# Patient Record
Sex: FEMALE | Race: WHITE | NOT HISPANIC OR LATINO | Employment: OTHER | URBAN - NONMETROPOLITAN AREA
[De-identification: names, ages, dates, MRNs, and addresses within clinical notes are randomized per-mention and may not be internally consistent; named-entity substitution may affect disease eponyms.]

---

## 2017-01-06 ENCOUNTER — TRANSCRIBE ORDERS (OUTPATIENT)
Dept: ADMINISTRATIVE | Facility: HOSPITAL | Age: 52
End: 2017-01-06

## 2017-01-06 DIAGNOSIS — M48.07 LUMBOSACRAL STENOSIS: ICD-10-CM

## 2017-01-06 DIAGNOSIS — G56.02 CARPAL TUNNEL SYNDROME ON LEFT: ICD-10-CM

## 2017-01-06 DIAGNOSIS — M47.26 OTHER SPONDYLOSIS WITH RADICULOPATHY, LUMBAR REGION: ICD-10-CM

## 2017-01-06 DIAGNOSIS — G56.01 CARPAL TUNNEL SYNDROME ON RIGHT: ICD-10-CM

## 2017-01-06 DIAGNOSIS — M51.37 DEGENERATION OF LUMBAR OR LUMBOSACRAL INTERVERTEBRAL DISC: ICD-10-CM

## 2017-01-06 DIAGNOSIS — M79.10 MYALGIA: ICD-10-CM

## 2017-01-06 DIAGNOSIS — F01.53 VASCULAR DEMENTIA WITH DEPRESSED MOOD (HCC): Primary | ICD-10-CM

## 2017-01-06 DIAGNOSIS — M48.02 SPINAL STENOSIS IN CERVICAL REGION: ICD-10-CM

## 2017-01-06 DIAGNOSIS — M47.27 LUMBOSACRAL RADICULOPATHY DUE TO DEGENERATIVE JOINT DISEASE OF SPINE: ICD-10-CM

## 2017-01-10 ENCOUNTER — HOSPITAL ENCOUNTER (OUTPATIENT)
Dept: INTERVENTIONAL RADIOLOGY/VASCULAR | Facility: HOSPITAL | Age: 52
Discharge: HOME | End: 2017-01-10
Attending: PHYSICAL MEDICINE & REHABILITATION | Admitting: PHYSICAL MEDICINE & REHABILITATION

## 2017-01-10 VITALS
TEMPERATURE: 97.3 F | DIASTOLIC BLOOD PRESSURE: 86 MMHG | HEART RATE: 74 BPM | BODY MASS INDEX: 22.2 KG/M2 | HEIGHT: 64 IN | SYSTOLIC BLOOD PRESSURE: 134 MMHG | WEIGHT: 130 LBS | OXYGEN SATURATION: 99 % | RESPIRATION RATE: 16 BRPM

## 2017-01-10 DIAGNOSIS — G56.01 CARPAL TUNNEL SYNDROME ON RIGHT: ICD-10-CM

## 2017-01-10 DIAGNOSIS — F01.53 VASCULAR DEMENTIA WITH DEPRESSED MOOD (HCC): ICD-10-CM

## 2017-01-10 DIAGNOSIS — R52 PAIN MANAGEMENT: ICD-10-CM

## 2017-01-10 DIAGNOSIS — M48.07 LUMBOSACRAL STENOSIS: ICD-10-CM

## 2017-01-10 DIAGNOSIS — M47.26 OTHER SPONDYLOSIS WITH RADICULOPATHY, LUMBAR REGION: ICD-10-CM

## 2017-01-10 DIAGNOSIS — M47.27 LUMBOSACRAL RADICULOPATHY DUE TO DEGENERATIVE JOINT DISEASE OF SPINE: ICD-10-CM

## 2017-01-10 DIAGNOSIS — G56.02 CARPAL TUNNEL SYNDROME ON LEFT: ICD-10-CM

## 2017-01-10 DIAGNOSIS — M79.10 MYALGIA: ICD-10-CM

## 2017-01-10 DIAGNOSIS — M51.37 DEGENERATION OF LUMBAR OR LUMBOSACRAL INTERVERTEBRAL DISC: ICD-10-CM

## 2017-01-10 DIAGNOSIS — M48.02 SPINAL STENOSIS IN CERVICAL REGION: ICD-10-CM

## 2017-01-10 PROCEDURE — 77003 FLUOROGUIDE FOR SPINE INJECT: CPT

## 2017-01-10 PROCEDURE — 25010000002 METHYLPREDNISOLONE PER 80 MG: Performed by: PHYSICAL MEDICINE & REHABILITATION

## 2017-01-10 RX ORDER — LIDOCAINE HYDROCHLORIDE 10 MG/ML
INJECTION, SOLUTION EPIDURAL; INFILTRATION; INTRACAUDAL; PERINEURAL
Status: COMPLETED | OUTPATIENT
Start: 2017-01-10 | End: 2017-01-10

## 2017-01-10 RX ORDER — BUPIVACAINE HYDROCHLORIDE 5 MG/ML
INJECTION, SOLUTION EPIDURAL; INTRACAUDAL
Status: COMPLETED | OUTPATIENT
Start: 2017-01-10 | End: 2017-01-10

## 2017-01-10 RX ORDER — METHYLPREDNISOLONE ACETATE 80 MG/ML
INJECTION, SUSPENSION INTRA-ARTICULAR; INTRALESIONAL; INTRAMUSCULAR; SOFT TISSUE
Status: COMPLETED | OUTPATIENT
Start: 2017-01-10 | End: 2017-01-10

## 2017-01-10 RX ADMIN — LIDOCAINE HYDROCHLORIDE 15 ML: 10 INJECTION, SOLUTION EPIDURAL; INFILTRATION; INTRACAUDAL; PERINEURAL at 09:34

## 2017-01-10 RX ADMIN — LIDOCAINE HYDROCHLORIDE 4.5 ML: 10 INJECTION, SOLUTION EPIDURAL; INFILTRATION; INTRACAUDAL; PERINEURAL at 09:38

## 2017-01-10 RX ADMIN — BUPIVACAINE HYDROCHLORIDE 4.5 ML: 5 INJECTION, SOLUTION EPIDURAL; INTRACAUDAL; PERINEURAL at 09:37

## 2017-01-10 RX ADMIN — METHYLPREDNISOLONE ACETATE 80 MG: 80 INJECTION, SUSPENSION INTRA-ARTICULAR; INTRALESIONAL; INTRAMUSCULAR; SOFT TISSUE at 09:38

## 2017-01-10 NOTE — NURSING NOTE
Alert and oriented x3, band aide dry and intact, discharge instructions explained to patient and understanding verbalized,

## 2017-03-07 ENCOUNTER — HOSPITAL ENCOUNTER (OUTPATIENT)
Dept: GENERAL RADIOLOGY | Age: 52
Discharge: HOME | End: 2017-03-07
Payer: COMMERCIAL

## 2017-03-07 ENCOUNTER — HOSPITAL ENCOUNTER (OUTPATIENT)
Age: 52
Setting detail: OUTPATIENT SURGERY
Discharge: HOME | End: 2017-03-07
Attending: PHYSICAL MEDICINE & REHABILITATION | Admitting: PHYSICAL MEDICINE & REHABILITATION

## 2017-03-07 VITALS
DIASTOLIC BLOOD PRESSURE: 78 MMHG | HEART RATE: 59 BPM | RESPIRATION RATE: 16 BRPM | BODY MASS INDEX: 22.2 KG/M2 | SYSTOLIC BLOOD PRESSURE: 135 MMHG | TEMPERATURE: 98.2 F | WEIGHT: 130 LBS | OXYGEN SATURATION: 95 % | HEIGHT: 64 IN

## 2017-03-07 DIAGNOSIS — M54.5 LOW BACK PAIN, UNSPECIFIED BACK PAIN LATERALITY, UNSPECIFIED CHRONICITY, WITH SCIATICA PRESENCE UNSPECIFIED: ICD-10-CM

## 2017-03-07 PROCEDURE — G8907 PT DOC NO EVENTS ON DISCHARG: HCPCS

## 2017-03-07 PROCEDURE — 3209999900 FLUORO FOR SURGICAL PROCEDURES

## 2017-03-07 PROCEDURE — G8918 PT W/O PREOP ORDER IV AB PRO: HCPCS

## 2017-03-07 PROCEDURE — 62322 NJX INTERLAMINAR LMBR/SAC: CPT

## 2017-03-07 RX ORDER — METHYLPREDNISOLONE ACETATE 80 MG/ML
INJECTION, SUSPENSION INTRA-ARTICULAR; INTRALESIONAL; INTRAMUSCULAR; SOFT TISSUE PRN
Status: DISCONTINUED | OUTPATIENT
Start: 2017-03-07 | End: 2017-03-07 | Stop reason: HOSPADM

## 2017-03-07 RX ORDER — LIDOCAINE HYDROCHLORIDE 10 MG/ML
INJECTION, SOLUTION INFILTRATION; PERINEURAL PRN
Status: DISCONTINUED | OUTPATIENT
Start: 2017-03-07 | End: 2017-03-07 | Stop reason: HOSPADM

## 2017-03-07 RX ORDER — 0.9 % SODIUM CHLORIDE 0.9 %
VIAL (ML) INJECTION PRN
Status: DISCONTINUED | OUTPATIENT
Start: 2017-03-07 | End: 2017-03-07 | Stop reason: HOSPADM

## 2017-03-21 ENCOUNTER — HOSPITAL ENCOUNTER (OUTPATIENT)
Dept: GENERAL RADIOLOGY | Age: 52
Discharge: HOME | End: 2017-03-21
Payer: COMMERCIAL

## 2017-03-21 ENCOUNTER — HOSPITAL ENCOUNTER (OUTPATIENT)
Age: 52
Setting detail: OUTPATIENT SURGERY
Discharge: HOME | End: 2017-03-21
Attending: PHYSICAL MEDICINE & REHABILITATION | Admitting: PHYSICAL MEDICINE & REHABILITATION

## 2017-03-21 VITALS
DIASTOLIC BLOOD PRESSURE: 77 MMHG | RESPIRATION RATE: 18 BRPM | HEART RATE: 70 BPM | OXYGEN SATURATION: 99 % | HEIGHT: 64 IN | WEIGHT: 130 LBS | SYSTOLIC BLOOD PRESSURE: 125 MMHG | BODY MASS INDEX: 22.2 KG/M2

## 2017-03-21 DIAGNOSIS — R52 PAIN: ICD-10-CM

## 2017-03-21 PROCEDURE — G8918 PT W/O PREOP ORDER IV AB PRO: HCPCS

## 2017-03-21 PROCEDURE — G8907 PT DOC NO EVENTS ON DISCHARG: HCPCS

## 2017-03-21 PROCEDURE — 64494 INJ PARAVERT F JNT L/S 2 LEV: CPT

## 2017-03-21 PROCEDURE — 64493 INJ PARAVERT F JNT L/S 1 LEV: CPT

## 2017-03-21 PROCEDURE — 3209999900 FLUORO FOR SURGICAL PROCEDURES

## 2017-03-21 RX ORDER — LIDOCAINE HYDROCHLORIDE 10 MG/ML
INJECTION, SOLUTION INFILTRATION; PERINEURAL PRN
Status: DISCONTINUED | OUTPATIENT
Start: 2017-03-21 | End: 2017-03-21 | Stop reason: HOSPADM

## 2017-05-09 ENCOUNTER — HOSPITAL ENCOUNTER (OUTPATIENT)
Age: 52
Setting detail: OUTPATIENT SURGERY
Discharge: HOME | End: 2017-05-09
Attending: PHYSICAL MEDICINE & REHABILITATION | Admitting: PHYSICAL MEDICINE & REHABILITATION

## 2017-05-09 ENCOUNTER — HOSPITAL ENCOUNTER (OUTPATIENT)
Dept: GENERAL RADIOLOGY | Age: 52
Discharge: HOME | End: 2017-05-09
Payer: COMMERCIAL

## 2017-05-09 VITALS
HEIGHT: 64 IN | HEART RATE: 67 BPM | DIASTOLIC BLOOD PRESSURE: 78 MMHG | SYSTOLIC BLOOD PRESSURE: 131 MMHG | RESPIRATION RATE: 16 BRPM | WEIGHT: 130 LBS | OXYGEN SATURATION: 99 % | BODY MASS INDEX: 22.2 KG/M2

## 2017-05-09 DIAGNOSIS — R52 PAIN: ICD-10-CM

## 2017-05-09 PROCEDURE — G8918 PT W/O PREOP ORDER IV AB PRO: HCPCS

## 2017-05-09 PROCEDURE — 62322 NJX INTERLAMINAR LMBR/SAC: CPT

## 2017-05-09 PROCEDURE — G8907 PT DOC NO EVENTS ON DISCHARG: HCPCS

## 2017-05-09 PROCEDURE — 3209999900 FLUORO FOR SURGICAL PROCEDURES

## 2017-05-09 PROCEDURE — 62323 NJX INTERLAMINAR LMBR/SAC: CPT

## 2017-05-09 RX ORDER — 0.9 % SODIUM CHLORIDE 0.9 %
VIAL (ML) INJECTION PRN
Status: DISCONTINUED | OUTPATIENT
Start: 2017-05-09 | End: 2017-05-09 | Stop reason: HOSPADM

## 2017-05-09 RX ORDER — LIDOCAINE HYDROCHLORIDE 10 MG/ML
INJECTION, SOLUTION INFILTRATION; PERINEURAL PRN
Status: DISCONTINUED | OUTPATIENT
Start: 2017-05-09 | End: 2017-05-09 | Stop reason: HOSPADM

## 2017-05-09 RX ORDER — METHYLPREDNISOLONE ACETATE 80 MG/ML
INJECTION, SUSPENSION INTRA-ARTICULAR; INTRALESIONAL; INTRAMUSCULAR; SOFT TISSUE PRN
Status: DISCONTINUED | OUTPATIENT
Start: 2017-05-09 | End: 2017-05-09 | Stop reason: HOSPADM

## 2017-05-23 ENCOUNTER — HOSPITAL ENCOUNTER (OUTPATIENT)
Dept: GENERAL RADIOLOGY | Age: 52
Discharge: HOME | End: 2017-05-23
Payer: COMMERCIAL

## 2017-05-23 ENCOUNTER — HOSPITAL ENCOUNTER (OUTPATIENT)
Age: 52
Setting detail: OUTPATIENT SURGERY
Discharge: HOME | End: 2017-05-23
Attending: PHYSICAL MEDICINE & REHABILITATION | Admitting: PHYSICAL MEDICINE & REHABILITATION

## 2017-05-23 VITALS
DIASTOLIC BLOOD PRESSURE: 81 MMHG | SYSTOLIC BLOOD PRESSURE: 122 MMHG | HEART RATE: 69 BPM | RESPIRATION RATE: 18 BRPM | OXYGEN SATURATION: 98 %

## 2017-05-23 DIAGNOSIS — M54.5 LOW BACK PAIN, UNSPECIFIED BACK PAIN LATERALITY, UNSPECIFIED CHRONICITY, WITH SCIATICA PRESENCE UNSPECIFIED: ICD-10-CM

## 2017-05-23 PROCEDURE — 3209999900 FLUORO FOR SURGICAL PROCEDURES

## 2017-05-23 PROCEDURE — 64493 INJ PARAVERT F JNT L/S 1 LEV: CPT

## 2017-05-23 PROCEDURE — G8907 PT DOC NO EVENTS ON DISCHARG: HCPCS

## 2017-05-23 PROCEDURE — G8918 PT W/O PREOP ORDER IV AB PRO: HCPCS

## 2017-05-23 RX ORDER — LIDOCAINE HYDROCHLORIDE 10 MG/ML
INJECTION, SOLUTION INFILTRATION; PERINEURAL PRN
Status: DISCONTINUED | OUTPATIENT
Start: 2017-05-23 | End: 2017-05-23 | Stop reason: HOSPADM

## 2017-08-01 ENCOUNTER — HOSPITAL ENCOUNTER (OUTPATIENT)
Dept: GENERAL RADIOLOGY | Age: 52
Discharge: HOME | End: 2017-08-01
Payer: COMMERCIAL

## 2017-08-01 ENCOUNTER — HOSPITAL ENCOUNTER (OUTPATIENT)
Age: 52
Setting detail: OUTPATIENT SURGERY
Discharge: HOME | End: 2017-08-01
Attending: PHYSICAL MEDICINE & REHABILITATION | Admitting: PHYSICAL MEDICINE & REHABILITATION

## 2017-08-01 VITALS
DIASTOLIC BLOOD PRESSURE: 74 MMHG | RESPIRATION RATE: 18 BRPM | OXYGEN SATURATION: 98 % | HEART RATE: 65 BPM | SYSTOLIC BLOOD PRESSURE: 121 MMHG

## 2017-08-01 DIAGNOSIS — R52 PAIN: ICD-10-CM

## 2017-08-01 PROCEDURE — G8918 PT W/O PREOP ORDER IV AB PRO: HCPCS

## 2017-08-01 PROCEDURE — 3209999900 FLUORO FOR SURGICAL PROCEDURES

## 2017-08-01 PROCEDURE — G8907 PT DOC NO EVENTS ON DISCHARG: HCPCS

## 2017-08-01 PROCEDURE — 62323 NJX INTERLAMINAR LMBR/SAC: CPT

## 2017-08-01 PROCEDURE — 62322 NJX INTERLAMINAR LMBR/SAC: CPT

## 2017-08-01 RX ORDER — LIDOCAINE HYDROCHLORIDE 10 MG/ML
INJECTION, SOLUTION INFILTRATION; PERINEURAL PRN
Status: DISCONTINUED | OUTPATIENT
Start: 2017-08-01 | End: 2017-08-01 | Stop reason: HOSPADM

## 2017-08-01 RX ORDER — 0.9 % SODIUM CHLORIDE 0.9 %
VIAL (ML) INJECTION PRN
Status: DISCONTINUED | OUTPATIENT
Start: 2017-08-01 | End: 2017-08-01 | Stop reason: HOSPADM

## 2017-08-14 ENCOUNTER — HOSPITAL ENCOUNTER (OUTPATIENT)
Dept: GENERAL RADIOLOGY | Facility: HOSPITAL | Age: 52
Discharge: HOME | End: 2017-08-14
Attending: INTERNAL MEDICINE | Admitting: INTERNAL MEDICINE

## 2017-08-14 ENCOUNTER — TRANSCRIBE ORDERS (OUTPATIENT)
Dept: ADMINISTRATIVE | Facility: HOSPITAL | Age: 52
End: 2017-08-14

## 2017-08-14 ENCOUNTER — APPOINTMENT (OUTPATIENT)
Dept: LAB | Facility: HOSPITAL | Age: 52
End: 2017-08-14
Attending: INTERNAL MEDICINE

## 2017-08-14 DIAGNOSIS — C50.912: Primary | ICD-10-CM

## 2017-08-14 DIAGNOSIS — C50.911: Primary | ICD-10-CM

## 2017-08-14 LAB
ALBUMIN SERPL-MCNC: 3.8 G/DL (ref 3.5–5)
ALBUMIN/GLOB SERPL: 1.6 G/DL (ref 1.1–2.5)
ALP SERPL-CCNC: 42 U/L (ref 24–120)
ALT SERPL W P-5'-P-CCNC: 46 U/L (ref 0–54)
ANION GAP SERPL CALCULATED.3IONS-SCNC: 8 MMOL/L (ref 4–13)
AST SERPL-CCNC: 29 U/L (ref 7–45)
BASOPHILS # BLD AUTO: 0.02 10*3/MM3 (ref 0–0.2)
BASOPHILS NFR BLD AUTO: 0.4 % (ref 0–2)
BILIRUB SERPL-MCNC: 0.5 MG/DL (ref 0.1–1)
BUN BLD-MCNC: 6 MG/DL (ref 5–21)
BUN/CREAT SERPL: 6.8 (ref 7–25)
CALCIUM SPEC-SCNC: 9.2 MG/DL (ref 8.4–10.4)
CEA SERPL-MCNC: 4.22 NG/ML (ref 0–5)
CHLORIDE SERPL-SCNC: 109 MMOL/L (ref 98–110)
CO2 SERPL-SCNC: 25 MMOL/L (ref 24–31)
CREAT BLD-MCNC: 0.88 MG/DL (ref 0.5–1.4)
DEPRECATED RDW RBC AUTO: 45.4 FL (ref 40–54)
EOSINOPHIL # BLD AUTO: 0.08 10*3/MM3 (ref 0–0.7)
EOSINOPHIL NFR BLD AUTO: 1.6 % (ref 0–4)
ERYTHROCYTE [DISTWIDTH] IN BLOOD BY AUTOMATED COUNT: 13.1 % (ref 12–15)
FERRITIN SERPL-MCNC: 56.8 NG/ML (ref 11.1–264)
FOLATE SERPL-MCNC: >20 NG/ML
GFR SERPL CREATININE-BSD FRML MDRD: 67 ML/MIN/1.73
GLOBULIN UR ELPH-MCNC: 2.4 GM/DL
GLUCOSE BLD-MCNC: 81 MG/DL (ref 70–100)
HCT VFR BLD AUTO: 36.8 % (ref 37–47)
HGB BLD-MCNC: 12.2 G/DL (ref 12–16)
IMM GRANULOCYTES # BLD: 0.01 10*3/MM3 (ref 0–0.03)
IMM GRANULOCYTES NFR BLD: 0.2 % (ref 0–5)
IRON 24H UR-MRATE: 52 MCG/DL (ref 42–180)
IRON SATN MFR SERPL: 19 % (ref 20–45)
LYMPHOCYTES # BLD AUTO: 2.33 10*3/MM3 (ref 0.72–4.86)
LYMPHOCYTES NFR BLD AUTO: 45.8 % (ref 15–45)
MCH RBC QN AUTO: 31.3 PG (ref 28–32)
MCHC RBC AUTO-ENTMCNC: 33.2 G/DL (ref 33–36)
MCV RBC AUTO: 94.4 FL (ref 82–98)
MONOCYTES # BLD AUTO: 0.27 10*3/MM3 (ref 0.19–1.3)
MONOCYTES NFR BLD AUTO: 5.3 % (ref 4–12)
NEUTROPHILS # BLD AUTO: 2.38 10*3/MM3 (ref 1.87–8.4)
NEUTROPHILS NFR BLD AUTO: 46.7 % (ref 39–78)
PLATELET # BLD AUTO: 218 10*3/MM3 (ref 130–400)
PMV BLD AUTO: 11.4 FL (ref 6–12)
POTASSIUM BLD-SCNC: 3.3 MMOL/L (ref 3.5–5.3)
PROT SERPL-MCNC: 6.2 G/DL (ref 6.3–8.7)
RBC # BLD AUTO: 3.9 10*6/MM3 (ref 4.2–5.4)
SODIUM BLD-SCNC: 142 MMOL/L (ref 135–145)
TIBC SERPL-MCNC: 272 MCG/DL (ref 225–420)
VIT B12 BLD-MCNC: 812 PG/ML (ref 239–931)
WBC NRBC COR # BLD: 5.09 10*3/MM3 (ref 4.8–10.8)

## 2017-08-14 PROCEDURE — 82746 ASSAY OF FOLIC ACID SERUM: CPT | Performed by: INTERNAL MEDICINE

## 2017-08-14 PROCEDURE — 86300 IMMUNOASSAY TUMOR CA 15-3: CPT | Performed by: INTERNAL MEDICINE

## 2017-08-14 PROCEDURE — 82728 ASSAY OF FERRITIN: CPT | Performed by: INTERNAL MEDICINE

## 2017-08-14 PROCEDURE — 85025 COMPLETE CBC W/AUTO DIFF WBC: CPT | Performed by: INTERNAL MEDICINE

## 2017-08-14 PROCEDURE — 80053 COMPREHEN METABOLIC PANEL: CPT | Performed by: INTERNAL MEDICINE

## 2017-08-14 PROCEDURE — 82607 VITAMIN B-12: CPT | Performed by: INTERNAL MEDICINE

## 2017-08-14 PROCEDURE — 71020 HC CHEST PA AND LATERAL: CPT

## 2017-08-14 PROCEDURE — 83540 ASSAY OF IRON: CPT | Performed by: INTERNAL MEDICINE

## 2017-08-14 PROCEDURE — 82378 CARCINOEMBRYONIC ANTIGEN: CPT | Performed by: INTERNAL MEDICINE

## 2017-08-14 PROCEDURE — 36415 COLL VENOUS BLD VENIPUNCTURE: CPT

## 2017-08-14 PROCEDURE — 83550 IRON BINDING TEST: CPT | Performed by: INTERNAL MEDICINE

## 2017-08-15 ENCOUNTER — HOSPITAL ENCOUNTER (OUTPATIENT)
Dept: GENERAL RADIOLOGY | Age: 52
Discharge: HOME | End: 2017-08-15
Payer: COMMERCIAL

## 2017-08-15 ENCOUNTER — HOSPITAL ENCOUNTER (OUTPATIENT)
Age: 52
Setting detail: OUTPATIENT SURGERY
Discharge: HOME | End: 2017-08-15
Attending: PHYSICAL MEDICINE & REHABILITATION | Admitting: PHYSICAL MEDICINE & REHABILITATION

## 2017-08-15 VITALS
HEART RATE: 63 BPM | DIASTOLIC BLOOD PRESSURE: 79 MMHG | RESPIRATION RATE: 18 BRPM | SYSTOLIC BLOOD PRESSURE: 127 MMHG | OXYGEN SATURATION: 98 %

## 2017-08-15 DIAGNOSIS — R52 PAIN: ICD-10-CM

## 2017-08-15 PROCEDURE — G8907 PT DOC NO EVENTS ON DISCHARG: HCPCS

## 2017-08-15 PROCEDURE — 3209999900 FLUORO FOR SURGICAL PROCEDURES

## 2017-08-15 PROCEDURE — G8918 PT W/O PREOP ORDER IV AB PRO: HCPCS

## 2017-08-15 PROCEDURE — 64494 INJ PARAVERT F JNT L/S 2 LEV: CPT

## 2017-08-15 PROCEDURE — 64493 INJ PARAVERT F JNT L/S 1 LEV: CPT

## 2017-08-15 RX ORDER — LIDOCAINE HYDROCHLORIDE 10 MG/ML
INJECTION, SOLUTION INFILTRATION; PERINEURAL PRN
Status: DISCONTINUED | OUTPATIENT
Start: 2017-08-15 | End: 2017-08-15 | Stop reason: HOSPADM

## 2017-08-16 LAB — CANCER AG27-29 SERPL-ACNC: 15.5 U/ML (ref 0–38.6)

## 2017-10-03 ENCOUNTER — HOSPITAL ENCOUNTER (OUTPATIENT)
Dept: GENERAL RADIOLOGY | Age: 52
Discharge: HOME | End: 2017-10-03
Payer: COMMERCIAL

## 2017-10-03 ENCOUNTER — HOSPITAL ENCOUNTER (OUTPATIENT)
Age: 52
Setting detail: OUTPATIENT SURGERY
Discharge: HOME | End: 2017-10-03
Attending: PHYSICAL MEDICINE & REHABILITATION | Admitting: PHYSICAL MEDICINE & REHABILITATION

## 2017-10-03 VITALS
BODY MASS INDEX: 22.2 KG/M2 | WEIGHT: 130 LBS | HEIGHT: 64 IN | OXYGEN SATURATION: 97 % | SYSTOLIC BLOOD PRESSURE: 125 MMHG | RESPIRATION RATE: 18 BRPM | HEART RATE: 58 BPM | DIASTOLIC BLOOD PRESSURE: 80 MMHG

## 2017-10-03 DIAGNOSIS — R52 PAIN: ICD-10-CM

## 2017-10-03 PROCEDURE — 62323 NJX INTERLAMINAR LMBR/SAC: CPT

## 2017-10-03 PROCEDURE — 3209999900 FLUORO FOR SURGICAL PROCEDURES

## 2017-10-03 PROCEDURE — G8918 PT W/O PREOP ORDER IV AB PRO: HCPCS

## 2017-10-03 PROCEDURE — G8907 PT DOC NO EVENTS ON DISCHARG: HCPCS

## 2017-10-03 RX ORDER — 0.9 % SODIUM CHLORIDE 0.9 %
VIAL (ML) INJECTION PRN
Status: DISCONTINUED | OUTPATIENT
Start: 2017-10-03 | End: 2017-10-03 | Stop reason: HOSPADM

## 2017-10-03 RX ORDER — LIDOCAINE HYDROCHLORIDE 10 MG/ML
INJECTION, SOLUTION EPIDURAL; INFILTRATION; INTRACAUDAL; PERINEURAL PRN
Status: DISCONTINUED | OUTPATIENT
Start: 2017-10-03 | End: 2017-10-03 | Stop reason: HOSPADM

## 2017-10-03 RX ORDER — METHYLPREDNISOLONE ACETATE 80 MG/ML
INJECTION, SUSPENSION INTRA-ARTICULAR; INTRALESIONAL; INTRAMUSCULAR; SOFT TISSUE PRN
Status: DISCONTINUED | OUTPATIENT
Start: 2017-10-03 | End: 2017-10-03 | Stop reason: HOSPADM

## 2017-10-17 ENCOUNTER — HOSPITAL ENCOUNTER (OUTPATIENT)
Dept: GENERAL RADIOLOGY | Age: 52
Discharge: HOME | End: 2017-10-17
Payer: COMMERCIAL

## 2017-10-17 ENCOUNTER — HOSPITAL ENCOUNTER (OUTPATIENT)
Age: 52
Setting detail: OUTPATIENT SURGERY
Discharge: HOME | End: 2017-10-17
Attending: PHYSICAL MEDICINE & REHABILITATION | Admitting: PHYSICAL MEDICINE & REHABILITATION

## 2017-10-17 VITALS
DIASTOLIC BLOOD PRESSURE: 81 MMHG | OXYGEN SATURATION: 95 % | SYSTOLIC BLOOD PRESSURE: 129 MMHG | RESPIRATION RATE: 18 BRPM | HEART RATE: 65 BPM

## 2017-10-17 DIAGNOSIS — R52 PAIN: ICD-10-CM

## 2017-10-17 PROCEDURE — G8907 PT DOC NO EVENTS ON DISCHARG: HCPCS

## 2017-10-17 PROCEDURE — 3209999900 FLUORO FOR SURGICAL PROCEDURES

## 2017-10-17 PROCEDURE — 64494 INJ PARAVERT F JNT L/S 2 LEV: CPT

## 2017-10-17 PROCEDURE — 64493 INJ PARAVERT F JNT L/S 1 LEV: CPT

## 2017-10-17 PROCEDURE — G8918 PT W/O PREOP ORDER IV AB PRO: HCPCS

## 2017-10-17 RX ORDER — LIDOCAINE HYDROCHLORIDE 10 MG/ML
INJECTION, SOLUTION INFILTRATION; PERINEURAL PRN
Status: DISCONTINUED | OUTPATIENT
Start: 2017-10-17 | End: 2017-10-17 | Stop reason: HOSPADM

## 2017-12-04 ENCOUNTER — HOSPITAL ENCOUNTER (OUTPATIENT)
Dept: WOMENS IMAGING | Age: 52
Discharge: HOME | End: 2017-12-04
Payer: COMMERCIAL

## 2017-12-04 DIAGNOSIS — Z78.0 POST-MENOPAUSAL: ICD-10-CM

## 2017-12-04 PROCEDURE — 77080 DXA BONE DENSITY AXIAL: CPT

## 2017-12-12 ENCOUNTER — HOSPITAL ENCOUNTER (OUTPATIENT)
Age: 52
Setting detail: OUTPATIENT SURGERY
Discharge: HOME | End: 2017-12-12
Attending: PHYSICAL MEDICINE & REHABILITATION | Admitting: PHYSICAL MEDICINE & REHABILITATION

## 2017-12-12 ENCOUNTER — HOSPITAL ENCOUNTER (OUTPATIENT)
Dept: GENERAL RADIOLOGY | Age: 52
Discharge: HOME | End: 2017-12-12
Payer: COMMERCIAL

## 2017-12-12 VITALS
SYSTOLIC BLOOD PRESSURE: 138 MMHG | HEIGHT: 64 IN | RESPIRATION RATE: 18 BRPM | WEIGHT: 130 LBS | BODY MASS INDEX: 22.2 KG/M2 | HEART RATE: 66 BPM | DIASTOLIC BLOOD PRESSURE: 81 MMHG | OXYGEN SATURATION: 99 %

## 2017-12-12 DIAGNOSIS — R52 PAIN: ICD-10-CM

## 2017-12-12 PROCEDURE — G8907 PT DOC NO EVENTS ON DISCHARG: HCPCS

## 2017-12-12 PROCEDURE — 62323 NJX INTERLAMINAR LMBR/SAC: CPT

## 2017-12-12 PROCEDURE — 3209999900 FLUORO FOR SURGICAL PROCEDURES

## 2017-12-12 PROCEDURE — G8918 PT W/O PREOP ORDER IV AB PRO: HCPCS

## 2017-12-12 RX ORDER — 0.9 % SODIUM CHLORIDE 0.9 %
VIAL (ML) INJECTION PRN
Status: DISCONTINUED | OUTPATIENT
Start: 2017-12-12 | End: 2017-12-12 | Stop reason: HOSPADM

## 2017-12-12 RX ORDER — METHYLPREDNISOLONE ACETATE 80 MG/ML
INJECTION, SUSPENSION INTRA-ARTICULAR; INTRALESIONAL; INTRAMUSCULAR; SOFT TISSUE PRN
Status: DISCONTINUED | OUTPATIENT
Start: 2017-12-12 | End: 2017-12-12 | Stop reason: HOSPADM

## 2017-12-12 RX ORDER — LIDOCAINE HYDROCHLORIDE 10 MG/ML
INJECTION, SOLUTION INFILTRATION; PERINEURAL PRN
Status: DISCONTINUED | OUTPATIENT
Start: 2017-12-12 | End: 2017-12-12 | Stop reason: HOSPADM

## 2017-12-26 ENCOUNTER — HOSPITAL ENCOUNTER (OUTPATIENT)
Dept: GENERAL RADIOLOGY | Age: 52
Discharge: HOME | End: 2017-12-26
Payer: COMMERCIAL

## 2017-12-26 ENCOUNTER — HOSPITAL ENCOUNTER (OUTPATIENT)
Age: 52
Setting detail: OUTPATIENT SURGERY
Discharge: HOME | End: 2017-12-26
Attending: PHYSICAL MEDICINE & REHABILITATION | Admitting: PHYSICAL MEDICINE & REHABILITATION

## 2017-12-26 VITALS
DIASTOLIC BLOOD PRESSURE: 85 MMHG | SYSTOLIC BLOOD PRESSURE: 142 MMHG | WEIGHT: 130 LBS | TEMPERATURE: 97 F | HEIGHT: 64 IN | RESPIRATION RATE: 18 BRPM | BODY MASS INDEX: 22.2 KG/M2 | OXYGEN SATURATION: 96 % | HEART RATE: 69 BPM

## 2017-12-26 DIAGNOSIS — M54.5 LOW BACK PAIN, UNSPECIFIED BACK PAIN LATERALITY, UNSPECIFIED CHRONICITY, WITH SCIATICA PRESENCE UNSPECIFIED: ICD-10-CM

## 2017-12-26 PROCEDURE — G8918 PT W/O PREOP ORDER IV AB PRO: HCPCS

## 2017-12-26 PROCEDURE — G8907 PT DOC NO EVENTS ON DISCHARG: HCPCS

## 2017-12-26 PROCEDURE — 3209999900 FLUORO FOR SURGICAL PROCEDURES

## 2017-12-26 PROCEDURE — 64494 INJ PARAVERT F JNT L/S 2 LEV: CPT

## 2017-12-26 PROCEDURE — 64493 INJ PARAVERT F JNT L/S 1 LEV: CPT

## 2017-12-26 RX ORDER — LIDOCAINE HYDROCHLORIDE 10 MG/ML
INJECTION, SOLUTION INFILTRATION; PERINEURAL PRN
Status: DISCONTINUED | OUTPATIENT
Start: 2017-12-26 | End: 2017-12-26 | Stop reason: HOSPADM

## 2018-02-20 ENCOUNTER — HOSPITAL ENCOUNTER (OUTPATIENT)
Age: 53
Setting detail: OUTPATIENT SURGERY
Discharge: HOME | End: 2018-02-20
Attending: PHYSICAL MEDICINE & REHABILITATION | Admitting: PHYSICAL MEDICINE & REHABILITATION

## 2018-02-20 ENCOUNTER — HOSPITAL ENCOUNTER (OUTPATIENT)
Dept: GENERAL RADIOLOGY | Age: 53
Discharge: HOME | End: 2018-02-20
Payer: COMMERCIAL

## 2018-02-20 VITALS
SYSTOLIC BLOOD PRESSURE: 137 MMHG | HEART RATE: 70 BPM | BODY MASS INDEX: 22.2 KG/M2 | RESPIRATION RATE: 16 BRPM | HEIGHT: 64 IN | OXYGEN SATURATION: 100 % | DIASTOLIC BLOOD PRESSURE: 86 MMHG | WEIGHT: 130 LBS

## 2018-02-20 DIAGNOSIS — G54.6 PHANTOM PAIN (HCC): ICD-10-CM

## 2018-02-20 PROCEDURE — 62323 NJX INTERLAMINAR LMBR/SAC: CPT

## 2018-02-20 PROCEDURE — G8918 PT W/O PREOP ORDER IV AB PRO: HCPCS

## 2018-02-20 PROCEDURE — G8907 PT DOC NO EVENTS ON DISCHARG: HCPCS

## 2018-02-20 PROCEDURE — 3209999900 FLUORO FOR SURGICAL PROCEDURES

## 2018-02-20 RX ORDER — LIDOCAINE HYDROCHLORIDE 10 MG/ML
INJECTION, SOLUTION INFILTRATION; PERINEURAL PRN
Status: DISCONTINUED | OUTPATIENT
Start: 2018-02-20 | End: 2018-02-20 | Stop reason: HOSPADM

## 2018-02-20 RX ORDER — METHYLPREDNISOLONE ACETATE 80 MG/ML
INJECTION, SUSPENSION INTRA-ARTICULAR; INTRALESIONAL; INTRAMUSCULAR; SOFT TISSUE PRN
Status: DISCONTINUED | OUTPATIENT
Start: 2018-02-20 | End: 2018-02-20 | Stop reason: HOSPADM

## 2018-02-20 RX ORDER — 0.9 % SODIUM CHLORIDE 0.9 %
VIAL (ML) INJECTION PRN
Status: DISCONTINUED | OUTPATIENT
Start: 2018-02-20 | End: 2018-02-20 | Stop reason: HOSPADM

## 2018-03-27 ENCOUNTER — HOSPITAL ENCOUNTER (OUTPATIENT)
Dept: GENERAL RADIOLOGY | Age: 53
Discharge: HOME | End: 2018-03-27
Payer: COMMERCIAL

## 2018-03-27 ENCOUNTER — HOSPITAL ENCOUNTER (OUTPATIENT)
Age: 53
Setting detail: OUTPATIENT SURGERY
Discharge: HOME | End: 2018-03-27
Attending: PHYSICAL MEDICINE & REHABILITATION | Admitting: PHYSICAL MEDICINE & REHABILITATION

## 2018-03-27 VITALS
DIASTOLIC BLOOD PRESSURE: 90 MMHG | RESPIRATION RATE: 20 BRPM | BODY MASS INDEX: 22.2 KG/M2 | OXYGEN SATURATION: 100 % | WEIGHT: 130 LBS | SYSTOLIC BLOOD PRESSURE: 136 MMHG | HEIGHT: 64 IN | HEART RATE: 74 BPM

## 2018-03-27 DIAGNOSIS — M54.5 LOW BACK PAIN, UNSPECIFIED BACK PAIN LATERALITY, UNSPECIFIED CHRONICITY, WITH SCIATICA PRESENCE UNSPECIFIED: ICD-10-CM

## 2018-03-27 PROCEDURE — G8918 PT W/O PREOP ORDER IV AB PRO: HCPCS

## 2018-03-27 PROCEDURE — G8907 PT DOC NO EVENTS ON DISCHARG: HCPCS

## 2018-03-27 PROCEDURE — 64494 INJ PARAVERT F JNT L/S 2 LEV: CPT

## 2018-03-27 PROCEDURE — 3209999900 FLUORO FOR SURGICAL PROCEDURES

## 2018-03-27 PROCEDURE — 64493 INJ PARAVERT F JNT L/S 1 LEV: CPT

## 2018-03-27 RX ORDER — LIDOCAINE HYDROCHLORIDE 10 MG/ML
INJECTION, SOLUTION INFILTRATION; PERINEURAL PRN
Status: DISCONTINUED | OUTPATIENT
Start: 2018-03-27 | End: 2018-03-27 | Stop reason: HOSPADM

## 2018-05-01 ENCOUNTER — HOSPITAL ENCOUNTER (OUTPATIENT)
Dept: GENERAL RADIOLOGY | Age: 53
Discharge: HOME | End: 2018-05-01
Payer: COMMERCIAL

## 2018-05-01 ENCOUNTER — HOSPITAL ENCOUNTER (OUTPATIENT)
Age: 53
Setting detail: OUTPATIENT SURGERY
Discharge: HOME | End: 2018-05-01
Attending: PHYSICAL MEDICINE & REHABILITATION | Admitting: PHYSICAL MEDICINE & REHABILITATION

## 2018-05-01 VITALS
TEMPERATURE: 98.3 F | HEART RATE: 59 BPM | OXYGEN SATURATION: 100 % | DIASTOLIC BLOOD PRESSURE: 79 MMHG | SYSTOLIC BLOOD PRESSURE: 130 MMHG | RESPIRATION RATE: 16 BRPM

## 2018-05-01 DIAGNOSIS — M51.36 DDD (DEGENERATIVE DISC DISEASE), LUMBAR: ICD-10-CM

## 2018-05-01 PROCEDURE — G8918 PT W/O PREOP ORDER IV AB PRO: HCPCS

## 2018-05-01 PROCEDURE — 62323 NJX INTERLAMINAR LMBR/SAC: CPT

## 2018-05-01 PROCEDURE — G8907 PT DOC NO EVENTS ON DISCHARG: HCPCS

## 2018-05-01 PROCEDURE — 3209999900 FLUORO FOR SURGICAL PROCEDURES

## 2018-05-01 RX ORDER — METHYLPREDNISOLONE ACETATE 80 MG/ML
INJECTION, SUSPENSION INTRA-ARTICULAR; INTRALESIONAL; INTRAMUSCULAR; SOFT TISSUE PRN
Status: DISCONTINUED | OUTPATIENT
Start: 2018-05-01 | End: 2018-05-01 | Stop reason: HOSPADM

## 2018-05-01 RX ORDER — LIDOCAINE HYDROCHLORIDE 10 MG/ML
INJECTION, SOLUTION EPIDURAL; INFILTRATION; INTRACAUDAL; PERINEURAL PRN
Status: DISCONTINUED | OUTPATIENT
Start: 2018-05-01 | End: 2018-05-01 | Stop reason: HOSPADM

## 2018-05-01 RX ORDER — 0.9 % SODIUM CHLORIDE 0.9 %
VIAL (ML) INJECTION PRN
Status: DISCONTINUED | OUTPATIENT
Start: 2018-05-01 | End: 2018-05-01 | Stop reason: HOSPADM

## 2018-05-01 ASSESSMENT — PAIN SCALES - GENERAL: PAINLEVEL_OUTOF10: 0

## 2018-05-29 ENCOUNTER — HOSPITAL ENCOUNTER (OUTPATIENT)
Age: 53
Setting detail: OUTPATIENT SURGERY
Discharge: HOME | End: 2018-05-29
Attending: PHYSICAL MEDICINE & REHABILITATION | Admitting: PHYSICAL MEDICINE & REHABILITATION

## 2018-05-29 VITALS
HEART RATE: 60 BPM | DIASTOLIC BLOOD PRESSURE: 83 MMHG | OXYGEN SATURATION: 100 % | SYSTOLIC BLOOD PRESSURE: 129 MMHG | RESPIRATION RATE: 16 BRPM

## 2018-05-29 PROCEDURE — G8907 PT DOC NO EVENTS ON DISCHARG: HCPCS

## 2018-05-29 PROCEDURE — 64494 INJ PARAVERT F JNT L/S 2 LEV: CPT

## 2018-05-29 PROCEDURE — G8918 PT W/O PREOP ORDER IV AB PRO: HCPCS

## 2018-05-29 PROCEDURE — 64493 INJ PARAVERT F JNT L/S 1 LEV: CPT

## 2018-05-29 RX ORDER — LIDOCAINE HYDROCHLORIDE 10 MG/ML
INJECTION, SOLUTION INFILTRATION; PERINEURAL PRN
Status: DISCONTINUED | OUTPATIENT
Start: 2018-05-29 | End: 2018-05-29 | Stop reason: HOSPADM

## 2018-07-03 ENCOUNTER — HOSPITAL ENCOUNTER (OUTPATIENT)
Dept: GENERAL RADIOLOGY | Age: 53
Discharge: HOME | End: 2018-07-03
Payer: COMMERCIAL

## 2018-07-03 ENCOUNTER — HOSPITAL ENCOUNTER (OUTPATIENT)
Age: 53
Setting detail: OUTPATIENT SURGERY
Discharge: HOME | End: 2018-07-03
Attending: PHYSICAL MEDICINE & REHABILITATION | Admitting: PHYSICAL MEDICINE & REHABILITATION

## 2018-07-03 VITALS
HEART RATE: 61 BPM | RESPIRATION RATE: 20 BRPM | SYSTOLIC BLOOD PRESSURE: 134 MMHG | OXYGEN SATURATION: 99 % | DIASTOLIC BLOOD PRESSURE: 81 MMHG

## 2018-07-03 DIAGNOSIS — G54.6 PHANTOM PAIN (HCC): ICD-10-CM

## 2018-07-03 PROCEDURE — G8918 PT W/O PREOP ORDER IV AB PRO: HCPCS

## 2018-07-03 PROCEDURE — 3209999900 FLUORO FOR SURGICAL PROCEDURES

## 2018-07-03 PROCEDURE — 64494 INJ PARAVERT F JNT L/S 2 LEV: CPT

## 2018-07-03 PROCEDURE — G8907 PT DOC NO EVENTS ON DISCHARG: HCPCS

## 2018-07-03 PROCEDURE — 64493 INJ PARAVERT F JNT L/S 1 LEV: CPT

## 2018-07-03 RX ORDER — LIDOCAINE HYDROCHLORIDE 10 MG/ML
INJECTION, SOLUTION INFILTRATION; PERINEURAL PRN
Status: DISCONTINUED | OUTPATIENT
Start: 2018-07-03 | End: 2018-07-03 | Stop reason: HOSPADM

## 2018-07-17 ENCOUNTER — HOSPITAL ENCOUNTER (OUTPATIENT)
Dept: GENERAL RADIOLOGY | Age: 53
Discharge: HOME | End: 2018-07-17
Payer: COMMERCIAL

## 2018-07-17 ENCOUNTER — HOSPITAL ENCOUNTER (OUTPATIENT)
Age: 53
Setting detail: OUTPATIENT SURGERY
Discharge: HOME | End: 2018-07-17
Attending: PHYSICAL MEDICINE & REHABILITATION | Admitting: PHYSICAL MEDICINE & REHABILITATION

## 2018-07-17 VITALS
OXYGEN SATURATION: 100 % | RESPIRATION RATE: 16 BRPM | DIASTOLIC BLOOD PRESSURE: 82 MMHG | SYSTOLIC BLOOD PRESSURE: 124 MMHG | HEART RATE: 66 BPM

## 2018-07-17 DIAGNOSIS — G54.6 PHANTOM PAIN (HCC): ICD-10-CM

## 2018-07-17 PROCEDURE — G8918 PT W/O PREOP ORDER IV AB PRO: HCPCS

## 2018-07-17 PROCEDURE — G8907 PT DOC NO EVENTS ON DISCHARG: HCPCS

## 2018-07-17 PROCEDURE — 62323 NJX INTERLAMINAR LMBR/SAC: CPT

## 2018-07-17 PROCEDURE — 3209999900 FLUORO FOR SURGICAL PROCEDURES

## 2018-07-17 RX ORDER — 0.9 % SODIUM CHLORIDE 0.9 %
VIAL (ML) INJECTION PRN
Status: DISCONTINUED | OUTPATIENT
Start: 2018-07-17 | End: 2018-07-17 | Stop reason: HOSPADM

## 2018-07-17 RX ORDER — LIDOCAINE HYDROCHLORIDE 10 MG/ML
INJECTION, SOLUTION EPIDURAL; INFILTRATION; INTRACAUDAL; PERINEURAL PRN
Status: DISCONTINUED | OUTPATIENT
Start: 2018-07-17 | End: 2018-07-17 | Stop reason: HOSPADM

## 2018-07-17 RX ORDER — METHYLPREDNISOLONE ACETATE 80 MG/ML
INJECTION, SUSPENSION INTRA-ARTICULAR; INTRALESIONAL; INTRAMUSCULAR; SOFT TISSUE PRN
Status: DISCONTINUED | OUTPATIENT
Start: 2018-07-17 | End: 2018-07-17 | Stop reason: HOSPADM

## 2018-08-13 ENCOUNTER — HOSPITAL ENCOUNTER (OUTPATIENT)
Dept: GENERAL RADIOLOGY | Facility: HOSPITAL | Age: 53
Discharge: HOME | End: 2018-08-13
Attending: INTERNAL MEDICINE | Admitting: INTERNAL MEDICINE

## 2018-08-13 ENCOUNTER — APPOINTMENT (OUTPATIENT)
Dept: LAB | Facility: HOSPITAL | Age: 53
End: 2018-08-13
Attending: INTERNAL MEDICINE

## 2018-08-13 ENCOUNTER — TRANSCRIBE ORDERS (OUTPATIENT)
Dept: ADMINISTRATIVE | Facility: HOSPITAL | Age: 53
End: 2018-08-13

## 2018-08-13 DIAGNOSIS — C50.911 INFILTRATING DUCTAL CARCINOMA OF BREAST, RIGHT (HCC): Primary | ICD-10-CM

## 2018-08-13 LAB
ALBUMIN SERPL-MCNC: 3.8 G/DL (ref 3.5–5)
ALBUMIN/GLOB SERPL: 1.5 G/DL (ref 1.1–2.5)
ALP SERPL-CCNC: 33 U/L (ref 24–120)
ALT SERPL W P-5'-P-CCNC: 20 U/L (ref 0–54)
ANION GAP SERPL CALCULATED.3IONS-SCNC: 10 MMOL/L (ref 4–13)
AST SERPL-CCNC: 26 U/L (ref 7–45)
BASOPHILS # BLD AUTO: 0.03 10*3/MM3 (ref 0–0.2)
BASOPHILS NFR BLD AUTO: 0.6 % (ref 0–2)
BILIRUB SERPL-MCNC: 0.3 MG/DL (ref 0.1–1)
BUN BLD-MCNC: 11 MG/DL (ref 5–21)
BUN/CREAT SERPL: 10.2 (ref 7–25)
CALCIUM SPEC-SCNC: 9.8 MG/DL (ref 8.4–10.4)
CEA SERPL-MCNC: 2.37 NG/ML (ref 0–5)
CHLORIDE SERPL-SCNC: 106 MMOL/L (ref 98–110)
CO2 SERPL-SCNC: 30 MMOL/L (ref 24–31)
CREAT BLD-MCNC: 1.08 MG/DL (ref 0.5–1.4)
DEPRECATED RDW RBC AUTO: 45.3 FL (ref 40–54)
EOSINOPHIL # BLD AUTO: 0.07 10*3/MM3 (ref 0–0.7)
EOSINOPHIL NFR BLD AUTO: 1.5 % (ref 0–4)
ERYTHROCYTE [DISTWIDTH] IN BLOOD BY AUTOMATED COUNT: 13 % (ref 12–15)
FERRITIN SERPL-MCNC: 61.6 NG/ML (ref 11.1–264)
FOLATE SERPL-MCNC: >20 NG/ML
GFR SERPL CREATININE-BSD FRML MDRD: 53 ML/MIN/1.73
GLOBULIN UR ELPH-MCNC: 2.5 GM/DL
GLUCOSE BLD-MCNC: 105 MG/DL (ref 70–100)
HCT VFR BLD AUTO: 36 % (ref 37–47)
HGB BLD-MCNC: 11.8 G/DL (ref 12–16)
IMM GRANULOCYTES # BLD: 0.01 10*3/MM3 (ref 0–0.03)
IMM GRANULOCYTES NFR BLD: 0.2 % (ref 0–5)
IRON 24H UR-MRATE: 61 MCG/DL (ref 42–180)
IRON SATN MFR SERPL: 23 % (ref 20–45)
LYMPHOCYTES # BLD AUTO: 1.96 10*3/MM3 (ref 0.72–4.86)
LYMPHOCYTES NFR BLD AUTO: 41.7 % (ref 15–45)
MCH RBC QN AUTO: 31.1 PG (ref 28–32)
MCHC RBC AUTO-ENTMCNC: 32.8 G/DL (ref 33–36)
MCV RBC AUTO: 94.7 FL (ref 82–98)
MONOCYTES # BLD AUTO: 0.38 10*3/MM3 (ref 0.19–1.3)
MONOCYTES NFR BLD AUTO: 8.1 % (ref 4–12)
NEUTROPHILS # BLD AUTO: 2.25 10*3/MM3 (ref 1.87–8.4)
NEUTROPHILS NFR BLD AUTO: 47.9 % (ref 39–78)
NRBC BLD MANUAL-RTO: 0 /100 WBC (ref 0–0)
PLATELET # BLD AUTO: 210 10*3/MM3 (ref 130–400)
PMV BLD AUTO: 11 FL (ref 6–12)
POTASSIUM BLD-SCNC: 3.7 MMOL/L (ref 3.5–5.3)
PROT SERPL-MCNC: 6.3 G/DL (ref 6.3–8.7)
RBC # BLD AUTO: 3.8 10*6/MM3 (ref 4.2–5.4)
SODIUM BLD-SCNC: 146 MMOL/L (ref 135–145)
TIBC SERPL-MCNC: 264 MCG/DL (ref 225–420)
VIT B12 BLD-MCNC: 896 PG/ML (ref 239–931)
WBC NRBC COR # BLD: 4.7 10*3/MM3 (ref 4.8–10.8)

## 2018-08-13 PROCEDURE — 71046 X-RAY EXAM CHEST 2 VIEWS: CPT

## 2018-08-13 PROCEDURE — 82746 ASSAY OF FOLIC ACID SERUM: CPT | Performed by: INTERNAL MEDICINE

## 2018-08-13 PROCEDURE — 80053 COMPREHEN METABOLIC PANEL: CPT | Performed by: INTERNAL MEDICINE

## 2018-08-13 PROCEDURE — 82378 CARCINOEMBRYONIC ANTIGEN: CPT | Performed by: INTERNAL MEDICINE

## 2018-08-13 PROCEDURE — 83540 ASSAY OF IRON: CPT | Performed by: INTERNAL MEDICINE

## 2018-08-13 PROCEDURE — 83550 IRON BINDING TEST: CPT | Performed by: INTERNAL MEDICINE

## 2018-08-13 PROCEDURE — 82728 ASSAY OF FERRITIN: CPT | Performed by: INTERNAL MEDICINE

## 2018-08-13 PROCEDURE — 36415 COLL VENOUS BLD VENIPUNCTURE: CPT

## 2018-08-13 PROCEDURE — 82607 VITAMIN B-12: CPT | Performed by: INTERNAL MEDICINE

## 2018-08-13 PROCEDURE — 86300 IMMUNOASSAY TUMOR CA 15-3: CPT | Performed by: INTERNAL MEDICINE

## 2018-08-13 PROCEDURE — 85025 COMPLETE CBC W/AUTO DIFF WBC: CPT | Performed by: INTERNAL MEDICINE

## 2018-08-14 LAB — CANCER AG27-29 SERPL-ACNC: 12.7 U/ML (ref 0–38.6)

## 2018-08-28 ENCOUNTER — HOSPITAL ENCOUNTER (OUTPATIENT)
Dept: GENERAL RADIOLOGY | Age: 53
Discharge: HOME | End: 2018-08-28
Payer: COMMERCIAL

## 2018-08-28 ENCOUNTER — HOSPITAL ENCOUNTER (OUTPATIENT)
Age: 53
Setting detail: OUTPATIENT SURGERY
Discharge: HOME | End: 2018-08-28
Attending: PHYSICAL MEDICINE & REHABILITATION | Admitting: PHYSICAL MEDICINE & REHABILITATION

## 2018-08-28 VITALS
OXYGEN SATURATION: 99 % | SYSTOLIC BLOOD PRESSURE: 125 MMHG | HEART RATE: 65 BPM | DIASTOLIC BLOOD PRESSURE: 79 MMHG | RESPIRATION RATE: 18 BRPM

## 2018-08-28 DIAGNOSIS — M54.5 LOW BACK PAIN, UNSPECIFIED BACK PAIN LATERALITY, UNSPECIFIED CHRONICITY, WITH SCIATICA PRESENCE UNSPECIFIED: ICD-10-CM

## 2018-08-28 PROCEDURE — 3209999900 FLUORO FOR SURGICAL PROCEDURES

## 2018-08-28 PROCEDURE — 62323 NJX INTERLAMINAR LMBR/SAC: CPT

## 2018-08-28 PROCEDURE — G8907 PT DOC NO EVENTS ON DISCHARG: HCPCS

## 2018-08-28 PROCEDURE — G8918 PT W/O PREOP ORDER IV AB PRO: HCPCS

## 2018-08-28 RX ORDER — 0.9 % SODIUM CHLORIDE 0.9 %
VIAL (ML) INJECTION PRN
Status: DISCONTINUED | OUTPATIENT
Start: 2018-08-28 | End: 2018-08-28 | Stop reason: HOSPADM

## 2018-08-28 RX ORDER — LIDOCAINE HYDROCHLORIDE 10 MG/ML
INJECTION, SOLUTION EPIDURAL; INFILTRATION; INTRACAUDAL; PERINEURAL PRN
Status: DISCONTINUED | OUTPATIENT
Start: 2018-08-28 | End: 2018-08-28 | Stop reason: HOSPADM

## 2018-08-28 RX ORDER — METHYLPREDNISOLONE ACETATE 80 MG/ML
INJECTION, SUSPENSION INTRA-ARTICULAR; INTRALESIONAL; INTRAMUSCULAR; SOFT TISSUE PRN
Status: DISCONTINUED | OUTPATIENT
Start: 2018-08-28 | End: 2018-08-28 | Stop reason: HOSPADM

## 2018-09-11 ENCOUNTER — HOSPITAL ENCOUNTER (OUTPATIENT)
Age: 53
Setting detail: OUTPATIENT SURGERY
Discharge: HOME | End: 2018-09-11
Attending: PHYSICAL MEDICINE & REHABILITATION | Admitting: PHYSICAL MEDICINE & REHABILITATION

## 2018-09-11 ENCOUNTER — HOSPITAL ENCOUNTER (OUTPATIENT)
Dept: GENERAL RADIOLOGY | Age: 53
Discharge: HOME | End: 2018-09-11
Payer: COMMERCIAL

## 2018-09-11 VITALS
SYSTOLIC BLOOD PRESSURE: 123 MMHG | HEART RATE: 58 BPM | OXYGEN SATURATION: 100 % | RESPIRATION RATE: 16 BRPM | DIASTOLIC BLOOD PRESSURE: 74 MMHG

## 2018-09-11 DIAGNOSIS — L90.5 SCAR PAINFUL: ICD-10-CM

## 2018-09-11 DIAGNOSIS — R52 SCAR PAINFUL: ICD-10-CM

## 2018-09-11 PROCEDURE — 64494 INJ PARAVERT F JNT L/S 2 LEV: CPT

## 2018-09-11 PROCEDURE — 64493 INJ PARAVERT F JNT L/S 1 LEV: CPT

## 2018-09-11 PROCEDURE — 3209999900 FLUORO FOR SURGICAL PROCEDURES

## 2018-09-11 PROCEDURE — G8907 PT DOC NO EVENTS ON DISCHARG: HCPCS

## 2018-09-11 PROCEDURE — G8918 PT W/O PREOP ORDER IV AB PRO: HCPCS

## 2018-09-11 RX ORDER — LIDOCAINE HYDROCHLORIDE 10 MG/ML
INJECTION, SOLUTION INFILTRATION; PERINEURAL PRN
Status: DISCONTINUED | OUTPATIENT
Start: 2018-09-11 | End: 2018-09-11 | Stop reason: HOSPADM

## 2018-11-06 ENCOUNTER — HOSPITAL ENCOUNTER (OUTPATIENT)
Age: 53
Setting detail: OUTPATIENT SURGERY
Discharge: HOME | End: 2018-11-06
Attending: PHYSICAL MEDICINE & REHABILITATION | Admitting: PHYSICAL MEDICINE & REHABILITATION

## 2018-11-06 VITALS
OXYGEN SATURATION: 100 % | SYSTOLIC BLOOD PRESSURE: 134 MMHG | DIASTOLIC BLOOD PRESSURE: 78 MMHG | HEART RATE: 52 BPM | RESPIRATION RATE: 16 BRPM

## 2018-11-06 PROCEDURE — G8907 PT DOC NO EVENTS ON DISCHARG: HCPCS

## 2018-11-06 PROCEDURE — 62323 NJX INTERLAMINAR LMBR/SAC: CPT

## 2018-11-06 PROCEDURE — G8918 PT W/O PREOP ORDER IV AB PRO: HCPCS

## 2018-11-06 RX ORDER — LIDOCAINE HYDROCHLORIDE 10 MG/ML
INJECTION, SOLUTION EPIDURAL; INFILTRATION; INTRACAUDAL; PERINEURAL PRN
Status: DISCONTINUED | OUTPATIENT
Start: 2018-11-06 | End: 2018-11-06 | Stop reason: HOSPADM

## 2018-11-06 RX ORDER — 0.9 % SODIUM CHLORIDE 0.9 %
VIAL (ML) INJECTION PRN
Status: DISCONTINUED | OUTPATIENT
Start: 2018-11-06 | End: 2018-11-06 | Stop reason: HOSPADM

## 2018-11-06 RX ORDER — METHYLPREDNISOLONE ACETATE 80 MG/ML
INJECTION, SUSPENSION INTRA-ARTICULAR; INTRALESIONAL; INTRAMUSCULAR; SOFT TISSUE PRN
Status: DISCONTINUED | OUTPATIENT
Start: 2018-11-06 | End: 2018-11-06 | Stop reason: HOSPADM

## 2018-11-20 ENCOUNTER — HOSPITAL ENCOUNTER (OUTPATIENT)
Dept: GENERAL RADIOLOGY | Age: 53
Discharge: HOME | End: 2018-11-20
Payer: COMMERCIAL

## 2018-11-20 ENCOUNTER — HOSPITAL ENCOUNTER (OUTPATIENT)
Age: 53
Setting detail: OUTPATIENT SURGERY
Discharge: HOME | End: 2018-11-20
Attending: PHYSICAL MEDICINE & REHABILITATION | Admitting: PHYSICAL MEDICINE & REHABILITATION

## 2018-11-20 VITALS
OXYGEN SATURATION: 99 % | RESPIRATION RATE: 16 BRPM | DIASTOLIC BLOOD PRESSURE: 81 MMHG | SYSTOLIC BLOOD PRESSURE: 136 MMHG | HEART RATE: 56 BPM

## 2018-11-20 DIAGNOSIS — L90.5 SCAR PAINFUL: ICD-10-CM

## 2018-11-20 DIAGNOSIS — R52 SCAR PAINFUL: ICD-10-CM

## 2018-11-20 PROCEDURE — 64494 INJ PARAVERT F JNT L/S 2 LEV: CPT

## 2018-11-20 PROCEDURE — G8918 PT W/O PREOP ORDER IV AB PRO: HCPCS

## 2018-11-20 PROCEDURE — 3209999900 FLUORO FOR SURGICAL PROCEDURES

## 2018-11-20 PROCEDURE — 64493 INJ PARAVERT F JNT L/S 1 LEV: CPT

## 2018-11-20 PROCEDURE — G8907 PT DOC NO EVENTS ON DISCHARG: HCPCS

## 2018-11-20 RX ORDER — LIDOCAINE HYDROCHLORIDE 10 MG/ML
INJECTION, SOLUTION INFILTRATION; PERINEURAL PRN
Status: DISCONTINUED | OUTPATIENT
Start: 2018-11-20 | End: 2018-11-20 | Stop reason: HOSPADM

## 2019-01-29 ENCOUNTER — HOSPITAL ENCOUNTER (OUTPATIENT)
Age: 54
Setting detail: OUTPATIENT SURGERY
Discharge: HOME | End: 2019-01-29
Attending: PHYSICAL MEDICINE & REHABILITATION | Admitting: PHYSICAL MEDICINE & REHABILITATION

## 2019-01-29 ENCOUNTER — HOSPITAL ENCOUNTER (OUTPATIENT)
Dept: GENERAL RADIOLOGY | Age: 54
Discharge: HOME | End: 2019-01-29
Payer: COMMERCIAL

## 2019-01-29 VITALS
OXYGEN SATURATION: 100 % | HEART RATE: 66 BPM | RESPIRATION RATE: 16 BRPM | DIASTOLIC BLOOD PRESSURE: 82 MMHG | SYSTOLIC BLOOD PRESSURE: 150 MMHG

## 2019-01-29 DIAGNOSIS — L90.5 SCAR PAINFUL: ICD-10-CM

## 2019-01-29 DIAGNOSIS — R52 SCAR PAINFUL: ICD-10-CM

## 2019-01-29 PROCEDURE — 64494 INJ PARAVERT F JNT L/S 2 LEV: CPT

## 2019-01-29 PROCEDURE — 3209999900 FLUORO FOR SURGICAL PROCEDURES

## 2019-01-29 PROCEDURE — G8918 PT W/O PREOP ORDER IV AB PRO: HCPCS

## 2019-01-29 PROCEDURE — G8907 PT DOC NO EVENTS ON DISCHARG: HCPCS

## 2019-01-29 PROCEDURE — 64493 INJ PARAVERT F JNT L/S 1 LEV: CPT

## 2019-01-29 RX ORDER — LIDOCAINE HYDROCHLORIDE 10 MG/ML
INJECTION, SOLUTION INFILTRATION; PERINEURAL PRN
Status: DISCONTINUED | OUTPATIENT
Start: 2019-01-29 | End: 2019-01-29 | Stop reason: HOSPADM

## 2019-02-12 ENCOUNTER — HOSPITAL ENCOUNTER (OUTPATIENT)
Age: 54
Setting detail: OUTPATIENT SURGERY
Discharge: HOME | End: 2019-02-12
Attending: PHYSICAL MEDICINE & REHABILITATION | Admitting: PHYSICAL MEDICINE & REHABILITATION

## 2019-02-12 ENCOUNTER — HOSPITAL ENCOUNTER (OUTPATIENT)
Dept: GENERAL RADIOLOGY | Age: 54
Discharge: HOME | End: 2019-02-12
Payer: COMMERCIAL

## 2019-02-12 VITALS
OXYGEN SATURATION: 99 % | SYSTOLIC BLOOD PRESSURE: 138 MMHG | DIASTOLIC BLOOD PRESSURE: 81 MMHG | RESPIRATION RATE: 18 BRPM | HEART RATE: 62 BPM

## 2019-02-12 DIAGNOSIS — M54.5 LOW BACK PAIN, UNSPECIFIED BACK PAIN LATERALITY, UNSPECIFIED CHRONICITY, WITH SCIATICA PRESENCE UNSPECIFIED: ICD-10-CM

## 2019-02-12 PROCEDURE — G8907 PT DOC NO EVENTS ON DISCHARG: HCPCS

## 2019-02-12 PROCEDURE — G8918 PT W/O PREOP ORDER IV AB PRO: HCPCS

## 2019-02-12 PROCEDURE — 62323 NJX INTERLAMINAR LMBR/SAC: CPT

## 2019-02-12 PROCEDURE — 3209999900 FLUORO FOR SURGICAL PROCEDURES

## 2019-02-12 RX ORDER — METHYLPREDNISOLONE ACETATE 80 MG/ML
INJECTION, SUSPENSION INTRA-ARTICULAR; INTRALESIONAL; INTRAMUSCULAR; SOFT TISSUE PRN
Status: DISCONTINUED | OUTPATIENT
Start: 2019-02-12 | End: 2019-02-12 | Stop reason: ALTCHOICE

## 2019-02-12 RX ORDER — 0.9 % SODIUM CHLORIDE 0.9 %
VIAL (ML) INJECTION PRN
Status: DISCONTINUED | OUTPATIENT
Start: 2019-02-12 | End: 2019-02-12 | Stop reason: ALTCHOICE

## 2019-02-12 RX ORDER — LIDOCAINE HYDROCHLORIDE 10 MG/ML
INJECTION, SOLUTION EPIDURAL; INFILTRATION; INTRACAUDAL; PERINEURAL PRN
Status: DISCONTINUED | OUTPATIENT
Start: 2019-02-12 | End: 2019-02-12 | Stop reason: ALTCHOICE

## 2019-04-02 ENCOUNTER — HOSPITAL ENCOUNTER (OUTPATIENT)
Dept: GENERAL RADIOLOGY | Age: 54
Discharge: HOME | End: 2019-04-02
Payer: COMMERCIAL

## 2019-04-02 ENCOUNTER — HOSPITAL ENCOUNTER (OUTPATIENT)
Age: 54
Setting detail: OUTPATIENT SURGERY
Discharge: HOME | End: 2019-04-02
Attending: PHYSICAL MEDICINE & REHABILITATION | Admitting: PHYSICAL MEDICINE & REHABILITATION

## 2019-04-02 VITALS
OXYGEN SATURATION: 99 % | SYSTOLIC BLOOD PRESSURE: 136 MMHG | HEART RATE: 58 BPM | DIASTOLIC BLOOD PRESSURE: 79 MMHG | RESPIRATION RATE: 18 BRPM

## 2019-04-02 DIAGNOSIS — R52 PAIN: ICD-10-CM

## 2019-04-02 PROCEDURE — G8907 PT DOC NO EVENTS ON DISCHARG: HCPCS

## 2019-04-02 PROCEDURE — 62323 NJX INTERLAMINAR LMBR/SAC: CPT

## 2019-04-02 PROCEDURE — G8918 PT W/O PREOP ORDER IV AB PRO: HCPCS

## 2019-04-02 PROCEDURE — 3209999900 FLUORO FOR SURGICAL PROCEDURES

## 2019-04-02 RX ORDER — 0.9 % SODIUM CHLORIDE 0.9 %
VIAL (ML) INJECTION PRN
Status: DISCONTINUED | OUTPATIENT
Start: 2019-04-02 | End: 2019-04-02 | Stop reason: ALTCHOICE

## 2019-04-02 RX ORDER — LIDOCAINE HYDROCHLORIDE 10 MG/ML
INJECTION, SOLUTION INFILTRATION; PERINEURAL PRN
Status: DISCONTINUED | OUTPATIENT
Start: 2019-04-02 | End: 2019-04-02 | Stop reason: ALTCHOICE

## 2019-04-02 RX ORDER — METHYLPREDNISOLONE ACETATE 80 MG/ML
INJECTION, SUSPENSION INTRA-ARTICULAR; INTRALESIONAL; INTRAMUSCULAR; SOFT TISSUE PRN
Status: DISCONTINUED | OUTPATIENT
Start: 2019-04-02 | End: 2019-04-02 | Stop reason: ALTCHOICE

## 2019-04-16 ENCOUNTER — HOSPITAL ENCOUNTER (OUTPATIENT)
Age: 54
Setting detail: OUTPATIENT SURGERY
Discharge: HOME | End: 2019-04-16
Attending: PHYSICAL MEDICINE & REHABILITATION | Admitting: PHYSICAL MEDICINE & REHABILITATION

## 2019-04-16 ENCOUNTER — HOSPITAL ENCOUNTER (OUTPATIENT)
Dept: GENERAL RADIOLOGY | Age: 54
Discharge: HOME | End: 2019-04-16
Payer: COMMERCIAL

## 2019-04-16 VITALS
SYSTOLIC BLOOD PRESSURE: 138 MMHG | RESPIRATION RATE: 16 BRPM | HEART RATE: 73 BPM | OXYGEN SATURATION: 100 % | DIASTOLIC BLOOD PRESSURE: 77 MMHG

## 2019-04-16 DIAGNOSIS — R52 PAIN: ICD-10-CM

## 2019-04-16 PROCEDURE — 64493 INJ PARAVERT F JNT L/S 1 LEV: CPT

## 2019-04-16 PROCEDURE — G8918 PT W/O PREOP ORDER IV AB PRO: HCPCS

## 2019-04-16 PROCEDURE — 3209999900 FLUORO FOR SURGICAL PROCEDURES

## 2019-04-16 PROCEDURE — G8907 PT DOC NO EVENTS ON DISCHARG: HCPCS

## 2019-04-16 PROCEDURE — 64494 INJ PARAVERT F JNT L/S 2 LEV: CPT

## 2019-04-16 RX ORDER — LIDOCAINE HYDROCHLORIDE 10 MG/ML
INJECTION, SOLUTION INFILTRATION; PERINEURAL PRN
Status: DISCONTINUED | OUTPATIENT
Start: 2019-04-16 | End: 2019-04-16 | Stop reason: ALTCHOICE

## 2019-06-11 ENCOUNTER — HOSPITAL ENCOUNTER (OUTPATIENT)
Dept: GENERAL RADIOLOGY | Age: 54
Discharge: HOME | End: 2019-06-11
Payer: COMMERCIAL

## 2019-06-11 ENCOUNTER — HOSPITAL ENCOUNTER (OUTPATIENT)
Age: 54
Setting detail: OUTPATIENT SURGERY
Discharge: HOME | End: 2019-06-11
Attending: PHYSICAL MEDICINE & REHABILITATION | Admitting: PHYSICAL MEDICINE & REHABILITATION

## 2019-06-11 VITALS
DIASTOLIC BLOOD PRESSURE: 80 MMHG | RESPIRATION RATE: 18 BRPM | OXYGEN SATURATION: 100 % | HEART RATE: 55 BPM | SYSTOLIC BLOOD PRESSURE: 136 MMHG

## 2019-06-11 DIAGNOSIS — R52 PAIN: ICD-10-CM

## 2019-06-11 PROCEDURE — G8907 PT DOC NO EVENTS ON DISCHARG: HCPCS

## 2019-06-11 PROCEDURE — G8918 PT W/O PREOP ORDER IV AB PRO: HCPCS

## 2019-06-11 PROCEDURE — 3209999900 FLUORO FOR SURGICAL PROCEDURES

## 2019-06-11 PROCEDURE — 62323 NJX INTERLAMINAR LMBR/SAC: CPT

## 2019-06-11 RX ORDER — METHYLPREDNISOLONE ACETATE 80 MG/ML
INJECTION, SUSPENSION INTRA-ARTICULAR; INTRALESIONAL; INTRAMUSCULAR; SOFT TISSUE PRN
Status: DISCONTINUED | OUTPATIENT
Start: 2019-06-11 | End: 2019-06-11 | Stop reason: ALTCHOICE

## 2019-06-11 RX ORDER — 0.9 % SODIUM CHLORIDE 0.9 %
VIAL (ML) INJECTION PRN
Status: DISCONTINUED | OUTPATIENT
Start: 2019-06-11 | End: 2019-06-11 | Stop reason: ALTCHOICE

## 2019-06-11 RX ORDER — LIDOCAINE HYDROCHLORIDE 10 MG/ML
INJECTION, SOLUTION INFILTRATION; PERINEURAL PRN
Status: DISCONTINUED | OUTPATIENT
Start: 2019-06-11 | End: 2019-06-11 | Stop reason: ALTCHOICE

## 2019-06-25 ENCOUNTER — HOSPITAL ENCOUNTER (OUTPATIENT)
Dept: GENERAL RADIOLOGY | Age: 54
Discharge: HOME | End: 2019-06-25
Payer: COMMERCIAL

## 2019-06-25 ENCOUNTER — HOSPITAL ENCOUNTER (OUTPATIENT)
Age: 54
Setting detail: OUTPATIENT SURGERY
Discharge: HOME | End: 2019-06-25
Attending: PHYSICAL MEDICINE & REHABILITATION | Admitting: PHYSICAL MEDICINE & REHABILITATION

## 2019-06-25 VITALS
RESPIRATION RATE: 16 BRPM | HEART RATE: 57 BPM | OXYGEN SATURATION: 99 % | BODY MASS INDEX: 22.2 KG/M2 | WEIGHT: 130 LBS | SYSTOLIC BLOOD PRESSURE: 117 MMHG | HEIGHT: 64 IN | DIASTOLIC BLOOD PRESSURE: 71 MMHG

## 2019-06-25 DIAGNOSIS — L90.5 SCAR PAINFUL: ICD-10-CM

## 2019-06-25 DIAGNOSIS — R52 SCAR PAINFUL: ICD-10-CM

## 2019-06-25 PROCEDURE — G8918 PT W/O PREOP ORDER IV AB PRO: HCPCS

## 2019-06-25 PROCEDURE — 3209999900 FLUORO FOR SURGICAL PROCEDURES

## 2019-06-25 PROCEDURE — 64494 INJ PARAVERT F JNT L/S 2 LEV: CPT

## 2019-06-25 PROCEDURE — 64493 INJ PARAVERT F JNT L/S 1 LEV: CPT

## 2019-06-25 PROCEDURE — G8907 PT DOC NO EVENTS ON DISCHARG: HCPCS

## 2019-06-25 RX ORDER — LIDOCAINE HYDROCHLORIDE 10 MG/ML
INJECTION, SOLUTION INFILTRATION; PERINEURAL PRN
Status: DISCONTINUED | OUTPATIENT
Start: 2019-06-25 | End: 2019-06-25 | Stop reason: ALTCHOICE

## 2019-06-25 ASSESSMENT — PAIN SCALES - GENERAL: PAINLEVEL_OUTOF10: 0

## 2019-08-13 ENCOUNTER — HOSPITAL ENCOUNTER (OUTPATIENT)
Dept: GENERAL RADIOLOGY | Age: 54
Discharge: HOME | End: 2019-08-13
Payer: COMMERCIAL

## 2019-08-13 ENCOUNTER — HOSPITAL ENCOUNTER (OUTPATIENT)
Dept: GENERAL RADIOLOGY | Facility: HOSPITAL | Age: 54
Discharge: HOME | End: 2019-08-13
Admitting: INTERNAL MEDICINE

## 2019-08-13 ENCOUNTER — HOSPITAL ENCOUNTER (OUTPATIENT)
Age: 54
Setting detail: OUTPATIENT SURGERY
Discharge: HOME | End: 2019-08-13
Attending: PHYSICAL MEDICINE & REHABILITATION | Admitting: PHYSICAL MEDICINE & REHABILITATION

## 2019-08-13 ENCOUNTER — APPOINTMENT (OUTPATIENT)
Dept: LAB | Facility: HOSPITAL | Age: 54
End: 2019-08-13

## 2019-08-13 ENCOUNTER — TRANSCRIBE ORDERS (OUTPATIENT)
Dept: ADMINISTRATIVE | Facility: HOSPITAL | Age: 54
End: 2019-08-13

## 2019-08-13 VITALS
HEIGHT: 64 IN | WEIGHT: 134 LBS | DIASTOLIC BLOOD PRESSURE: 77 MMHG | OXYGEN SATURATION: 100 % | HEART RATE: 58 BPM | SYSTOLIC BLOOD PRESSURE: 131 MMHG | RESPIRATION RATE: 18 BRPM | BODY MASS INDEX: 22.88 KG/M2

## 2019-08-13 DIAGNOSIS — R52 PAIN: ICD-10-CM

## 2019-08-13 DIAGNOSIS — C50.411 MALIGNANT NEOPLASM OF UPPER-OUTER QUADRANT OF RIGHT FEMALE BREAST, UNSPECIFIED ESTROGEN RECEPTOR STATUS (HCC): Primary | ICD-10-CM

## 2019-08-13 LAB
ALBUMIN SERPL-MCNC: 3.8 G/DL (ref 3.5–5)
ALBUMIN/GLOB SERPL: 1.3 G/DL (ref 1.1–2.5)
ALP SERPL-CCNC: 40 U/L (ref 24–120)
ALT SERPL W P-5'-P-CCNC: 18 U/L (ref 0–54)
ANION GAP SERPL CALCULATED.3IONS-SCNC: 7 MMOL/L (ref 4–13)
AST SERPL-CCNC: 32 U/L (ref 7–45)
BASOPHILS # BLD AUTO: 0.03 10*3/MM3 (ref 0–0.2)
BASOPHILS NFR BLD AUTO: 0.6 % (ref 0–1.5)
BILIRUB SERPL-MCNC: 0.5 MG/DL (ref 0.1–1)
BUN BLD-MCNC: 15 MG/DL (ref 5–21)
BUN/CREAT SERPL: 14.4 (ref 7–25)
CALCIUM SPEC-SCNC: 9.7 MG/DL (ref 8.4–10.4)
CEA SERPL-MCNC: 1.42 NG/ML (ref 0–5)
CHLORIDE SERPL-SCNC: 108 MMOL/L (ref 98–110)
CO2 SERPL-SCNC: 26 MMOL/L (ref 24–31)
CREAT BLD-MCNC: 1.04 MG/DL (ref 0.5–1.4)
DEPRECATED RDW RBC AUTO: 45.7 FL (ref 37–54)
EOSINOPHIL # BLD AUTO: 0.06 10*3/MM3 (ref 0–0.4)
EOSINOPHIL NFR BLD AUTO: 1.3 % (ref 0.3–6.2)
ERYTHROCYTE [DISTWIDTH] IN BLOOD BY AUTOMATED COUNT: 13 % (ref 12.3–15.4)
FERRITIN SERPL-MCNC: 38.1 NG/ML (ref 11.1–264)
FOLATE SERPL-MCNC: >20 NG/ML (ref 4.78–24.2)
GFR SERPL CREATININE-BSD FRML MDRD: 55 ML/MIN/1.73
GLOBULIN UR ELPH-MCNC: 3 GM/DL
GLUCOSE BLD-MCNC: 77 MG/DL (ref 70–100)
HCT VFR BLD AUTO: 36.4 % (ref 34–46.6)
HGB BLD-MCNC: 11.8 G/DL (ref 12–15.9)
IMM GRANULOCYTES # BLD AUTO: 0.01 10*3/MM3 (ref 0–0.05)
IMM GRANULOCYTES NFR BLD AUTO: 0.2 % (ref 0–0.5)
IRON 24H UR-MRATE: 83 MCG/DL (ref 42–180)
IRON SATN MFR SERPL: 31 % (ref 20–45)
LYMPHOCYTES # BLD AUTO: 1.65 10*3/MM3 (ref 0.7–3.1)
LYMPHOCYTES NFR BLD AUTO: 34.5 % (ref 19.6–45.3)
MCH RBC QN AUTO: 30.8 PG (ref 26.6–33)
MCHC RBC AUTO-ENTMCNC: 32.4 G/DL (ref 31.5–35.7)
MCV RBC AUTO: 95 FL (ref 79–97)
MONOCYTES # BLD AUTO: 0.37 10*3/MM3 (ref 0.1–0.9)
MONOCYTES NFR BLD AUTO: 7.7 % (ref 5–12)
NEUTROPHILS # BLD AUTO: 2.66 10*3/MM3 (ref 1.7–7)
NEUTROPHILS NFR BLD AUTO: 55.7 % (ref 42.7–76)
NRBC BLD AUTO-RTO: 0 /100 WBC (ref 0–0.2)
PLATELET # BLD AUTO: 191 10*3/MM3 (ref 140–450)
PMV BLD AUTO: 10.7 FL (ref 6–12)
POTASSIUM BLD-SCNC: 3.7 MMOL/L (ref 3.5–5.3)
PROT SERPL-MCNC: 6.8 G/DL (ref 6.3–8.7)
RBC # BLD AUTO: 3.83 10*6/MM3 (ref 3.77–5.28)
SODIUM BLD-SCNC: 141 MMOL/L (ref 135–145)
TIBC SERPL-MCNC: 271 MCG/DL (ref 225–420)
VIT B12 BLD-MCNC: 862 PG/ML (ref 239–931)
WBC NRBC COR # BLD: 4.78 10*3/MM3 (ref 3.4–10.8)

## 2019-08-13 PROCEDURE — 71046 X-RAY EXAM CHEST 2 VIEWS: CPT

## 2019-08-13 PROCEDURE — 82746 ASSAY OF FOLIC ACID SERUM: CPT | Performed by: INTERNAL MEDICINE

## 2019-08-13 PROCEDURE — G8907 PT DOC NO EVENTS ON DISCHARG: HCPCS

## 2019-08-13 PROCEDURE — 80053 COMPREHEN METABOLIC PANEL: CPT | Performed by: INTERNAL MEDICINE

## 2019-08-13 PROCEDURE — 85025 COMPLETE CBC W/AUTO DIFF WBC: CPT | Performed by: INTERNAL MEDICINE

## 2019-08-13 PROCEDURE — 83540 ASSAY OF IRON: CPT | Performed by: INTERNAL MEDICINE

## 2019-08-13 PROCEDURE — 36415 COLL VENOUS BLD VENIPUNCTURE: CPT | Performed by: INTERNAL MEDICINE

## 2019-08-13 PROCEDURE — 62323 NJX INTERLAMINAR LMBR/SAC: CPT

## 2019-08-13 PROCEDURE — 82607 VITAMIN B-12: CPT | Performed by: INTERNAL MEDICINE

## 2019-08-13 PROCEDURE — 86300 IMMUNOASSAY TUMOR CA 15-3: CPT | Performed by: INTERNAL MEDICINE

## 2019-08-13 PROCEDURE — 83550 IRON BINDING TEST: CPT | Performed by: INTERNAL MEDICINE

## 2019-08-13 PROCEDURE — 3209999900 FLUORO FOR SURGICAL PROCEDURES

## 2019-08-13 PROCEDURE — G8918 PT W/O PREOP ORDER IV AB PRO: HCPCS

## 2019-08-13 PROCEDURE — 82728 ASSAY OF FERRITIN: CPT | Performed by: INTERNAL MEDICINE

## 2019-08-13 PROCEDURE — 82378 CARCINOEMBRYONIC ANTIGEN: CPT | Performed by: INTERNAL MEDICINE

## 2019-08-13 RX ORDER — METHYLPREDNISOLONE ACETATE 80 MG/ML
INJECTION, SUSPENSION INTRA-ARTICULAR; INTRALESIONAL; INTRAMUSCULAR; SOFT TISSUE PRN
Status: DISCONTINUED | OUTPATIENT
Start: 2019-08-13 | End: 2019-08-13 | Stop reason: ALTCHOICE

## 2019-08-13 RX ORDER — LIDOCAINE HYDROCHLORIDE 10 MG/ML
INJECTION, SOLUTION INFILTRATION; PERINEURAL PRN
Status: DISCONTINUED | OUTPATIENT
Start: 2019-08-13 | End: 2019-08-13 | Stop reason: ALTCHOICE

## 2019-08-13 RX ORDER — 0.9 % SODIUM CHLORIDE 0.9 %
VIAL (ML) INJECTION PRN
Status: DISCONTINUED | OUTPATIENT
Start: 2019-08-13 | End: 2019-08-13 | Stop reason: ALTCHOICE

## 2019-08-14 LAB — CANCER AG27-29 SERPL-ACNC: 19.6 U/ML (ref 0–38.6)

## 2019-08-16 PROBLEM — Z17.0 MALIGNANT NEOPLASM OF RIGHT BREAST IN FEMALE, ESTROGEN RECEPTOR POSITIVE (HCC): Status: ACTIVE | Noted: 2019-08-16

## 2019-08-16 PROBLEM — C50.911 MALIGNANT NEOPLASM OF RIGHT BREAST IN FEMALE, ESTROGEN RECEPTOR POSITIVE (HCC): Status: ACTIVE | Noted: 2019-08-16

## 2019-08-16 NOTE — PROGRESS NOTES
[facility]  [address]  [facility] [address] [phone]    Patient Name: Carol Rodriguez  Encounter Date: 08/19/2019  YOB: 1965  Patient Number: [id number]      REASON FOR VISIT: Ms. Carol Rodriguez is a 54-year-old female who returns in follow-up of breast cancer. She is seen nearly 140 months since completion of adjuvant chemotherapy and over 91 months since cessation of adjuvant Femara (completed 60 months of therapy). The patient is here alone. History is obtained from the patient who is considered to be a reliable historian.    Mrs. Rodriguez is doing very well today.  She presents today without any complaints.  She is followed by Dr. Leung for primary medical management.  She is now on Prolia and has had 3 injections so far.  She denies any abdominal pain, nausea and vomiting.  She has had no bowel or bladder habit changes.  She denies any headaches or dizziness.  She states she does have joint pains from time to time that she relates to arthritis.    She had a chest x-ray on 8/13/2019 that showed no acute cardiopulmonary disease. Labs today show a normal CEA of 1.42 and CA-27-29 of 19.6.  Her CMP is normal except for a slightly low GFR of 55.  Folate normal greater than 20.  Her vitamin B12 is normal at 862 with an iron profile that shows an iron saturation at 31% and a ferritin of 38.1.  CBC shows her white count to be 4.78, hemoglobin 11.8 and platelet count of 191,000.      Problem List Items Addressed This Visit        Hematopoietic and Hemostatic    Anemia    Relevant Orders    Iron Profile    Ferritin       Other    Malignant neoplasm of right breast in female, estrogen receptor positive (CMS/HCC) - Primary    Relevant Orders    Vitamin B12 & Folate    CA 27.29 (Serial Monitor)    CEA    CBC & Differential    Comprehensive Metabolic Panel    XR Chest PA & Lateral    Iron Profile    Ferritin         Oncology/Hematology History    DIAGNOSTIC ABNORMALITIES:  Breast carcinoma    1.Unilateral mammogram, right breast, 08/23/2006, [facility].  Approximately a 2 cm spiculated area in the upper-outer quadrant of the right breast at the 10 o'clock to 11 o'clock position.  2.Right breast ultrasound, 08/28/2006, [facility].  A solid nodule at 10 o’clock in the right breast measuring up to 2 cm is felt to correspond to the abnormality seen on the mammograms, including spot views today.  There are also small cysts in this region.  Excisional biopsy should be considered.  3.Bone scan, 09/07/2006, [facility]. Small areas of vaguely increased activity in the upper thoracic area and at the lumbosacral junction, probably arthritic.  No definite metastatic change is identified.  4.CT of the brain, 09/07/2006, [facility]. Chronic sinus disease involving the left half of the sphenoid sinus. Otherwise unremarkable enhanced CT of the brain with no convincing evidence of metastatic disease.  5.CT of the chest, 09/07/2006, [facility] small cortical cyst involving the upper pole of the right kidney. Otherwise unremarkable CT of the chest with no evidence of metastatic disease.  6.CT of the pelvis, 09/07/2006, [facility].  Sclerotic lesion involving the right femoral head which I would favor to represent a bony enostosis. Considering the lack of other metastatic disease, I feel a solitary bony lesion to the right femoral head would be an unusual manifestation but warranting followup nonetheless.  Bone scan may be helpful for further evaluation.  7.Unilateral mammogram of breast, 09/11/2006, [facility]. Lobular areas of density in the left breast consistent with several left breast masses. Ultrasound was performed. There are no spiculated densities. No suspicious microcalcifications are identified. Screening exam of the left breast is recommended in 1 year.  8.Right breast biopsy, 09/29/2006,  [facility]. Infiltrating mammary carcinoma.  Part A: Primary tumor diagnosis is infiltrating ductal carcinoma.  Part B: Right axillary lymph node was benign.  Part C: Right breast tissue showed right modified radical mastectomy with deep margin without obvious histopathologic alteration.  No residual tumor present at previous biopsy site.  Fibrocystic mastopathy including fibrosis, adenosis, duct ectasia, papillary apocrine metaplasia, sclerosing adenosis, multiple cysts, and intraductal microcalcifications.  Seventeen (17) benign lymph nodes.  Part D:  Left breast tissue reveals no tumor present.   Fibrocystic mastopathy including fibrosis, adenosis, duct ectasia, multiple cysts, apocrine metaplasia, ruptured tension cysts, periductal chronic inflammation, intraductal hyperplasia of usual type, and intraductal microcalcifications (multiple).  Part E:  Right breast tissue shows portions of benign loose fibrofatty tissue.  Part F:  Left breast tissue show portions of benign loose fibrofatty tissue and fatty breast tissue.  ER 94% (+), MN 7% (+), HER-2 negative.  9.Labs, 10/10/2006: CMP was unrevealing, CEA of 1.3, and CA27-29 of 14.1.  10.2D echo, 10/17/2006, [facility].  Normal left ventricular chamber size and wall motion.  The left ventricular ejection fraction is felt to be in excess of 60%. No pericardial effusion or intracavitary thrombus noted. No valvular abnormalities are noted.  Spectral and color flow Doppler studies reveal trivial tricuspid regurgitation. The right ventricular systolic pressure is normal and was estimated at 19 mmHg.  11.Labs, 08/22/2016: Hemoglobin 12.4, hematocrit 39.4,  (elevated).  12.Labs, 08/30/2016:  (313 to 615), TSH 1.9, B12 of 1055, folate greater than 20, T4 9.6 (4.9 to 12.3), SPIEP: IgG 527 (791 to 1643), IgM 261, IgA 76.6 (each normal). DAVION: No monoclonal immunoglobulins detected.  13.Comprehensive blood report, 08/30/2016.   MILD ANEMIA; INCREASED  T-CELL LGL'S. COMPREHENSIVE DIAGNOSIS.  Review of peripheral blood smear: Mild anemia with borderline macrocytic features. Normal white blood cell and platelet counts and morphology. Mildly expanded population of reactive appearing T-cell large granular lymphocytes detected on flow cytometric studies.  COMPREHENSIVE COMMENT.  The exact etiology of this patient's mild anemia with borderline macrocytic features is not apparent from review of the specimen. Given the lack of significant atypia and normal white blood cell and platelet values, myelodysplasia appears less likely. Non-neoplastic etiologies to consider include liver disease, thyroid disease, immune mediated disorders, vitamin/nutritional deficiencies and drugs/toxins. Correlation recommended. Flow cytometry study after erythrolysis reveals no circulating myeloid or lymphoid blasts. Myeloid and monocytic lineages are represented by mature granulocytes and monocytes. Basophils and eosinophils are not increased. It must be noted that the diagnosis of a myeloid stem cell disorder, if clinically suspected, is best made using bone marrow specimens. Peripheral blood is not always representative of abnormalities involving the bone marrow. The B cells show no evidence of clonality or antigenic aberrancy. The majority of the T cells show normal expression of the pan T-cell antigens. A mildly expanded population of T-cell LGL is detected accounting for approximately 35.9% of the total T cells and 12.6% of the total white blood cells. The overall immunophenotype of the T-LGL and a normal CD4:CD8 ratio favors reactive process over T-cell neoplasia. The presence of these large granular lymphocytes raises the possibility of an immune-mediated etiology for this patient's anemia.    PREVIOUS INTERVENTIONS:  Breast carcinoma.    1.Adriamycin, 11/02/2006 through 12/14/2006.  Four cycles completed.  2.Taxotere, 12/28/2006 through 02/09/2007.  Four cycles completed.  3.Cytoxan,  02/22/2007 through 04/05/2007.  Four cycles completed.  4.Femara 2.5 mg daily, 04/12/2007 through 04/12/2012.    DIAGNOSTIC ABNORMALITIES:  Anemia.    1.CBC, 05/15/2006. Hemoglobin 7.9 and hematocrit 25.3. Additional lab same date revealed retic count 1.4%, serum iron 7 (50 to 170), iron saturation 2%, TIBC 345 (273 to 456), TSH 1.72, and T4 0.9 (each normal).  2.CBC (post transfusion 2 units of PRBCs), 05/24/2006.  Hemoglobin 9.3, an hematocrit 29.6, an MCV 74.9, platelets 309,000 and a WBC 6.5 with a normal differential.  3.Anemia substrate evaluation, 07/12/2006. Fe 116, TIBC 343, Fe sat 34%, ferritin 14, B12 of 655, folate 23.1, retic 0.8%, and stools for occult blood (OB) 0/3 +.    PREVIOUS INTERVENTIONS:  Anemia.    1.Two units RBCs, 05/22/2006.  2.Transabdominal hysterectomy and bilateral oophorectomy, 06/07/2006. Pathology report is not immediately available to this examiner.  3.Chromagen by mouth 07/12/2006 through present (currently on 1 daily).          Malignant neoplasm of right breast in female, estrogen receptor positive (CMS/HCC)    8/16/2019 Initial Diagnosis     Malignant neoplasm of right breast in female, estrogen receptor positive (CMS/HCC)            PAST MEDICAL HISTORY:  ALLERGIES:  No Known Allergies  CURRENT MEDICATIONS:  Outpatient Encounter Medications as of 8/19/2019   Medication Sig Dispense Refill   • amitriptyline (ELAVIL) 50 MG tablet TK 1 T PO Q NIGHT  2   • amphetamine-dextroamphetamine (ADDERALL) 30 MG tablet   2 times daily     • Calcium 500 MG tablet Take 500 mg by mouth.     • desvenlafaxine (PRISTIQ) 50 MG 24 hr tablet TK 1 T PO DAILY FOR 7 DAYS THEN INCREASE TO 2 TS DAILY  2   • diazePAM (VALIUM) 10 MG tablet TK 1/2 TO 1 T PO BID  2   • HYDROcodone-acetaminophen (NORCO)  MG per tablet Take 1 tablet by mouth.     • nabumetone (RELAFEN) 750 MG tablet Take 750 mg by mouth.     • Omega-3 Fatty Acids (FISH OIL) 1000 MG capsule capsule      • pantoprazole (PROTONIX) 40 MG EC  tablet   daily     • pregabalin (LYRICA) 75 MG capsule Take 75 mg by mouth.     • propranolol (INDERAL) 20 MG tablet Take 40 mg by mouth.     • therapeutic multivitamin-minerals (THERAGRAN-M) tablet Take 1 tablet by mouth.     • tiZANidine (ZANAFLEX) 2 MG tablet Take 4 mg by mouth.     • topiramate (TOPAMAX) 100 MG tablet 100 mg.       No facility-administered encounter medications on file as of 2019.      ADULT ILLNESSES:  Patient Active Problem List   Diagnosis Code   • Malignant neoplasm of right breast in female, estrogen receptor positive (CMS/[facility]) C50.911, Z17.0   • Anemia D64.9     SURGERIES:  Past Surgical History:   Procedure Laterality Date   • BREAST BIOPSY Right 2006    Biopsy of breast, right, benign fibrocystic changes   • CARPAL TUNNEL RELEASE      left 2011; right 2011   •  SECTION      x2. Most recently in    • MASTECTOMY MODIFIED RADICAL / SIMPLE / COMPLETE Right 2006   • SIMPLE MASTECTOMY Left 2006     prophylactic, left with left breast reconstruction   • TOTAL ABDOMINAL HYSTERECTOMY WITH SALPINGO OOPHORECTOMY  2006     HEALTH MAINTENANCE ITEMS:  Health Maintenance Due   Topic Date Due   • ANNUAL PHYSICAL  1968   • TDAP/TD VACCINES (1 - Tdap) 1984   • ZOSTER VACCINE (1 of 2) 2015   • HEPATITIS C SCREENING  2019   • PAP SMEAR  2019   • DXA SCAN  2019   • INFLUENZA VACCINE  2019       <no information>  Last Completed Colonoscopy       Status Date      COLONOSCOPY Done 2015  COLONOSCOPY  Colonoscopy           Patient has more history with this topic...          There is no immunization history on file for this patient.  Last Completed Mammogram     Patient has no health maintenance due at this time            FAMILY HISTORY:  Family History   Problem Relation Age of Onset   • Polymyalgia rheumatica Mother    • Diabetes Father    • Hypertension Father    • Other Father         heart issues   • No Known  "Problems Brother    • No Known Problems Maternal Grandmother    • Heart attack Maternal Grandfather         52 years old    • No Known Problems Paternal Grandmother    • Diabetes Paternal Grandfather    • No Known Problems Brother    • No Known Problems Brother    • No Known Problems Brother      SOCIAL HISTORY:  Social History     Socioeconomic History   • Marital status:      Spouse name: Not on file   • Number of children: Not on file   • Years of education: Not on file   • Highest education level: Not on file   Tobacco Use   • Smoking status: Former Smoker     Packs/day: 1.00     Years: 25.00     Pack years: 25.00     Types: Cigarettes     Last attempt to quit: 2017     Years since quittin.7   • Smokeless tobacco: Never Used   Substance and Sexual Activity   • Alcohol use: No     Frequency: Never   • Drug use: No   • Sexual activity: Defer     CHANGES IN PAST MEDICAL/SOCIAL/FAMILY HISTORY:   Since the last visit, Carol Rodriguez states that she has had no changes since her last visit. She has had a bone density scan and it showed that she has osteoporosis. Is now on Prolia.  PRIMARY COMPLAINT:   \"I feel alright.\"  REVIEW OF SYSTEMS:   Constitutional:   The patient reports a fairly good appetite and energy is variable, \"some days better than others.\" She manages all her ADLs. She has no fevers, chills, or drenching night sweats. Still has intermittent hot flashes, \"yes but just in spurts.\" Her sleep habits are fairly good even off Restoril.  Ear/Nose/Mouth/Throat:   She has chronic rhinitis with no sinus congestion, ear discomfort, or discolored drainage. She has no sore throat, nosebleeds, or sore tongue. She has fewer bouts of headaches.  Ocular:   She still has bouts of blurry vision. She is still followed by Dr. Ellis, ophthalmology. She says that prior eye exams were unrevealing. No eye pain or double vision.  Respiratory:   Still has intermittent chest congestion and cough productive of " "alternating clear phlegm but no exertional dyspnea, no significant shortness of breathing at rest, or unexplained chest wall pain. She has not smoked since 12/31/2017. Is aware that she has chronic obstructive pulmonary disease (COPD) on x-ray.   Cardiovascular:   She has no exertional chest pain, chest pressure, or chest heaviness. She has no claudication. She has no palpitations or symptomatic orthostasis.  Gastrointestinal:   She has no dysphagia, nausea, vomiting, postprandial abdominal pain (Aciphex), bloating, cramping, or change in bowel habits. She has not had dark stools or rectal bleeding. She has no diarrhea, and her bowels have been regular (every other day) without stool softeners. EGD last 08/18/2016 per Dr. Sheppard. \"He took a biopsy but didn't say anything else.\" Colonoscopy, 09/04/2015. Normal. Repeat surveillance 10 years.  Genitourinary:   She has no urinary burning, frequency, dribbling, or discoloration. No vaginal bleeding. She denies any vaginal discharge. Musculoskeletal:   She has intermittent generalized arthralgias and myalgias. She is also on nabumetone (2 times a day), which also helps. Says epidural/spinal injections per Dr. Walters last 06/2018 continue to be effective \"for about 4-5 weeks.\" Still goes every other month. Has been on Savella which has helped her fibromyalgia pains.  Extremities:   She has no trouble with fluid retention or significant leg swelling.  Endocrine:   The hot flashes are tolerable. She has no problems with excess thirst, excessive urination, or unexplained fatigue.  Heme/Lymphatic:   She has no unexplained bleeding, bruising, petechial rash, or swollen glands.  Skin:   She has no itching or rashes.  Neuro:   She has no loss of consciousness, seizures, fainting spells, or dizziness. She has no weakness of her face, arms, or legs. She has no difficulty with speech. She has no tremors.  Psych:   She has chronic depression and anxiety which sometimes interferes " "with her sleep patterns. She still feels that the anxiety is triggered due to the back pains. She continues on Elavil, Pristiq and Valium.      VITAL SIGNS: /82   Pulse 60   Temp 98.3 °F (36.8 °C) (Oral)   Resp 16   Ht 162.6 cm (64\")   Wt 60.8 kg (134 lb)   SpO2 91%   Breastfeeding? No   BMI 23.00 kg/m² Body surface area is 1.65 meters squared.  Pain Score    08/19/19 0835   PainSc: 0-No pain           PHYSICAL EXAMINATION:   General:   She is a pleasant, perennially-anxious, slender, well-nourished, well-appearing, well-kept middle-aged female in no distress. ECOG PS = 0.  Head/Neck:   The patient is anicteric and atraumatic. The oropharynx, mouth, and throat are clear. The trachea is midline. The neck is supple without evidence of jugular venous distention or cervical adenopathy. There is no frontal sinus tenderness.  Eyes:   The extraocular movements are full. There is no scleral jaundice or erythema. There is no conjunctival pallor.  Chest:   The respiratory efforts are normal and unhindered. The breath sounds are slightly diminished but no rhonchi, rales, or asymmetry of breath sounds. The port site is well healed (has been removed).  Breasts:   The right breast is remarkable except for the presence of a saline implant. The left breast is unremarkable except for the presence of a saline implant. Both are well seated. There are no overt chest wall nodules or masses to suggest local recurrence. There is no axillary or supraclavicular adenopathy.  Cardiovascular:   The patient has a regular cardiac rate and rhythm without murmurs, rubs, or gallops. The peripheral pulses are equal and full.  Extremities:   There is no evidence of cyanosis, clubbing, or edema.  Rheumatologic:   There is no overt evidence of rheumatoid deformities of the hands. There is no sausaging of the fingers. There is no sign of active synovitis. The gait is normal.  Cutaneous:   There are no overt rashes, disseminated lesions, " purpura, or petechiae.  Lymphatics:   There is no evidence of adenopathy in the cervical, supraclavicular, or axillary areas.  Neurologic:   The patient is alert, oriented, cooperative, and pleasant. She is appropriately conversant. She ambulated into the exam room without assistance and transferred from chair to exam table unaided. There is no overt dysfunction of the motor, sensory, or cerebellar systems.  Psych:   Mood and affect are appropriate for circumstance. Eye contact is appropriate      LABS    Lab Results - Last 18 Months   Lab Units 08/13/19  0713 08/13/18  0750   HEMOGLOBIN g/dL 11.8* 11.8*   HEMATOCRIT % 36.4 36.0*   MCV fL 95.0 94.7   WBC 10*3/mm3 4.78 4.70*   RDW % 13.0 13.0   MPV fL 10.7 11.0   PLATELETS 10*3/mm3 191 210   IMM GRAN % % 0.2 0.2   NEUTROS ABS 10*3/mm3 2.66 2.25   LYMPHS ABS 10*3/mm3 1.65 1.96   MONOS ABS 10*3/mm3 0.37 0.38   EOS ABS 10*3/mm3 0.06 0.07   BASOS ABS 10*3/mm3 0.03 0.03   IMMATURE GRANS (ABS) 10*3/mm3 0.01 0.01   NRBC /100 WBC 0.0 0.0       Lab Results - Last 18 Months   Lab Units 08/13/19  0713 08/13/18  0750   GLUCOSE mg/dL 77 105*   SODIUM mmol/L 141 146*   POTASSIUM mmol/L 3.7 3.7   CO2 mmol/L 26.0 30.0   CHLORIDE mmol/L 108 106   ANION GAP mmol/L 7.0 10.0   CREATININE mg/dL 1.04 1.08   BUN mg/dL 15 11   BUN / CREAT RATIO  14.4 10.2   CALCIUM mg/dL 9.7 9.8   EGFR IF NONAFRICN AM mL/min/1.73 55* 53*   ALK PHOS U/L 40 33   TOTAL PROTEIN g/dL 6.8 6.3   ALT (SGPT) U/L 18 20   AST (SGOT) U/L 32 26   BILIRUBIN mg/dL 0.5 0.3   ALBUMIN g/dL 3.80 3.80   GLOBULIN gm/dL 3.0 2.5       Lab Results - Last 18 Months   Lab Units 08/13/19  0713 08/13/18  0750   CEA ng/mL 1.42 2.37       Lab Results - Last 18 Months   Lab Units 08/13/19  0713 08/13/18  0750   IRON mcg/dL 83 61   TIBC mcg/dL 271 264   IRON SATURATION % 31 23   FERRITIN ng/mL 38.10 61.60   FOLATE ng/mL >20.00 >20.00       ASSESSMENT:   1. Breast cancer, infiltrating ductal carcinoma:   Stage: I (pT1c, pN0, Mx; G2), ER  94% (+), MS 7% (+), HER-2 negative.   Tumor Lima: 1.9 cm right breast mass, 0/18 lymph nodes.   Complications of Tumor: Breast pain.   Tumor Status: No clinical or biochemical evidence of disease recurrence.   Femara tolerance: Mild arthralgias and hot flashes.  2. Normocytic anemia. Stable, Hgb 11.8; MCV 95 on 08/13/2019 (prior range: Hgb 11.7 - 12.4; MCV 94.4 - 102):  a. Colonoscopy, 09/04/2015. Normal. Repeat surveillance 10 years.   b. Normal B12, normal folate, normal T4, normal TSH, normal SPIEP, normal LDH, and peripheral blood flow comprehensive report (above) showing reactive appearing T-cell large granular lymphocytes.  3. History of Francisco's esophagus, gastritis, and hiatal hernia. Asymptomatic on proton pump inhibitor (PPI):  a. EGD last 08/18/2016: Normal, biopsy showed Squamocolumnar Gastroesophageal junction with mild chronic inflammation.  Negative for intestinal metaplasia.   4. Tobacco addiction/excess. Has not smoked since 12/31/2017.   5. Abnormal CEA. Stable (1.42, 08/13/2019:. Chip 2.37 - Peak 13). Was smoking at time of elevation. No other clinical or radiographic (PET scan, 02/24/2010 - no abnormal uptake) correlate. Continued followup warranted.   6. Chronic obstructive pulmonary disease (COPD). Stable CXR 8/13/19  7. 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 Confirmed by renal ultrasound, 11/10/2009.   8. Iron deficiency resolved with normal iron studies 8/13/19.  Patient had negative repeat colonoscopy and esophagogastroduodenoscopy (EGD) that showed gastritis (see above). Repleted iron on oral iron repletion.   9. Arthralgias, mild. Controlled at present.   10. (Mild) Vasomotor symptoms secondary to iatrogenic menopause. Subjectively not worse.   11. Anxiety stable on medications per PCP  12. Lumbar spinal degenerative disk disease (MRI, 10/09/2008, and again 07/2012). Symptomatically with her current pain regimen. Has been seen by Dr. Goldstein and has received intraspinal  injections by Dr. Walters.  13. Osteopenia with moderate fracture risk (see above). Already on calcium + vitamin D. Boniva intolerant. Now on Prolia every 6 months beginning 02/2018. Dr. Leung following.   14. Chronic kidney disease, stage 3.  GFR, 55 mL/min, 08/13/2019 (prior:  53 mL/min on 08/13/2018)    PLAN:   1.  Re: Apprise of labs from 08/13/2019 with stable anemia, improved macrocytosis, normal K+, low GFR (stable), repleted B12/folate, repleted serum iron and Fe sat (> 20%) but ferritin 38 (prior:61), normal LDH, pending chest x-ray.  2. Applaud her success at smoking cessation (has not smoked since 12/31/2017).    3. Previously discussed the abnormal (but stable) CEA. Other than malignancy (colon, breast, lung), I again noted that increased CEA levels can also be associated with inflammation, cirrhosis, peptic ulcer, ulcerative colitis, rectal polyps, emphysema, and benign breast disease. Needs continued followup.   4. Observe.   5. Continue ongoing management per primary care physician.   6. Continue other currently identified medications.   7. Return to the [place] office in 12 months with pre-office chest x-ray, serum iron, Fe saturation, ferritin, TIBC, B12, folate, CMP, CEA, CA27-29, and CBC with differential.    MEDICAL DECISION MAKING: High Complexity   AMOUNT OF DATA: Moderate  TIME SPENT: Face-to-face time on this encounter, as defined by the American Medical Association in the 2019 Current Procedural Terminology codebook; assessment, record review, lab/imaging review, planning and education - minutes.         cc: Erna Leung DO

## 2019-08-19 ENCOUNTER — OFFICE VISIT (OUTPATIENT)
Dept: ONCOLOGY | Facility: CLINIC | Age: 54
End: 2019-08-19

## 2019-08-19 VITALS
HEART RATE: 60 BPM | RESPIRATION RATE: 16 BRPM | DIASTOLIC BLOOD PRESSURE: 82 MMHG | WEIGHT: 134 LBS | SYSTOLIC BLOOD PRESSURE: 124 MMHG | OXYGEN SATURATION: 91 % | BODY MASS INDEX: 22.88 KG/M2 | TEMPERATURE: 98.3 F | HEIGHT: 64 IN

## 2019-08-19 DIAGNOSIS — Z17.0 MALIGNANT NEOPLASM OF RIGHT BREAST IN FEMALE, ESTROGEN RECEPTOR POSITIVE, UNSPECIFIED SITE OF BREAST (HCC): Primary | ICD-10-CM

## 2019-08-19 DIAGNOSIS — D64.9 ANEMIA, UNSPECIFIED TYPE: ICD-10-CM

## 2019-08-19 DIAGNOSIS — C50.911 MALIGNANT NEOPLASM OF RIGHT BREAST IN FEMALE, ESTROGEN RECEPTOR POSITIVE, UNSPECIFIED SITE OF BREAST (HCC): Primary | ICD-10-CM

## 2019-08-19 PROCEDURE — 99214 OFFICE O/P EST MOD 30 MIN: CPT | Performed by: NURSE PRACTITIONER

## 2019-08-19 RX ORDER — M-VIT,TX,IRON,MINS/CALC/FOLIC 27MG-0.4MG
1 TABLET ORAL DAILY
COMMUNITY

## 2019-08-19 RX ORDER — CHLORAL HYDRATE 500 MG
1000 CAPSULE ORAL
COMMUNITY

## 2019-08-19 RX ORDER — DESVENLAFAXINE SUCCINATE 50 MG/1
TABLET, EXTENDED RELEASE ORAL
Refills: 2 | COMMUNITY
Start: 2019-07-26 | End: 2020-08-20 | Stop reason: ALTCHOICE

## 2019-08-19 RX ORDER — TOPIRAMATE 100 MG/1
100 TABLET, FILM COATED ORAL
COMMUNITY
Start: 2015-05-26 | End: 2023-02-09

## 2019-08-19 RX ORDER — HYDROCODONE BITARTRATE AND ACETAMINOPHEN 10; 325 MG/1; MG/1
1 TABLET ORAL EVERY 4 HOURS PRN
COMMUNITY
End: 2023-02-14 | Stop reason: HOSPADM

## 2019-08-19 RX ORDER — PROPRANOLOL HYDROCHLORIDE 40 MG/1
40 TABLET ORAL
COMMUNITY
Start: 2013-05-08 | End: 2023-02-09

## 2019-08-19 RX ORDER — CALCIUM CARBONATE 500(1250)
500 TABLET ORAL DAILY
COMMUNITY

## 2019-08-19 RX ORDER — NABUMETONE 750 MG/1
750 TABLET, FILM COATED ORAL
COMMUNITY
End: 2022-02-18 | Stop reason: ALTCHOICE

## 2019-08-19 RX ORDER — PANTOPRAZOLE SODIUM 40 MG/1
TABLET, DELAYED RELEASE ORAL
COMMUNITY
Start: 2015-05-31 | End: 2022-02-18 | Stop reason: ALTCHOICE

## 2019-08-19 RX ORDER — PREGABALIN 75 MG/1
75 CAPSULE ORAL
COMMUNITY
End: 2022-08-18 | Stop reason: ALTCHOICE

## 2019-08-19 RX ORDER — DEXTROAMPHETAMINE SACCHARATE, AMPHETAMINE ASPARTATE, DEXTROAMPHETAMINE SULFATE AND AMPHETAMINE SULFATE 7.5; 7.5; 7.5; 7.5 MG/1; MG/1; MG/1; MG/1
TABLET ORAL
COMMUNITY
Start: 2015-05-27 | End: 2023-02-09

## 2019-08-19 RX ORDER — DIAZEPAM 10 MG/1
10 TABLET ORAL EVERY 12 HOURS PRN
Refills: 2 | COMMUNITY
Start: 2019-08-02

## 2019-08-19 RX ORDER — AMITRIPTYLINE HYDROCHLORIDE 50 MG/1
TABLET, FILM COATED ORAL
Refills: 2 | COMMUNITY
Start: 2019-08-06 | End: 2022-10-17

## 2019-08-19 RX ORDER — TIZANIDINE 2 MG/1
4 TABLET ORAL
COMMUNITY
Start: 2013-05-16 | End: 2022-08-18 | Stop reason: ALTCHOICE

## 2019-08-27 ENCOUNTER — HOSPITAL ENCOUNTER (OUTPATIENT)
Age: 54
Setting detail: OUTPATIENT SURGERY
Discharge: HOME | End: 2019-08-27
Attending: PHYSICAL MEDICINE & REHABILITATION | Admitting: PHYSICAL MEDICINE & REHABILITATION

## 2019-08-27 ENCOUNTER — HOSPITAL ENCOUNTER (OUTPATIENT)
Dept: GENERAL RADIOLOGY | Age: 54
Discharge: HOME | End: 2019-08-27
Payer: COMMERCIAL

## 2019-08-27 VITALS
HEART RATE: 57 BPM | RESPIRATION RATE: 18 BRPM | DIASTOLIC BLOOD PRESSURE: 71 MMHG | SYSTOLIC BLOOD PRESSURE: 127 MMHG | OXYGEN SATURATION: 90 %

## 2019-08-27 DIAGNOSIS — R52 PAIN: ICD-10-CM

## 2019-08-27 PROCEDURE — G8907 PT DOC NO EVENTS ON DISCHARG: HCPCS

## 2019-08-27 PROCEDURE — 64493 INJ PARAVERT F JNT L/S 1 LEV: CPT

## 2019-08-27 PROCEDURE — G8918 PT W/O PREOP ORDER IV AB PRO: HCPCS

## 2019-08-27 PROCEDURE — 64494 INJ PARAVERT F JNT L/S 2 LEV: CPT

## 2019-08-27 PROCEDURE — 3209999900 FLUORO FOR SURGICAL PROCEDURES

## 2019-08-27 RX ORDER — LIDOCAINE HYDROCHLORIDE 10 MG/ML
INJECTION, SOLUTION INFILTRATION; PERINEURAL PRN
Status: DISCONTINUED | OUTPATIENT
Start: 2019-08-27 | End: 2019-08-27 | Stop reason: ALTCHOICE

## 2019-10-15 ENCOUNTER — HOSPITAL ENCOUNTER (OUTPATIENT)
Age: 54
Setting detail: OUTPATIENT SURGERY
Discharge: HOME | End: 2019-10-15
Attending: PHYSICAL MEDICINE & REHABILITATION | Admitting: PHYSICAL MEDICINE & REHABILITATION

## 2019-10-15 VITALS
DIASTOLIC BLOOD PRESSURE: 72 MMHG | HEART RATE: 52 BPM | SYSTOLIC BLOOD PRESSURE: 122 MMHG | RESPIRATION RATE: 18 BRPM | OXYGEN SATURATION: 100 %

## 2019-10-15 PROCEDURE — G8918 PT W/O PREOP ORDER IV AB PRO: HCPCS

## 2019-10-15 PROCEDURE — G8907 PT DOC NO EVENTS ON DISCHARG: HCPCS

## 2019-10-15 PROCEDURE — 62323 NJX INTERLAMINAR LMBR/SAC: CPT

## 2019-10-15 RX ORDER — 0.9 % SODIUM CHLORIDE 0.9 %
VIAL (ML) INJECTION PRN
Status: DISCONTINUED | OUTPATIENT
Start: 2019-10-15 | End: 2019-10-15 | Stop reason: ALTCHOICE

## 2019-10-15 RX ORDER — METHYLPREDNISOLONE ACETATE 80 MG/ML
INJECTION, SUSPENSION INTRA-ARTICULAR; INTRALESIONAL; INTRAMUSCULAR; SOFT TISSUE PRN
Status: DISCONTINUED | OUTPATIENT
Start: 2019-10-15 | End: 2019-10-15 | Stop reason: ALTCHOICE

## 2019-10-15 RX ORDER — LIDOCAINE HYDROCHLORIDE 10 MG/ML
INJECTION, SOLUTION INFILTRATION; PERINEURAL PRN
Status: DISCONTINUED | OUTPATIENT
Start: 2019-10-15 | End: 2019-10-15 | Stop reason: ALTCHOICE

## 2019-10-29 ENCOUNTER — HOSPITAL ENCOUNTER (OUTPATIENT)
Dept: GENERAL RADIOLOGY | Age: 54
Discharge: HOME | End: 2019-10-29
Payer: COMMERCIAL

## 2019-10-29 ENCOUNTER — HOSPITAL ENCOUNTER (OUTPATIENT)
Age: 54
Setting detail: OUTPATIENT SURGERY
Discharge: HOME | End: 2019-10-29
Attending: PHYSICAL MEDICINE & REHABILITATION | Admitting: PHYSICAL MEDICINE & REHABILITATION

## 2019-10-29 VITALS
DIASTOLIC BLOOD PRESSURE: 74 MMHG | SYSTOLIC BLOOD PRESSURE: 134 MMHG | RESPIRATION RATE: 16 BRPM | HEART RATE: 57 BPM | OXYGEN SATURATION: 98 %

## 2019-10-29 DIAGNOSIS — M54.50 LOW BACK PAIN, UNSPECIFIED BACK PAIN LATERALITY, UNSPECIFIED CHRONICITY, UNSPECIFIED WHETHER SCIATICA PRESENT: ICD-10-CM

## 2019-10-29 PROCEDURE — 64493 INJ PARAVERT F JNT L/S 1 LEV: CPT

## 2019-10-29 PROCEDURE — 64494 INJ PARAVERT F JNT L/S 2 LEV: CPT

## 2019-10-29 PROCEDURE — 3209999900 FLUORO FOR SURGICAL PROCEDURES

## 2019-10-29 RX ORDER — LIDOCAINE HYDROCHLORIDE 10 MG/ML
INJECTION, SOLUTION INFILTRATION; PERINEURAL PRN
Status: DISCONTINUED | OUTPATIENT
Start: 2019-10-29 | End: 2019-10-29 | Stop reason: ALTCHOICE

## 2019-12-16 ENCOUNTER — HOSPITAL ENCOUNTER (OUTPATIENT)
Dept: WOMENS IMAGING | Age: 54
Discharge: HOME | End: 2019-12-16
Payer: COMMERCIAL

## 2019-12-16 DIAGNOSIS — Z78.0 ASYMPTOMATIC AGE-RELATED POSTMENOPAUSAL STATE: ICD-10-CM

## 2019-12-16 PROCEDURE — 77080 DXA BONE DENSITY AXIAL: CPT

## 2019-12-31 ENCOUNTER — HOSPITAL ENCOUNTER (OUTPATIENT)
Age: 54
Setting detail: OUTPATIENT SURGERY
Discharge: HOME | End: 2019-12-31
Attending: PHYSICAL MEDICINE & REHABILITATION | Admitting: PHYSICAL MEDICINE & REHABILITATION

## 2019-12-31 ENCOUNTER — HOSPITAL ENCOUNTER (OUTPATIENT)
Dept: GENERAL RADIOLOGY | Age: 54
Discharge: HOME | End: 2019-12-31
Payer: COMMERCIAL

## 2019-12-31 VITALS — DIASTOLIC BLOOD PRESSURE: 84 MMHG | SYSTOLIC BLOOD PRESSURE: 144 MMHG

## 2019-12-31 PROCEDURE — G8907 PT DOC NO EVENTS ON DISCHARG: HCPCS

## 2019-12-31 PROCEDURE — G8918 PT W/O PREOP ORDER IV AB PRO: HCPCS

## 2019-12-31 PROCEDURE — 3209999900 FLUORO FOR SURGICAL PROCEDURES

## 2019-12-31 PROCEDURE — 62323 NJX INTERLAMINAR LMBR/SAC: CPT

## 2019-12-31 RX ORDER — LIDOCAINE HYDROCHLORIDE 10 MG/ML
INJECTION, SOLUTION INFILTRATION; PERINEURAL PRN
Status: DISCONTINUED | OUTPATIENT
Start: 2019-12-31 | End: 2019-12-31 | Stop reason: ALTCHOICE

## 2019-12-31 RX ORDER — METHYLPREDNISOLONE ACETATE 80 MG/ML
INJECTION, SUSPENSION INTRA-ARTICULAR; INTRALESIONAL; INTRAMUSCULAR; SOFT TISSUE PRN
Status: DISCONTINUED | OUTPATIENT
Start: 2019-12-31 | End: 2019-12-31 | Stop reason: ALTCHOICE

## 2019-12-31 RX ORDER — SODIUM CHLORIDE 9 MG/ML
INJECTION INTRAVENOUS PRN
Status: DISCONTINUED | OUTPATIENT
Start: 2019-12-31 | End: 2019-12-31 | Stop reason: ALTCHOICE

## 2019-12-31 NOTE — INTERVAL H&P NOTE
H&P Update         Patient examined. There has been no change.     Electronically signed by Arsh Evangelista on 12/31/19 at 12:37 PM

## 2020-01-14 ENCOUNTER — HOSPITAL ENCOUNTER (OUTPATIENT)
Dept: GENERAL RADIOLOGY | Age: 55
Discharge: HOME | End: 2020-01-14
Payer: COMMERCIAL

## 2020-01-14 ENCOUNTER — HOSPITAL ENCOUNTER (OUTPATIENT)
Age: 55
Setting detail: OUTPATIENT SURGERY
Discharge: HOME | End: 2020-01-14
Attending: PHYSICAL MEDICINE & REHABILITATION | Admitting: PHYSICAL MEDICINE & REHABILITATION

## 2020-01-14 VITALS
RESPIRATION RATE: 18 BRPM | OXYGEN SATURATION: 99 % | DIASTOLIC BLOOD PRESSURE: 79 MMHG | HEART RATE: 67 BPM | SYSTOLIC BLOOD PRESSURE: 121 MMHG

## 2020-01-14 PROCEDURE — G8918 PT W/O PREOP ORDER IV AB PRO: HCPCS

## 2020-01-14 PROCEDURE — 64494 INJ PARAVERT F JNT L/S 2 LEV: CPT

## 2020-01-14 PROCEDURE — 64493 INJ PARAVERT F JNT L/S 1 LEV: CPT

## 2020-01-14 PROCEDURE — G8907 PT DOC NO EVENTS ON DISCHARG: HCPCS

## 2020-01-14 PROCEDURE — 3209999900 FLUORO FOR SURGICAL PROCEDURES

## 2020-01-14 RX ORDER — SODIUM CHLORIDE 9 MG/ML
INJECTION INTRAVENOUS PRN
Status: DISCONTINUED | OUTPATIENT
Start: 2020-01-14 | End: 2020-01-14 | Stop reason: ALTCHOICE

## 2020-01-14 RX ORDER — LIDOCAINE HYDROCHLORIDE 10 MG/ML
INJECTION, SOLUTION INFILTRATION; PERINEURAL PRN
Status: DISCONTINUED | OUTPATIENT
Start: 2020-01-14 | End: 2020-01-14 | Stop reason: ALTCHOICE

## 2020-03-17 ENCOUNTER — HOSPITAL ENCOUNTER (OUTPATIENT)
Dept: GENERAL RADIOLOGY | Age: 55
Discharge: HOME | End: 2020-03-17
Payer: COMMERCIAL

## 2020-03-17 ENCOUNTER — HOSPITAL ENCOUNTER (OUTPATIENT)
Age: 55
Setting detail: OUTPATIENT SURGERY
Discharge: HOME | End: 2020-03-17
Attending: PHYSICAL MEDICINE & REHABILITATION | Admitting: PHYSICAL MEDICINE & REHABILITATION

## 2020-03-17 VITALS
HEART RATE: 66 BPM | OXYGEN SATURATION: 100 % | DIASTOLIC BLOOD PRESSURE: 75 MMHG | SYSTOLIC BLOOD PRESSURE: 124 MMHG | RESPIRATION RATE: 20 BRPM

## 2020-03-17 PROCEDURE — 3209999900 FLUORO FOR SURGICAL PROCEDURES

## 2020-03-17 PROCEDURE — 62323 NJX INTERLAMINAR LMBR/SAC: CPT

## 2020-03-17 RX ORDER — SODIUM CHLORIDE 9 MG/ML
INJECTION INTRAVENOUS PRN
Status: DISCONTINUED | OUTPATIENT
Start: 2020-03-17 | End: 2020-03-17 | Stop reason: ALTCHOICE

## 2020-03-17 RX ORDER — METHYLPREDNISOLONE ACETATE 80 MG/ML
INJECTION, SUSPENSION INTRA-ARTICULAR; INTRALESIONAL; INTRAMUSCULAR; SOFT TISSUE PRN
Status: DISCONTINUED | OUTPATIENT
Start: 2020-03-17 | End: 2020-03-17 | Stop reason: ALTCHOICE

## 2020-03-17 RX ORDER — LIDOCAINE HYDROCHLORIDE 10 MG/ML
INJECTION, SOLUTION INFILTRATION; PERINEURAL PRN
Status: DISCONTINUED | OUTPATIENT
Start: 2020-03-17 | End: 2020-03-17 | Stop reason: ALTCHOICE

## 2020-03-17 NOTE — INTERVAL H&P NOTE
H&P Update     Patient examined. There has been no change.     Electronically signed by Angelique Horton on 3/17/20 at 9:13 AM CDT

## 2020-05-12 ENCOUNTER — HOSPITAL ENCOUNTER (OUTPATIENT)
Dept: PAIN MANAGEMENT | Age: 55
Discharge: HOME | End: 2020-05-12
Payer: COMMERCIAL

## 2020-05-12 VITALS
RESPIRATION RATE: 16 BRPM | HEART RATE: 68 BPM | SYSTOLIC BLOOD PRESSURE: 138 MMHG | OXYGEN SATURATION: 97 % | DIASTOLIC BLOOD PRESSURE: 77 MMHG | TEMPERATURE: 96.8 F

## 2020-05-12 PROCEDURE — 6360000002 HC RX W HCPCS

## 2020-05-12 PROCEDURE — 3209999900 FLUORO FOR SURGICAL PROCEDURES

## 2020-05-12 PROCEDURE — 64494 INJ PARAVERT F JNT L/S 2 LEV: CPT

## 2020-05-12 PROCEDURE — 2500000003 HC RX 250 WO HCPCS

## 2020-05-12 PROCEDURE — 64493 INJ PARAVERT F JNT L/S 1 LEV: CPT

## 2020-05-12 RX ORDER — BUPIVACAINE HYDROCHLORIDE 5 MG/ML
INJECTION, SOLUTION EPIDURAL; INTRACAUDAL
Status: COMPLETED | OUTPATIENT
Start: 2020-05-12 | End: 2020-05-12

## 2020-05-12 RX ORDER — LIDOCAINE HYDROCHLORIDE 10 MG/ML
INJECTION, SOLUTION EPIDURAL; INFILTRATION; INTRACAUDAL; PERINEURAL
Status: COMPLETED | OUTPATIENT
Start: 2020-05-12 | End: 2020-05-12

## 2020-05-12 RX ORDER — METHYLPREDNISOLONE ACETATE 80 MG/ML
INJECTION, SUSPENSION INTRA-ARTICULAR; INTRALESIONAL; INTRAMUSCULAR; SOFT TISSUE
Status: COMPLETED | OUTPATIENT
Start: 2020-05-12 | End: 2020-05-12

## 2020-05-12 RX ADMIN — BUPIVACAINE HYDROCHLORIDE 5 ML: 5 INJECTION, SOLUTION EPIDURAL; INTRACAUDAL at 09:00

## 2020-05-12 RX ADMIN — LIDOCAINE HYDROCHLORIDE 20 ML: 10 INJECTION, SOLUTION EPIDURAL; INFILTRATION; INTRACAUDAL; PERINEURAL at 09:00

## 2020-05-12 RX ADMIN — METHYLPREDNISOLONE ACETATE 80 MG: 80 INJECTION, SUSPENSION INTRA-ARTICULAR; INTRALESIONAL; INTRAMUSCULAR; SOFT TISSUE at 09:00

## 2020-05-12 NOTE — INTERVAL H&P NOTE
Update History & Physical    The patient's History and Physical  was reviewed with the patient and I examined the patient. There was no change:[id number]}. The surgical site was confirmed by the patient and me. Plan: The risks, benefits, expected outcome, and alternative to the recommended procedure have been discussed with the patient. Patient understands and wants to proceed with the procedure.      Electronically signed by Jeaneth Neff on 5/12/2020 at 9:07 AM

## 2020-06-23 ENCOUNTER — HOSPITAL ENCOUNTER (OUTPATIENT)
Dept: PAIN MANAGEMENT | Age: 55
Discharge: HOME | End: 2020-06-23
Payer: COMMERCIAL

## 2020-06-23 VITALS
DIASTOLIC BLOOD PRESSURE: 78 MMHG | HEART RATE: 65 BPM | SYSTOLIC BLOOD PRESSURE: 129 MMHG | RESPIRATION RATE: 18 BRPM | TEMPERATURE: 97.4 F | OXYGEN SATURATION: 98 %

## 2020-06-23 PROCEDURE — 2580000003 HC RX 258

## 2020-06-23 PROCEDURE — 62323 NJX INTERLAMINAR LMBR/SAC: CPT

## 2020-06-23 PROCEDURE — 6360000002 HC RX W HCPCS

## 2020-06-23 PROCEDURE — 3209999900 FLUORO FOR SURGICAL PROCEDURES

## 2020-06-23 PROCEDURE — 2500000003 HC RX 250 WO HCPCS

## 2020-06-23 RX ORDER — SODIUM CHLORIDE 9 MG/ML
INJECTION INTRAVENOUS
Status: COMPLETED | OUTPATIENT
Start: 2020-06-23 | End: 2020-06-23

## 2020-06-23 RX ORDER — LIDOCAINE HYDROCHLORIDE 10 MG/ML
INJECTION, SOLUTION EPIDURAL; INFILTRATION; INTRACAUDAL; PERINEURAL
Status: COMPLETED | OUTPATIENT
Start: 2020-06-23 | End: 2020-06-23

## 2020-06-23 RX ORDER — METHYLPREDNISOLONE ACETATE 80 MG/ML
INJECTION, SUSPENSION INTRA-ARTICULAR; INTRALESIONAL; INTRAMUSCULAR; SOFT TISSUE
Status: COMPLETED | OUTPATIENT
Start: 2020-06-23 | End: 2020-06-23

## 2020-06-23 RX ADMIN — LIDOCAINE HYDROCHLORIDE 5 ML: 10 INJECTION, SOLUTION EPIDURAL; INFILTRATION; INTRACAUDAL; PERINEURAL at 09:01

## 2020-06-23 RX ADMIN — METHYLPREDNISOLONE ACETATE 80 MG: 80 INJECTION, SUSPENSION INTRA-ARTICULAR; INTRALESIONAL; INTRAMUSCULAR; SOFT TISSUE at 09:02

## 2020-06-23 RX ADMIN — SODIUM CHLORIDE 5 ML: 9 INJECTION INTRAVENOUS at 09:02

## 2020-06-23 ASSESSMENT — PAIN SCALES - GENERAL: PAINLEVEL_OUTOF10: 8

## 2020-06-23 ASSESSMENT — PAIN - FUNCTIONAL ASSESSMENT
PAIN_FUNCTIONAL_ASSESSMENT: 0-10
PAIN_FUNCTIONAL_ASSESSMENT: PREVENTS OR INTERFERES SOME ACTIVE ACTIVITIES AND ADLS

## 2020-06-23 ASSESSMENT — PAIN DESCRIPTION - LOCATION: LOCATION: BACK

## 2020-06-23 ASSESSMENT — PAIN DESCRIPTION - ONSET: ONSET: AWAKENED FROM SLEEP

## 2020-06-23 ASSESSMENT — PAIN DESCRIPTION - PROGRESSION: CLINICAL_PROGRESSION: GRADUALLY WORSENING

## 2020-06-23 ASSESSMENT — PAIN DESCRIPTION - FREQUENCY: FREQUENCY: INTERMITTENT

## 2020-06-23 ASSESSMENT — PAIN DESCRIPTION - DESCRIPTORS: DESCRIPTORS: ACHING

## 2020-06-23 ASSESSMENT — PAIN DESCRIPTION - PAIN TYPE: TYPE: CHRONIC PAIN

## 2020-07-16 ENCOUNTER — TELEPHONE (OUTPATIENT)
Dept: ONCOLOGY | Facility: CLINIC | Age: 55
End: 2020-07-16

## 2020-07-16 NOTE — TELEPHONE ENCOUNTER
PT CALLED TO CONFIRM 8/20/20 1 YR F/U APPT AND ASKED IF SHE COULD COME IN 8/11/2020 FOR LABS BEFORE SHE LEAVES TOWN UNTIL 8/17/2020. SHE SAID SHE COULD COME IN 8/18/2020, IF NEEDED.     PLEASE CALL AND CONFIRM LAB APPT NEEDED PER DR. REINOSO'S LAST OFFICE NOTE.    PHONE NUMBER:  [phone]

## 2020-08-12 ENCOUNTER — LAB (OUTPATIENT)
Dept: LAB | Facility: HOSPITAL | Age: 55
End: 2020-08-12

## 2020-08-12 ENCOUNTER — HOSPITAL ENCOUNTER (OUTPATIENT)
Dept: GENERAL RADIOLOGY | Facility: HOSPITAL | Age: 55
Discharge: HOME | End: 2020-08-12
Admitting: NURSE PRACTITIONER

## 2020-08-12 DIAGNOSIS — C50.911 MALIGNANT NEOPLASM OF RIGHT BREAST IN FEMALE, ESTROGEN RECEPTOR POSITIVE, UNSPECIFIED SITE OF BREAST (HCC): ICD-10-CM

## 2020-08-12 DIAGNOSIS — Z17.0 MALIGNANT NEOPLASM OF RIGHT BREAST IN FEMALE, ESTROGEN RECEPTOR POSITIVE, UNSPECIFIED SITE OF BREAST (HCC): ICD-10-CM

## 2020-08-12 DIAGNOSIS — D64.9 ANEMIA, UNSPECIFIED TYPE: ICD-10-CM

## 2020-08-12 LAB
ALBUMIN SERPL-MCNC: 4.1 G/DL (ref 3.5–5.2)
ALBUMIN/GLOB SERPL: 1.7 G/DL
ALP SERPL-CCNC: 34 U/L (ref 39–117)
ALT SERPL W P-5'-P-CCNC: 10 U/L (ref 1–33)
ANION GAP SERPL CALCULATED.3IONS-SCNC: 9 MMOL/L (ref 5–15)
AST SERPL-CCNC: 14 U/L (ref 1–32)
BASOPHILS # BLD AUTO: 0.03 10*3/MM3 (ref 0–0.2)
BASOPHILS NFR BLD AUTO: 0.5 % (ref 0–1.5)
BILIRUB SERPL-MCNC: 0.2 MG/DL (ref 0–1.2)
BUN SERPL-MCNC: 5 MG/DL (ref 6–20)
BUN/CREAT SERPL: 5.2 (ref 7–25)
CALCIUM SPEC-SCNC: 9.3 MG/DL (ref 8.6–10.5)
CEA SERPL-MCNC: 2.89 NG/ML
CHLORIDE SERPL-SCNC: 106 MMOL/L (ref 98–107)
CO2 SERPL-SCNC: 26 MMOL/L (ref 22–29)
CREAT SERPL-MCNC: 0.97 MG/DL (ref 0.57–1)
DEPRECATED RDW RBC AUTO: 47.8 FL (ref 37–54)
EOSINOPHIL # BLD AUTO: 0.07 10*3/MM3 (ref 0–0.4)
EOSINOPHIL NFR BLD AUTO: 1.1 % (ref 0.3–6.2)
ERYTHROCYTE [DISTWIDTH] IN BLOOD BY AUTOMATED COUNT: 13.7 % (ref 12.3–15.4)
FERRITIN SERPL-MCNC: 55.19 NG/ML (ref 13–150)
FOLATE SERPL-MCNC: >20 NG/ML (ref 4.78–24.2)
GFR SERPL CREATININE-BSD FRML MDRD: 60 ML/MIN/1.73
GLOBULIN UR ELPH-MCNC: 2.4 GM/DL
GLUCOSE SERPL-MCNC: 99 MG/DL (ref 65–99)
HCT VFR BLD AUTO: 39.7 % (ref 34–46.6)
HGB BLD-MCNC: 12.9 G/DL (ref 12–15.9)
IMM GRANULOCYTES # BLD AUTO: 0.01 10*3/MM3 (ref 0–0.05)
IMM GRANULOCYTES NFR BLD AUTO: 0.2 % (ref 0–0.5)
IRON 24H UR-MRATE: 59 MCG/DL (ref 37–145)
IRON SATN MFR SERPL: 19 % (ref 20–50)
LYMPHOCYTES # BLD AUTO: 1.56 10*3/MM3 (ref 0.7–3.1)
LYMPHOCYTES NFR BLD AUTO: 23.9 % (ref 19.6–45.3)
MCH RBC QN AUTO: 30.9 PG (ref 26.6–33)
MCHC RBC AUTO-ENTMCNC: 32.5 G/DL (ref 31.5–35.7)
MCV RBC AUTO: 95 FL (ref 79–97)
MONOCYTES # BLD AUTO: 0.48 10*3/MM3 (ref 0.1–0.9)
MONOCYTES NFR BLD AUTO: 7.4 % (ref 5–12)
NEUTROPHILS NFR BLD AUTO: 4.38 10*3/MM3 (ref 1.7–7)
NEUTROPHILS NFR BLD AUTO: 66.9 % (ref 42.7–76)
NRBC BLD AUTO-RTO: 0 /100 WBC (ref 0–0.2)
PLATELET # BLD AUTO: 199 10*3/MM3 (ref 140–450)
PMV BLD AUTO: 11.3 FL (ref 6–12)
POTASSIUM SERPL-SCNC: 3.5 MMOL/L (ref 3.5–5.2)
PROT SERPL-MCNC: 6.5 G/DL (ref 6–8.5)
RBC # BLD AUTO: 4.18 10*6/MM3 (ref 3.77–5.28)
SODIUM SERPL-SCNC: 141 MMOL/L (ref 136–145)
TIBC SERPL-MCNC: 305 MCG/DL (ref 298–536)
TRANSFERRIN SERPL-MCNC: 205 MG/DL (ref 200–360)
VIT B12 BLD-MCNC: 755 PG/ML (ref 211–946)
WBC # BLD AUTO: 6.53 10*3/MM3 (ref 3.4–10.8)

## 2020-08-12 PROCEDURE — 82378 CARCINOEMBRYONIC ANTIGEN: CPT | Performed by: NURSE PRACTITIONER

## 2020-08-12 PROCEDURE — 85025 COMPLETE CBC W/AUTO DIFF WBC: CPT | Performed by: NURSE PRACTITIONER

## 2020-08-12 PROCEDURE — 83540 ASSAY OF IRON: CPT | Performed by: NURSE PRACTITIONER

## 2020-08-12 PROCEDURE — 71046 X-RAY EXAM CHEST 2 VIEWS: CPT

## 2020-08-12 PROCEDURE — 86300 IMMUNOASSAY TUMOR CA 15-3: CPT | Performed by: NURSE PRACTITIONER

## 2020-08-12 PROCEDURE — 36415 COLL VENOUS BLD VENIPUNCTURE: CPT

## 2020-08-12 PROCEDURE — 82746 ASSAY OF FOLIC ACID SERUM: CPT | Performed by: NURSE PRACTITIONER

## 2020-08-12 PROCEDURE — 84466 ASSAY OF TRANSFERRIN: CPT | Performed by: NURSE PRACTITIONER

## 2020-08-12 PROCEDURE — 80053 COMPREHEN METABOLIC PANEL: CPT | Performed by: NURSE PRACTITIONER

## 2020-08-12 PROCEDURE — 82607 VITAMIN B-12: CPT | Performed by: NURSE PRACTITIONER

## 2020-08-12 PROCEDURE — 82728 ASSAY OF FERRITIN: CPT | Performed by: NURSE PRACTITIONER

## 2020-08-13 LAB — CANCER AG27-29 SERPL-ACNC: 17.6 U/ML (ref 0–38.6)

## 2020-08-16 NOTE — PROGRESS NOTES
MGW ONC [facility] GROUP HEMATOLOGY AND ONCOLOGY  [address]  [place] [address] [phone]    Patient Name: Carol Rodriguez  Encounter Date: 08/20/2020  YOB: 1965  Patient Number: [id number]        REASON FOR VISIT: Ms. Carol Rodriguez is a 55-year-old female who returns in follow-up of breast cancer. She is seen nearly 152 months since completion of adjuvant chemotherapy and over 103 months since cessation of adjuvant Femara (completed 60 months of therapy). The patient is here alone. History is obtained from the patient who is considered to be a reliable historian.     DIAGNOSTIC ABNORMALITIES: Breast carcinoma   1. Unilateral mammogram, right breast, 08/23/2006, [facility]. Approximately a 2 cm spiculated area in the upper-outer quadrant of the right breast at the 10 o'clock to 11 o'clock position.  2. Right breast ultrasound, 08/28/2006, [facility]. A solid nodule at 10 o’clock in the right breast measuring up to 2 cm is felt to correspond to the abnormality seen on the mammograms, including spot views today. There are also small cysts in this region. Excisional biopsy should be considered.  3. Bone scan, 09/07/2006, [facility]. Small areas of vaguely increased activity in the upper thoracic area and at the lumbosacral junction, probably arthritic. No definite metastatic change is identified.  4. CT of the brain, 09/07/2006, [facility]. Chronic sinus disease involving the left half of the sphenoid sinus. Otherwise unremarkable enhanced CT of the brain with no convincing evidence of metastatic disease.  5. CT of the chest, 09/07/2006, [facility] small cortical cyst involving the upper pole of the right kidney. Otherwise unremarkable CT of the chest with no evidence of metastatic disease.  6. CT of the pelvis, 09/07/2006, [facility]. Sclerotic lesion involving the right femoral head which I would favor to  represent a bony enostosis. Considering the lack of other metastatic disease, I feel a solitary bony lesion to the right femoral head would be an unusual manifestation but warranting followup nonetheless. Bone scan may be helpful for further evaluation.  7. Unilateral mammogram of breast, 09/11/2006, [facility]. Lobular areas of density in the left breast consistent with several left breast masses. Ultrasound was performed. There are no spiculated densities. No suspicious microcalcifications are identified. Screening exam of the left breast is recommended in 1 year.  8. Right breast biopsy, 09/29/2006, [facility]. Infiltrating mammary carcinoma. Part A: Primary tumor diagnosis is infiltrating ductal carcinoma. Part B: Right axillary lymph node was benign. Part C: Right breast tissue showed right modified radical mastectomy with deep margin without obvious histopathologic alteration. No residual tumor present at previous biopsy site. Fibrocystic mastopathy including fibrosis, adenosis, duct ectasia, papillary apocrine metaplasia, sclerosing adenosis, multiple cysts, and intraductal microcalcifications. Seventeen (17) benign lymph nodes. Part D: Left breast tissue reveals no tumor present. Fibrocystic mastopathy including fibrosis, adenosis, duct ectasia, multiple cysts, apocrine metaplasia, ruptured tension cysts, periductal chronic inflammation, intraductal hyperplasia of usual type, and intraductal microcalcifications (multiple). Part E: Right breast tissue shows portions of benign loose fibrofatty tissue. Part F: Left breast tissue show portions of benign loose fibrofatty tissue and fatty breast tissue. ER 94% (+), SC 7% (+), HER-2 negative.  9. Labs, 10/10/2006: CMP was unrevealing, CEA of 1.3, and CA27-29 of 14.1.  10. 2D echo, 10/17/2006, [facility]. Normal left ventricular chamber size and wall motion. The left ventricular ejection fraction is felt to be in excess of 60%. No pericardial  effusion or intracavitary thrombus noted. No valvular abnormalities are noted. Spectral and color flow Doppler studies reveal trivial tricuspid regurgitation. The right ventricular systolic pressure is normal and was estimated at 19 mmHg.  11. Labs, 08/22/2016: Hemoglobin 12.4, hematocrit 39.4,  (elevated).  12. Labs, 08/30/2016:  (313 to 615), TSH 1.9, B12 of 1055, folate greater than 20, T4 9.6 (4.9 to 12.3), SPIEP: IgG 527 (791 to 1643), IgM 261, IgA 76.6 (each normal). DAVION: No monoclonal immunoglobulins detected.  13. Comprehensive blood report, 08/30/2016. MILD ANEMIA; INCREASED T-CELL LGL'S. COMPREHENSIVE DIAGNOSIS. Review of peripheral blood smear: Mild anemia with borderline macrocytic features. Normal white blood cell and platelet counts and morphology. Mildly expanded population of reactive appearing T-cell large granular lymphocytes detected on flow cytometric studies. COMPREHENSIVE COMMENT. The exact etiology of this patient's mild anemia with borderline macrocytic features is not apparent from review of the specimen. Given the lack of significant atypia and normal white blood cell and platelet values, myelodysplasia appears less likely. Non-neoplastic etiologies to consider include liver disease, thyroid disease, immune mediated disorders, vitamin/nutritional deficiencies and drugs/toxins. Correlation recommended. Flow cytometry study after erythrolysis reveals no circulating myeloid or lymphoid blasts. Myeloid and monocytic lineages are represented by mature granulocytes and monocytes. Basophils and eosinophils are not increased. It must be noted that the diagnosis of a myeloid stem cell disorder, if clinically suspected, is best made using bone marrow specimens. Peripheral blood is not always representative of abnormalities involving the bone marrow. The B cells show no evidence of clonality or antigenic aberrancy. The majority of the T cells show normal expression of the pan T-cell  antigens. A mildly expanded population of T-cell LGL is detected accounting for approximately 35.9% of the total T cells and 12.6% of the total white blood cells. The overall immunophenotype of the T-LGL and a normal CD4:CD8 ratio favors reactive process over T-cell neoplasia. The presence of these large granular lymphocytes raises the possibility of an immune-mediated etiology for this patient's anemia.    PREVIOUS INTERVENTIONS: Breast carcinoma.   1. Adriamycin, 11/02/2006 through 12/14/2006. Four cycles completed.  2. Taxotere, 12/28/2006 through 02/09/2007. Four cycles completed.  3. Cytoxan, 02/22/2007 through 04/05/2007. Four cycles completed.  4. Femara 2.5 mg daily, 04/12/2007 through 04/12/2012.    DIAGNOSTIC ABNORMALITIES: Anemia.   1. CBC, 05/15/2006. Hemoglobin 7.9 and hematocrit 25.3. Additional lab same date revealed retic count 1.4%, serum iron 7 (50 to 170), iron saturation 2%, TIBC 345 (273 to 456), TSH 1.72, and T4 0.9 (each normal).  2. CBC (post transfusion 2 units of PRBCs), 05/24/2006. Hemoglobin 9.3, an hematocrit 29.6, an MCV 74.9, platelets 309,000 and a WBC 6.5 with a normal differential.  3. Anemia substrate evaluation, 07/12/2006. Fe 116, TIBC 343, Fe sat 34%, ferritin 14, B12 of 655, folate 23.1, retic 0.8%, and stools for occult blood (OB) 0/3 +.    PREVIOUS INTERVENTIONS: Anemia.   1. Two units RBCs, 05/22/2006.  2. Transabdominal hysterectomy and bilateral oophorectomy, 06/07/2006. Pathology report is not immediately available to this examiner.  3. Chromagen by mouth 07/12/2006 through present (currently on 1 daily).        Problem List Items Addressed This Visit        Other    Malignant neoplasm of right breast in female, estrogen receptor positive (CMS/HCC) - Primary        Oncology/Hematology History    DIAGNOSTIC ABNORMALITIES:  Breast carcinoma    1.Unilateral mammogram, right breast, 08/23/2006, [facility].  Approximately a 2 cm spiculated area in the upper-outer  quadrant of the right breast at the 10 o'clock to 11 o'clock position.  2.Right breast ultrasound, 08/28/2006, [facility].  A solid nodule at 10 o’clock in the right breast measuring up to 2 cm is felt to correspond to the abnormality seen on the mammograms, including spot views today.  There are also small cysts in this region.  Excisional biopsy should be considered.  3.Bone scan, 09/07/2006, [facility]. Small areas of vaguely increased activity in the upper thoracic area and at the lumbosacral junction, probably arthritic.  No definite metastatic change is identified.  4.CT of the brain, 09/07/2006, [facility]. Chronic sinus disease involving the left half of the sphenoid sinus. Otherwise unremarkable enhanced CT of the brain with no convincing evidence of metastatic disease.  5.CT of the chest, 09/07/2006, [facility] small cortical cyst involving the upper pole of the right kidney. Otherwise unremarkable CT of the chest with no evidence of metastatic disease.  6.CT of the pelvis, 09/07/2006, [facility].  Sclerotic lesion involving the right femoral head which I would favor to represent a bony enostosis. Considering the lack of other metastatic disease, I feel a solitary bony lesion to the right femoral head would be an unusual manifestation but warranting followup nonetheless.  Bone scan may be helpful for further evaluation.  7.Unilateral mammogram of breast, 09/11/2006, [facility]. Lobular areas of density in the left breast consistent with several left breast masses. Ultrasound was performed. There are no spiculated densities. No suspicious microcalcifications are identified. Screening exam of the left breast is recommended in 1 year.  8.Right breast biopsy, 09/29/2006, [facility]. Infiltrating mammary carcinoma.  Part A: Primary tumor diagnosis is infiltrating ductal carcinoma.  Part B: Right axillary lymph node was benign.  Part C: Right breast tissue showed right  modified radical mastectomy with deep margin without obvious histopathologic alteration.  No residual tumor present at previous biopsy site.  Fibrocystic mastopathy including fibrosis, adenosis, duct ectasia, papillary apocrine metaplasia, sclerosing adenosis, multiple cysts, and intraductal microcalcifications.  Seventeen (17) benign lymph nodes.  Part D:  Left breast tissue reveals no tumor present.   Fibrocystic mastopathy including fibrosis, adenosis, duct ectasia, multiple cysts, apocrine metaplasia, ruptured tension cysts, periductal chronic inflammation, intraductal hyperplasia of usual type, and intraductal microcalcifications (multiple).  Part E:  Right breast tissue shows portions of benign loose fibrofatty tissue.  Part F:  Left breast tissue show portions of benign loose fibrofatty tissue and fatty breast tissue.  ER 94% (+), UT 7% (+), HER-2 negative.  9.Labs, 10/10/2006: CMP was unrevealing, CEA of 1.3, and CA27-29 of 14.1.  10.2D echo, 10/17/2006, [facility].  Normal left ventricular chamber size and wall motion.  The left ventricular ejection fraction is felt to be in excess of 60%. No pericardial effusion or intracavitary thrombus noted. No valvular abnormalities are noted.  Spectral and color flow Doppler studies reveal trivial tricuspid regurgitation. The right ventricular systolic pressure is normal and was estimated at 19 mmHg.  11.Labs, 08/22/2016: Hemoglobin 12.4, hematocrit 39.4,  (elevated).  12.Labs, 08/30/2016:  (313 to 615), TSH 1.9, B12 of 1055, folate greater than 20, T4 9.6 (4.9 to 12.3), SPIEP: IgG 527 (791 to 1643), IgM 261, IgA 76.6 (each normal). DAVION: No monoclonal immunoglobulins detected.  13.Comprehensive blood report, 08/30/2016.   MILD ANEMIA; INCREASED T-CELL LGL'S. COMPREHENSIVE DIAGNOSIS.  Review of peripheral blood smear: Mild anemia with borderline macrocytic features. Normal white blood cell and platelet counts and morphology. Mildly expanded  population of reactive appearing T-cell large granular lymphocytes detected on flow cytometric studies.  COMPREHENSIVE COMMENT.  The exact etiology of this patient's mild anemia with borderline macrocytic features is not apparent from review of the specimen. Given the lack of significant atypia and normal white blood cell and platelet values, myelodysplasia appears less likely. Non-neoplastic etiologies to consider include liver disease, thyroid disease, immune mediated disorders, vitamin/nutritional deficiencies and drugs/toxins. Correlation recommended. Flow cytometry study after erythrolysis reveals no circulating myeloid or lymphoid blasts. Myeloid and monocytic lineages are represented by mature granulocytes and monocytes. Basophils and eosinophils are not increased. It must be noted that the diagnosis of a myeloid stem cell disorder, if clinically suspected, is best made using bone marrow specimens. Peripheral blood is not always representative of abnormalities involving the bone marrow. The B cells show no evidence of clonality or antigenic aberrancy. The majority of the T cells show normal expression of the pan T-cell antigens. A mildly expanded population of T-cell LGL is detected accounting for approximately 35.9% of the total T cells and 12.6% of the total white blood cells. The overall immunophenotype of the T-LGL and a normal CD4:CD8 ratio favors reactive process over T-cell neoplasia. The presence of these large granular lymphocytes raises the possibility of an immune-mediated etiology for this patient's anemia.    PREVIOUS INTERVENTIONS:  Breast carcinoma.    1.Adriamycin, 11/02/2006 through 12/14/2006.  Four cycles completed.  2.Taxotere, 12/28/2006 through 02/09/2007.  Four cycles completed.  3.Cytoxan, 02/22/2007 through 04/05/2007.  Four cycles completed.  4.Femara 2.5 mg daily, 04/12/2007 through 04/12/2012.    DIAGNOSTIC ABNORMALITIES:  Anemia.    1.CBC, 05/15/2006. Hemoglobin 7.9 and hematocrit  25.3. Additional lab same date revealed retic count 1.4%, serum iron 7 (50 to 170), iron saturation 2%, TIBC 345 (273 to 456), TSH 1.72, and T4 0.9 (each normal).  2.CBC (post transfusion 2 units of PRBCs), 05/24/2006.  Hemoglobin 9.3, an hematocrit 29.6, an MCV 74.9, platelets 309,000 and a WBC 6.5 with a normal differential.  3.Anemia substrate evaluation, 07/12/2006. Fe 116, TIBC 343, Fe sat 34%, ferritin 14, B12 of 655, folate 23.1, retic 0.8%, and stools for occult blood (OB) 0/3 +.    PREVIOUS INTERVENTIONS:  Anemia.    1.Two units RBCs, 05/22/2006.  2.Transabdominal hysterectomy and bilateral oophorectomy, 06/07/2006. Pathology report is not immediately available to this examiner.  3.Chromagen by mouth 07/12/2006 through present (currently on 1 daily).          Malignant neoplasm of right breast in female, estrogen receptor positive (CMS/HCC)    8/16/2019 Initial Diagnosis     Malignant neoplasm of right breast in female, estrogen receptor positive (CMS/HCC)         PAST MEDICAL HISTORY:  ALLERGIES:  No Known Allergies  CURRENT MEDICATIONS:  Outpatient Encounter Medications as of 8/20/2020   Medication Sig Dispense Refill   • amitriptyline (ELAVIL) 50 MG tablet TK 1 T PO Q NIGHT  2   • amphetamine-dextroamphetamine (ADDERALL) 30 MG tablet   2 times daily     • Calcium 500 MG tablet Take 500 mg by mouth.     • diazePAM (VALIUM) 10 MG tablet TK 1/2 TO 1 T PO BID  2   • HYDROcodone-acetaminophen (NORCO)  MG per tablet Take 1 tablet by mouth.     • milnacipran (SAVELLA) 50 MG tablet tablet Take 75 mg by mouth 2 (Two) Times a Day.     • nabumetone (RELAFEN) 750 MG tablet Take 750 mg by mouth.     • Omega-3 Fatty Acids (FISH OIL) 1000 MG capsule capsule      • pantoprazole (PROTONIX) 40 MG EC tablet   daily     • pregabalin (LYRICA) 75 MG capsule Take 75 mg by mouth.     • propranolol (INDERAL) 20 MG tablet Take 40 mg by mouth.     • therapeutic multivitamin-minerals (THERAGRAN-M) tablet Take 1 tablet by  mouth.     • tiZANidine (ZANAFLEX) 2 MG tablet Take 4 mg by mouth.     • topiramate (TOPAMAX) 100 MG tablet 100 mg.     • [DISCONTINUED] desvenlafaxine (PRISTIQ) 50 MG 24 hr tablet TK 1 T PO DAILY FOR 7 DAYS THEN INCREASE TO 2 TS DAILY  2     No facility-administered encounter medications on file as of 2020.      ADULT ILLNESSES:   Infiltrating ductal carcinoma of breast ( ER Status: Positive; PA Status: Positive; )   Anxiety   Arthralgia   Francisco's esophagus (disorder)   Carcinoembryonic antigen present (finding)   Fibrocystic breast disease   Hypokalemia   Iron deficiency anemia   Osteopenia   Tobacco user (finding)    SURGERIES:   Total abdominal hysterectomy with bilateral salpingo-oophorectomy, (DANIEL/BSO), 2006    section, x2. Most recently in    Modified radical mastectomy, right, 2006   Simple mastectomy, prophylactic, left with left breast reconstruction, 2006   Biopsy of breast, right, benign fibrocystic changes, 2006   Carpal tunnel release, left 2011; right 2011      ADULT ILLNESSES:  Patient Active Problem List   Diagnosis Code   • Malignant neoplasm of right breast in female, estrogen receptor positive (CMS/[facility]) C50.911, Z17.0   • Anemia D64.9   • Francisco esophagus K22.70   • GERD (gastroesophageal reflux disease) K21.9   • Hiatal hernia K44.9   • Irritable bowel syndrome K58.9   • Lumbar radicular pain M54.16   • Tortuous colon Q43.8     SURGERIES:  Past Surgical History:   Procedure Laterality Date   • BREAST BIOPSY Right 2006    Biopsy of breast, right, benign fibrocystic changes   • CARPAL TUNNEL RELEASE      left 2011; right 2011   •  SECTION      x2. Most recently in    • MASTECTOMY MODIFIED RADICAL / SIMPLE / COMPLETE Right 2006   • SIMPLE MASTECTOMY Left 2006     prophylactic, left with left breast reconstruction   • TOTAL ABDOMINAL HYSTERECTOMY WITH SALPINGO OOPHORECTOMY  2006     [facility]  "ITEMS:  Health Maintenance Due   Topic Date Due   • ANNUAL PHYSICAL  1968   • TDAP/TD VACCINES (1 - Tdap) 1976   • PNEUMOCOCCAL VACCINE (19-64 MEDIUM RISK) (1 of 1 - PPSV23) 1984   • ZOSTER VACCINE (1 of 2) 2015   • HEPATITIS C SCREENING  2019   • PAP SMEAR  2019   • DXA SCAN  2019   • INFLUENZA VACCINE  2020       <no information>  Last Completed Colonoscopy       Status Date      COLONOSCOPY Done 2015 [facility] COLONOSCOPY [facility] Colonoscopy           Patient has more history with this topic...          There is no immunization history on file for this patient.  Last Completed Mammogram     Patient has no health maintenance due at this time            FAMILY HISTORY:  Family History   Problem Relation Age of Onset   • Polymyalgia rheumatica Mother    • Diabetes Father    • Hypertension Father    • Other Father         heart issues   • No Known Problems Brother    • No Known Problems Maternal Grandmother    • Heart attack Maternal Grandfather         52 years old    • No Known Problems Paternal Grandmother    • Diabetes Paternal Grandfather    • No Known Problems Brother    • No Known Problems Brother    • No Known Problems Brother      SOCIAL HISTORY:  Social History     Socioeconomic History   • Marital status:      Spouse name: Not on file   • Number of children: Not on file   • Years of education: Not on file   • Highest education level: Not on file   Tobacco Use   • Smoking status: Former Smoker     Packs/day: 1.00     Years: 25.00     Pack years: 25.00     Types: Cigarettes     Last attempt to quit: 2017     Years since quittin.7   • Smokeless tobacco: Never Used   Substance and Sexual Activity   • Alcohol use: No     Frequency: Never   • Drug use: No   • Sexual activity: Defer       REVIEW OF SYSTEMS:  Constitutional:   The patient reports a fairly good appetite and energy is variable, \"more down than up.\" She manages all her ADLs. She is still " "working full time - remotely at home. She has gained 8 pounds (in addition to 2 pounds at her prior visit) in the interval. Says her fibromyalgia symptoms have been modulated by Savella and epidural injections by Dr. Lugo, most recently last 06/2020 (every 2 months).  She has no fevers, chills, or drenching night sweats. Still has intermittent hot flashes, \"occasionally.\" Her sleep habits are fairly good even off Restoril.  Ear/Nose/Mouth/Throat:   She has chronic rhinitis with no sinus congestion, ear discomfort, or discolored drainage. She has no sore throat, nosebleeds, or sore tongue. She has fewer bouts of headaches.  Ocular:   She still has bouts of blurry vision. She is still followed by ophthalmology. No eye pain or double vision.  Respiratory:   No chest congestion.  No cough.  Occasional exertional dyspnea, no significant shortness of breathing at rest, or unexplained chest wall pain. She has not smoked since 12/31/2017. Is aware that she has chronic obstructive pulmonary disease (COPD) on x-ray.   Cardiovascular:   She has no exertional chest pain, chest pressure, or chest heaviness. She has no claudication. She has no palpitations or symptomatic orthostasis.  Gastrointestinal:   She has no dysphagia, nausea, vomiting, postprandial abdominal pain (Aciphex), bloating, cramping, or change in bowel habits. She has not had dark stools or rectal bleeding. She has no diarrhea, and her bowels have been regular (every other day) without stool softeners. EGD last 08/18/2016 per Dr. Sheppard. \"He took a biopsy but didn't say anything else.\" Colonoscopy, 09/04/2015. Normal. Repeat surveillance 10 years.  Genitourinary:   She has no urinary burning, frequency, dribbling, or discoloration. She has no need to urinate frequently through the night. She has no difficulty controlling her bladder. No vaginal bleeding. She denies any vaginal discharge. Says she had breakthrough bleeding last 12/2016 but was seen by DrKemar " "Gladys (GYN). \"She did a pelvic exam and ran a bunch of tests and everything was okay.\" No repeat episodes since.  Musculoskeletal:   She has no unexplained myalgias or nighttime leg cramping. Has only mild aching, especially in the hands, lower back, hips, and shoulders. Now knows she has spinal degenerative joint disease (DJD), (MRI, 10/09/2008; 07/2012). She has no distal radiculitis. She is still using Lortab about 2-3 (from 3-4) daily. She is also on nabumetone (2 times a day as needed). Says epidural/spinal injections per Dr. Walters last 06/2020 continue to be effective \"for about 4-5 weeks.\" Still goes every other month. Has been on Savella which has helped her fibromyalgia pains.  Extremities:   She has no trouble with fluid retention or significant leg swelling.  Endocrine:   The hot flashes are tolerable. She has no problems with excess thirst, excessive urination, or unexplained fatigue.  Heme/Lymphatic:   She has no unexplained bleeding, bruising, petechial rash, or swollen glands.  Skin:   She has no itching or rashes.  Neuro:   She has no loss of consciousness, seizures, fainting spells, or dizziness. She has no weakness of her face, arms, or legs. She has no difficulty with speech. She has no tremors.  Psych:   She has chronic depression and anxiety which sometimes interferes with her sleep patterns. She still feels that the anxiety is triggered due to the back pains. Says her symptoms are modulated by her pain medications and Savella.         VITAL SIGNS: /72   Pulse 70   Temp 97 °F (36.1 °C)   Resp 16   Ht 162.6 cm (64\")   Wt 61.7 kg (136 lb 1.6 oz)   SpO2 97%   Breastfeeding No   BMI 23.36 kg/m² Body surface area is 1.66 meters squared.  Pain Score    08/20/20 0859   PainSc:   7   PainLoc: Back         PHYSICAL EXAMINATION:   General:   She is a pleasant, perennially-anxious, slender, well-nourished, well-appearing, well-kept middle-aged female in no distress. ECOG PS = " 0.  Head/Neck:   The patient is anicteric and atraumatic. She is wearing a surgical mask today.  The trachea is midline. The neck is supple without evidence of jugular venous distention or cervical adenopathy. There is no frontal sinus tenderness.  Eyes:   The extraocular movements are full. There is no scleral jaundice or erythema. There is no conjunctival pallor.  Chest:   The respiratory efforts are normal and unhindered. The breath sounds are distant with scattered soft wheezing but no rhonchi, rales, or asymmetry of breath sounds. The port site is well healed (has been removed).  Breasts:   The right breast is remarkable for the presence of a saline implant. The left breast is remarkable for the presence of a saline implant. Both are well seated. There are no overt chest wall nodules or masses to suggest local recurrence. There is no axillary or supraclavicular adenopathy.  Cardiovascular:   The patient has a regular cardiac rate and rhythm without murmurs, rubs, or gallops. The peripheral pulses are equal and full.  Extremities:   There is no evidence of cyanosis, clubbing, or edema.  Rheumatologic:   There is no overt evidence of rheumatoid deformities of the hands. There is no sausaging of the fingers. There is no sign of active synovitis. The gait is normal.  Cutaneous:   There are no overt rashes, disseminated lesions, purpura, or petechiae.  Lymphatics:   There is no evidence of adenopathy in the cervical, supraclavicular, or axillary areas.  Neurologic:   The patient is alert, oriented, cooperative, and pleasant. She is appropriately conversant. She ambulated into the exam room without assistance and transferred from chair to exam table unaided. There is no overt dysfunction of the motor, sensory, or cerebellar systems.  Psych:   Mood and affect are appropriate for circumstance. Eye contact is appropriate.        LABS    Lab Results - Last 18 Months   Lab Units 08/12/20  1229 08/13/19  0713   HEMOGLOBIN  g/dL 12.9 11.8*   HEMATOCRIT % 39.7 36.4   MCV fL 95.0 95.0   WBC 10*3/mm3 6.53 4.78   RDW % 13.7 13.0   MPV fL 11.3 10.7   PLATELETS 10*3/mm3 199 191   IMM GRAN % % 0.2 0.2   NEUTROS ABS 10*3/mm3 4.38 2.66   LYMPHS ABS 10*3/mm3 1.56 1.65   MONOS ABS 10*3/mm3 0.48 0.37   EOS ABS 10*3/mm3 0.07 0.06   BASOS ABS 10*3/mm3 0.03 0.03   IMMATURE GRANS (ABS) 10*3/mm3 0.01 0.01   NRBC /100 WBC 0.0 0.0       Lab Results - Last 18 Months   Lab Units 08/12/20  1229 08/13/19  0713   GLUCOSE mg/dL 99 77   SODIUM mmol/L 141 141   POTASSIUM mmol/L 3.5 3.7   CO2 mmol/L 26.0 26.0   CHLORIDE mmol/L 106 108   ANION GAP mmol/L 9.0 7.0   CREATININE mg/dL 0.97 1.04   BUN mg/dL 5* 15   BUN / CREAT RATIO  5.2* 14.4   CALCIUM mg/dL 9.3 9.7   EGFR IF NONAFRICN AM mL/min/1.73 60* 55*   ALK PHOS U/L 34* 40   TOTAL PROTEIN g/dL 6.5 6.8   ALT (SGPT) U/L 10 18   AST (SGOT) U/L 14 32   BILIRUBIN mg/dL 0.2 0.5   ALBUMIN g/dL 4.10 3.80   GLOBULIN gm/dL 2.4 3.0       Lab Results - Last 18 Months   Lab Units 08/12/20  1229 08/13/19  0713   CEA ng/mL 2.89 1.42       Lab Results - Last 18 Months   Lab Units 08/12/20  1229 08/13/19  0713   IRON mcg/dL 59 83   TIBC mcg/dL 305 271   IRON SATURATION % 19* 31   FERRITIN ng/mL 55.19 38.10   FOLATE ng/mL >20.00 >20.00       ASSESSMENT:   1.  History of breast cancer, infiltrating ductal carcinoma:   Stage: I (pT1c, pN0, Mx; G2), ER 94% (+), AZ 7% (+), HER-2 negative.   Tumor Twain: 1.9 cm right breast mass, 0/18 lymph nodes.   Complications of Tumor: Breast pain.   Tumor Status: No clinical or biochemical evidence of disease recurrence.     2. Normocytic anemia. Stable, Hgb 12.9; MCV 95 on 08/12/2020 (prior range: Hgb 11.7 - 12.4; MCV 94.4 - 102):  a. Colonoscopy, 09/04/2015. Normal. Repeat surveillance 10 years.   b. Normal B12, normal folate, normal T4, normal TSH, normal SPIEP, normal LDH, and peripheral blood flow comprehensive report (above) showing reactive appearing T-cell large granular  lymphocytes.  3. History of Francisco's esophagus, gastritis, and hiatal hernia. Asymptomatic on proton pump inhibitor (PPI):  a. Esophagogastroduodenoscopy (EGD), 07/12/2013 (see above) with small hiatal hernia. May still be contributory to #5.   b. EGD last 08/18/2016. Report pending.  4. Tobacco addiction/excess. Has not smoked since 12/31/2017.   5. Abnormal CEA. Stable (2.89, 08/12/2020:. Chip 2.37 - Peak 13). She admits to smoking. No other clinical or radiographic (PET scan, 02/24/2010 - no abnormal uptake) correlate. Continued followup warranted.   6. Chronic obstructive pulmonary disease (COPD).   7. 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 Confirmed by renal ultrasound, 11/10/2009.   8. Iron deficiency with negative repeat colonoscopy and esophagogastroduodenoscopy (EGD) that showed gastritis (see above). Repleted iron on oral iron repletion.   9. Arthralgias, mild. Perhaps Femara associated. Well-modulated on Lyrica and Lortab.   10. (Mild) Vasomotor symptoms secondary to iatrogenic menopause. Subjectively not worse.   11. Anxiety, unchanged.   12. Lumbar spinal degenerative disk disease (MRI, 10/09/2008, and again 07/2012). Symptomatically not as well modulated by her current pain regimen. Has been seen by Dr. Goldstein and receives intraspinal injections by Dr. Walters, due on 08/23/2016.   13. Osteopenia with moderate fracture risk (see above). Already on calcium + vitamin D. Boniva intolerant. Now on Prolia every 6 months beginning 02/2018. Dr. Leung following.   14. Low GFR, 53 mL/min on 08/13/2018. Repeat 60 mL/min, 08/12/2020    PLAN:   1.  Re: Apprise of labs from 08/12/2020 with resolution of anemia, resolution of macrocytosis, normal K+, stable GFR (60), repleted B12/folate/ iron levels, slightly depressed (19%) Fe sat%, slightly depressed ferritin (55), stable chest x-ray, normal CEA, normal CA-27-29.   2.  Apprised of chest x-ray, 08/12/2020.  Impression: No acute findings.  3.  "Previously discussed labs from 08/22/2016 with macrocytosis () and 08/30/2016 with normal B12, normal folate, normal T4, normal TSH, normal SPIEP, normal LDH, and peripheral blood flow comprehensive report (above) showing ractive appearing T-cell large granular lymphocytes.   4. Applaud her success at smoking cessation (has not smoked since 12/31/2017). \"I'm not going back to smoking again.\"   5. Previously discussed the abnormal (but stable) CEA. Other than malignancy (colon, breast, lung), I again noted that increased CEA levels can also be associated with inflammation, cirrhosis, peptic ulcer, ulcerative colitis, rectal polyps, emphysema, and benign breast disease. Needs continued followup.   6. Observe.   7. Continue ongoing management per primary care physician.   8. Continue other currently identified medications.   9. Return to the [place] office in 12 months with pre-office chest x-ray, serum iron, Fe saturation, ferritin, TIBC, B12, folate, CMP, CEA, CA27-29, and CBC with differential.    MEDICAL DECISION MAKING: High Complexity   AMOUNT OF DATA: Moderate    I spent 44 total minutes, face-to-face, caring for Carol today.  Greater than 50% of this time involved counseling and/or coordination of care as documented within this note regarding the patient's illness(es), pros and cons of various treatment options, instructions and/or risk reduction.    cc: Erna Leung, DO       "

## 2020-08-20 ENCOUNTER — OFFICE VISIT (OUTPATIENT)
Dept: ONCOLOGY | Facility: CLINIC | Age: 55
End: 2020-08-20

## 2020-08-20 VITALS
BODY MASS INDEX: 23.23 KG/M2 | TEMPERATURE: 97 F | RESPIRATION RATE: 16 BRPM | HEART RATE: 70 BPM | WEIGHT: 136.1 LBS | DIASTOLIC BLOOD PRESSURE: 72 MMHG | SYSTOLIC BLOOD PRESSURE: 124 MMHG | OXYGEN SATURATION: 97 % | HEIGHT: 64 IN

## 2020-08-20 DIAGNOSIS — C50.911 MALIGNANT NEOPLASM OF RIGHT BREAST IN FEMALE, ESTROGEN RECEPTOR POSITIVE, UNSPECIFIED SITE OF BREAST (HCC): Primary | ICD-10-CM

## 2020-08-20 DIAGNOSIS — Z17.0 MALIGNANT NEOPLASM OF RIGHT BREAST IN FEMALE, ESTROGEN RECEPTOR POSITIVE, UNSPECIFIED SITE OF BREAST (HCC): Primary | ICD-10-CM

## 2020-08-20 PROCEDURE — 99215 OFFICE O/P EST HI 40 MIN: CPT | Performed by: INTERNAL MEDICINE

## 2020-09-01 ENCOUNTER — HOSPITAL ENCOUNTER (OUTPATIENT)
Dept: PAIN MANAGEMENT | Age: 55
Discharge: HOME | End: 2020-09-01
Payer: COMMERCIAL

## 2020-09-01 VITALS
TEMPERATURE: 96.9 F | SYSTOLIC BLOOD PRESSURE: 132 MMHG | DIASTOLIC BLOOD PRESSURE: 80 MMHG | OXYGEN SATURATION: 100 % | HEART RATE: 55 BPM | RESPIRATION RATE: 18 BRPM

## 2020-09-01 PROCEDURE — 2500000003 HC RX 250 WO HCPCS

## 2020-09-01 PROCEDURE — 3209999900 FLUORO FOR SURGICAL PROCEDURES

## 2020-09-01 PROCEDURE — 62323 NJX INTERLAMINAR LMBR/SAC: CPT

## 2020-09-01 PROCEDURE — 2580000003 HC RX 258

## 2020-09-01 PROCEDURE — 6360000002 HC RX W HCPCS

## 2020-09-01 RX ORDER — METHYLPREDNISOLONE ACETATE 80 MG/ML
INJECTION, SUSPENSION INTRA-ARTICULAR; INTRALESIONAL; INTRAMUSCULAR; SOFT TISSUE
Status: COMPLETED | OUTPATIENT
Start: 2020-09-01 | End: 2020-09-01

## 2020-09-01 RX ORDER — SODIUM CHLORIDE 9 MG/ML
INJECTION INTRAVENOUS
Status: COMPLETED | OUTPATIENT
Start: 2020-09-01 | End: 2020-09-01

## 2020-09-01 RX ORDER — LIDOCAINE HYDROCHLORIDE 10 MG/ML
INJECTION, SOLUTION EPIDURAL; INFILTRATION; INTRACAUDAL; PERINEURAL
Status: COMPLETED | OUTPATIENT
Start: 2020-09-01 | End: 2020-09-01

## 2020-09-01 RX ADMIN — LIDOCAINE HYDROCHLORIDE 5 ML: 10 INJECTION, SOLUTION EPIDURAL; INFILTRATION; INTRACAUDAL; PERINEURAL at 09:39

## 2020-09-01 RX ADMIN — SODIUM CHLORIDE 5 ML: 9 INJECTION INTRAVENOUS at 09:39

## 2020-09-01 RX ADMIN — METHYLPREDNISOLONE ACETATE 80 MG: 80 INJECTION, SUSPENSION INTRA-ARTICULAR; INTRALESIONAL; INTRAMUSCULAR; SOFT TISSUE at 09:39

## 2020-09-01 ASSESSMENT — PAIN SCALES - GENERAL: PAINLEVEL_OUTOF10: 8

## 2020-09-01 ASSESSMENT — PAIN DESCRIPTION - PAIN TYPE: TYPE: CHRONIC PAIN

## 2020-09-01 ASSESSMENT — PAIN DESCRIPTION - LOCATION: LOCATION: BACK

## 2020-09-01 NOTE — INTERVAL H&P NOTE
Update History & Physical    The patient's History and Physical  was reviewed with the patient and I examined the patient. There was NO CHANGE:[id number]}. The surgical site was confirmed by the patient and me. Plan: The risks, benefits, expected outcome, and alternative to the recommended procedure have been discussed with the patient. Patient understands and wants to proceed with the procedure.      Electronically signed by Kindra Palma MD on 9/1/2020 at 9:48 AM

## 2020-09-15 ENCOUNTER — HOSPITAL ENCOUNTER (OUTPATIENT)
Dept: PAIN MANAGEMENT | Age: 55
Discharge: HOME | End: 2020-09-15
Payer: COMMERCIAL

## 2020-09-15 VITALS
OXYGEN SATURATION: 100 % | HEART RATE: 65 BPM | TEMPERATURE: 96.7 F | SYSTOLIC BLOOD PRESSURE: 127 MMHG | RESPIRATION RATE: 18 BRPM | DIASTOLIC BLOOD PRESSURE: 79 MMHG

## 2020-09-15 PROCEDURE — 64493 INJ PARAVERT F JNT L/S 1 LEV: CPT

## 2020-09-15 PROCEDURE — 3209999900 FLUORO FOR SURGICAL PROCEDURES

## 2020-09-15 PROCEDURE — 2500000003 HC RX 250 WO HCPCS

## 2020-09-15 PROCEDURE — 64494 INJ PARAVERT F JNT L/S 2 LEV: CPT

## 2020-09-15 PROCEDURE — 6360000002 HC RX W HCPCS

## 2020-09-15 RX ORDER — BUPIVACAINE HYDROCHLORIDE 5 MG/ML
INJECTION, SOLUTION EPIDURAL; INTRACAUDAL
Status: COMPLETED | OUTPATIENT
Start: 2020-09-15 | End: 2020-09-15

## 2020-09-15 RX ORDER — METHYLPREDNISOLONE ACETATE 80 MG/ML
INJECTION, SUSPENSION INTRA-ARTICULAR; INTRALESIONAL; INTRAMUSCULAR; SOFT TISSUE
Status: COMPLETED | OUTPATIENT
Start: 2020-09-15 | End: 2020-09-15

## 2020-09-15 RX ORDER — LIDOCAINE HYDROCHLORIDE 10 MG/ML
INJECTION, SOLUTION EPIDURAL; INFILTRATION; INTRACAUDAL; PERINEURAL
Status: COMPLETED | OUTPATIENT
Start: 2020-09-15 | End: 2020-09-15

## 2020-09-15 RX ADMIN — LIDOCAINE HYDROCHLORIDE 20 ML: 10 INJECTION, SOLUTION EPIDURAL; INFILTRATION; INTRACAUDAL; PERINEURAL at 09:28

## 2020-09-15 RX ADMIN — METHYLPREDNISOLONE ACETATE 80 MG: 80 INJECTION, SUSPENSION INTRA-ARTICULAR; INTRALESIONAL; INTRAMUSCULAR; SOFT TISSUE at 09:29

## 2020-09-15 RX ADMIN — BUPIVACAINE HYDROCHLORIDE 5 ML: 5 INJECTION, SOLUTION EPIDURAL; INTRACAUDAL at 09:28

## 2020-09-15 ASSESSMENT — PAIN DESCRIPTION - FREQUENCY: FREQUENCY: INTERMITTENT

## 2020-09-15 ASSESSMENT — PAIN DESCRIPTION - PAIN TYPE: TYPE: CHRONIC PAIN

## 2020-09-15 ASSESSMENT — PAIN - FUNCTIONAL ASSESSMENT: PAIN_FUNCTIONAL_ASSESSMENT: PREVENTS OR INTERFERES SOME ACTIVE ACTIVITIES AND ADLS

## 2020-09-15 ASSESSMENT — PAIN SCALES - GENERAL: PAINLEVEL_OUTOF10: 9

## 2020-09-15 ASSESSMENT — PAIN DESCRIPTION - PROGRESSION: CLINICAL_PROGRESSION: GRADUALLY WORSENING

## 2020-09-15 ASSESSMENT — PAIN DESCRIPTION - DESCRIPTORS: DESCRIPTORS: THROBBING;ACHING;OTHER (COMMENT)

## 2020-09-15 ASSESSMENT — PAIN DESCRIPTION - ORIENTATION: ORIENTATION: LOWER

## 2020-09-15 ASSESSMENT — PAIN DESCRIPTION - LOCATION: LOCATION: BACK

## 2020-09-15 ASSESSMENT — PAIN DESCRIPTION - ONSET: ONSET: AWAKENED FROM SLEEP

## 2020-09-15 NOTE — INTERVAL H&P NOTE
Update History & Physical    The patient's History and Physical  was reviewed with the patient and I examined the patient. There was NO CHANGE:[id number]}. The surgical site was confirmed by the patient and me. Plan: The risks, benefits, expected outcome, and alternative to the recommended procedure have been discussed with the patient. Patient understands and wants to proceed with the procedure.      Electronically signed by Winifred Peñaloza MD on 9/15/2020 at 9:53 AM

## 2020-12-01 ENCOUNTER — HOSPITAL ENCOUNTER (OUTPATIENT)
Dept: PAIN MANAGEMENT | Age: 55
Discharge: HOME | End: 2020-12-01
Payer: COMMERCIAL

## 2020-12-01 VITALS
DIASTOLIC BLOOD PRESSURE: 78 MMHG | TEMPERATURE: 96.6 F | OXYGEN SATURATION: 99 % | HEART RATE: 64 BPM | RESPIRATION RATE: 18 BRPM | SYSTOLIC BLOOD PRESSURE: 129 MMHG

## 2020-12-01 PROCEDURE — 6360000002 HC RX W HCPCS

## 2020-12-01 PROCEDURE — 62323 NJX INTERLAMINAR LMBR/SAC: CPT

## 2020-12-01 PROCEDURE — 3209999900 FLUORO FOR SURGICAL PROCEDURES

## 2020-12-01 PROCEDURE — 2580000003 HC RX 258

## 2020-12-01 PROCEDURE — 2500000003 HC RX 250 WO HCPCS

## 2020-12-01 RX ORDER — LIDOCAINE HYDROCHLORIDE 10 MG/ML
INJECTION, SOLUTION EPIDURAL; INFILTRATION; INTRACAUDAL; PERINEURAL
Status: COMPLETED | OUTPATIENT
Start: 2020-12-01 | End: 2020-12-01

## 2020-12-01 RX ORDER — SODIUM CHLORIDE 9 MG/ML
INJECTION INTRAVENOUS
Status: COMPLETED | OUTPATIENT
Start: 2020-12-01 | End: 2020-12-01

## 2020-12-01 RX ORDER — METHYLPREDNISOLONE ACETATE 80 MG/ML
INJECTION, SUSPENSION INTRA-ARTICULAR; INTRALESIONAL; INTRAMUSCULAR; SOFT TISSUE
Status: COMPLETED | OUTPATIENT
Start: 2020-12-01 | End: 2020-12-01

## 2020-12-01 RX ADMIN — METHYLPREDNISOLONE ACETATE 80 MG: 80 INJECTION, SUSPENSION INTRA-ARTICULAR; INTRALESIONAL; INTRAMUSCULAR; SOFT TISSUE at 08:34

## 2020-12-01 RX ADMIN — SODIUM CHLORIDE 5 ML: 9 INJECTION INTRAVENOUS at 08:34

## 2020-12-01 RX ADMIN — LIDOCAINE HYDROCHLORIDE 5 ML: 10 INJECTION, SOLUTION EPIDURAL; INFILTRATION; INTRACAUDAL; PERINEURAL at 08:34

## 2020-12-01 ASSESSMENT — PAIN DESCRIPTION - FREQUENCY: FREQUENCY: CONTINUOUS

## 2020-12-01 ASSESSMENT — PAIN SCALES - GENERAL: PAINLEVEL_OUTOF10: 9

## 2020-12-01 ASSESSMENT — PAIN DESCRIPTION - PROGRESSION: CLINICAL_PROGRESSION: NOT CHANGED

## 2020-12-01 ASSESSMENT — PAIN DESCRIPTION - PAIN TYPE: TYPE: CHRONIC PAIN

## 2020-12-01 ASSESSMENT — PAIN - FUNCTIONAL ASSESSMENT: PAIN_FUNCTIONAL_ASSESSMENT: PREVENTS OR INTERFERES SOME ACTIVE ACTIVITIES AND ADLS

## 2020-12-01 ASSESSMENT — PAIN DESCRIPTION - ORIENTATION: ORIENTATION: LEFT

## 2020-12-01 ASSESSMENT — PAIN DESCRIPTION - LOCATION: LOCATION: BACK

## 2020-12-01 ASSESSMENT — PAIN DESCRIPTION - DESCRIPTORS: DESCRIPTORS: ACHING;RADIATING

## 2020-12-15 ENCOUNTER — HOSPITAL ENCOUNTER (OUTPATIENT)
Dept: PAIN MANAGEMENT | Age: 55
Discharge: HOME | End: 2020-12-15
Payer: COMMERCIAL

## 2020-12-15 VITALS
RESPIRATION RATE: 18 BRPM | TEMPERATURE: 97.7 F | OXYGEN SATURATION: 100 % | HEART RATE: 57 BPM | SYSTOLIC BLOOD PRESSURE: 127 MMHG | DIASTOLIC BLOOD PRESSURE: 79 MMHG

## 2020-12-15 PROCEDURE — 64494 INJ PARAVERT F JNT L/S 2 LEV: CPT

## 2020-12-15 PROCEDURE — 3209999900 FLUORO FOR SURGICAL PROCEDURES

## 2020-12-15 PROCEDURE — 6360000002 HC RX W HCPCS

## 2020-12-15 PROCEDURE — 64493 INJ PARAVERT F JNT L/S 1 LEV: CPT

## 2020-12-15 PROCEDURE — 2500000003 HC RX 250 WO HCPCS

## 2020-12-15 RX ORDER — BUPIVACAINE HYDROCHLORIDE 5 MG/ML
INJECTION, SOLUTION EPIDURAL; INTRACAUDAL
Status: COMPLETED | OUTPATIENT
Start: 2020-12-15 | End: 2020-12-15

## 2020-12-15 RX ORDER — METHYLPREDNISOLONE ACETATE 80 MG/ML
INJECTION, SUSPENSION INTRA-ARTICULAR; INTRALESIONAL; INTRAMUSCULAR; SOFT TISSUE
Status: COMPLETED | OUTPATIENT
Start: 2020-12-15 | End: 2020-12-15

## 2020-12-15 RX ORDER — LIDOCAINE HYDROCHLORIDE 10 MG/ML
INJECTION, SOLUTION EPIDURAL; INFILTRATION; INTRACAUDAL; PERINEURAL
Status: COMPLETED | OUTPATIENT
Start: 2020-12-15 | End: 2020-12-15

## 2020-12-15 RX ADMIN — METHYLPREDNISOLONE ACETATE 80 MG: 80 INJECTION, SUSPENSION INTRA-ARTICULAR; INTRALESIONAL; INTRAMUSCULAR; SOFT TISSUE at 09:48

## 2020-12-15 RX ADMIN — BUPIVACAINE HYDROCHLORIDE 5 ML: 5 INJECTION, SOLUTION EPIDURAL; INTRACAUDAL at 09:48

## 2020-12-15 RX ADMIN — LIDOCAINE HYDROCHLORIDE 20 ML: 10 INJECTION, SOLUTION EPIDURAL; INFILTRATION; INTRACAUDAL; PERINEURAL at 09:48

## 2020-12-15 ASSESSMENT — PAIN DESCRIPTION - PAIN TYPE: TYPE: CHRONIC PAIN

## 2020-12-15 ASSESSMENT — PAIN DESCRIPTION - DESCRIPTORS: DESCRIPTORS: ACHING;STABBING

## 2020-12-15 ASSESSMENT — PAIN DESCRIPTION - FREQUENCY: FREQUENCY: CONTINUOUS

## 2020-12-15 ASSESSMENT — PAIN - FUNCTIONAL ASSESSMENT: PAIN_FUNCTIONAL_ASSESSMENT: PREVENTS OR INTERFERES SOME ACTIVE ACTIVITIES AND ADLS

## 2020-12-15 ASSESSMENT — PAIN SCALES - GENERAL: PAINLEVEL_OUTOF10: 9

## 2020-12-15 ASSESSMENT — PAIN DESCRIPTION - LOCATION: LOCATION: BACK;NECK

## 2020-12-15 ASSESSMENT — PAIN DESCRIPTION - ORIENTATION: ORIENTATION: MID;LOWER

## 2020-12-15 ASSESSMENT — PAIN DESCRIPTION - PROGRESSION: CLINICAL_PROGRESSION: NOT CHANGED

## 2020-12-15 NOTE — INTERVAL H&P NOTE
Update History & Physical    The patient's History and Physical  was reviewed with the patient and I examined the patient. There was  NO CHANGE:[id number]}. The surgical site was confirmed by the patient and me. Plan: The risks, benefits, expected outcome, and alternative to the recommended procedure have been discussed with the patient. Patient understands and wants to proceed with the procedure.      Electronically signed by Ned Galvan MD on 12/15/2020 at 9:48 AM

## 2021-03-02 ENCOUNTER — HOSPITAL ENCOUNTER (OUTPATIENT)
Dept: PAIN MANAGEMENT | Age: 56
Discharge: HOME | End: 2021-03-02
Payer: COMMERCIAL

## 2021-03-02 VITALS
OXYGEN SATURATION: 100 % | TEMPERATURE: 97.1 F | DIASTOLIC BLOOD PRESSURE: 86 MMHG | HEART RATE: 56 BPM | RESPIRATION RATE: 16 BRPM | SYSTOLIC BLOOD PRESSURE: 153 MMHG

## 2021-03-02 DIAGNOSIS — R52 PAIN MANAGEMENT: ICD-10-CM

## 2021-03-02 PROCEDURE — 6360000002 HC RX W HCPCS

## 2021-03-02 PROCEDURE — 64494 INJ PARAVERT F JNT L/S 2 LEV: CPT

## 2021-03-02 PROCEDURE — 2500000003 HC RX 250 WO HCPCS

## 2021-03-02 PROCEDURE — 64493 INJ PARAVERT F JNT L/S 1 LEV: CPT

## 2021-03-02 PROCEDURE — 3209999900 FLUORO FOR SURGICAL PROCEDURES

## 2021-03-02 RX ORDER — LIDOCAINE HYDROCHLORIDE 10 MG/ML
INJECTION, SOLUTION EPIDURAL; INFILTRATION; INTRACAUDAL; PERINEURAL
Status: COMPLETED | OUTPATIENT
Start: 2021-03-02 | End: 2021-03-02

## 2021-03-02 RX ORDER — METHYLPREDNISOLONE ACETATE 80 MG/ML
INJECTION, SUSPENSION INTRA-ARTICULAR; INTRALESIONAL; INTRAMUSCULAR; SOFT TISSUE
Status: COMPLETED | OUTPATIENT
Start: 2021-03-02 | End: 2021-03-02

## 2021-03-02 RX ORDER — BUPIVACAINE HYDROCHLORIDE 5 MG/ML
INJECTION, SOLUTION EPIDURAL; INTRACAUDAL
Status: COMPLETED | OUTPATIENT
Start: 2021-03-02 | End: 2021-03-02

## 2021-03-02 RX ADMIN — BUPIVACAINE HYDROCHLORIDE 5 ML: 5 INJECTION, SOLUTION EPIDURAL; INTRACAUDAL at 09:14

## 2021-03-02 RX ADMIN — LIDOCAINE HYDROCHLORIDE 20 ML: 10 INJECTION, SOLUTION EPIDURAL; INFILTRATION; INTRACAUDAL; PERINEURAL at 09:13

## 2021-03-02 RX ADMIN — METHYLPREDNISOLONE ACETATE 80 MG: 80 INJECTION, SUSPENSION INTRA-ARTICULAR; INTRALESIONAL; INTRAMUSCULAR; SOFT TISSUE at 09:14

## 2021-03-02 ASSESSMENT — PAIN SCALES - GENERAL: PAINLEVEL_OUTOF10: 9

## 2021-03-02 ASSESSMENT — PAIN DESCRIPTION - ORIENTATION: ORIENTATION: LOWER

## 2021-03-02 NOTE — INTERVAL H&P NOTE
Update History & Physical    The patient's History and Physical  was reviewed with the patient and I examined the patient. There was  NO CHANGE:[id number]}. The surgical site was confirmed by the patient and me. Plan: The risks, benefits, expected outcome, and alternative to the recommended procedure have been discussed with the patient. Patient understands and wants to proceed with the procedure.      Electronically signed by Nick Delgado MD on 3/2/2021 at 9:12 AM

## 2021-03-16 ENCOUNTER — HOSPITAL ENCOUNTER (OUTPATIENT)
Dept: PAIN MANAGEMENT | Age: 56
Discharge: HOME | End: 2021-03-16
Payer: COMMERCIAL

## 2021-03-16 VITALS
HEART RATE: 59 BPM | TEMPERATURE: 97.3 F | RESPIRATION RATE: 18 BRPM | SYSTOLIC BLOOD PRESSURE: 142 MMHG | DIASTOLIC BLOOD PRESSURE: 81 MMHG | OXYGEN SATURATION: 96 %

## 2021-03-16 DIAGNOSIS — M51.36 LUMBAR DEGENERATIVE DISC DISEASE: ICD-10-CM

## 2021-03-16 PROCEDURE — 62323 NJX INTERLAMINAR LMBR/SAC: CPT

## 2021-03-16 PROCEDURE — 2580000003 HC RX 258

## 2021-03-16 PROCEDURE — 3209999900 FLUORO FOR SURGICAL PROCEDURES

## 2021-03-16 PROCEDURE — 6360000002 HC RX W HCPCS

## 2021-03-16 PROCEDURE — 2500000003 HC RX 250 WO HCPCS

## 2021-03-16 RX ORDER — METHYLPREDNISOLONE ACETATE 80 MG/ML
INJECTION, SUSPENSION INTRA-ARTICULAR; INTRALESIONAL; INTRAMUSCULAR; SOFT TISSUE
Status: COMPLETED | OUTPATIENT
Start: 2021-03-16 | End: 2021-03-16

## 2021-03-16 RX ORDER — LIDOCAINE HYDROCHLORIDE 10 MG/ML
INJECTION, SOLUTION EPIDURAL; INFILTRATION; INTRACAUDAL; PERINEURAL
Status: COMPLETED | OUTPATIENT
Start: 2021-03-16 | End: 2021-03-16

## 2021-03-16 RX ORDER — SODIUM CHLORIDE 9 MG/ML
INJECTION INTRAVENOUS
Status: COMPLETED | OUTPATIENT
Start: 2021-03-16 | End: 2021-03-16

## 2021-03-16 RX ADMIN — SODIUM CHLORIDE 5 ML: 9 INJECTION INTRAVENOUS at 09:21

## 2021-03-16 RX ADMIN — LIDOCAINE HYDROCHLORIDE 5 ML: 10 INJECTION, SOLUTION EPIDURAL; INFILTRATION; INTRACAUDAL; PERINEURAL at 09:21

## 2021-03-16 RX ADMIN — METHYLPREDNISOLONE ACETATE 80 MG: 80 INJECTION, SUSPENSION INTRA-ARTICULAR; INTRALESIONAL; INTRAMUSCULAR; SOFT TISSUE at 09:21

## 2021-03-16 ASSESSMENT — PAIN DESCRIPTION - ORIENTATION: ORIENTATION: LOWER

## 2021-03-16 ASSESSMENT — PAIN DESCRIPTION - PAIN TYPE: TYPE: CHRONIC PAIN

## 2021-03-16 NOTE — INTERVAL H&P NOTE
Update History & Physical    The patient's History and Physical  was reviewed with the patient and I examined the patient. There was  NO CHANGE:[id number]}. The surgical site was confirmed by the patient and me. Plan: The risks, benefits, expected outcome, and alternative to the recommended procedure have been discussed with the patient. Patient understands and wants to proceed with the procedure.      Electronically signed by Danyelle Shelton MD on 3/16/2021 at 9:19 AM

## 2021-05-25 ENCOUNTER — HOSPITAL ENCOUNTER (OUTPATIENT)
Dept: PAIN MANAGEMENT | Age: 56
Discharge: HOME | End: 2021-05-25
Payer: COMMERCIAL

## 2021-05-25 VITALS
TEMPERATURE: 96.2 F | RESPIRATION RATE: 18 BRPM | HEART RATE: 57 BPM | OXYGEN SATURATION: 100 % | DIASTOLIC BLOOD PRESSURE: 82 MMHG | SYSTOLIC BLOOD PRESSURE: 149 MMHG

## 2021-05-25 DIAGNOSIS — R52 PAIN MANAGEMENT: ICD-10-CM

## 2021-05-25 PROCEDURE — 2500000003 HC RX 250 WO HCPCS

## 2021-05-25 PROCEDURE — 2580000003 HC RX 258

## 2021-05-25 PROCEDURE — 6360000002 HC RX W HCPCS

## 2021-05-25 PROCEDURE — 62323 NJX INTERLAMINAR LMBR/SAC: CPT

## 2021-05-25 PROCEDURE — 3209999900 FLUORO FOR SURGICAL PROCEDURES

## 2021-05-25 RX ORDER — SODIUM CHLORIDE 9 MG/ML
INJECTION INTRAVENOUS
Status: COMPLETED | OUTPATIENT
Start: 2021-05-25 | End: 2021-05-25

## 2021-05-25 RX ORDER — LIDOCAINE HYDROCHLORIDE 10 MG/ML
INJECTION, SOLUTION EPIDURAL; INFILTRATION; INTRACAUDAL; PERINEURAL
Status: COMPLETED | OUTPATIENT
Start: 2021-05-25 | End: 2021-05-25

## 2021-05-25 RX ORDER — METHYLPREDNISOLONE ACETATE 80 MG/ML
INJECTION, SUSPENSION INTRA-ARTICULAR; INTRALESIONAL; INTRAMUSCULAR; SOFT TISSUE
Status: COMPLETED | OUTPATIENT
Start: 2021-05-25 | End: 2021-05-25

## 2021-05-25 RX ADMIN — METHYLPREDNISOLONE ACETATE 80 MG: 80 INJECTION, SUSPENSION INTRA-ARTICULAR; INTRALESIONAL; INTRAMUSCULAR; SOFT TISSUE at 12:16

## 2021-05-25 RX ADMIN — SODIUM CHLORIDE 5 ML: 9 INJECTION INTRAVENOUS at 12:16

## 2021-05-25 RX ADMIN — LIDOCAINE HYDROCHLORIDE 5 ML: 10 INJECTION, SOLUTION EPIDURAL; INFILTRATION; INTRACAUDAL; PERINEURAL at 12:16

## 2021-05-25 ASSESSMENT — PAIN - FUNCTIONAL ASSESSMENT: PAIN_FUNCTIONAL_ASSESSMENT: 0-10

## 2021-05-25 NOTE — INTERVAL H&P NOTE
Update History & Physical    The patient's History and Physical  was reviewed with the patient and I examined the patient. There was NO CHANGE:[id number]}. The surgical site was confirmed by the patient and me. Plan: The risks, benefits, expected outcome, and alternative to the recommended procedure have been discussed with the patient. Patient understands and wants to proceed with the procedure.      Electronically signed by Olivia Robert MD on 5/25/2021 at 12:12 PM

## 2021-06-08 ENCOUNTER — HOSPITAL ENCOUNTER (OUTPATIENT)
Dept: PAIN MANAGEMENT | Age: 56
Discharge: HOME | End: 2021-06-08
Payer: COMMERCIAL

## 2021-06-08 VITALS
RESPIRATION RATE: 16 BRPM | TEMPERATURE: 96.1 F | OXYGEN SATURATION: 100 % | HEART RATE: 64 BPM | DIASTOLIC BLOOD PRESSURE: 69 MMHG | SYSTOLIC BLOOD PRESSURE: 127 MMHG

## 2021-06-08 VITALS
HEART RATE: 54 BPM | RESPIRATION RATE: 18 BRPM | SYSTOLIC BLOOD PRESSURE: 145 MMHG | DIASTOLIC BLOOD PRESSURE: 79 MMHG | OXYGEN SATURATION: 97 %

## 2021-06-08 DIAGNOSIS — M47.816 LUMBAR FACET JOINT SYNDROME: ICD-10-CM

## 2021-06-08 PROCEDURE — 64493 INJ PARAVERT F JNT L/S 1 LEV: CPT

## 2021-06-08 PROCEDURE — 3209999900 FLUORO FOR SURGICAL PROCEDURES

## 2021-06-08 PROCEDURE — 6360000002 HC RX W HCPCS

## 2021-06-08 PROCEDURE — 64494 INJ PARAVERT F JNT L/S 2 LEV: CPT

## 2021-06-08 PROCEDURE — 2500000003 HC RX 250 WO HCPCS

## 2021-06-08 RX ORDER — BUPIVACAINE HYDROCHLORIDE 5 MG/ML
INJECTION, SOLUTION EPIDURAL; INTRACAUDAL
Status: COMPLETED | OUTPATIENT
Start: 2021-06-08 | End: 2021-06-08

## 2021-06-08 RX ORDER — LIDOCAINE HYDROCHLORIDE 10 MG/ML
INJECTION, SOLUTION EPIDURAL; INFILTRATION; INTRACAUDAL; PERINEURAL
Status: COMPLETED | OUTPATIENT
Start: 2021-06-08 | End: 2021-06-08

## 2021-06-08 RX ORDER — METHYLPREDNISOLONE ACETATE 80 MG/ML
INJECTION, SUSPENSION INTRA-ARTICULAR; INTRALESIONAL; INTRAMUSCULAR; SOFT TISSUE
Status: COMPLETED | OUTPATIENT
Start: 2021-06-08 | End: 2021-06-08

## 2021-06-08 RX ADMIN — BUPIVACAINE HYDROCHLORIDE 5 ML: 5 INJECTION, SOLUTION EPIDURAL; INTRACAUDAL at 10:35

## 2021-06-08 RX ADMIN — METHYLPREDNISOLONE ACETATE 80 MG: 80 INJECTION, SUSPENSION INTRA-ARTICULAR; INTRALESIONAL; INTRAMUSCULAR; SOFT TISSUE at 10:35

## 2021-06-08 RX ADMIN — LIDOCAINE HYDROCHLORIDE 20 ML: 10 INJECTION, SOLUTION EPIDURAL; INFILTRATION; INTRACAUDAL; PERINEURAL at 10:35

## 2021-06-08 ASSESSMENT — PAIN DESCRIPTION - PAIN TYPE: TYPE: CHRONIC PAIN

## 2021-06-08 ASSESSMENT — PAIN DESCRIPTION - LOCATION: LOCATION: BACK

## 2021-06-08 ASSESSMENT — PAIN SCALES - GENERAL: PAINLEVEL_OUTOF10: 9

## 2021-06-08 ASSESSMENT — PAIN DESCRIPTION - ORIENTATION: ORIENTATION: LOWER

## 2021-06-08 ASSESSMENT — PAIN DESCRIPTION - FREQUENCY: FREQUENCY: CONTINUOUS

## 2021-06-08 ASSESSMENT — PAIN DESCRIPTION - DESCRIPTORS: DESCRIPTORS: CONSTANT

## 2021-06-08 NOTE — INTERVAL H&P NOTE
Update History & Physical    The patient's History and Physical  was reviewed with the patient and I examined the patient. There was  NO CHANGE:[id number]}. The surgical site was confirmed by the patient and me. Plan: The risks, benefits, expected outcome, and alternative to the recommended procedure have been discussed with the patient. Patient understands and wants to proceed with the procedure.      Electronically signed by Edgar Manuel MD on 6/8/2021 at 10:32 AM

## 2021-08-10 ENCOUNTER — HOSPITAL ENCOUNTER (OUTPATIENT)
Dept: PAIN MANAGEMENT | Age: 56
Discharge: HOME | End: 2021-08-10
Payer: COMMERCIAL

## 2021-08-10 VITALS
OXYGEN SATURATION: 100 % | HEART RATE: 50 BPM | DIASTOLIC BLOOD PRESSURE: 72 MMHG | TEMPERATURE: 97.5 F | SYSTOLIC BLOOD PRESSURE: 124 MMHG | RESPIRATION RATE: 18 BRPM

## 2021-08-10 DIAGNOSIS — R52 PAIN MANAGEMENT: ICD-10-CM

## 2021-08-10 PROCEDURE — 64494 INJ PARAVERT F JNT L/S 2 LEV: CPT

## 2021-08-10 PROCEDURE — 2500000003 HC RX 250 WO HCPCS

## 2021-08-10 PROCEDURE — 6360000002 HC RX W HCPCS

## 2021-08-10 PROCEDURE — 64493 INJ PARAVERT F JNT L/S 1 LEV: CPT

## 2021-08-10 PROCEDURE — 3209999900 FLUORO FOR SURGICAL PROCEDURES

## 2021-08-10 RX ORDER — BUPIVACAINE HYDROCHLORIDE 5 MG/ML
5 INJECTION, SOLUTION EPIDURAL; INTRACAUDAL ONCE
Status: DISCONTINUED | OUTPATIENT
Start: 2021-08-10 | End: 2021-08-12 | Stop reason: HOSPADM

## 2021-08-10 RX ORDER — LIDOCAINE HYDROCHLORIDE 10 MG/ML
20 INJECTION, SOLUTION EPIDURAL; INFILTRATION; INTRACAUDAL; PERINEURAL ONCE
Status: DISCONTINUED | OUTPATIENT
Start: 2021-08-10 | End: 2021-08-12 | Stop reason: HOSPADM

## 2021-08-10 RX ORDER — METHYLPREDNISOLONE ACETATE 80 MG/ML
80 INJECTION, SUSPENSION INTRA-ARTICULAR; INTRALESIONAL; INTRAMUSCULAR; SOFT TISSUE ONCE
Status: DISCONTINUED | OUTPATIENT
Start: 2021-08-10 | End: 2021-08-12 | Stop reason: HOSPADM

## 2021-08-10 ASSESSMENT — PAIN DESCRIPTION - ORIENTATION: ORIENTATION: LOWER

## 2021-08-10 ASSESSMENT — PAIN - FUNCTIONAL ASSESSMENT: PAIN_FUNCTIONAL_ASSESSMENT: 0-10

## 2021-08-10 ASSESSMENT — PAIN DESCRIPTION - PAIN TYPE: TYPE: CHRONIC PAIN

## 2021-08-10 ASSESSMENT — PAIN SCALES - GENERAL: PAINLEVEL_OUTOF10: 10

## 2021-08-10 ASSESSMENT — PAIN DESCRIPTION - LOCATION: LOCATION: BACK

## 2021-08-10 NOTE — INTERVAL H&P NOTE
Update History & Physical    The patient's History and Physical  was reviewed with the patient and I examined the patient. There was NO CHANGE:[id number]}. The surgical site was confirmed by the patient and me. Plan: The risks, benefits, expected outcome, and alternative to the recommended procedure have been discussed with the patient. Patient understands and wants to proceed with the procedure.      Electronically signed by Kenneth Oquendo MD on 8/10/2021 at 8:21 AM

## 2021-08-11 ENCOUNTER — LAB (OUTPATIENT)
Dept: LAB | Facility: HOSPITAL | Age: 56
End: 2021-08-11

## 2021-08-11 ENCOUNTER — TRANSCRIBE ORDERS (OUTPATIENT)
Dept: ADMINISTRATIVE | Facility: HOSPITAL | Age: 56
End: 2021-08-11

## 2021-08-11 ENCOUNTER — HOSPITAL ENCOUNTER (OUTPATIENT)
Dept: GENERAL RADIOLOGY | Facility: HOSPITAL | Age: 56
Discharge: HOME | End: 2021-08-11

## 2021-08-11 ENCOUNTER — HOSPITAL ENCOUNTER (OUTPATIENT)
Dept: CARDIOLOGY | Facility: HOSPITAL | Age: 56
Discharge: HOME | End: 2021-08-11

## 2021-08-11 DIAGNOSIS — Z17.0 MALIGNANT NEOPLASM OF RIGHT BREAST IN FEMALE, ESTROGEN RECEPTOR POSITIVE, UNSPECIFIED SITE OF BREAST (HCC): ICD-10-CM

## 2021-08-11 DIAGNOSIS — Z51.81 ENCOUNTER FOR THERAPEUTIC DRUG MONITORING: ICD-10-CM

## 2021-08-11 DIAGNOSIS — Z51.81 ENCOUNTER FOR THERAPEUTIC DRUG MONITORING: Primary | ICD-10-CM

## 2021-08-11 DIAGNOSIS — C50.911 MALIGNANT NEOPLASM OF RIGHT BREAST IN FEMALE, ESTROGEN RECEPTOR POSITIVE, UNSPECIFIED SITE OF BREAST (HCC): ICD-10-CM

## 2021-08-11 LAB
ALBUMIN SERPL-MCNC: 4.3 G/DL (ref 3.5–5.2)
ALBUMIN/GLOB SERPL: 1.8 G/DL
ALP SERPL-CCNC: 48 U/L (ref 39–117)
ALT SERPL W P-5'-P-CCNC: 10 U/L (ref 1–33)
ANION GAP SERPL CALCULATED.3IONS-SCNC: 9 MMOL/L (ref 5–15)
AST SERPL-CCNC: 14 U/L (ref 1–32)
BASOPHILS # BLD AUTO: 0.03 10*3/MM3 (ref 0–0.2)
BASOPHILS NFR BLD AUTO: 0.5 % (ref 0–1.5)
BILIRUB SERPL-MCNC: 0.2 MG/DL (ref 0–1.2)
BUN SERPL-MCNC: 7 MG/DL (ref 6–20)
BUN/CREAT SERPL: 7 (ref 7–25)
CALCIUM SPEC-SCNC: 9.7 MG/DL (ref 8.6–10.5)
CEA SERPL-MCNC: 2.77 NG/ML
CHLORIDE SERPL-SCNC: 107 MMOL/L (ref 98–107)
CO2 SERPL-SCNC: 27 MMOL/L (ref 22–29)
CREAT SERPL-MCNC: 1 MG/DL (ref 0.57–1)
DEPRECATED RDW RBC AUTO: 47 FL (ref 37–54)
EOSINOPHIL # BLD AUTO: 0.03 10*3/MM3 (ref 0–0.4)
EOSINOPHIL NFR BLD AUTO: 0.5 % (ref 0.3–6.2)
ERYTHROCYTE [DISTWIDTH] IN BLOOD BY AUTOMATED COUNT: 13.2 % (ref 12.3–15.4)
FERRITIN SERPL-MCNC: 54.01 NG/ML (ref 13–150)
FOLATE SERPL-MCNC: >20 NG/ML (ref 4.78–24.2)
GFR SERPL CREATININE-BSD FRML MDRD: 57 ML/MIN/1.73
GLOBULIN UR ELPH-MCNC: 2.4 GM/DL
GLUCOSE SERPL-MCNC: 95 MG/DL (ref 65–99)
HCT VFR BLD AUTO: 38.6 % (ref 34–46.6)
HGB BLD-MCNC: 11.8 G/DL (ref 12–15.9)
IMM GRANULOCYTES # BLD AUTO: 0.02 10*3/MM3 (ref 0–0.05)
IMM GRANULOCYTES NFR BLD AUTO: 0.3 % (ref 0–0.5)
IRON 24H UR-MRATE: 65 MCG/DL (ref 37–145)
IRON SATN MFR SERPL: 21 % (ref 20–50)
LYMPHOCYTES # BLD AUTO: 1.54 10*3/MM3 (ref 0.7–3.1)
LYMPHOCYTES NFR BLD AUTO: 23.2 % (ref 19.6–45.3)
MCH RBC QN AUTO: 29.2 PG (ref 26.6–33)
MCHC RBC AUTO-ENTMCNC: 30.6 G/DL (ref 31.5–35.7)
MCV RBC AUTO: 95.5 FL (ref 79–97)
MONOCYTES # BLD AUTO: 0.37 10*3/MM3 (ref 0.1–0.9)
MONOCYTES NFR BLD AUTO: 5.6 % (ref 5–12)
NEUTROPHILS NFR BLD AUTO: 4.65 10*3/MM3 (ref 1.7–7)
NEUTROPHILS NFR BLD AUTO: 69.9 % (ref 42.7–76)
NRBC BLD AUTO-RTO: 0 /100 WBC (ref 0–0.2)
PLATELET # BLD AUTO: 232 10*3/MM3 (ref 140–450)
PMV BLD AUTO: 11.3 FL (ref 6–12)
POTASSIUM SERPL-SCNC: 3.4 MMOL/L (ref 3.5–5.2)
PROT SERPL-MCNC: 6.7 G/DL (ref 6–8.5)
RBC # BLD AUTO: 4.04 10*6/MM3 (ref 3.77–5.28)
SODIUM SERPL-SCNC: 143 MMOL/L (ref 136–145)
TIBC SERPL-MCNC: 314 MCG/DL (ref 298–536)
TRANSFERRIN SERPL-MCNC: 211 MG/DL (ref 200–360)
VIT B12 BLD-MCNC: 591 PG/ML (ref 211–946)
WBC # BLD AUTO: 6.64 10*3/MM3 (ref 3.4–10.8)

## 2021-08-11 PROCEDURE — 82746 ASSAY OF FOLIC ACID SERUM: CPT

## 2021-08-11 PROCEDURE — 82378 CARCINOEMBRYONIC ANTIGEN: CPT

## 2021-08-11 PROCEDURE — 82728 ASSAY OF FERRITIN: CPT

## 2021-08-11 PROCEDURE — 82607 VITAMIN B-12: CPT

## 2021-08-11 PROCEDURE — 93005 ELECTROCARDIOGRAM TRACING: CPT | Performed by: NURSE PRACTITIONER

## 2021-08-11 PROCEDURE — 71046 X-RAY EXAM CHEST 2 VIEWS: CPT

## 2021-08-11 PROCEDURE — 83540 ASSAY OF IRON: CPT

## 2021-08-11 PROCEDURE — 93010 ELECTROCARDIOGRAM REPORT: CPT | Performed by: INTERNAL MEDICINE

## 2021-08-11 PROCEDURE — 36415 COLL VENOUS BLD VENIPUNCTURE: CPT

## 2021-08-11 PROCEDURE — 80053 COMPREHEN METABOLIC PANEL: CPT

## 2021-08-11 PROCEDURE — 85025 COMPLETE CBC W/AUTO DIFF WBC: CPT

## 2021-08-11 PROCEDURE — 86300 IMMUNOASSAY TUMOR CA 15-3: CPT

## 2021-08-11 PROCEDURE — 84466 ASSAY OF TRANSFERRIN: CPT

## 2021-08-12 ENCOUNTER — TELEPHONE (OUTPATIENT)
Dept: ONCOLOGY | Facility: CLINIC | Age: 56
End: 2021-08-12

## 2021-08-12 LAB
CANCER AG27-29 SERPL-ACNC: 21.2 U/ML (ref 0–38.6)
QT INTERVAL: 416 MS
QTC INTERVAL: 418 MS

## 2021-08-12 RX ORDER — POTASSIUM CHLORIDE 1500 MG/1
20 TABLET, FILM COATED, EXTENDED RELEASE ORAL 3 TIMES DAILY
Qty: 9 TABLET | Refills: 0 | Status: SHIPPED | OUTPATIENT
Start: 2021-08-12 | End: 2021-08-12

## 2021-08-12 RX ORDER — POTASSIUM CHLORIDE 20 MEQ/1
20 TABLET, EXTENDED RELEASE ORAL 3 TIMES DAILY
Qty: 9 TABLET | Refills: 0 | Status: SHIPPED | OUTPATIENT
Start: 2021-08-12 | End: 2021-08-15

## 2021-08-12 NOTE — TELEPHONE ENCOUNTER
----- Message from Dave Gonzalez MD sent at 8/11/2021  5:35 PM CDT -----  Potassium 3.4-please: K-Dur 20 mEq p.o. 3 times daily x3 days.  Thank you

## 2021-08-12 NOTE — TELEPHONE ENCOUNTER
Patient called back and she is agreeable to taking potassium. Script to be sent to [facility] in [place].

## 2021-08-15 NOTE — PROGRESS NOTES
[facility] HEMATOLOGY AND ONCOLOGY  [address]  [place] [address] [phone]    Patient Name: Carol Rodriguez  Encounter Date: 08/19/2021  YOB: 1965  Patient Number: [id number]    REASON FOR VISIT: Ms. Carol Rodriguez is a 56-year-old female who returns in follow-up of breast cancer. She is seen nearly 164 months since completion of adjuvant chemotherapy and over 115 months since cessation of adjuvant Femara (completed 60 months of therapy). The patient is here alone.      I have reviewed the HPI and verified with the patient the accuracy of it. No changes to interval history since the information was documented. Dave Gonzalez MD 08/19/21    DIAGNOSTIC ABNORMALITIES: Breast carcinoma   1. Unilateral mammogram, right breast, 08/23/2006, [facility]. Approximately a 2 cm spiculated area in the upper-outer quadrant of the right breast at the 10 o'clock to 11 o'clock position.  2. Right breast ultrasound, 08/28/2006, [facility]. A solid nodule at 10 o’clock in the right breast measuring up to 2 cm is felt to correspond to the abnormality seen on the mammograms, including spot views today. There are also small cysts in this region. Excisional biopsy should be considered.  3. Bone scan, 09/07/2006, [facility]. Small areas of vaguely increased activity in the upper thoracic area and at the lumbosacral junction, probably arthritic. No definite metastatic change is identified.  4. CT of the brain, 09/07/2006, [facility]. Chronic sinus disease involving the left half of the sphenoid sinus. Otherwise unremarkable enhanced CT of the brain with no convincing evidence of metastatic disease.  5. CT of the chest, 09/07/2006, [facility] small cortical cyst involving the upper pole of the right kidney. Otherwise unremarkable CT of the chest with no evidence of metastatic disease.  6. CT of the pelvis, 09/07/2006,  [facility]. Sclerotic lesion involving the right femoral head which I would favor to represent a bony enostosis. Considering the lack of other metastatic disease, I feel a solitary bony lesion to the right femoral head would be an unusual manifestation but warranting followup nonetheless. Bone scan may be helpful for further evaluation.  7. Unilateral mammogram of breast, 09/11/2006, [facility]. Lobular areas of density in the left breast consistent with several left breast masses. Ultrasound was performed. There are no spiculated densities. No suspicious microcalcifications are identified. Screening exam of the left breast is recommended in 1 year.  8. Right breast biopsy, 09/29/2006, [facility]. Infiltrating mammary carcinoma. Part A: Primary tumor diagnosis is infiltrating ductal carcinoma. Part B: Right axillary lymph node was benign. Part C: Right breast tissue showed right modified radical mastectomy with deep margin without obvious histopathologic alteration. No residual tumor present at previous biopsy site. Fibrocystic mastopathy including fibrosis, adenosis, duct ectasia, papillary apocrine metaplasia, sclerosing adenosis, multiple cysts, and intraductal microcalcifications. Seventeen (17) benign lymph nodes. Part D: Left breast tissue reveals no tumor present. Fibrocystic mastopathy including fibrosis, adenosis, duct ectasia, multiple cysts, apocrine metaplasia, ruptured tension cysts, periductal chronic inflammation, intraductal hyperplasia of usual type, and intraductal microcalcifications (multiple). Part E: Right breast tissue shows portions of benign loose fibrofatty tissue. Part F: Left breast tissue show portions of benign loose fibrofatty tissue and fatty breast tissue. ER 94% (+), NM 7% (+), HER-2 negative.  9. Labs, 10/10/2006: CMP was unrevealing, CEA of 1.3, and CA27-29 of 14.1.  10. 2D echo, 10/17/2006, [facility]. Normal left ventricular chamber size and wall motion.  The left ventricular ejection fraction is felt to be in excess of 60%. No pericardial effusion or intracavitary thrombus noted. No valvular abnormalities are noted. Spectral and color flow Doppler studies reveal trivial tricuspid regurgitation. The right ventricular systolic pressure is normal and was estimated at 19 mmHg.  11. Labs, 08/22/2016: Hemoglobin 12.4, hematocrit 39.4,  (elevated).  12. Labs, 08/30/2016:  (313 to 615), TSH 1.9, B12 of 1055, folate greater than 20, T4 9.6 (4.9 to 12.3), SPIEP: IgG 527 (791 to 1643), IgM 261, IgA 76.6 (each normal). DAVION: No monoclonal immunoglobulins detected.  13. Comprehensive blood report, 08/30/2016. MILD ANEMIA; INCREASED T-CELL LGL'S. COMPREHENSIVE DIAGNOSIS. Review of peripheral blood smear: Mild anemia with borderline macrocytic features. Normal white blood cell and platelet counts and morphology. Mildly expanded population of reactive appearing T-cell large granular lymphocytes detected on flow cytometric studies. COMPREHENSIVE COMMENT. The exact etiology of this patient's mild anemia with borderline macrocytic features is not apparent from review of the specimen. Given the lack of significant atypia and normal white blood cell and platelet values, myelodysplasia appears less likely. Non-neoplastic etiologies to consider include liver disease, thyroid disease, immune mediated disorders, vitamin/nutritional deficiencies and drugs/toxins. Correlation recommended. Flow cytometry study after erythrolysis reveals no circulating myeloid or lymphoid blasts. Myeloid and monocytic lineages are represented by mature granulocytes and monocytes. Basophils and eosinophils are not increased. It must be noted that the diagnosis of a myeloid stem cell disorder, if clinically suspected, is best made using bone marrow specimens. Peripheral blood is not always representative of abnormalities involving the bone marrow. The B cells show no evidence of clonality or  antigenic aberrancy. The majority of the T cells show normal expression of the pan T-cell antigens. A mildly expanded population of T-cell LGL is detected accounting for approximately 35.9% of the total T cells and 12.6% of the total white blood cells. The overall immunophenotype of the T-LGL and a normal CD4:CD8 ratio favors reactive process over T-cell neoplasia. The presence of these large granular lymphocytes raises the possibility of an immune-mediated etiology for this patient's anemia.    PREVIOUS INTERVENTIONS: Breast carcinoma.   1. Adriamycin, 11/02/2006 through 12/14/2006. Four cycles completed.  2. Taxotere, 12/28/2006 through 02/09/2007. Four cycles completed.  3. Cytoxan, 02/22/2007 through 04/05/2007. Four cycles completed.  4. Femara 2.5 mg daily, 04/12/2007 through 04/12/2012.    DIAGNOSTIC ABNORMALITIES: Anemia.   1. CBC, 05/15/2006. Hemoglobin 7.9 and hematocrit 25.3. Additional lab same date revealed retic count 1.4%, serum iron 7 (50 to 170), iron saturation 2%, TIBC 345 (273 to 456), TSH 1.72, and T4 0.9 (each normal).  2. CBC (post transfusion 2 units of PRBCs), 05/24/2006. Hemoglobin 9.3, an hematocrit 29.6, an MCV 74.9, platelets 309,000 and a WBC 6.5 with a normal differential.  3. Anemia substrate evaluation, 07/12/2006. Fe 116, TIBC 343, Fe sat 34%, ferritin 14, B12 of 655, folate 23.1, retic 0.8%, and stools for occult blood (OB) 0/3 +.    PREVIOUS INTERVENTIONS: Anemia.   1. Two units RBCs, 05/22/2006.  2. Transabdominal hysterectomy and bilateral oophorectomy, 06/07/2006. Pathology report is not immediately available to this examiner.  3. Chromagen by mouth 07/12/2006 through present (currently on 1 daily).        Problem List Items Addressed This Visit        Other    Malignant neoplasm of right breast in female, estrogen receptor positive (CMS/HCC) - Primary        Oncology/Hematology History Overview Note   DIAGNOSTIC ABNORMALITIES:  Breast carcinoma    1.Unilateral mammogram, right  breast, 08/23/2006, [facility].  Approximately a 2 cm spiculated area in the upper-outer quadrant of the right breast at the 10 o'clock to 11 o'clock position.  2.Right breast ultrasound, 08/28/2006, [facility].  A solid nodule at 10 o’clock in the right breast measuring up to 2 cm is felt to correspond to the abnormality seen on the mammograms, including spot views today.  There are also small cysts in this region.  Excisional biopsy should be considered.  3.Bone scan, 09/07/2006, [facility]. Small areas of vaguely increased activity in the upper thoracic area and at the lumbosacral junction, probably arthritic.  No definite metastatic change is identified.  4.CT of the brain, 09/07/2006, [facility]. Chronic sinus disease involving the left half of the sphenoid sinus. Otherwise unremarkable enhanced CT of the brain with no convincing evidence of metastatic disease.  5.CT of the chest, 09/07/2006, [facility] small cortical cyst involving the upper pole of the right kidney. Otherwise unremarkable CT of the chest with no evidence of metastatic disease.  6.CT of the pelvis, 09/07/2006, [facility].  Sclerotic lesion involving the right femoral head which I would favor to represent a bony enostosis. Considering the lack of other metastatic disease, I feel a solitary bony lesion to the right femoral head would be an unusual manifestation but warranting followup nonetheless.  Bone scan may be helpful for further evaluation.  7.Unilateral mammogram of breast, 09/11/2006, [facility]. Lobular areas of density in the left breast consistent with several left breast masses. Ultrasound was performed. There are no spiculated densities. No suspicious microcalcifications are identified. Screening exam of the left breast is recommended in 1 year.  8.Right breast biopsy, 09/29/2006, [facility]. Infiltrating mammary carcinoma.  Part A: Primary tumor diagnosis is infiltrating ductal  carcinoma.  Part B: Right axillary lymph node was benign.  Part C: Right breast tissue showed right modified radical mastectomy with deep margin without obvious histopathologic alteration.  No residual tumor present at previous biopsy site.  Fibrocystic mastopathy including fibrosis, adenosis, duct ectasia, papillary apocrine metaplasia, sclerosing adenosis, multiple cysts, and intraductal microcalcifications.  Seventeen (17) benign lymph nodes.  Part D:  Left breast tissue reveals no tumor present.   Fibrocystic mastopathy including fibrosis, adenosis, duct ectasia, multiple cysts, apocrine metaplasia, ruptured tension cysts, periductal chronic inflammation, intraductal hyperplasia of usual type, and intraductal microcalcifications (multiple).  Part E:  Right breast tissue shows portions of benign loose fibrofatty tissue.  Part F:  Left breast tissue show portions of benign loose fibrofatty tissue and fatty breast tissue.  ER 94% (+), KS 7% (+), HER-2 negative.  9.Labs, 10/10/2006: CMP was unrevealing, CEA of 1.3, and CA27-29 of 14.1.  10.2D echo, 10/17/2006, [facility].  Normal left ventricular chamber size and wall motion.  The left ventricular ejection fraction is felt to be in excess of 60%. No pericardial effusion or intracavitary thrombus noted. No valvular abnormalities are noted.  Spectral and color flow Doppler studies reveal trivial tricuspid regurgitation. The right ventricular systolic pressure is normal and was estimated at 19 mmHg.  11.Labs, 08/22/2016: Hemoglobin 12.4, hematocrit 39.4,  (elevated).  12.Labs, 08/30/2016:  (313 to 615), TSH 1.9, B12 of 1055, folate greater than 20, T4 9.6 (4.9 to 12.3), SPIEP: IgG 527 (791 to 1643), IgM 261, IgA 76.6 (each normal). DAVION: No monoclonal immunoglobulins detected.  13.Comprehensive blood report, 08/30/2016.   MILD ANEMIA; INCREASED T-CELL LGL'S. COMPREHENSIVE DIAGNOSIS.  Review of peripheral blood smear: Mild anemia with borderline  macrocytic features. Normal white blood cell and platelet counts and morphology. Mildly expanded population of reactive appearing T-cell large granular lymphocytes detected on flow cytometric studies.  COMPREHENSIVE COMMENT.  The exact etiology of this patient's mild anemia with borderline macrocytic features is not apparent from review of the specimen. Given the lack of significant atypia and normal white blood cell and platelet values, myelodysplasia appears less likely. Non-neoplastic etiologies to consider include liver disease, thyroid disease, immune mediated disorders, vitamin/nutritional deficiencies and drugs/toxins. Correlation recommended. Flow cytometry study after erythrolysis reveals no circulating myeloid or lymphoid blasts. Myeloid and monocytic lineages are represented by mature granulocytes and monocytes. Basophils and eosinophils are not increased. It must be noted that the diagnosis of a myeloid stem cell disorder, if clinically suspected, is best made using bone marrow specimens. Peripheral blood is not always representative of abnormalities involving the bone marrow. The B cells show no evidence of clonality or antigenic aberrancy. The majority of the T cells show normal expression of the pan T-cell antigens. A mildly expanded population of T-cell LGL is detected accounting for approximately 35.9% of the total T cells and 12.6% of the total white blood cells. The overall immunophenotype of the T-LGL and a normal CD4:CD8 ratio favors reactive process over T-cell neoplasia. The presence of these large granular lymphocytes raises the possibility of an immune-mediated etiology for this patient's anemia.    PREVIOUS INTERVENTIONS:  Breast carcinoma.    1.Adriamycin, 11/02/2006 through 12/14/2006.  Four cycles completed.  2.Taxotere, 12/28/2006 through 02/09/2007.  Four cycles completed.  3.Cytoxan, 02/22/2007 through 04/05/2007.  Four cycles completed.  4.Femara 2.5 mg daily, 04/12/2007 through  04/12/2012.    DIAGNOSTIC ABNORMALITIES:  Anemia.    1.CBC, 05/15/2006. Hemoglobin 7.9 and hematocrit 25.3. Additional lab same date revealed retic count 1.4%, serum iron 7 (50 to 170), iron saturation 2%, TIBC 345 (273 to 456), TSH 1.72, and T4 0.9 (each normal).  2.CBC (post transfusion 2 units of PRBCs), 05/24/2006.  Hemoglobin 9.3, an hematocrit 29.6, an MCV 74.9, platelets 309,000 and a WBC 6.5 with a normal differential.  3.Anemia substrate evaluation, 07/12/2006. Fe 116, TIBC 343, Fe sat 34%, ferritin 14, B12 of 655, folate 23.1, retic 0.8%, and stools for occult blood (OB) 0/3 +.    PREVIOUS INTERVENTIONS:  Anemia.    1.Two units RBCs, 05/22/2006.  2.Transabdominal hysterectomy and bilateral oophorectomy, 06/07/2006. Pathology report is not immediately available to this examiner.  3.Chromagen by mouth 07/12/2006 through present (currently on 1 daily).       Malignant neoplasm of right breast in female, estrogen receptor positive (CMS/HCC)   8/16/2019 Initial Diagnosis    Malignant neoplasm of right breast in female, estrogen receptor positive (CMS/HCC)         PAST MEDICAL HISTORY:  ALLERGIES:  No Known Allergies  CURRENT MEDICATIONS:  Outpatient Encounter Medications as of 8/19/2021   Medication Sig Dispense Refill   • amitriptyline (ELAVIL) 50 MG tablet TK 1 T PO Q NIGHT  2   • amphetamine-dextroamphetamine (ADDERALL) 30 MG tablet   2 times daily     • Calcium 500 MG tablet Take 500 mg by mouth.     • diazePAM (VALIUM) 10 MG tablet TK 1/2 TO 1 T PO BID  2   • HYDROcodone-acetaminophen (NORCO)  MG per tablet Take 1 tablet by mouth.     • milnacipran (SAVELLA) 50 MG tablet tablet Take 75 mg by mouth 2 (Two) Times a Day.     • nabumetone (RELAFEN) 750 MG tablet Take 750 mg by mouth.     • Omega-3 Fatty Acids (FISH OIL) 1000 MG capsule capsule      • pantoprazole (PROTONIX) 40 MG EC tablet   daily     • pregabalin (LYRICA) 75 MG capsule Take 75 mg by mouth.     • propranolol (INDERAL) 20 MG tablet Take  40 mg by mouth.     • therapeutic multivitamin-minerals (THERAGRAN-M) tablet Take 1 tablet by mouth.     • tiZANidine (ZANAFLEX) 2 MG tablet Take 4 mg by mouth.     • topiramate (TOPAMAX) 100 MG tablet 100 mg.     • [] potassium chloride (K-DUR,KLOR-CON) 20 MEQ CR tablet Take 1 tablet by mouth 3 (Three) Times a Day for 3 days. 9 tablet 0     No facility-administered encounter medications on file as of 2021.     ADULT ILLNESSES:   Infiltrating ductal carcinoma of breast ( ER Status: Positive; IN Status: Positive; )   Anxiety   Arthralgia   Francisco's esophagus (disorder)   Carcinoembryonic antigen present (finding)   Fibrocystic breast disease   Hypokalemia   Iron deficiency anemia   Osteopenia   Tobacco user (finding)    SURGERIES:   Total abdominal hysterectomy with bilateral salpingo-oophorectomy, (DANIEL/BSO), 2006    section, x2. Most recently in    Modified radical mastectomy, right, 2006   Simple mastectomy, prophylactic, left with left breast reconstruction, 2006   Biopsy of breast, right, benign fibrocystic changes, 2006   Carpal tunnel release, left 2011; right 2011      ADULT ILLNESSES:  Patient Active Problem List   Diagnosis Code   • Malignant neoplasm of right breast in female, estrogen receptor positive (CMS/[facility]) C50.911, Z17.0   • Anemia D64.9   • Francisco esophagus K22.70   • GERD (gastroesophageal reflux disease) K21.9   • Hiatal hernia K44.9   • Irritable bowel syndrome K58.9   • Lumbar radicular pain M54.16   • Tortuous colon Q43.8     SURGERIES:  Past Surgical History:   Procedure Laterality Date   • BREAST BIOPSY Right 2006    Biopsy of breast, right, benign fibrocystic changes   • CARPAL TUNNEL RELEASE      left 2011; right 2011   •  SECTION      x2. Most recently in    • MASTECTOMY MODIFIED RADICAL / SIMPLE / COMPLETE Right 2006   • SIMPLE MASTECTOMY Left 2006     prophylactic, left with left breast  "reconstruction   • TOTAL ABDOMINAL HYSTERECTOMY WITH SALPINGO OOPHORECTOMY  2006     HEALTH MAINTENANCE ITEMS:  Health Maintenance Due   Topic Date Due   • DXA SCAN  Never done   • ANNUAL PHYSICAL  Never done   • Pneumococcal Vaccine 0-64 (1 of 2 - PPSV23) Never done   • COVID-19 Vaccine (1) Never done   • TDAP/TD VACCINES (1 - Tdap) Never done   • ZOSTER VACCINE (1 of 2) Never done   • LUNG CANCER SCREENING  Never done   • HEPATITIS C SCREENING  Never done   • PAP SMEAR  Never done       <no information>  Last Completed Colonoscopy     This patient has no relevant Health Maintenance data.          There is no immunization history on file for this patient.  Last Completed Mammogram     This patient has no relevant Health Maintenance data.            FAMILY HISTORY:  Family History   Problem Relation Age of Onset   • Polymyalgia rheumatica Mother    • Diabetes Father    • Hypertension Father    • Other Father         heart issues   • No Known Problems Brother    • No Known Problems Maternal Grandmother    • Heart attack Maternal Grandfather         52 years old    • No Known Problems Paternal Grandmother    • Diabetes Paternal Grandfather    • No Known Problems Brother    • No Known Problems Brother    • No Known Problems Brother      SOCIAL HISTORY:  Social History     Socioeconomic History   • Marital status:      Spouse name: Not on file   • Number of children: Not on file   • Years of education: Not on file   • Highest education level: Not on file   Tobacco Use   • Smoking status: Former Smoker     Packs/day: 1.00     Years: 25.00     Pack years: 25.00     Types: Cigarettes     Quit date: 2017     Years since quitting: 3.7   • Smokeless tobacco: Never Used   Substance and Sexual Activity   • Alcohol use: No   • Drug use: No   • Sexual activity: Defer       REVIEW OF SYSTEMS:  Constitutional:   The patient reports a fairly good appetite and energy is variable, \"some days better than others.\" " "She manages all her ADLs. She has retired since 04/30/2021. She has gained 7 pounds (in addition to 10 pounds at her prior visit) in the interval. Says her fibromyalgia symptoms have been modulated by Savella and epidural injections by Dr. Lugo, most recently last 08/2021 (every 3 months).  She has no fevers, chills, or drenching night sweats. Still has intermittent hot flashes, \"occasionally.\" Her sleep habits are fairly good even off Restoril.  Ear/Nose/Mouth/Throat:   She has chronic rhinitis with no sinus congestion, ear discomfort, or discolored drainage. She has no sore throat, nosebleeds, or sore tongue. She has fewer bouts of headaches.  Ocular:   She still has bouts of blurry vision. She is still followed by ophthalmology. No eye pain or double vision.  Respiratory:   No chest congestion.  No cough.  Occasional exertional dyspnea, no significant shortness of breathing at rest, or unexplained chest wall pain. She has not smoked since 12/31/2017. Is aware that she has chronic obstructive pulmonary disease (COPD) on x-ray.   Cardiovascular:   She has no exertional chest pain, chest pressure, or chest heaviness. She has no claudication. She has no palpitations or symptomatic orthostasis.  Gastrointestinal:   She has no dysphagia, nausea, vomiting, postprandial abdominal pain (Aciphex), bloating, cramping, or change in bowel habits. She has not had dark stools or rectal bleeding. She has no diarrhea, and her bowels have been regular (still every other day) without stool softeners. EGD last 08/18/2016 per Dr. Sheppard. \"He took a biopsy but didn't say anything else.\" Colonoscopy, 09/04/2015. Normal. Says she has been recalled for repeat EGD  Genitourinary:   She has no urinary burning, frequency, dribbling, or discoloration. She has no need to urinate frequently through the night. She has no difficulty controlling her bladder. No vaginal bleeding. She denies any vaginal discharge. Says she had breakthrough " "bleeding last 12/2016 but was seen by Dr. Graham (GYN). \"She did a pelvic exam and ran a bunch of tests and everything was okay.\" No repeat episodes since.  Musculoskeletal:   She has no unexplained myalgias or nighttime leg cramping. Has only mild aching, especially in the hands, lower back, hips, and shoulders. Now knows she has spinal degenerative joint disease (DJD), (MRI, 10/09/2008; 07/2012). She has no distal radiculitis. She is still using Lortab ~ 3-4 daily. She is also on nabumetone (2 times a day - 2x/week). Says epidural/spinal injections per Dr. Walters last 08/2021 continue to be effective \"for about 4-5 weeks.\" Still goes every other month. Has been on Savella which has helped her fibromyalgia pains.  Extremities:   She has no trouble with fluid retention or significant leg swelling.  Endocrine:   The hot flashes are tolerable. She has no problems with excess thirst, excessive urination, or unexplained fatigue.  Heme/Lymphatic:   She has no unexplained bleeding, bruising, petechial rash, or swollen glands.  Skin:   She has no itching or rashes.  Neuro:   She has no loss of consciousness, seizures, fainting spells, or dizziness. She has no weakness of her face, arms, or legs. She has no difficulty with speech. She has no tremors.  Psych:   She has chronic depression and anxiety which sometimes interferes with her sleep patterns. She still feels that the anxiety is triggered due to the back pains. Says her symptoms are modulated by her pain medications and Savella.       VITAL SIGNS: /80   Pulse 60   Temp 97.2 °F (36.2 °C)   Resp 16   Ht 162.6 cm (64\")   Wt 65.1 kg (143 lb 9.6 oz)   SpO2 99%   Breastfeeding No   BMI 24.65 kg/m² Body surface area is 1.7 meters squared.  Pain Score    08/19/21 1013   PainSc: 0-No pain   PainLoc: Back  Comment: Has low back pain that is normal.         PHYSICAL EXAMINATION:   General:   She is a pleasant, perennially-anxious, slender, well-nourished, " well-appearing, well-kept middle-aged female in no distress. ECOG PS = 0.  Head/Neck:   The patient is anicteric and atraumatic. She is wearing a surgical mask today.  The trachea is midline. The neck is supple without evidence of jugular venous distention or cervical adenopathy. There is no frontal sinus tenderness.  Eyes:   The extraocular movements are full. There is no scleral jaundice or erythema. There is no conjunctival pallor.  Chest:   The respiratory efforts are normal and unhindered. The breath sounds are distant with scattered soft wheezing but no rhonchi, rales, or asymmetry of breath sounds. The port site is well healed (has been removed).  Breasts:   The right breast is remarkable for the presence of a saline implant. The left breast is remarkable for the presence of a saline implant. Both are well seated. There are no overt chest wall nodules or masses to suggest local recurrence. There is no axillary or supraclavicular adenopathy.  Cardiovascular:   The patient has a regular cardiac rate and rhythm without murmurs, rubs, or gallops. The peripheral pulses are equal and full.  Extremities:   There is no evidence of cyanosis, clubbing, or edema.  Rheumatologic:   There is no overt evidence of rheumatoid deformities of the hands. There is no sausaging of the fingers. There is no sign of active synovitis. The gait is normal.  Cutaneous:   There are no overt rashes, disseminated lesions, purpura, or petechiae.  Lymphatics:   There is no evidence of adenopathy in the cervical, supraclavicular, or axillary areas.  Neurologic:   The patient is alert, oriented, cooperative, and pleasant. She is appropriately conversant. She ambulated into the exam room without assistance and transferred from chair to exam table unaided. There is no overt dysfunction of the motor, sensory, or cerebellar systems.  Psych:   Mood and affect are appropriate for circumstance. Eye contact is appropriate.        LABS    Lab Results -  Last 18 Months   Lab Units 08/11/21  1345 08/12/20  1229   HEMOGLOBIN g/dL 11.8* 12.9   HEMATOCRIT % 38.6 39.7   MCV fL 95.5 95.0   WBC 10*3/mm3 6.64 6.53   RDW % 13.2 13.7   MPV fL 11.3 11.3   PLATELETS 10*3/mm3 232 199   IMM GRAN % % 0.3 0.2   NEUTROS ABS 10*3/mm3 4.65 4.38   LYMPHS ABS 10*3/mm3 1.54 1.56   MONOS ABS 10*3/mm3 0.37 0.48   EOS ABS 10*3/mm3 0.03 0.07   BASOS ABS 10*3/mm3 0.03 0.03   IMMATURE GRANS (ABS) 10*3/mm3 0.02 0.01   NRBC /100 WBC 0.0 0.0       Lab Results - Last 18 Months   Lab Units 08/11/21  1345 08/12/20  1229   GLUCOSE mg/dL 95 99   SODIUM mmol/L 143 141   POTASSIUM mmol/L 3.4* 3.5   CO2 mmol/L 27.0 26.0   CHLORIDE mmol/L 107 106   ANION GAP mmol/L 9.0 9.0   CREATININE mg/dL 1.00 0.97   BUN mg/dL 7 5*   BUN / CREAT RATIO  7.0 5.2*   CALCIUM mg/dL 9.7 9.3   EGFR IF NONAFRICN AM mL/min/1.73 57* 60*   ALK PHOS U/L 48 34*   TOTAL PROTEIN g/dL 6.7 6.5   ALT (SGPT) U/L 10 10   AST (SGOT) U/L 14 14   BILIRUBIN mg/dL 0.2 0.2   ALBUMIN g/dL 4.30 4.10   GLOBULIN gm/dL 2.4 2.4       Lab Results - Last 18 Months   Lab Units 08/11/21  1345 08/12/20  1229   CEA ng/mL 2.77 2.89       Lab Results - Last 18 Months   Lab Units 08/11/21  1345 08/12/20  1229   IRON mcg/dL 65 59   TIBC mcg/dL 314 305   IRON SATURATION % 21 19*   FERRITIN ng/mL 54.01 55.19   FOLATE ng/mL >20.00 >20.00     I have reviewed the assessment and plan and verified the accuracy of it. No changes to assessment and plan since the information was documented. Dave Gonzalez MD 08/19/21    ASSESSMENT:   1.  History of breast cancer, infiltrating ductal carcinoma:   Stage: I (pT1c, pN0, Mx; G2), ER 94% (+), [place] 7% (+), HER-2 negative.   Tumor [place]: 1.9 cm right breast mass, 0/18 lymph nodes.   Complications of Tumor: Breast pain.   Tumor Status: No clinical or biochemical evidence of disease recurrence.     2. Normocytic anemia.   --Stable, Hgb 11.8; MCV 95.5 on 08/11/2021 (prior range: Hgb 11.7 - 12.4; MCV 94.4 - 102):  erik  Colonoscopy, 09/04/2015. Normal. Repeat surveillance 10 years.   b. Normal B12, normal folate, normal T4, normal TSH, normal SPIEP, normal LDH, and peripheral blood flow comprehensive report (above) showing reactive appearing T-cell large granular lymphocytes.  3. History of Francisco's esophagus, gastritis, and hiatal hernia. Asymptomatic on proton pump inhibitor (PPI):  a. Esophagogastroduodenoscopy (EGD), 07/12/2013 (see above) with small hiatal hernia. May still be contributory to #5.   b. EGD last 08/18/2016. Report pending.  4. Tobacco addiction/excess. Has not smoked since 12/31/2017.   5. Abnormal CEA. Stable (2.77, 08/11/2021:. Chip 2.37 - Peak 13). She admits to smoking. No other clinical or radiographic (PET scan, 02/24/2010 - no abnormal uptake) correlate. Continued followup warranted.   6. Chronic obstructive pulmonary disease (COPD).   7. 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 Confirmed by renal ultrasound, 11/10/2009.   8. Iron deficiency with negative repeat colonoscopy and esophagogastroduodenoscopy (EGD) that showed gastritis (see above). Repleted iron on oral iron repletion.   9. Arthralgias, mild. Perhaps Femara associated. Well-modulated on Lyrica and Lortab.   10. (Mild) Vasomotor symptoms secondary to iatrogenic menopause. Subjectively not worse.   11. Anxiety, unchanged.   12. Lumbar spinal degenerative disk disease (MRI, 10/09/2008, and again 07/2012). Symptomatically not as well modulated by her current pain regimen. Has been seen by Dr. Goldstein and receives intraspinal injections by Dr. Walters, due on 08/23/2016.   13. Osteopenia with moderate fracture risk (see above). Already on calcium + vitamin D. Boniva intolerant.   --On Prolia every 6 months beginning 02/2018. Dr. Leung following.   14.  Chronic kidney disease, stage 3.    --GFR, 57 mL/min on 08/11/2021 (prior:  GFR 53- 67 mL/min)    PLAN:   1.  Re: Apprise of labs from 08/11/2021 with mild (stable) anemia,  "resolution of macrocytosis, slightly depressed K+ (kdur called in), stable GFR, repleted B12/folate/ iron levels, slightly depressed (19%) Fe sat%, slightly depressed ferritin (54; from 55), stable chest x-ray, normal CEA, normal CA-27-29.   2.  Apprised of chest x-ray, 08/11/2021.  Impression: No acute findings.  3. Previously discussed labs from 08/22/2016 with macrocytosis () and 08/30/2016 with normal B12, normal folate, normal T4, normal TSH, normal SPIEP, normal LDH, and peripheral blood flow comprehensive report (above) showing ractive appearing T-cell large granular lymphocytes.   4. Applaud her success at smoking cessation (has not smoked since 12/31/2017). \"I'm not going back to smoking again.\"   5. Previously discussed the abnormal (but stable) CEA. Other than malignancy (colon, breast, lung), I again noted that increased CEA levels can also be associated with inflammation, cirrhosis, peptic ulcer, ulcerative colitis, rectal polyps, emphysema, and benign breast disease. Needs continued followup.   6. Rx:  Ferrous sulfate 325 po qd # 30 x 6 RF  7. Continue ongoing management per primary care physician.   8. Continue other currently identified medications.  Is due for EGD  9. Return to the [place] office in 6 months with pre-office chest x-ray, serum iron, Fe saturation, ferritin, TIBC, B12, folate, CMP, CEA, CA27-29, and CBC with differential.    MEDICAL DECISION MAKING: High Complexity   AMOUNT OF DATA: Moderate    I spent    total minutes, face-to-face, caring for Carol today.  Greater than 50% of this time involved counseling and/or coordination of care as documented within this note regarding the patient's illness(es), pros and cons of various treatment options, instructions and/or risk reduction.    cc: Erna Leung, DO       "

## 2021-08-19 ENCOUNTER — OFFICE VISIT (OUTPATIENT)
Dept: ONCOLOGY | Facility: CLINIC | Age: 56
End: 2021-08-19

## 2021-08-19 VITALS
HEIGHT: 64 IN | BODY MASS INDEX: 24.52 KG/M2 | HEART RATE: 60 BPM | DIASTOLIC BLOOD PRESSURE: 80 MMHG | RESPIRATION RATE: 16 BRPM | WEIGHT: 143.6 LBS | OXYGEN SATURATION: 99 % | SYSTOLIC BLOOD PRESSURE: 142 MMHG | TEMPERATURE: 97.2 F

## 2021-08-19 DIAGNOSIS — C50.911 MALIGNANT NEOPLASM OF RIGHT BREAST IN FEMALE, ESTROGEN RECEPTOR POSITIVE, UNSPECIFIED SITE OF BREAST (HCC): Primary | ICD-10-CM

## 2021-08-19 DIAGNOSIS — Z17.0 MALIGNANT NEOPLASM OF RIGHT BREAST IN FEMALE, ESTROGEN RECEPTOR POSITIVE, UNSPECIFIED SITE OF BREAST (HCC): Primary | ICD-10-CM

## 2021-08-19 PROCEDURE — 99214 OFFICE O/P EST MOD 30 MIN: CPT | Performed by: INTERNAL MEDICINE

## 2021-08-19 RX ORDER — FERROUS SULFATE 325(65) MG
325 TABLET ORAL DAILY
Qty: 30 TABLET | Refills: 6 | Status: SHIPPED | OUTPATIENT
Start: 2021-08-19 | End: 2022-03-10 | Stop reason: SDUPTHER

## 2021-08-25 ENCOUNTER — OFFICE VISIT (OUTPATIENT)
Dept: GASTROENTEROLOGY | Age: 56
End: 2021-08-25
Payer: COMMERCIAL

## 2021-08-25 VITALS
BODY MASS INDEX: 25.27 KG/M2 | OXYGEN SATURATION: 98 % | DIASTOLIC BLOOD PRESSURE: 62 MMHG | WEIGHT: 148 LBS | HEIGHT: 64 IN | HEART RATE: 62 BPM | SYSTOLIC BLOOD PRESSURE: 106 MMHG

## 2021-08-25 DIAGNOSIS — R12 CHRONIC HEARTBURN: ICD-10-CM

## 2021-08-25 DIAGNOSIS — K22.70 BARRETT'S ESOPHAGUS DETERMINED BY BIOPSY: Primary | ICD-10-CM

## 2021-08-25 PROCEDURE — 99214 OFFICE O/P EST MOD 30 MIN: CPT | Performed by: NURSE PRACTITIONER

## 2021-08-25 RX ORDER — DIAZEPAM 10 MG/1
10 TABLET ORAL DAILY
COMMUNITY

## 2021-08-25 ASSESSMENT — ENCOUNTER SYMPTOMS
ABDOMINAL DISTENTION: 0
ABDOMINAL PAIN: 0
BACK PAIN: 1
VOMITING: 0
CONSTIPATION: 0
BLOOD IN STOOL: 0
VOICE CHANGE: 0
COUGH: 0
SHORTNESS OF BREATH: 0
TROUBLE SWALLOWING: 0
SORE THROAT: 0
DIARRHEA: 0
NAUSEA: 0
ANAL BLEEDING: 0
RECTAL PAIN: 0

## 2021-08-25 NOTE — PROGRESS NOTES
Subjective:      Rosa Tafoya is a61 y.o. female  Chief Complaint   Patient presents with    Endoscopy       HPI  PCP: Agapito Hennessy DO   CC: Dr Bea Curran  Pt made an appt to schedule an EGD. Has hx of Barretts esophagus. Is due for surveillance. Reports she has acid reflux. Has been on antacids for 10-15 years. Currenty on generic pantoprazole 40mg daily. This works well most of the time. However some days, at random times, will notice breakthrough heartburn. The brand name protonix worked much better than the generic she is on now. She never had any breakthrough heartburn on it. Wants to get back on the brand name protonix. Recent CMP in epic, I reviewed this. No further GI complaints. GI scope reports in history per MA per OV policy, I reviewed this. Family HX:    Pt denies family hx of colon polyps, colon CA, inflammatory bowel dx, gastric CA and esophageal CA.     Past Medical History:   Diagnosis Date    Anxiety     Arthralgia     Arthritis     Back pain     Ferrari's esophagus 2007 diagnosed    Bereavement     father passed away 13    Disc degeneration, lumbar     Fibrocystic breast     Gastritis     GERD (gastroesophageal reflux disease)     Hiatal hernia     History of anemia     Infiltrating ductal carcinoma of breast ([facility] [address]     right-Dr Persaud    Osteopenia     Tortuous colon           Past Surgical History:   Procedure Laterality Date    BREAST BIOPSY  2006    right    CARPAL TUNNEL RELEASE      left , right 11     SECTION      x 2, most recent was     COLONOSCOPY  2007    Dr Sheryl Cuadra  2010    Dr Anabela Fu: unremarkable, tortuous, 5 year recall    COLONOSCOPY  2015    Dr. Doreen Sanchez: normal  10yr recall    EPIDURAL STEROID INJECTION N/A 2019    LUMBAR EPIDURAL STEROID INJECTION L4-5 performed by Naga Jasso at [facility]  2006    transabdominal w/kenny oophorectomy    LUMBAR NERVE BLOCK N/A 02/02/2016    LESI L4-5 #1 performed by Naga Jasso at [address] N/A 04/02/2019    LUMBAR INTER LAMINAR STACI L5-S1 performed by Naga Jasso at [address] N/A 06/11/2019    LUMBAR INTER LAMINAR STACI L4-5 performed by Naga Jasso at [address] [facility] N/A 08/13/2019    LUMBAR INTER LAMINAR STACI L4-5 performed by Naga Jasso at [address] [place] [address]  09/29/2006    right modified radical    MASTECTOMY  09/29/2006    prophylactic left simple with reconstruction    NERVE BLOCK LUMB FACET LEVEL 1 BILATERAL Bilateral 05/23/2017    LUMBAR FACET BLOCK BILATERAL L4-5 L5-S1 performed by Naga Jasso at [facility] FACET LEVEL 1 BILATERAL Bilateral 01/29/2019    LUMBAR FACET L4-5/ L5-S1  performed by Naga Jasso at [facility] FACET LEVEL 1 BILATERAL Bilateral 04/16/2019    LUMBAR FACET L4-5 L5-S1 performed by Naga Jasso at [facility] FACET LEVEL 1 BILATERAL Bilateral 06/25/2019    LUMBAR FACET BILATERAL L4-5 L5-S1 performed by Naga Jasso at [facility] Bilateral 08/27/2019    LUMBAR FACET BILATERAL L4-5 AND L5-S1 performed by Naga Jasso at [facility] N/A 10/15/2019    LUMBAR INTER LAMINAR STACI L4-5 performed by Naga Jasso at [facility] Bilateral 10/29/2019    LUMBAR FACET BILATERAL L4-5 L5-S1 performed by Naga Jasso at [address] [place] [address] N/A 12/31/2019    EPIDURAL STEROID INJECTION L4-5 performed by Naga Jasso at [address] [place] [address] Bilateral 01/14/2020    LUMBAR FACET BILATERAL L4-5, L5-S1 performed by Naga Jasso at [address] [place] [address] N/A 03/17/2020    LUMBAR INTER LAMINAR STACI L4-5 performed by Naga Jasso at [facility]    [place] INJ DX/THER AGNT PARAVERT FACET JOINT, CERV/THORAC, 2ND LEVEL Bilateral 11/20/2018    LUMBAR FACET L4-5, L5-S1 performed by Naga Jasso at [address] [place] [address] DX/THER AGNT PARAVERT FACET JOINT, LUMBAR/SAC, 1ST LEVEL Bilateral 10/17/2017    LUMBAR FACET L4-5 L5-S1 performed by Naga Jasso at [address] [place] [address] DX/THER AGNT PARAVERT FACET JOINT, LUMBAR/SAC, 1ST LEVEL Bilateral 12/26/2017    LUMBAR FACET L4-5/ L5-S1 performed by Naga Jasso at [address] [place] [address] DX/THER AGNT PARAVERT FACET JOINT, LUMBAR/SAC, 1ST LEVEL Bilateral 03/27/2018    LUMBAR FACET L4-5/ L5-S1 performed by Naga Jasso at [address] [place] [address] DX/THER AGNT PARAVERT FACET JOINT, LUMBAR/SAC, 1ST LEVEL Bilateral 05/29/2018    LUMBAR FACET L4-5/L5-S1 performed by Naga Jasso at [address] [place] [address] DX/THER AGNT PARAVERT FACET JOINT, LUMBAR/SAC, 1ST LEVEL Bilateral 07/03/2018    BILATERAL LUMBAR FACET L4-5 / L5-S1 performed by Naga Jasso at [address] [place] [address] DX/THER AGNT PARAVERT FACET JOINT, LUMBAR/SAC, 1ST LEVEL Bilateral 09/11/2018    LUMBAR FACET BLOCK 1ST AND 2ND LEVEL L4-5, L5-S1 performed by Naga Jasso at [facility] L/S,1 LEVEL N/A 03/21/2017    LUMBAR FACET BLKAC L4-5 L5-S1 performed by Naga Jasso at [facility] L/S,1 LEVEL N/A 08/15/2017    LUMBAR FACET L4-5 L5-S1 performed by Naga Jasso at [address] [place] [address] DX/THER SBST EPIDURAL/SUBARACH LUMBAR/SACRAL N/A 03/07/2017    L4-5 LUMBAR EPIDURAL STEROID INJECTION performed by Naga Jasso at [address] [place] [address] DX/THER SBST EPIDURAL/SUBARACH LUMBAR/SACRAL N/A 05/09/2017    EPIDURAL STEROID INJECTION LUMBAR L4-5 #2 performed by Naga Jasso at [address] [place] [address] DX/THER SBST EPIDURAL/SUBARACH LUMBAR/SACRAL N/A 08/01/2017    L4-5 EPIDURAL STEROID INJECTION performed by Theresa Jasso at [address] [place] [address] DX/THER SBST EPIDURAL/SUBARACH LUMBAR/SACRAL  10/03/2017    LUMBAR INTER LAMINAR STACI performed by Theresa Jasso at [address] [place] [address] [place] DX/THER SBST EPIDURAL/SUBARACH LUMBAR/SACRAL N/A 12/12/2017    L4-5 EPIDURAL STEROID INJECTION performed by Naga Jasso at [address] DX/THER SBST INTRLMNR LMBR/SAC W/IMG GDN N/A 02/20/2018    L4-5 EPIDURAL STEROID INJECTION performed by Naga Jasso at [address] DX/THER SBST INTRLMNR LMBR/SAC W/IMG GDN N/A 05/01/2018    L4-5 EPIDURAL STEROID INJECTION performed by Naga Jasso at [address] DX/THER SBST INTRLMNR LMBR/SAC W/IMG GDN N/A 07/17/2018    LUMBAR EPIDURAL STEROID INJECTION L4-5 performed by Naga Jasso at [address] DX/THER SBST INTRLMNR LMBR/SAC W/IMG GDN N/A 08/28/2018    LUMBAR EPIDURAL STEROID INJECTION L4-5 performed by Naga Jasso at [address] DX/THER SBST INTRLMNR LMBR/SAC W/IMG GDN N/A 11/06/2018    LUMBAR EPIDURAL STEROID INJECTION L4-5 performed by Naga Jasso at [facility] ENDOSCOPY  05/01/2007    Dr Cele Luther: GERD, pos Ferrari's-1st dx, neg dysplasia; gastritis, neg h-pylori    UPPER GASTROINTESTINAL ENDOSCOPY  05/02/2008    Dr Esther Rosario Ferrari's   [facility] ENDOSCOPY  07/02/2010    Dr Esther Rosario Ferrari's   [address] [place] [address] Drive  07/12/2013    [facility]: surveillance of Ferrari's, biopsies neg for Ferrari's/dysplasia.  Small hiatal hernia noted    UPPER GASTROINTESTINAL ENDOSCOPY  08/18/2016    Dr Hayward (-), 5 yr recall       Social History     Socioeconomic History    Marital status:      Spouse name: None    Number of children: None    Years of education: None    Highest education level: None   Occupational History    None   Tobacco Use    Smoking status: Former Smoker     Years: 25.00     Quit date: 12/2017     Years since quitting: 3.7    Smokeless tobacco: Never Used    Tobacco comment: Juul    Vaping Use    Vaping Use: Every day    Start date: 3/15/2019    Substances: Nicotine    Devices: Disposable   Substance and Sexual Activity    Alcohol use: No     Alcohol/week: 0.0 standard drinks    Drug use: No    Sexual activity: None   Other Topics Concern    None   Social History Narrative    None     Social Determinants of Health     Financial Resource Strain:     Difficulty of Paying Living Expenses:    Food Insecurity:     Worried About Running Out of Food in the Last Year:     Ran Out of Food in the Last Year:    Transportation Needs:     Lack of Transportation (Medical):  Lack of Transportation (Non-Medical):    Physical Activity:     Days of Exercise per Week:     Minutes of Exercise per Session:    Stress:     Feeling of Stress :    Social Connections:     Frequency of Communication with Friends and Family:     Frequency of Social Gatherings with Friends and Family:     Attends Hinduism Services:     Active Member of Clubs or Organizations:     Attends Club or Organization Meetings:     Marital Status:    Intimate Partner Violence:     Fear of Current or Ex-Partner:     Emotionally Abused:     Physically Abused:     Sexually Abused:        No Known Allergies    Current Outpatient Medications   Medication Sig Dispense Refill    diazePAM (VALIUM) 10 MG tablet Take 10 mg by mouth daily.  HYDROcodone-acetaminophen (NORCO)  MG per tablet Take 1 tablet by mouth every 6 hours as needed for Pain      milnacipran HCl (SAVELLA) 50 MG TABS Take 50 mg by mouth 2 times daily      pantoprazole (PROTONIX) 40 MG tablet   daily       amphetamine-dextroamphetamine (ADDERALL) 30 MG tablet   2 times daily       topiramate (TOPAMAX) 100 MG tablet   100 mg daily       propranolol (INDERAL) 20 MG tablet   Take 40 mg by mouth daily       tiZANidine (ZANAFLEX) 2 MG tablet   Take 4 mg by mouth every 6 hours as needed       FISH OIL by Does not apply route daily       calcium carbonate (OSCAL) 500 MG TABS tablet Take 500 mg by mouth daily.       pregabalin (LYRICA) 75 MG capsule Take 75 mg by mouth 3 times daily       therapeutic multivitamin-minerals (THERAGRAN-M) tablet Take 1 tablet by mouth daily.  nabumetone (RELAFEN) 750 MG tablet Take 750 mg by mouth Two tablet three times weekly       No current facility-administered medications for this visit. Review of Systems   Constitutional: Negative for appetite change, fatigue, fever and unexpected weight change. HENT: Negative for sore throat, trouble swallowing and voice change. Respiratory: Negative for cough and shortness of breath. Cardiovascular: Negative for chest pain, palpitations and leg swelling. Gastrointestinal: Negative for abdominal distention, abdominal pain, anal bleeding, blood in stool, constipation, diarrhea, nausea, rectal pain and vomiting. Genitourinary: Negative for hematuria. Musculoskeletal: Positive for arthralgias, back pain and neck pain. Neurological: Negative for dizziness, weakness, light-headedness and headaches. Psychiatric/Behavioral: Positive for dysphoric mood. Negative for sleep disturbance. The patient is nervous/anxious. All other systems reviewed and are negative. Objective:     Physical Exam  Vitals and nursing note reviewed. Constitutional:       Appearance: She is well-developed. Comments: /62   Pulse 62   Ht 5' 4\" (1.626 m)   Wt 148 lb (67.1 kg)   SpO2 98%   BMI 25.40 kg/m²    Eyes:      General: No scleral icterus. Conjunctiva/sclera: Conjunctivae normal.      Pupils: Pupils are equal, round, and reactive to light. Neck:      Thyroid: No thyromegaly. Cardiovascular:      Rate and Rhythm: Normal rate and regular rhythm. Heart sounds: Normal heart sounds. No murmur heard. No friction rub. No gallop. Pulmonary:      Effort: Pulmonary effort is normal. No respiratory distress. Breath sounds: Normal breath sounds. Abdominal:      General: Bowel sounds are normal. There is no distension. Palpations: Abdomen is soft.

## 2021-08-25 NOTE — PATIENT INSTRUCTIONS
You are going to have an Endoscopy and here are some basic instructions:    Nothing to eat or drink after midnight EXCEPT:  PLEASE TAKE MEDICATION(S) FOR HIGH BLOOD PRESSURE, SEIZURES, HEART, AND THYROID WITH A SIP OF WATER AT LEAST 2 HOURS PRIOR TO ARRIVAL TIME.   YOU MAY ALSO TAKE ANY INHALERS YOU ARE PRESCRIBED. You will not be able to drive for 24 hours after the procedure due to sedation. Bring a  with you the day of the procedure. No aspirin, ibuprofen, naproxen, fish oil or vitamin E for 5 days before procedure. Continue current medications. If you are on blood thinners, clearance from the prescribing physician will be obtained before your procedure is scheduled. [email or web address] may be associated with discontinuation of your blood thinner and include, but not limited to, stroke, TIA, or cardiac event. If biopsies are taken during the procedure they will be sent to a pathologist for analysis. You will be notified by mail of the pathology results in 2-3 weeks. Your physician may also schedule a follow up appointment with the nurse practitioner to discuss pathology, symptoms or to check if you have had any problems related to your procedure. If you prefer not to return to the office after your procedure please discuss this with your physician on the day of your procedure.

## 2021-08-27 ENCOUNTER — TELEPHONE (OUTPATIENT)
Dept: GASTROENTEROLOGY | Age: 56
End: 2021-08-27

## 2021-08-27 DIAGNOSIS — R12 CHRONIC HEARTBURN: ICD-10-CM

## 2021-08-27 DIAGNOSIS — K22.70 BARRETT'S ESOPHAGUS DETERMINED BY BIOPSY: Primary | ICD-10-CM

## 2021-08-27 RX ORDER — DEXLANSOPRAZOLE 60 MG/1
60 CAPSULE, DELAYED RELEASE ORAL DAILY
Qty: 90 CAPSULE | Refills: 3 | Status: SHIPPED | OUTPATIENT
Start: 2021-08-27

## 2021-08-27 NOTE — TELEPHONE ENCOUNTER
Shana Carranza,    Pt called her insurance, and Protonix brand name is not covered. They will cover Dexilant. Pt is wanting to try this new medication. Please send a 90 day supply. I have changed her pharmacy in her chart to her mail in pharmacy. Thanks.

## 2021-08-31 ENCOUNTER — HOSPITAL ENCOUNTER (OUTPATIENT)
Dept: PAIN MANAGEMENT | Age: 56
Discharge: HOME | End: 2021-08-31
Payer: COMMERCIAL

## 2021-08-31 VITALS
OXYGEN SATURATION: 99 % | HEART RATE: 48 BPM | DIASTOLIC BLOOD PRESSURE: 71 MMHG | RESPIRATION RATE: 18 BRPM | SYSTOLIC BLOOD PRESSURE: 117 MMHG | TEMPERATURE: 96.3 F

## 2021-08-31 DIAGNOSIS — R52 PAIN MANAGEMENT: ICD-10-CM

## 2021-08-31 PROCEDURE — 2580000003 HC RX 258

## 2021-08-31 PROCEDURE — 6360000002 HC RX W HCPCS

## 2021-08-31 PROCEDURE — 3209999900 FLUORO FOR SURGICAL PROCEDURES

## 2021-08-31 PROCEDURE — 62323 NJX INTERLAMINAR LMBR/SAC: CPT

## 2021-08-31 PROCEDURE — 2500000003 HC RX 250 WO HCPCS

## 2021-08-31 RX ORDER — METHYLPREDNISOLONE ACETATE 80 MG/ML
80 INJECTION, SUSPENSION INTRA-ARTICULAR; INTRALESIONAL; INTRAMUSCULAR; SOFT TISSUE ONCE
Status: DISCONTINUED | OUTPATIENT
Start: 2021-08-31 | End: 2021-09-02 | Stop reason: HOSPADM

## 2021-08-31 RX ORDER — FERROUS SULFATE 325(65) MG
325 TABLET ORAL DAILY
COMMUNITY
Start: 2021-08-19

## 2021-08-31 RX ORDER — SODIUM CHLORIDE 9 MG/ML
5 INJECTION INTRAVENOUS ONCE
Status: DISCONTINUED | OUTPATIENT
Start: 2021-08-31 | End: 2021-09-02 | Stop reason: HOSPADM

## 2021-08-31 RX ORDER — LIDOCAINE HYDROCHLORIDE 10 MG/ML
5 INJECTION, SOLUTION EPIDURAL; INFILTRATION; INTRACAUDAL; PERINEURAL ONCE
Status: DISCONTINUED | OUTPATIENT
Start: 2021-08-31 | End: 2021-09-02 | Stop reason: HOSPADM

## 2021-08-31 ASSESSMENT — PAIN SCALES - GENERAL: PAINLEVEL_OUTOF10: 8

## 2021-08-31 ASSESSMENT — PAIN DESCRIPTION - PAIN TYPE: TYPE: CHRONIC PAIN

## 2021-08-31 ASSESSMENT — PAIN DESCRIPTION - ORIENTATION: ORIENTATION: LOWER

## 2021-08-31 ASSESSMENT — PAIN DESCRIPTION - LOCATION: LOCATION: BACK

## 2021-08-31 NOTE — INTERVAL H&P NOTE
Update History & Physical    The patient's History and Physical   was reviewed with the patient and I examined the patient. There was  NO CHANGE:[id number]}. The surgical site was confirmed by the patient and me. Plan: The risks, benefits, expected outcome, and alternative to the recommended procedure have been discussed with the patient. Patient understands and wants to proceed with the procedure.      Electronically signed by Sundar De La Cruz MD on 8/31/2021 at 8:37 AM

## 2021-09-13 ENCOUNTER — TELEPHONE (OUTPATIENT)
Dept: GASTROENTEROLOGY | Age: 56
End: 2021-09-13

## 2021-09-13 NOTE — TELEPHONE ENCOUNTER
Patient saw Maydahalle Boss for an OV on 8/25/2, she received her EOB from her insurance saying that Silvestre Trammell was no ta preferred (in-network) provider. I looked at her insurance and we have always been in network with and advised that she call her insurance company - she called them and they said that as of 1/2021 she was not renewed. I told her that I would send then info to you, as I have us in network and it may be a clerical error. Per her EOB She is expecting to receive a bill for $109. She is also scheduled for an EGD with Dr. Harry Casas on 9/27/21, she said that he is in network. [address] - I told her you were off today, but would call her asap.

## 2021-09-13 NOTE — TELEPHONE ENCOUNTER
Estefany Chkaraborty requests that the office return their call. She is scheduled for a EGD on 9/27 and she wants to be sure her insurance [facility] will cover it. The best time to reach her is Anytime. Thank you.

## 2021-09-14 NOTE — TELEPHONE ENCOUNTER
I called patient this morning and had to leave a msg; I informed her on the message that we are in fact in network with her insurance and if she receives a bill to let me know and we can take it from there; I told her on the vm that if she has any more questions to let me know- AA

## 2021-09-23 ENCOUNTER — ANESTHESIA EVENT (OUTPATIENT)
Dept: OPERATING ROOM | Age: 56
End: 2021-09-23

## 2021-09-24 ENCOUNTER — OFFICE VISIT (OUTPATIENT)
Age: 56
End: 2021-09-24

## 2021-09-24 DIAGNOSIS — Z11.59 SCREENING FOR VIRAL DISEASE: Primary | ICD-10-CM

## 2021-09-24 LAB — SARS-COV-2, PCR: NOT DETECTED

## 2021-09-24 PROCEDURE — 99999 PR OFFICE/OUTPT VISIT,PROCEDURE ONLY: CPT | Performed by: NURSE PRACTITIONER

## 2021-09-27 ENCOUNTER — HOSPITAL ENCOUNTER (OUTPATIENT)
Age: 56
Setting detail: OUTPATIENT SURGERY
Discharge: HOME | End: 2021-09-27
Attending: INTERNAL MEDICINE | Admitting: INTERNAL MEDICINE
Payer: COMMERCIAL

## 2021-09-27 ENCOUNTER — HOSPITAL ENCOUNTER (OUTPATIENT)
Age: 56
Setting detail: SPECIMEN
Discharge: HOME | End: 2021-09-27
Payer: COMMERCIAL

## 2021-09-27 ENCOUNTER — APPOINTMENT (OUTPATIENT)
Dept: OPERATING ROOM | Age: 56
End: 2021-09-27

## 2021-09-27 ENCOUNTER — ANESTHESIA (OUTPATIENT)
Dept: OPERATING ROOM | Age: 56
End: 2021-09-27

## 2021-09-27 VITALS
TEMPERATURE: 97.1 F | SYSTOLIC BLOOD PRESSURE: 106 MMHG | OXYGEN SATURATION: 95 % | WEIGHT: 130 LBS | RESPIRATION RATE: 18 BRPM | DIASTOLIC BLOOD PRESSURE: 60 MMHG | BODY MASS INDEX: 22.2 KG/M2 | HEART RATE: 56 BPM | HEIGHT: 64 IN

## 2021-09-27 VITALS — SYSTOLIC BLOOD PRESSURE: 96 MMHG | OXYGEN SATURATION: 98 % | DIASTOLIC BLOOD PRESSURE: 54 MMHG

## 2021-09-27 PROCEDURE — 43239 EGD BIOPSY SINGLE/MULTIPLE: CPT | Performed by: INTERNAL MEDICINE

## 2021-09-27 PROCEDURE — 88305 TISSUE EXAM BY PATHOLOGIST: CPT

## 2021-09-27 PROCEDURE — 43239 EGD BIOPSY SINGLE/MULTIPLE: CPT

## 2021-09-27 RX ORDER — SODIUM CHLORIDE 9 MG/ML
INJECTION, SOLUTION INTRAVENOUS CONTINUOUS
Status: DISCONTINUED | OUTPATIENT
Start: 2021-09-27 | End: 2021-09-27 | Stop reason: HOSPADM

## 2021-09-27 RX ORDER — PROPOFOL 10 MG/ML
INJECTION, EMULSION INTRAVENOUS PRN
Status: DISCONTINUED | OUTPATIENT
Start: 2021-09-27 | End: 2021-09-27 | Stop reason: SDUPTHER

## 2021-09-27 RX ORDER — LIDOCAINE HYDROCHLORIDE 10 MG/ML
INJECTION, SOLUTION INFILTRATION; PERINEURAL PRN
Status: DISCONTINUED | OUTPATIENT
Start: 2021-09-27 | End: 2021-09-27 | Stop reason: SDUPTHER

## 2021-09-27 RX ADMIN — LIDOCAINE HYDROCHLORIDE 50 MG: 10 INJECTION, SOLUTION INFILTRATION; PERINEURAL at 09:59

## 2021-09-27 RX ADMIN — PROPOFOL 150 MG: 10 INJECTION, EMULSION INTRAVENOUS at 09:59

## 2021-09-27 RX ADMIN — SODIUM CHLORIDE: 9 INJECTION, SOLUTION INTRAVENOUS at 09:44

## 2021-09-27 NOTE — ANESTHESIA PRE PROCEDURE
Department of Anesthesiology  Preprocedure Note       Name:  Nikolas Watson   Age:  64 y.o.  :  1965                                          MRN:  [id number]         Date:  2021      Surgeon: Caitlin Justin):  Chloe Helm MD    Procedure: Procedure(s):  EGD BIOPSY    Medications prior to admission:   Prior to Admission medications    Medication Sig Start Date End Date Taking? Authorizing Provider   ferrous sulfate (IRON 325) 325 (65 Fe) MG tablet Take 325 mg by mouth daily 21   Historical Provider, MD   dexlansoprazole (DEXILANT) 60 MG CPDR delayed release capsule Take 1 capsule by mouth daily 21   DAYNA Girard   diazePAM (VALIUM) 10 MG tablet Take 10 mg by mouth daily. Historical Provider, MD   HYDROcodone-acetaminophen (NORCO)  MG per tablet Take 1 tablet by mouth every 6 hours as needed for Pain    Historical Provider, MD   milnacipran HCl (SAVELLA) 50 MG TABS Take 50 mg by mouth 2 times daily    Historical Provider, MD   pantoprazole (PROTONIX) 40 MG tablet   daily  5/31/15   Historical Provider, MD   amphetamine-dextroamphetamine (ADDERALL) 30 MG tablet   2 times daily  5/27/15   Historical Provider, MD   topiramate (TOPAMAX) 100 MG tablet   100 mg daily  5/26/15   Historical Provider, MD   propranolol (INDERAL) 20 MG tablet   Take 40 mg by mouth daily  13   Historical Provider, MD   tiZANidine (ZANAFLEX) 2 MG tablet   Take 4 mg by mouth every 6 hours as needed  13   Historical Provider, MD   [address] [place] [address]  by Does not apply route daily     Historical Provider, MD   calcium carbonate (OSCAL) 500 MG TABS tablet Take 500 mg by mouth daily. Historical Provider, MD   pregabalin (LYRICA) 75 MG capsule   Take 75 mg by mouth 3 times daily     Historical Provider, MD   therapeutic multivitamin-minerals (THERAGRAN-M) tablet Take 1 tablet by mouth daily.     Historical Provider, MD   nabumetone (RELAFEN) 750 MG tablet Take 750 mg by mouth Two tablet three times weekly    Historical Provider, MD       Current medications:    No current facility-administered medications for this encounter.        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    GERD (gastroesophageal reflux disease) K21.9    Hiatal hernia K44.9    Tortuous colon Q43.8    Irritable bowel syndrome K58.9    Lumbar radicular pain M54.16    Ferrari's esophagus determined by biopsy K22.70    Chronic heartburn R12       Past Medical History:        Diagnosis Date    Anxiety     Arthralgia     Arthritis     Back pain     Ferrari's esophagus 2007 diagnosed    Bereavement     father passed away 13    Disc degeneration, lumbar     Fibrocystic breast     Gastritis     GERD (gastroesophageal reflux disease)     Hiatal hernia     History of anemia     Infiltrating ductal carcinoma of breast ([facility] [address]     right-Dr Persaud    Osteopenia     Tortuous colon        Past Surgical History:        Procedure Laterality Date    BREAST BIOPSY  2006    right    CARPAL TUNNEL RELEASE      left , right 11     SECTION      x 2, most recent was     COLONOSCOPY  2007    Dr Urrutia Reveal COLONOSCOPY  2010    Dr Maximino Morales: unremarkable, tortuous, 5 year recall    COLONOSCOPY  2015    Dr. Alcira Krishnamurthy: normal  10yr recall    EPIDURAL STEROID INJECTION N/A 2019    LUMBAR EPIDURAL STEROID INJECTION L4-5 performed by Naga Jasso at e [facility]  2006    transabdominal w/kenny oophorectomy    LUMBAR NERVE BLOCK N/A 2016    LESI L4-5 #1 performed by Naga Jasso at [address] N/A 2019    LUMBAR INTER LAMINAR STACI L5-S1 performed by Naga Jasso at [address] N/A 2019    LUMBAR INTER LAMINAR STACI L4-5 performed by Naga Jasso at [address] N/A 2019    LUMBAR INTER LAMINAR STACI L4-5 performed by Filipe Padilla at [address] [place] [address]  09/29/2006    right modified radical    MASTECTOMY  09/29/2006    prophylactic left simple with reconstruction    NERVE BLOCK LUMB FACET LEVEL 1 BILATERAL Bilateral 05/23/2017    LUMBAR FACET BLOCK BILATERAL L4-5 L5-S1 performed by Naga Jasso at [facility] FACET LEVEL 1 BILATERAL Bilateral 01/29/2019    LUMBAR FACET L4-5/ L5-S1  performed by Naga Jasso at [facility] FACET LEVEL 1 BILATERAL Bilateral 04/16/2019    LUMBAR FACET L4-5 L5-S1 performed by Naga Jasso at [facility] FACET LEVEL 1 BILATERAL Bilateral 06/25/2019    LUMBAR FACET BILATERAL L4-5 L5-S1 performed by Naga Jasso at [facility] Bilateral 08/27/2019    LUMBAR FACET BILATERAL L4-5 AND L5-S1 performed by Naga Jasso at [facility] N/A 10/15/2019    LUMBAR INTER LAMINAR STACI L4-5 performed by Naga Jasso at [facility] Bilateral 10/29/2019    LUMBAR FACET BILATERAL L4-5 L5-S1 performed by Naga Jasso at [address] [place] [address] N/A 12/31/2019    EPIDURAL STEROID INJECTION L4-5 performed by Naga Jasso at [address] [place] [address] Bilateral 01/14/2020    LUMBAR FACET BILATERAL L4-5, L5-S1 performed by Naga Jasso at [address] [place] [address] N/A 03/17/2020    LUMBAR INTER LAMINAR STACI L4-5 performed by Naga Jasso at [address] [place] [address] DX/THER AGNT PARAVERT FACET JOINT, CERV/THORAC, 2ND LEVEL Bilateral 11/20/2018    LUMBAR FACET L4-5, L5-S1 performed by Naga Jasso at [address] [place] [address] DX/THER AGNT PARAVERT FACET JOINT, LUMBAR/SAC, 1ST LEVEL Bilateral 10/17/2017    LUMBAR FACET L4-5 L5-S1 performed by Naga Jasso at [address] [place] [address] DX/THER AGNT PARAVERT FACET JOINT, LUMBAR/SAC, 1ST LEVEL Bilateral 12/26/2017    LUMBAR FACET L4-5/ L5-S1 performed by Naga Jasso at [facility]  AL INJ DX/THER AGNT PARAVERT FACET JOINT, LUMBAR/SAC, 1ST LEVEL Bilateral 03/27/2018    LUMBAR FACET L4-5/ L5-S1 performed by Naga Jasso at [address] [place] [address] DX/THER AGNT PARAVERT FACET JOINT, LUMBAR/SAC, 1ST LEVEL Bilateral 05/29/2018    LUMBAR FACET L4-5/L5-S1 performed by Naga Jasso at [address] [place] [address] DX/THER AGNT PARAVERT FACET JOINT, LUMBAR/SAC, 1ST LEVEL Bilateral 07/03/2018    BILATERAL LUMBAR FACET L4-5 / L5-S1 performed by Naga Jasso at [address] [place] [address] DX/THER AGNT PARAVERT FACET JOINT, LUMBAR/SAC, 1ST LEVEL Bilateral 09/11/2018    LUMBAR FACET BLOCK 1ST AND 2ND LEVEL L4-5, L5-S1 performed by Naga Jasso at e  [address] L/S,1 LEVEL N/A 03/21/2017    LUMBAR FACET BLKAC L4-5 L5-S1 [facility] L/S,1 LEVEL N/A 08/15/2017    LUMBAR FACET L4-5 L5-S1 performed by Naga Jasso at [address] [place] [address] DX/THER SBST EPIDURAL/SUBARACH LUMBAR/SACRAL N/A 03/07/2017    L4-5 LUMBAR EPIDURAL STEROID INJECTION performed by Naga Jasso at [address] [place] [address] DX/THER SBST EPIDURAL/SUBARACH LUMBAR/SACRAL N/A 05/09/2017    EPIDURAL STEROID INJECTION LUMBAR L4-5 #2 performed by Naga Jasso at [address] [place] [address] DX/THER SBST EPIDURAL/SUBARACH LUMBAR/SACRAL N/A 08/01/2017    L4-5 EPIDURAL STEROID INJECTION performed by Naga Jasso at [address] [place] [address] DX/THER SBST EPIDURAL/SUBARACH LUMBAR/SACRAL  10/03/2017    LUMBAR INTER LAMINAR STACI performed by Naga Jasso at [address] [place] [address] DX/THER SBST EPIDURAL/SUBARACH LUMBAR/SACRAL N/A 12/12/2017    L4-5 EPIDURAL STEROID INJECTION performed by Naga Jasso at [address] [place] [address] DX/THER SBST INTRLMNR LMBR/SAC W/IMG GDN N/A 02/20/2018    L4-5 EPIDURAL STEROID INJECTION performed by Naga Jasso at [address] [place] [address] DX/THER SBST INTRLMNR LMBR/SAC W/IMG GDN N/A 05/01/2018    L4-5 EPIDURAL STEROID INJECTION performed by SYSCO Romero at [facility] OR    MA NJX DX/THER SBST INTRLMNR LMBR/SAC W/IMG GDN N/A 07/17/2018    LUMBAR EPIDURAL STEROID INJECTION L4-5 performed by Naga Jasso at [address] DX/THER SBST INTRLMNR LMBR/SAC W/IMG GDN N/A 08/28/2018    LUMBAR EPIDURAL STEROID INJECTION L4-5 performed by aNga Jasso at [address] DX/THER SBST INTRLMNR LMBR/SAC W/IMG GDN N/A 11/06/2018    LUMBAR EPIDURAL STEROID INJECTION L4-5 performed by Naga Jasso at [facility] ENDOSCOPY  05/01/2007    Dr Collazo Going: GERD, pos Ferrari's-1st dx, neg dysplasia; gastritis, neg h-pylori    UPPER GASTROINTESTINAL ENDOSCOPY  05/02/2008    Dr Aneudy Ferrari's   [facility] ENDOSCOPY  07/02/2010    Dr Aneudy Wellers   [facility] ENDOSCOPY  07/12/2013    [place]: surveillance of Ferrari's, biopsies neg for Ferrari's/dysplasia. Small hiatal hernia noted    UPPER GASTROINTESTINAL ENDOSCOPY  08/18/2016    Dr Hayward (-), 5 yr recall       Social History:    Social History     Tobacco Use    Smoking status: Former Smoker     Years: 25.00     Quit date: 12/2017     Years since quitting: 3.8    Smokeless tobacco: Never Used    Tobacco comment: Isabela    Substance Use Topics    Alcohol use: No     Alcohol/week: 0.0 standard drinks                                Counseling given: Not Answered  Comment: Isabela       Vital Signs (Current): There were no vitals filed for this visit.                                            BP Readings from Last 3 Encounters:   08/31/21 117/71   08/25/21 106/62   08/10/21 124/72       NPO Status:                                                                                 BMI:   Wt Readings from Last 3 Encounters:   08/25/21 148 lb (67.1 kg)   08/13/19 134 lb (60.8 kg)   06/25/19 130 lb (59 kg)     There is no height or weight on file to calculate BMI.    CBC: No results found for: WBC, RBC, HGB, HCT, MCV, RDW, PLT    CMP: No results found for: NA, K, CL, CO2, BUN, CREATININE, GFRAA, AGRATIO, LABGLOM, GLUCOSE, PROT, CALCIUM, BILITOT, ALKPHOS, AST, ALT    POC Tests: No results for input(s): POCGLU, POCNA, POCK, POCCL, POCBUN, POCHEMO, POCHCT in the last 72 hours. Coags: No results found for: PROTIME, INR, APTT    HCG (If Applicable): No results found for: PREGTESTUR, PREGSERUM, HCG, HCGQUANT     ABGs: No results found for: PHART, PO2ART, OZS4MUZ, ESG6MVC, BEART, Z9LSFTGN     Type & Screen (If Applicable):  No results found for: LABABO, LABRH    Drug/Infectious Status (If Applicable):  No results found for: HIV, HEPCAB    COVID-19 Screening (If Applicable):   Lab Results   Component Value Date    COVID19 Not Detected 09/24/2021           Anesthesia Evaluation  Patient summary reviewed and Nursing notes reviewed  Airway: Mallampati: II  TM distance: >3 FB   Neck ROM: full  Mouth opening: > = 3 FB Dental: normal exam         Pulmonary:Negative Pulmonary ROS and normal exam                               Cardiovascular:Negative CV ROS             Beta Blocker:  Dose within 24 Hrs         Neuro/Psych:   (+) neuromuscular disease:,             GI/Hepatic/Renal:   (+) hiatal hernia, GERD:,           Endo/Other: Negative Endo/Other ROS                    Abdominal:             Vascular: negative vascular ROS. Other Findings:             Anesthesia Plan      general and TIVA     ASA 2       Induction: intravenous. Anesthetic plan and risks discussed with patient. Plan discussed with CRNA.                   DAYNA Plascencia - CRNA   9/27/2021

## 2021-09-27 NOTE — H&P
Patient Name: Osiel Chatterjee  : 1965  MRN: [id number]  DATE: 21    Allergies: No Known Allergies     ENDOSCOPY  History and Physical    Procedure:    [] Diagnostic Colonoscopy       [] Screening Colonoscopy  [x] EGD      [] ERCP      [] EUS       [] Other    [x] Previous office notes/History and Physical reviewed from the patients chart. Please see EMR for further details of HPI. I have examined the patient's status immediately prior to the procedure and:      Indications/HPI:    []Abdominal Pain   []Cancer- GI/Lung     []Fhx of colon CA/polyps  []History of Polyps  [x]Barretts            []Melena  []Abnormal Imaging              []Dysphagia              []Persistent Pneumonia   []Anemia                            []Food Impaction        []History of Polyps  [] GI Bleed             []Pulmonary nodule/Mass   []Change in bowel habits [x]Heartburn/Reflux  []Rectal Bleed (BRBPR)  []Chest Pain - Non Cardiac []Heme (+) Stool []Ulcers  []Constipation  []Hemoptysis  []Varices  []Diarrhea  []Hypoxemia    []Nausea/Vomiting   []Screening   []Crohns/Colitis  []Other:     Anesthesia:   [x] MAC [] Moderate Sedation   [] General   [] None     ROS: 12 pt Review of Symptoms was negative unless mentioned above    Medications:   Prior to Admission medications    Medication Sig Start Date End Date Taking? Authorizing Provider   ferrous sulfate (IRON 325) 325 (65 Fe) MG tablet Take 325 mg by mouth daily 21   Historical Provider, MD   dexlansoprazole (DEXILANT) 60 MG CPDR delayed release capsule Take 1 capsule by mouth daily 21   DAYNA Sepulveda   diazePAM (VALIUM) 10 MG tablet Take 10 mg by mouth daily.     Historical Provider, MD   HYDROcodone-acetaminophen (NORCO)  MG per tablet Take 1 tablet by mouth every 6 hours as needed for Pain    Historical Provider, MD   milnacipran HCl (SAVELLA) 50 MG TABS Take 50 mg by mouth 2 times daily    Historical Provider, MD   pantoprazole (PROTONIX) 40 MG tablet daily  5/31/15   Historical Provider, MD   amphetamine-dextroamphetamine (ADDERALL) 30 MG tablet   2 times daily  5/27/15   Historical Provider, MD   topiramate (TOPAMAX) 100 MG tablet   100 mg daily  5/26/15   Historical Provider, MD   propranolol (INDERAL) 20 MG tablet   Take 40 mg by mouth daily  13   Historical Provider, MD   tiZANidine (ZANAFLEX) 2 MG tablet   Take 4 mg by mouth every 6 hours as needed  13   Historical Provider, MD   [address] by Does not apply route daily     Historical Provider, MD   calcium carbonate (OSCAL) 500 MG TABS tablet Take 500 mg by mouth daily. Historical Provider, MD   pregabalin (LYRICA) 75 MG capsule   Take 75 mg by mouth 3 times daily     Historical Provider, MD   therapeutic multivitamin-minerals (THERAGRAN-M) tablet Take 1 tablet by mouth daily.     Historical Provider, MD   nabumetone (RELAFEN) 750 MG tablet Take 750 mg by mouth Two tablet three times weekly    Historical Provider, MD       Past Medical History:  Past Medical History:   Diagnosis Date    Anxiety     Arthralgia     Arthritis     Back pain     Ferrari's esophagus 2007 diagnosed    Bereavement     father passed away 13    Disc degeneration, lumbar     Fibrocystic breast     Gastritis     GERD (gastroesophageal reflux disease)     Hiatal hernia     History of anemia     Infiltrating ductal carcinoma of breast ([facility] [address]     right-Dr Persaud    Osteopenia     Tortuous colon        Past Surgical History:  Past Surgical History:   Procedure Laterality Date    BREAST BIOPSY  2006    right    CARPAL TUNNEL RELEASE      left , right 11     SECTION      x 2, most recent was     COLONOSCOPY  2007    Dr Jesus Lu  2010    Dr Delarosa Cutter: unremarkable, tortuous, 5 year recall    COLONOSCOPY  2015    Dr. Clay Leaks: normal  10yr recall    EPIDURAL STEROID INJECTION N/A 2019    LUMBAR EPIDURAL STEROID INJECTION L4-5 performed by Moi Mast at [facility]  06/07/2006    transabdominal w/kenny oophorectomy    LUMBAR NERVE BLOCK N/A 02/02/2016    LESI L4-5 #1 performed by Naga Jasso at [address] N/A 04/02/2019    LUMBAR INTER LAMINAR STACI L5-S1 performed by Naga Jasso at [address] N/A 06/11/2019    LUMBAR INTER LAMINAR STACI L4-5 performed by Naga Jasso at [address] N/A 08/13/2019    LUMBAR INTER LAMINAR TSACI L4-5 performed by Naga Jasso at [address]  09/29/2006    right modified radical    MASTECTOMY  09/29/2006    prophylactic left simple with reconstruction    NERVE BLOCK LUMB FACET LEVEL 1 BILATERAL Bilateral 05/23/2017    LUMBAR FACET BLOCK BILATERAL L4-5 L5-S1 performed by Naga Jasso at [facility] FACET LEVEL 1 BILATERAL Bilateral 01/29/2019    LUMBAR FACET L4-5/ L5-S1  performed by Naga Jasso at [facility] FACET LEVEL 1 BILATERAL Bilateral 04/16/2019    LUMBAR FACET L4-5 L5-S1 performed by Naga Jasso at [facility] FACET LEVEL 1 BILATERAL Bilateral 06/25/2019    LUMBAR FACET BILATERAL L4-5 L5-S1 performed by Naga Jasso at [address] Bilateral 08/27/2019    LUMBAR FACET BILATERAL L4-5 AND L5-S1 performed by Naga Jasso at [address] N/A 10/15/2019    LUMBAR INTER LAMINAR STACI L4-5 performed by Naga Jasso at [address] Bilateral 10/29/2019    LUMBAR FACET BILATERAL L4-5 L5-S1 performed by Naga Jasso at [facility] N/A 12/31/2019    EPIDURAL STEROID INJECTION L4-5 performed by Naga Jasso at [facility] Bilateral 01/14/2020    LUMBAR FACET BILATERAL L4-5, L5-S1 performed by Naga Jasso at [facility] N/A 03/17/2020    LUMBAR INTER LAMINAR STACI L4-5 performed by Naga Jasso at [address] [place] [address] DX/THER AGNT PARAVERT FACET JOINT, CERV/THORAC, 2ND LEVEL Bilateral 11/20/2018    LUMBAR FACET L4-5, L5-S1 performed by Naga Jasso at [address] [place] [address] DX/THER AGNT PARAVERT FACET JOINT, LUMBAR/SAC, 1ST LEVEL Bilateral 10/17/2017    LUMBAR FACET L4-5 L5-S1 performed by Naga Jasso at [address] [place] [address] DX/THER AGNT PARAVERT FACET JOINT, LUMBAR/SAC, 1ST LEVEL Bilateral 12/26/2017    LUMBAR FACET L4-5/ L5-S1 performed by Naga Jasso at [address] [place] [address] DX/THER AGNT PARAVERT FACET JOINT, LUMBAR/SAC, 1ST LEVEL Bilateral 03/27/2018    LUMBAR FACET L4-5/ L5-S1 performed by Naga Jasso at [address] [place] [address] DX/THER AGNT PARAVERT FACET JOINT, LUMBAR/SAC, 1ST LEVEL Bilateral 05/29/2018    LUMBAR FACET L4-5/L5-S1 performed by Naga Jasso at [address] [place] [address] DX/THER AGNT PARAVERT FACET JOINT, LUMBAR/SAC, 1ST LEVEL Bilateral 07/03/2018    BILATERAL LUMBAR FACET L4-5 / L5-S1 performed by Naga Jasso at [address] [place] [address] DX/THER AGNT PARAVERT FACET JOINT, LUMBAR/SAC, 1ST LEVEL Bilateral 09/11/2018    LUMBAR FACET BLOCK 1ST AND 2ND LEVEL L4-5, L5-S1 performed by Naga Jasso at [facility] [address] L/S,1 LEVEL N/A 03/21/2017    LUMBAR FACET BLKAC L4-5 L5-S1 [facility] L/S,1 LEVEL N/A 08/15/2017    LUMBAR FACET L4-5 L5-S1 performed by Naga Jasso at [address] [place] [address] DX/THER SBST EPIDURAL/SUBARACH LUMBAR/SACRAL N/A 03/07/2017    L4-5 LUMBAR EPIDURAL STEROID INJECTION performed by Naga Jasso at [address] [place] [address] DX/THER SBST EPIDURAL/SUBARACH LUMBAR/SACRAL N/A 05/09/2017    EPIDURAL STEROID INJECTION LUMBAR L4-5 #2 performed by Naga Jasso at [address] [place] [address] DX/THER SBST EPIDURAL/SUBARACH LUMBAR/SACRAL N/A 08/01/2017    L4-5 EPIDURAL STEROID INJECTION performed by Jeaneth Swanson at [address] [place] [address] DX/THER SBST EPIDURAL/SUBARACH LUMBAR/SACRAL  10/03/2017    LUMBAR INTER LAMINAR STACI performed by Naga Jasso at [address] DX/THER SBST EPIDURAL/SUBARACH LUMBAR/SACRAL N/A 12/12/2017    L4-5 EPIDURAL STEROID INJECTION performed by Naga Jasso at [address] DX/THER SBST INTRLMNR LMBR/SAC W/IMG GDN N/A 02/20/2018    L4-5 EPIDURAL STEROID INJECTION performed by Naga Jasso at [address] DX/THER SBST INTRLMNR LMBR/SAC W/IMG GDN N/A 05/01/2018    L4-5 EPIDURAL STEROID INJECTION performed by Naga Jasso at [address] DX/THER SBST INTRLMNR LMBR/SAC W/IMG GDN N/A 07/17/2018    LUMBAR EPIDURAL STEROID INJECTION L4-5 performed by Naga Jasso at [address] DX/THER SBST INTRLMNR LMBR/SAC W/IMG GDN N/A 08/28/2018    LUMBAR EPIDURAL STEROID INJECTION L4-5 performed by Naga Jasso at [address] DX/THER SBST INTRLMNR LMBR/SAC W/IMG GDN N/A 11/06/2018    LUMBAR EPIDURAL STEROID INJECTION L4-5 [facility]  05/01/2007    Dr Lane Spine: GERD, pos Ferrari's-1st dx, neg dysplasia; gastritis, neg h-pylori    UPPER GASTROINTESTINAL ENDOSCOPY  05/02/2008    Dr Andrez Velásquez Ferrari's   [facility] ENDOSCOPY  07/02/2010    Dr Andrez Velásquez Ferrari's   [address] [place] [address] Drive  07/12/2013    [facility]'s, biopsies neg for Ferrari's/dysplasia.  Small hiatal hernia noted    UPPER GASTROINTESTINAL ENDOSCOPY  08/18/2016    Dr Hayward (-), 5 yr recall       Social History:  Social History     Tobacco Use    Smoking status: Former Smoker     Years: 25.00     Quit date: 12/2017     Years since quitting: 3.8    Smokeless tobacco: Never Used    Tobacco comment: Juul    Vaping Use    Vaping Use: Every day    Start date: 3/15/2019    Substances: Nicotine    Devices: Disposable   Substance Use Topics    Alcohol use: No     Alcohol/week: 0.0 standard drinks    Drug use: No       Vital Signs: There were no vitals filed for this visit. Physical Exam:  Cardiac:  [x]WNL  []Comments:  Pulmonary:  [x]WNL   []Comments:  Neuro/Mental Status:  [x]WNL  []Comments:  Abdominal:  [x]WNL    []Comments:  Other:   []WNL  []Comments:    Informed Consent:  The risks and benefits of the procedure have been discussed with either the patient or if they cannot consent, their representative. Assessment:  Patient examined and appropriate for planned sedation and procedure. Plan:  Proceed with planned sedation and procedure as above. Chelly Phillips am scribing for and in the presence of Dr. Durga Paige MD.  Electronically signed by Micheline Spaulding RN on 9/27/2021 at 9:24 AM    I personally performed the services described in this documentation as scribed by Sophia Emanuel, and it appears accurate and complete.      Durga Paige MD  9/27/2021

## 2021-09-27 NOTE — OP NOTE
Endoscopic Procedure Note    Patient: Roland Gao: 1965  Med Rec#: [id number] Acc#: [id number]     Primary Care Provider Gabe Adorno DO  Referring Provider: DAYNA Carrera Si    Endoscopist: Gareth Roth MD    Date of Procedure:  9/27/2021    Procedure:   1. EGD with biopsy    Indications:   1. Reflux   2. Hx of Ferrari's esophagus    Anesthesia:  Sedation was administered by anesthesia who monitored the patient during the procedure. Estimated Blood Loss: minimal    Procedure:   After reviewing the patient's chart and obtaining informed consent, the patient was placed in the left lateral decubitus position. A forward-viewing Olympus endoscope was lubricated and inserted through the mouth into the oropharynx. Under direct visualization, the upper esophagus was intubated. The scope was advanced to the level of the third portion of duodenum. Scope was slowly withdrawn with careful inspection of the mucosal surfaces. The scope was retroflexed for inspection of the gastric fundus and incisura. Findings and maneuvers are listed in impression below. The patient tolerated the procedure well. The scope was removed. There were no immediate complications. Findings:   Esophagus: abnormal: there was questionable mucosal changes of esophagitis vs Ferrari's- biopsied. There is no hiatal hernia present. Stomach: Abnormal: there was a large amount of solid food in the stomach. The pylorus was patent. Duodenum: normal      IMPRESSION:  1. Esophagitis vs Ferrari's- biopsied     RECOMMENDATIONS:    1. Await path results, the patient will be contacted in 7-10 days with biopsy results. 2.  Continue PPI daily  3. Repeat EGD in 5 years for surveillance. The results were discussed with the patient and family. A copy of the images obtained were given to the patient.      Kvng Caballero am scribing for and in the presence of Dr. Gareth Roth MD.  Electronically signed by [facility] Giles Barnes RN on 9/27/2021 at 9:25 AM    I personally performed the services described in this documentation as scribed by Michelle Davidson, and it appears accurate and complete.      Liam Pratt MD  9/27/2021

## 2021-10-17 ENCOUNTER — TRANSCRIBE ORDERS (OUTPATIENT)
Dept: ADMINISTRATIVE | Facility: HOSPITAL | Age: 56
End: 2021-10-17

## 2021-10-17 DIAGNOSIS — Z51.81 ENCOUNTER FOR THERAPEUTIC DRUG MONITORING: Primary | ICD-10-CM

## 2021-10-19 ENCOUNTER — HOSPITAL ENCOUNTER (OUTPATIENT)
Dept: PAIN MANAGEMENT | Age: 56
Discharge: HOME | End: 2021-10-19
Payer: COMMERCIAL

## 2021-10-19 VITALS
SYSTOLIC BLOOD PRESSURE: 141 MMHG | OXYGEN SATURATION: 99 % | TEMPERATURE: 97.1 F | DIASTOLIC BLOOD PRESSURE: 74 MMHG | RESPIRATION RATE: 18 BRPM | HEART RATE: 51 BPM

## 2021-10-19 DIAGNOSIS — R52 PAIN MANAGEMENT: ICD-10-CM

## 2021-10-19 PROCEDURE — G0260 INJ FOR SACROILIAC JT ANESTH: HCPCS

## 2021-10-19 PROCEDURE — 2580000003 HC RX 258

## 2021-10-19 PROCEDURE — 2500000003 HC RX 250 WO HCPCS

## 2021-10-19 PROCEDURE — 3209999900 FLUORO FOR SURGICAL PROCEDURES

## 2021-10-19 PROCEDURE — 6360000002 HC RX W HCPCS

## 2021-10-19 RX ORDER — LIDOCAINE HYDROCHLORIDE 10 MG/ML
7 INJECTION, SOLUTION EPIDURAL; INFILTRATION; INTRACAUDAL; PERINEURAL ONCE
Status: DISCONTINUED | OUTPATIENT
Start: 2021-10-19 | End: 2021-10-21 | Stop reason: HOSPADM

## 2021-10-19 RX ORDER — METHYLPREDNISOLONE ACETATE 80 MG/ML
80 INJECTION, SUSPENSION INTRA-ARTICULAR; INTRALESIONAL; INTRAMUSCULAR; SOFT TISSUE ONCE
Status: DISCONTINUED | OUTPATIENT
Start: 2021-10-19 | End: 2021-10-21 | Stop reason: HOSPADM

## 2021-10-19 RX ORDER — BUPIVACAINE HYDROCHLORIDE 5 MG/ML
2 INJECTION, SOLUTION EPIDURAL; INTRACAUDAL ONCE
Status: DISCONTINUED | OUTPATIENT
Start: 2021-10-19 | End: 2021-10-21 | Stop reason: HOSPADM

## 2021-10-19 RX ORDER — SODIUM CHLORIDE 9 MG/ML
3 INJECTION INTRAVENOUS ONCE
Status: DISCONTINUED | OUTPATIENT
Start: 2021-10-19 | End: 2021-10-21 | Stop reason: HOSPADM

## 2021-10-19 ASSESSMENT — PAIN - FUNCTIONAL ASSESSMENT: PAIN_FUNCTIONAL_ASSESSMENT: 0-10

## 2021-10-19 NOTE — INTERVAL H&P NOTE
Update History & Physical    The patient's History and Physical  was reviewed with the patient and I examined the patient. There was  NO CHANGE:[id number]}. The surgical site was confirmed by the patient and me. Plan: The risks, benefits, expected outcome, and alternative to the recommended procedure have been discussed with the patient. Patient understands and wants to proceed with the procedure.      Electronically signed by Virginia Quinn MD on 10/19/2021 at 11:41 AM

## 2021-10-22 ENCOUNTER — HOSPITAL ENCOUNTER (OUTPATIENT)
Dept: MRI IMAGING | Age: 56
Discharge: HOME | End: 2021-10-22
Payer: COMMERCIAL

## 2021-10-22 DIAGNOSIS — M54.16 LUMBAR RADICULOPATHY: ICD-10-CM

## 2021-10-22 PROCEDURE — 72148 MRI LUMBAR SPINE W/O DYE: CPT

## 2021-11-02 ENCOUNTER — HOSPITAL ENCOUNTER (OUTPATIENT)
Dept: PAIN MANAGEMENT | Age: 56
Discharge: HOME | End: 2021-11-02
Payer: COMMERCIAL

## 2021-11-02 VITALS
DIASTOLIC BLOOD PRESSURE: 80 MMHG | OXYGEN SATURATION: 100 % | SYSTOLIC BLOOD PRESSURE: 130 MMHG | RESPIRATION RATE: 18 BRPM | TEMPERATURE: 96.9 F | HEART RATE: 61 BPM

## 2021-11-02 DIAGNOSIS — R52 PAIN MANAGEMENT: ICD-10-CM

## 2021-11-02 PROCEDURE — 64494 INJ PARAVERT F JNT L/S 2 LEV: CPT

## 2021-11-02 PROCEDURE — 6360000002 HC RX W HCPCS

## 2021-11-02 PROCEDURE — 2500000003 HC RX 250 WO HCPCS

## 2021-11-02 PROCEDURE — 3209999900 FLUORO FOR SURGICAL PROCEDURES

## 2021-11-02 PROCEDURE — 64493 INJ PARAVERT F JNT L/S 1 LEV: CPT

## 2021-11-02 RX ORDER — BUPIVACAINE HYDROCHLORIDE 5 MG/ML
5 INJECTION, SOLUTION EPIDURAL; INTRACAUDAL ONCE
Status: DISCONTINUED | OUTPATIENT
Start: 2021-11-02 | End: 2021-11-04 | Stop reason: HOSPADM

## 2021-11-02 RX ORDER — LIDOCAINE HYDROCHLORIDE 10 MG/ML
20 INJECTION, SOLUTION EPIDURAL; INFILTRATION; INTRACAUDAL; PERINEURAL ONCE
Status: DISCONTINUED | OUTPATIENT
Start: 2021-11-02 | End: 2021-11-04 | Stop reason: HOSPADM

## 2021-11-02 RX ORDER — METHYLPREDNISOLONE ACETATE 80 MG/ML
80 INJECTION, SUSPENSION INTRA-ARTICULAR; INTRALESIONAL; INTRAMUSCULAR; SOFT TISSUE ONCE
Status: DISCONTINUED | OUTPATIENT
Start: 2021-11-02 | End: 2021-11-04 | Stop reason: HOSPADM

## 2021-11-02 ASSESSMENT — PAIN DESCRIPTION - FREQUENCY: FREQUENCY: INTERMITTENT

## 2021-11-02 ASSESSMENT — PAIN DESCRIPTION - PAIN TYPE: TYPE: CHRONIC PAIN

## 2021-11-02 ASSESSMENT — PAIN DESCRIPTION - DESCRIPTORS: DESCRIPTORS: RADIATING;ACHING;SHARP

## 2021-11-02 ASSESSMENT — PAIN SCALES - GENERAL: PAINLEVEL_OUTOF10: 9

## 2021-11-02 ASSESSMENT — PAIN DESCRIPTION - LOCATION: LOCATION: BACK

## 2021-11-02 ASSESSMENT — PAIN DESCRIPTION - ORIENTATION: ORIENTATION: LOWER

## 2021-11-02 ASSESSMENT — PAIN - FUNCTIONAL ASSESSMENT
PAIN_FUNCTIONAL_ASSESSMENT: PREVENTS OR INTERFERES SOME ACTIVE ACTIVITIES AND ADLS
PAIN_FUNCTIONAL_ASSESSMENT: 0-10

## 2021-11-02 NOTE — INTERVAL H&P NOTE
Update History & Physical    The patient's History and Physical  was reviewed with the patient and I examined the patient. There was  NO CHANGE:[id number]}. The surgical site was confirmed by the patient and me. Plan: The risks, benefits, expected outcome, and alternative to the recommended procedure have been discussed with the patient. Patient understands and wants to proceed with the procedure.      Electronically signed by Sabrina Pederson MD on 11/2/2021 at 8:52 AM

## 2021-11-02 NOTE — PROGRESS NOTES
Procedure:  Level of Consciousness: [x]Alert [x]Oriented []Disoriented []Lethargic  Anxiety Level: [x]Calm []Anxious []Depressed []Other  Skin: [x]Warm [x]Dry []Cool []Moist []Intact []Other  Cardiovascular: [x]Palpitations: [x]Never []Occasionally []Frequently  Chest Pain: [x]No []Yes  Respiratory:  [x]Unlabored []Labored []Cough ([] Productive []Unproductive)  HCG Required: [x]No []Yes   Results: []Negative []Positive  Knowledge Level:        [x]Patient/Other verbalized understanding of pre-procedure instructions. [x]Assessment of post-op care needs (transportation, responsible caregiver)        [x]Able to discuss health care problems and how to deal with it.   Factors that Affect Teaching:        Language Barrier: [x]No []Yes - why:        Hearing Loss:        [x]No []Yes            Corrective Device:  []Yes []No        Vision Loss:           []No [x]Yes            Corrective Device:  [x]Yes []No        Memory Loss:       [x]No []Yes            []Short Term []Long Term  Motivational Level:  [x]Asks Questions                  []Extremely Anxious       [x]Seems Interested               []Seems Uninterested                  [x]Denies need for Education  Risk for Injury:  [x]Patient oriented to person, place and time  []History of frequent falls/loss of balance  Nutritional:  []Change in appetite   []Weight Gain   []Weight Loss  Functional:  []Requires assistance with ADL's 17-Mar-2019 20:50

## 2021-11-16 ENCOUNTER — HOSPITAL ENCOUNTER (OUTPATIENT)
Dept: PAIN MANAGEMENT | Age: 56
Discharge: HOME | End: 2021-11-16
Payer: COMMERCIAL

## 2021-11-16 VITALS
TEMPERATURE: 98 F | SYSTOLIC BLOOD PRESSURE: 127 MMHG | OXYGEN SATURATION: 100 % | DIASTOLIC BLOOD PRESSURE: 76 MMHG | RESPIRATION RATE: 18 BRPM | HEART RATE: 64 BPM

## 2021-11-16 DIAGNOSIS — R52 PAIN MANAGEMENT: ICD-10-CM

## 2021-11-16 PROCEDURE — 3209999900 FLUORO FOR SURGICAL PROCEDURES

## 2021-11-16 PROCEDURE — 2580000003 HC RX 258

## 2021-11-16 PROCEDURE — 2500000003 HC RX 250 WO HCPCS

## 2021-11-16 PROCEDURE — 6360000002 HC RX W HCPCS

## 2021-11-16 PROCEDURE — 62323 NJX INTERLAMINAR LMBR/SAC: CPT

## 2021-11-16 RX ORDER — METHYLPREDNISOLONE ACETATE 80 MG/ML
80 INJECTION, SUSPENSION INTRA-ARTICULAR; INTRALESIONAL; INTRAMUSCULAR; SOFT TISSUE ONCE
Status: DISCONTINUED | OUTPATIENT
Start: 2021-11-16 | End: 2021-11-18 | Stop reason: HOSPADM

## 2021-11-16 RX ORDER — SODIUM CHLORIDE 9 MG/ML
5 INJECTION INTRAVENOUS ONCE
Status: DISCONTINUED | OUTPATIENT
Start: 2021-11-16 | End: 2021-11-18 | Stop reason: HOSPADM

## 2021-11-16 RX ORDER — LIDOCAINE HYDROCHLORIDE 10 MG/ML
5 INJECTION, SOLUTION EPIDURAL; INFILTRATION; INTRACAUDAL; PERINEURAL ONCE
Status: DISCONTINUED | OUTPATIENT
Start: 2021-11-16 | End: 2021-11-18 | Stop reason: HOSPADM

## 2021-11-16 ASSESSMENT — PAIN DESCRIPTION - PAIN TYPE: TYPE: CHRONIC PAIN

## 2021-11-16 ASSESSMENT — PAIN DESCRIPTION - LOCATION: LOCATION: BACK

## 2021-11-16 ASSESSMENT — PAIN SCALES - GENERAL: PAINLEVEL_OUTOF10: 8

## 2021-11-16 ASSESSMENT — PAIN - FUNCTIONAL ASSESSMENT: PAIN_FUNCTIONAL_ASSESSMENT: 0-10

## 2021-11-16 NOTE — INTERVAL H&P NOTE
Update History & Physical    The patient's History and Physical   was reviewed with the patient and I examined the patient. There was  NO CHANGE:[id number]}. The surgical site was confirmed by the patient and me. Plan: The risks, benefits, expected outcome, and alternative to the recommended procedure have been discussed with the patient. Patient understands and wants to proceed with the procedure.      Electronically signed by Geno Amador MD on 11/16/2021 at 8:25 AM

## 2021-11-23 ENCOUNTER — HOSPITAL ENCOUNTER (OUTPATIENT)
Dept: ULTRASOUND IMAGING | Age: 56
Discharge: HOME | End: 2021-11-23
Payer: COMMERCIAL

## 2021-11-23 DIAGNOSIS — R93.429 ABNORMAL RADIOLOGIC FINDINGS ON DIAGNOSTIC IMAGING OF UNSPECIFIED KIDNEY: ICD-10-CM

## 2021-11-23 PROCEDURE — 76770 US EXAM ABDO BACK WALL COMP: CPT

## 2021-12-09 ENCOUNTER — TELEPHONE (OUTPATIENT)
Dept: VASCULAR SURGERY | Facility: CLINIC | Age: 56
End: 2021-12-09

## 2021-12-14 ENCOUNTER — TELEPHONE (OUTPATIENT)
Dept: VASCULAR SURGERY | Facility: CLINIC | Age: 56
End: 2021-12-14

## 2021-12-14 ENCOUNTER — TRANSCRIBE ORDERS (OUTPATIENT)
Dept: LAB | Facility: HOSPITAL | Age: 56
End: 2021-12-14

## 2021-12-14 DIAGNOSIS — Z11.59 SCREENING FOR VIRAL DISEASE: Primary | ICD-10-CM

## 2021-12-17 ENCOUNTER — HOSPITAL ENCOUNTER (OUTPATIENT)
Dept: WOMENS IMAGING | Age: 56
Discharge: HOME | End: 2021-12-17
Payer: COMMERCIAL

## 2021-12-17 DIAGNOSIS — M81.0 AGE-RELATED OSTEOPOROSIS WITHOUT CURRENT PATHOLOGICAL FRACTURE: ICD-10-CM

## 2021-12-17 DIAGNOSIS — M85.9 DISORDER OF BONE DENSITY AND STRUCTURE, UNSPECIFIED: ICD-10-CM

## 2021-12-17 DIAGNOSIS — E55.9 VITAMIN D DEFICIENCY: ICD-10-CM

## 2021-12-17 DIAGNOSIS — Z13.820 ENCOUNTER FOR SCREENING FOR OSTEOPOROSIS: ICD-10-CM

## 2021-12-17 PROCEDURE — 77080 DXA BONE DENSITY AXIAL: CPT

## 2022-01-25 ENCOUNTER — HOSPITAL ENCOUNTER (OUTPATIENT)
Dept: PAIN MANAGEMENT | Age: 57
Discharge: HOME | End: 2022-01-25
Payer: COMMERCIAL

## 2022-01-25 VITALS
OXYGEN SATURATION: 99 % | BODY MASS INDEX: 23.39 KG/M2 | WEIGHT: 137 LBS | TEMPERATURE: 97.4 F | HEIGHT: 64 IN | HEART RATE: 64 BPM | DIASTOLIC BLOOD PRESSURE: 83 MMHG | SYSTOLIC BLOOD PRESSURE: 142 MMHG | RESPIRATION RATE: 18 BRPM

## 2022-01-25 DIAGNOSIS — R52 PAIN MANAGEMENT: ICD-10-CM

## 2022-01-25 PROCEDURE — 2500000003 HC RX 250 WO HCPCS

## 2022-01-25 PROCEDURE — 2580000003 HC RX 258

## 2022-01-25 PROCEDURE — 3209999900 FLUORO FOR SURGICAL PROCEDURES

## 2022-01-25 PROCEDURE — G0260 INJ FOR SACROILIAC JT ANESTH: HCPCS

## 2022-01-25 PROCEDURE — 6360000002 HC RX W HCPCS

## 2022-01-25 PROCEDURE — A4216 STERILE WATER/SALINE, 10 ML: HCPCS

## 2022-01-25 RX ORDER — SODIUM CHLORIDE 9 MG/ML
3 INJECTION INTRAVENOUS ONCE
Status: DISCONTINUED | OUTPATIENT
Start: 2022-01-25 | End: 2022-01-27 | Stop reason: HOSPADM

## 2022-01-25 RX ORDER — BUPIVACAINE HYDROCHLORIDE 5 MG/ML
2 INJECTION, SOLUTION EPIDURAL; INTRACAUDAL ONCE
Status: DISCONTINUED | OUTPATIENT
Start: 2022-01-25 | End: 2022-01-27 | Stop reason: HOSPADM

## 2022-01-25 RX ORDER — METHYLPREDNISOLONE ACETATE 80 MG/ML
80 INJECTION, SUSPENSION INTRA-ARTICULAR; INTRALESIONAL; INTRAMUSCULAR; SOFT TISSUE ONCE
Status: DISCONTINUED | OUTPATIENT
Start: 2022-01-25 | End: 2022-01-27 | Stop reason: HOSPADM

## 2022-01-25 RX ORDER — LIDOCAINE HYDROCHLORIDE 10 MG/ML
7 INJECTION, SOLUTION EPIDURAL; INFILTRATION; INTRACAUDAL; PERINEURAL ONCE
Status: DISCONTINUED | OUTPATIENT
Start: 2022-01-25 | End: 2022-01-27 | Stop reason: HOSPADM

## 2022-01-25 ASSESSMENT — PAIN - FUNCTIONAL ASSESSMENT
PAIN_FUNCTIONAL_ASSESSMENT: 0-10
PAIN_FUNCTIONAL_ASSESSMENT: 0-10

## 2022-01-25 NOTE — INTERVAL H&P NOTE
Update History & Physical    The patient's History and Physical   was reviewed with the patient and I examined the patient. There was nO CHANGE:[id number]}. The surgical site was confirmed by the patient and me. Plan: The risks, benefits, expected outcome, and alternative to the recommended procedure have been discussed with the patient. Patient understands and wants to proceed with the procedure.      Electronically signed by Angelica Contreras MD on 1/25/2022 at 9:57 AM

## 2022-02-08 ENCOUNTER — HOSPITAL ENCOUNTER (OUTPATIENT)
Dept: PAIN MANAGEMENT | Age: 57
Discharge: HOME | End: 2022-02-08
Payer: COMMERCIAL

## 2022-02-08 VITALS
RESPIRATION RATE: 18 BRPM | DIASTOLIC BLOOD PRESSURE: 77 MMHG | HEART RATE: 57 BPM | OXYGEN SATURATION: 99 % | TEMPERATURE: 97.5 F | SYSTOLIC BLOOD PRESSURE: 134 MMHG

## 2022-02-08 DIAGNOSIS — R52 PAIN MANAGEMENT: ICD-10-CM

## 2022-02-08 PROCEDURE — 2580000003 HC RX 258

## 2022-02-08 PROCEDURE — 6360000002 HC RX W HCPCS

## 2022-02-08 PROCEDURE — 62323 NJX INTERLAMINAR LMBR/SAC: CPT

## 2022-02-08 PROCEDURE — 2500000003 HC RX 250 WO HCPCS

## 2022-02-08 PROCEDURE — 3209999900 FLUORO FOR SURGICAL PROCEDURES

## 2022-02-08 PROCEDURE — A4216 STERILE WATER/SALINE, 10 ML: HCPCS

## 2022-02-08 RX ORDER — METHYLPREDNISOLONE ACETATE 40 MG/ML
80 INJECTION, SUSPENSION INTRA-ARTICULAR; INTRALESIONAL; INTRAMUSCULAR; SOFT TISSUE ONCE
Status: DISCONTINUED | OUTPATIENT
Start: 2022-02-08 | End: 2022-02-10 | Stop reason: HOSPADM

## 2022-02-08 RX ORDER — LIDOCAINE HYDROCHLORIDE 10 MG/ML
5 INJECTION, SOLUTION EPIDURAL; INFILTRATION; INTRACAUDAL; PERINEURAL ONCE
Status: DISCONTINUED | OUTPATIENT
Start: 2022-02-08 | End: 2022-02-10 | Stop reason: HOSPADM

## 2022-02-08 RX ORDER — SODIUM CHLORIDE 9 MG/ML
5 INJECTION INTRAVENOUS ONCE
Status: DISCONTINUED | OUTPATIENT
Start: 2022-02-08 | End: 2022-02-10 | Stop reason: HOSPADM

## 2022-02-08 ASSESSMENT — PAIN - FUNCTIONAL ASSESSMENT: PAIN_FUNCTIONAL_ASSESSMENT: 0-10

## 2022-02-08 NOTE — INTERVAL H&P NOTE
Update History & Physical    The patient's History and Physical  was reviewed with the patient and I examined the patient. There was  NO CHANGE:[id number]}. The surgical site was confirmed by the patient and me. Plan: The risks, benefits, expected outcome, and alternative to the recommended procedure have been discussed with the patient. Patient understands and wants to proceed with the procedure.      Electronically signed by Edgar Manuel MD on 2/8/2022 at 10:37 AM

## 2022-02-11 ENCOUNTER — HOSPITAL ENCOUNTER (OUTPATIENT)
Dept: GENERAL RADIOLOGY | Facility: HOSPITAL | Age: 57
Discharge: HOME | End: 2022-02-11

## 2022-02-11 ENCOUNTER — LAB (OUTPATIENT)
Dept: LAB | Facility: HOSPITAL | Age: 57
End: 2022-02-11

## 2022-02-11 DIAGNOSIS — C50.911 MALIGNANT NEOPLASM OF RIGHT BREAST IN FEMALE, ESTROGEN RECEPTOR POSITIVE, UNSPECIFIED SITE OF BREAST: ICD-10-CM

## 2022-02-11 DIAGNOSIS — Z17.0 MALIGNANT NEOPLASM OF RIGHT BREAST IN FEMALE, ESTROGEN RECEPTOR POSITIVE, UNSPECIFIED SITE OF BREAST: ICD-10-CM

## 2022-02-11 LAB
ALBUMIN SERPL-MCNC: 4.1 G/DL (ref 3.5–5.2)
ALBUMIN/GLOB SERPL: 2 G/DL
ALP SERPL-CCNC: 48 U/L (ref 39–117)
ALT SERPL W P-5'-P-CCNC: 12 U/L (ref 1–33)
ANION GAP SERPL CALCULATED.3IONS-SCNC: 8 MMOL/L (ref 5–15)
AST SERPL-CCNC: 14 U/L (ref 1–32)
BASOPHILS # BLD AUTO: 0.02 10*3/MM3 (ref 0–0.2)
BASOPHILS NFR BLD AUTO: 0.3 % (ref 0–1.5)
BILIRUB SERPL-MCNC: 0.2 MG/DL (ref 0–1.2)
BUN SERPL-MCNC: 9 MG/DL (ref 6–20)
BUN/CREAT SERPL: 7.8 (ref 7–25)
CALCIUM SPEC-SCNC: 9.5 MG/DL (ref 8.6–10.5)
CHLORIDE SERPL-SCNC: 106 MMOL/L (ref 98–107)
CO2 SERPL-SCNC: 27 MMOL/L (ref 22–29)
CREAT SERPL-MCNC: 1.15 MG/DL (ref 0.57–1)
DEPRECATED RDW RBC AUTO: 46.3 FL (ref 37–54)
EOSINOPHIL # BLD AUTO: 0.04 10*3/MM3 (ref 0–0.4)
EOSINOPHIL NFR BLD AUTO: 0.7 % (ref 0.3–6.2)
ERYTHROCYTE [DISTWIDTH] IN BLOOD BY AUTOMATED COUNT: 13.3 % (ref 12.3–15.4)
FERRITIN SERPL-MCNC: 63.95 NG/ML (ref 13–150)
GFR SERPL CREATININE-BSD FRML MDRD: 49 ML/MIN/1.73
GLOBULIN UR ELPH-MCNC: 2.1 GM/DL
GLUCOSE SERPL-MCNC: 96 MG/DL (ref 65–99)
HCT VFR BLD AUTO: 39.3 % (ref 34–46.6)
HGB BLD-MCNC: 12 G/DL (ref 12–15.9)
IMM GRANULOCYTES # BLD AUTO: 0.01 10*3/MM3 (ref 0–0.05)
IMM GRANULOCYTES NFR BLD AUTO: 0.2 % (ref 0–0.5)
IRON 24H UR-MRATE: 125 MCG/DL (ref 37–145)
IRON SATN MFR SERPL: 35 % (ref 20–50)
LYMPHOCYTES # BLD AUTO: 1.65 10*3/MM3 (ref 0.7–3.1)
LYMPHOCYTES NFR BLD AUTO: 26.8 % (ref 19.6–45.3)
MCH RBC QN AUTO: 28.9 PG (ref 26.6–33)
MCHC RBC AUTO-ENTMCNC: 30.5 G/DL (ref 31.5–35.7)
MCV RBC AUTO: 94.7 FL (ref 79–97)
MONOCYTES # BLD AUTO: 0.41 10*3/MM3 (ref 0.1–0.9)
MONOCYTES NFR BLD AUTO: 6.7 % (ref 5–12)
NEUTROPHILS NFR BLD AUTO: 4.02 10*3/MM3 (ref 1.7–7)
NEUTROPHILS NFR BLD AUTO: 65.3 % (ref 42.7–76)
NRBC BLD AUTO-RTO: 0 /100 WBC (ref 0–0.2)
PLATELET # BLD AUTO: 213 10*3/MM3 (ref 140–450)
PMV BLD AUTO: 10.5 FL (ref 6–12)
POTASSIUM SERPL-SCNC: 4.1 MMOL/L (ref 3.5–5.2)
PROT SERPL-MCNC: 6.2 G/DL (ref 6–8.5)
RBC # BLD AUTO: 4.15 10*6/MM3 (ref 3.77–5.28)
SODIUM SERPL-SCNC: 141 MMOL/L (ref 136–145)
TIBC SERPL-MCNC: 353 MCG/DL (ref 298–536)
TRANSFERRIN SERPL-MCNC: 237 MG/DL (ref 200–360)
WBC NRBC COR # BLD: 6.15 10*3/MM3 (ref 3.4–10.8)

## 2022-02-11 PROCEDURE — 83540 ASSAY OF IRON: CPT

## 2022-02-11 PROCEDURE — 80053 COMPREHEN METABOLIC PANEL: CPT

## 2022-02-11 PROCEDURE — 84466 ASSAY OF TRANSFERRIN: CPT

## 2022-02-11 PROCEDURE — 82728 ASSAY OF FERRITIN: CPT

## 2022-02-11 PROCEDURE — 82746 ASSAY OF FOLIC ACID SERUM: CPT

## 2022-02-11 PROCEDURE — 71046 X-RAY EXAM CHEST 2 VIEWS: CPT

## 2022-02-11 PROCEDURE — 82378 CARCINOEMBRYONIC ANTIGEN: CPT

## 2022-02-11 PROCEDURE — 85025 COMPLETE CBC W/AUTO DIFF WBC: CPT

## 2022-02-11 PROCEDURE — 36415 COLL VENOUS BLD VENIPUNCTURE: CPT

## 2022-02-11 PROCEDURE — 82607 VITAMIN B-12: CPT

## 2022-02-11 PROCEDURE — 86300 IMMUNOASSAY TUMOR CA 15-3: CPT

## 2022-02-12 LAB
CANCER AG27-29 SERPL-ACNC: 19.2 U/ML (ref 0–38.6)
CEA SERPL-MCNC: 2.39 NG/ML
FOLATE SERPL-MCNC: >20 NG/ML (ref 4.78–24.2)
VIT B12 BLD-MCNC: 515 PG/ML (ref 211–946)

## 2022-02-13 NOTE — PROGRESS NOTES
[facility] ON[facility] HEMATOLOGY AND ONCOLOGY  [address]  [facility] [address] [phone]    Patient Name: Carol Rodriguez  Encounter Date: 02/18/2022  YOB: 1965  Patient Number: [id number]      REASON FOR VISIT: Ms. Carol Rodriguez is a 56-year-old female who returns in follow-up of breast cancer. She is seen nearly 170 months since completion of adjuvant chemotherapy and over 121 months since cessation of adjuvant Femara (completed 60 months of therapy). The patient is here alone.      I have reviewed the HPI and verified with the patient the accuracy of it. No changes to interval history since the information was documented. Dave Gonzalez MD 02/18/22       DIAGNOSTIC ABNORMALITIES: Breast carcinoma   1. Unilateral mammogram, right breast, 08/23/2006, [facility]. Approximately a 2 cm spiculated area in the upper-outer quadrant of the right breast at the 10 o'clock to 11 o'clock position.  2. Right breast ultrasound, 08/28/2006, [facility]. A solid nodule at 10 o’clock in the right breast measuring up to 2 cm is felt to correspond to the abnormality seen on the mammograms, including spot views today. There are also small cysts in this region. Excisional biopsy should be considered.  3. Bone scan, 09/07/2006, [facility]. Small areas of vaguely increased activity in the upper thoracic area and at the lumbosacral junction, probably arthritic. No definite metastatic change is identified.  4. CT of the brain, 09/07/2006, [facility]. Chronic sinus disease involving the left half of the sphenoid sinus. Otherwise unremarkable enhanced CT of the brain with no convincing evidence of metastatic disease.  5. CT of the chest, 09/07/2006, [facility] small cortical cyst involving the upper pole of the right kidney. Otherwise unremarkable CT of the chest with no evidence of metastatic disease.  6. CT of the pelvis,  09/07/2006, [facility]. Sclerotic lesion involving the right femoral head which I would favor to represent a bony enostosis. Considering the lack of other metastatic disease, I feel a solitary bony lesion to the right femoral head would be an unusual manifestation but warranting followup nonetheless. Bone scan may be helpful for further evaluation.  7. Unilateral mammogram of breast, 09/11/2006, [facility]. Lobular areas of density in the left breast consistent with several left breast masses. Ultrasound was performed. There are no spiculated densities. No suspicious microcalcifications are identified. Screening exam of the left breast is recommended in 1 year.  8. Right breast biopsy, 09/29/2006, [facility]. Infiltrating mammary carcinoma. Part A: Primary tumor diagnosis is infiltrating ductal carcinoma. Part B: Right axillary lymph node was benign. Part C: Right breast tissue showed right modified radical mastectomy with deep margin without obvious histopathologic alteration. No residual tumor present at previous biopsy site. Fibrocystic mastopathy including fibrosis, adenosis, duct ectasia, papillary apocrine metaplasia, sclerosing adenosis, multiple cysts, and intraductal microcalcifications. Seventeen (17) benign lymph nodes. Part D: Left breast tissue reveals no tumor present. Fibrocystic mastopathy including fibrosis, adenosis, duct ectasia, multiple cysts, apocrine metaplasia, ruptured tension cysts, periductal chronic inflammation, intraductal hyperplasia of usual type, and intraductal microcalcifications (multiple). Part E: Right breast tissue shows portions of benign loose fibrofatty tissue. Part F: Left breast tissue show portions of benign loose fibrofatty tissue and fatty breast tissue. ER 94% (+), IA 7% (+), HER-2 negative.  9. Labs, 10/10/2006: CMP was unrevealing, CEA of 1.3, and CA27-29 of 14.1.  10. 2D echo, 10/17/2006, [facility]. Normal left ventricular chamber size and  wall motion. The left ventricular ejection fraction is felt to be in excess of 60%. No pericardial effusion or intracavitary thrombus noted. No valvular abnormalities are noted. Spectral and color flow Doppler studies reveal trivial tricuspid regurgitation. The right ventricular systolic pressure is normal and was estimated at 19 mmHg.  11. Labs, 08/22/2016: Hemoglobin 12.4, hematocrit 39.4,  (elevated).  12. Labs, 08/30/2016:  (313 to 615), TSH 1.9, B12 of 1055, folate greater than 20, T4 9.6 (4.9 to 12.3), SPIEP: IgG 527 (791 to 1643), IgM 261, IgA 76.6 (each normal). DAVION: No monoclonal immunoglobulins detected.  13. Comprehensive blood report, 08/30/2016. MILD ANEMIA; INCREASED T-CELL LGL'S. COMPREHENSIVE DIAGNOSIS. Review of peripheral blood smear: Mild anemia with borderline macrocytic features. Normal white blood cell and platelet counts and morphology. Mildly expanded population of reactive appearing T-cell large granular lymphocytes detected on flow cytometric studies. COMPREHENSIVE COMMENT. The exact etiology of this patient's mild anemia with borderline macrocytic features is not apparent from review of the specimen. Given the lack of significant atypia and normal white blood cell and platelet values, myelodysplasia appears less likely. Non-neoplastic etiologies to consider include liver disease, thyroid disease, immune mediated disorders, vitamin/nutritional deficiencies and drugs/toxins. Correlation recommended. Flow cytometry study after erythrolysis reveals no circulating myeloid or lymphoid blasts. Myeloid and monocytic lineages are represented by mature granulocytes and monocytes. Basophils and eosinophils are not increased. It must be noted that the diagnosis of a myeloid stem cell disorder, if clinically suspected, is best made using bone marrow specimens. Peripheral blood is not always representative of abnormalities involving the bone marrow. The B cells show no evidence of  clonality or antigenic aberrancy. The majority of the T cells show normal expression of the pan T-cell antigens. A mildly expanded population of T-cell LGL is detected accounting for approximately 35.9% of the total T cells and 12.6% of the total white blood cells. The overall immunophenotype of the T-LGL and a normal CD4:CD8 ratio favors reactive process over T-cell neoplasia. The presence of these large granular lymphocytes raises the possibility of an immune-mediated etiology for this patient's anemia.    PREVIOUS INTERVENTIONS: Breast carcinoma.   1. Adriamycin, 11/02/2006 through 12/14/2006. Four cycles completed.  2. Taxotere, 12/28/2006 through 02/09/2007. Four cycles completed.  3. Cytoxan, 02/22/2007 through 04/05/2007. Four cycles completed.  4. Femara 2.5 mg daily, 04/12/2007 through 04/12/2012.    DIAGNOSTIC ABNORMALITIES: Anemia.   1. CBC, 05/15/2006. Hemoglobin 7.9 and hematocrit 25.3. Additional lab same date revealed retic count 1.4%, serum iron 7 (50 to 170), iron saturation 2%, TIBC 345 (273 to 456), TSH 1.72, and T4 0.9 (each normal).  2. CBC (post transfusion 2 units of PRBCs), 05/24/2006. Hemoglobin 9.3, an hematocrit 29.6, an MCV 74.9, platelets 309,000 and a WBC 6.5 with a normal differential.  3. Anemia substrate evaluation, 07/12/2006. Fe 116, TIBC 343, Fe sat 34%, ferritin 14, B12 of 655, folate 23.1, retic 0.8%, and stools for occult blood (OB) 0/3 +.    PREVIOUS INTERVENTIONS: Anemia.   1. Two units RBCs, 05/22/2006.  2. Transabdominal hysterectomy and bilateral oophorectomy, 06/07/2006. Pathology report is not immediately available to this examiner.  3. Chromagen by mouth 07/12/2006 through present (currently on 1 daily).        Problem List Items Addressed This Visit        Other    Malignant neoplasm of right breast in female, estrogen receptor positive (HCC) - Primary        Oncology/Hematology History Overview Note   DIAGNOSTIC ABNORMALITIES:  Breast carcinoma    1.Unilateral  mammogram, right breast, 08/23/2006, [facility].  Approximately a 2 cm spiculated area in the upper-outer quadrant of the right breast at the 10 o'clock to 11 o'clock position.  2.Right breast ultrasound, 08/28/2006, [facility].  A solid nodule at 10 o’clock in the right breast measuring up to 2 cm is felt to correspond to the abnormality seen on the mammograms, including spot views today.  There are also small cysts in this region.  Excisional biopsy should be considered.  3.Bone scan, 09/07/2006, [facility]. Small areas of vaguely increased activity in the upper thoracic area and at the lumbosacral junction, probably arthritic.  No definite metastatic change is identified.  4.CT of the brain, 09/07/2006, [facility]. Chronic sinus disease involving the left half of the sphenoid sinus. Otherwise unremarkable enhanced CT of the brain with no convincing evidence of metastatic disease.  5.CT of the chest, 09/07/2006, [facility] small cortical cyst involving the upper pole of the right kidney. Otherwise unremarkable CT of the chest with no evidence of metastatic disease.  6.CT of the pelvis, 09/07/2006, [facility].  Sclerotic lesion involving the right femoral head which I would favor to represent a bony enostosis. Considering the lack of other metastatic disease, I feel a solitary bony lesion to the right femoral head would be an unusual manifestation but warranting followup nonetheless.  Bone scan may be helpful for further evaluation.  7.Unilateral mammogram of breast, 09/11/2006, [facility]. Lobular areas of density in the left breast consistent with several left breast masses. Ultrasound was performed. There are no spiculated densities. No suspicious microcalcifications are identified. Screening exam of the left breast is recommended in 1 year.  8.Right breast biopsy, 09/29/2006, [facility]. Infiltrating mammary carcinoma.  Part A: Primary tumor diagnosis is  infiltrating ductal carcinoma.  Part B: Right axillary lymph node was benign.  Part C: Right breast tissue showed right modified radical mastectomy with deep margin without obvious histopathologic alteration.  No residual tumor present at previous biopsy site.  Fibrocystic mastopathy including fibrosis, adenosis, duct ectasia, papillary apocrine metaplasia, sclerosing adenosis, multiple cysts, and intraductal microcalcifications.  Seventeen (17) benign lymph nodes.  Part D:  Left breast tissue reveals no tumor present.   Fibrocystic mastopathy including fibrosis, adenosis, duct ectasia, multiple cysts, apocrine metaplasia, ruptured tension cysts, periductal chronic inflammation, intraductal hyperplasia of usual type, and intraductal microcalcifications (multiple).  Part E:  Right breast tissue shows portions of benign loose fibrofatty tissue.  Part F:  Left breast tissue show portions of benign loose fibrofatty tissue and fatty breast tissue.  ER 94% (+), SC 7% (+), HER-2 negative.  9.Labs, 10/10/2006: CMP was unrevealing, CEA of 1.3, and CA27-29 of 14.1.  10.2D echo, 10/17/2006, [facility].  Normal left ventricular chamber size and wall motion.  The left ventricular ejection fraction is felt to be in excess of 60%. No pericardial effusion or intracavitary thrombus noted. No valvular abnormalities are noted.  Spectral and color flow Doppler studies reveal trivial tricuspid regurgitation. The right ventricular systolic pressure is normal and was estimated at 19 mmHg.  11.Labs, 08/22/2016: Hemoglobin 12.4, hematocrit 39.4,  (elevated).  12.Labs, 08/30/2016:  (313 to 615), TSH 1.9, B12 of 1055, folate greater than 20, T4 9.6 (4.9 to 12.3), SPIEP: IgG 527 (791 to 1643), IgM 261, IgA 76.6 (each normal). DAVION: No monoclonal immunoglobulins detected.  13.Comprehensive blood report, 08/30/2016.   MILD ANEMIA; INCREASED T-CELL LGL'S. COMPREHENSIVE DIAGNOSIS.  Review of peripheral blood smear: Mild anemia  with borderline macrocytic features. Normal white blood cell and platelet counts and morphology. Mildly expanded population of reactive appearing T-cell large granular lymphocytes detected on flow cytometric studies.  COMPREHENSIVE COMMENT.  The exact etiology of this patient's mild anemia with borderline macrocytic features is not apparent from review of the specimen. Given the lack of significant atypia and normal white blood cell and platelet values, myelodysplasia appears less likely. Non-neoplastic etiologies to consider include liver disease, thyroid disease, immune mediated disorders, vitamin/nutritional deficiencies and drugs/toxins. Correlation recommended. Flow cytometry study after erythrolysis reveals no circulating myeloid or lymphoid blasts. Myeloid and monocytic lineages are represented by mature granulocytes and monocytes. Basophils and eosinophils are not increased. It must be noted that the diagnosis of a myeloid stem cell disorder, if clinically suspected, is best made using bone marrow specimens. Peripheral blood is not always representative of abnormalities involving the bone marrow. The B cells show no evidence of clonality or antigenic aberrancy. The majority of the T cells show normal expression of the pan T-cell antigens. A mildly expanded population of T-cell LGL is detected accounting for approximately 35.9% of the total T cells and 12.6% of the total white blood cells. The overall immunophenotype of the T-LGL and a normal CD4:CD8 ratio favors reactive process over T-cell neoplasia. The presence of these large granular lymphocytes raises the possibility of an immune-mediated etiology for this patient's anemia.    PREVIOUS INTERVENTIONS:  Breast carcinoma.    1.Adriamycin, 11/02/2006 through 12/14/2006.  Four cycles completed.  2.Taxotere, 12/28/2006 through 02/09/2007.  Four cycles completed.  3.Cytoxan, 02/22/2007 through 04/05/2007.  Four cycles completed.  4.Femara 2.5 mg daily,  04/12/2007 through 04/12/2012.    DIAGNOSTIC ABNORMALITIES:  Anemia.    1.CBC, 05/15/2006. Hemoglobin 7.9 and hematocrit 25.3. Additional lab same date revealed retic count 1.4%, serum iron 7 (50 to 170), iron saturation 2%, TIBC 345 (273 to 456), TSH 1.72, and T4 0.9 (each normal).  2.CBC (post transfusion 2 units of PRBCs), 05/24/2006.  Hemoglobin 9.3, an hematocrit 29.6, an MCV 74.9, platelets 309,000 and a WBC 6.5 with a normal differential.  3.Anemia substrate evaluation, 07/12/2006. Fe 116, TIBC 343, Fe sat 34%, ferritin 14, B12 of 655, folate 23.1, retic 0.8%, and stools for occult blood (OB) 0/3 +.    PREVIOUS INTERVENTIONS:  Anemia.    1.Two units RBCs, 05/22/2006.  2.Transabdominal hysterectomy and bilateral oophorectomy, 06/07/2006. Pathology report is not immediately available to this examiner.  3.Chromagen by mouth 07/12/2006 through present (currently on 1 daily).       Malignant neoplasm of right breast in female, estrogen receptor positive (HCC)   8/16/2019 Initial Diagnosis    Malignant neoplasm of right breast in female, estrogen receptor positive (CMS/HCC)         PAST MEDICAL HISTORY:  ALLERGIES:  No Known Allergies  CURRENT MEDICATIONS:  Outpatient Encounter Medications as of 2/18/2022   Medication Sig Dispense Refill   • amitriptyline (ELAVIL) 50 MG tablet TK 1 T PO Q NIGHT  2   • amphetamine-dextroamphetamine (ADDERALL) 30 MG tablet   2 times daily     • Calcium 500 MG tablet Take 500 mg by mouth.     • dexlansoprazole (DEXILANT) 60 MG capsule      • diazePAM (VALIUM) 10 MG tablet TK 1/2 TO 1 T PO BID  2   • ferrous sulfate 325 (65 FE) MG tablet Take 1 tablet by mouth Daily. 30 tablet 6   • HYDROcodone-acetaminophen (NORCO)  MG per tablet Take 1 tablet by mouth.     • milnacipran (SAVELLA) 50 MG tablet tablet Take 75 mg by mouth 2 (Two) Times a Day.     • Omega-3 Fatty Acids (FISH OIL) 1000 MG capsule capsule      • pregabalin (LYRICA) 75 MG capsule Take 75 mg by mouth.     • propranolol  (INDERAL) 20 MG tablet Take 40 mg by mouth.     • therapeutic multivitamin-minerals (THERAGRAN-M) tablet Take 1 tablet by mouth.     • tiZANidine (ZANAFLEX) 2 MG tablet Take 4 mg by mouth.     • topiramate (TOPAMAX) 100 MG tablet 100 mg.     • [DISCONTINUED] nabumetone (RELAFEN) 750 MG tablet Take 750 mg by mouth.     • [DISCONTINUED] pantoprazole (PROTONIX) 40 MG EC tablet   daily       No facility-administered encounter medications on file as of 2022.     ADULT ILLNESSES:   Infiltrating ductal carcinoma of breast ( ER Status: Positive; MD Status: Positive; )   Anxiety   Arthralgia   Francisco's esophagus (disorder)   Carcinoembryonic antigen present (finding)   Fibrocystic breast disease   Hypokalemia   Iron deficiency anemia   Osteopenia   Tobacco user (finding)    SURGERIES:   Total abdominal hysterectomy with bilateral salpingo-oophorectomy, (DANIEL/BSO), 2006    section, x2. Most recently in    Modified radical mastectomy, right, 2006   Simple mastectomy, prophylactic, left with left breast reconstruction, 2006   Biopsy of breast, right, benign fibrocystic changes, 2006   Carpal tunnel release, left 2011; right 2011      ADULT ILLNESSES:  Patient Active Problem List   Diagnosis Code   • Malignant neoplasm of right breast in female, estrogen receptor positive (HCC) C50.911, Z17.0   • Anemia D64.9   • Francisco esophagus K22.70   • GERD (gastroesophageal reflux disease) K21.9   • Hiatal hernia K44.9   • Irritable bowel syndrome K58.9   • Lumbar radicular pain M54.16   • Tortuous colon Q43.8     SURGERIES:  Past Surgical History:   Procedure Laterality Date   • BREAST BIOPSY Right 2006    Biopsy of breast, right, benign fibrocystic changes   • CARPAL TUNNEL RELEASE      left 2011; right 2011   •  SECTION      x2. Most recently in    • MASTECTOMY MODIFIED RADICAL / SIMPLE / COMPLETE Right 2006   • SIMPLE MASTECTOMY Left 2006      "prophylactic, left with left breast reconstruction   • TOTAL ABDOMINAL HYSTERECTOMY WITH SALPINGO OOPHORECTOMY  2006     HEALTH MAINTENANCE ITEMS:  Health Maintenance Due   Topic Date Due   • MAMMOGRAM  Never done   • ANNUAL PHYSICAL  Never done   • TDAP/TD VACCINES (1 - Tdap) Never done   • ZOSTER VACCINE (1 of 2) Never done   • LUNG CANCER SCREENING  Never done   • HEPATITIS C SCREENING  Never done   • PAP SMEAR  Never done   • COVID-19 Vaccine (3 - Booster for Moderna series) 2021       <no information>  Last Completed Colonoscopy          COLORECTAL CANCER SCREENING (COLONOSCOPY - Every 10 Years) Next due on 2015  SCANNED - COLONOSCOPY    2015  [facility] Colonoscopy component of [facility] COLONOSCOPY    2014  Colonoscopy                There is no immunization history on file for this patient.  Last Completed Mammogram     This patient has no relevant Health Maintenance data.            FAMILY HISTORY:  Family History   Problem Relation Age of Onset   • Polymyalgia rheumatica Mother    • Diabetes Father    • Hypertension Father    • Other Father         heart issues   • No Known Problems Brother    • No Known Problems Maternal Grandmother    • Heart attack Maternal Grandfather         52 years old    • No Known Problems Paternal Grandmother    • Diabetes Paternal Grandfather    • No Known Problems Brother    • No Known Problems Brother    • No Known Problems Brother      SOCIAL HISTORY:  Social History     Socioeconomic History   • Marital status:    Tobacco Use   • Smoking status: Former Smoker     Packs/day: 1.00     Years: 25.00     Pack years: 25.00     Types: Cigarettes     Quit date: 2017     Years since quittin.2   • Smokeless tobacco: Never Used   Substance and Sexual Activity   • Alcohol use: No   • Drug use: No   • Sexual activity: Defer       REVIEW OF SYSTEMS:  Constitutional:   The patient reports a fairly good appetite and energy is variable, \"comes " "and goes.\" She manages all her ADLs. She has retired since 04/30/2021. She has gained 4 pounds (in addition to 17 pounds at her 2 prior visits) in the interval.  States her fibromyalgia has slowed her down.  She has no fevers, chills, or drenching night sweats. Still has intermittent hot flashes, \"just occasionally.\" Her sleep habits are fairly good even off Restoril.  Ear/Nose/Mouth/Throat:   She has chronic rhinitis with no sinus congestion, ear discomfort, or discolored drainage. She has no sore throat, nosebleeds, or sore tongue. She has fewer bouts of headaches.  Ocular:   She still has bouts of blurry vision. She is still followed by ophthalmology. No eye pain or double vision.  Respiratory:   No chest congestion.  No cough.  Only occasional exertional dyspnea, no significant shortness of breathing at rest, or unexplained chest wall pain. She has not smoked since 12/31/2017. Is aware that she has chronic obstructive pulmonary disease (COPD) on x-ray.   Cardiovascular:   She has no exertional chest pain, chest pressure, or chest heaviness. She has no claudication. She has no palpitations or symptomatic orthostasis.  Gastrointestinal:   She has no dysphagia, nausea, vomiting, postprandial abdominal pain (PPI), bloating, cramping, or change in bowel habits. She has not had dark stools or rectal bleeding. She has no diarrhea, and her bowels have been regular (still every other day) without stool softeners. EGD last 08/18/2016 per Dr. Sheppard. \"He took a biopsy but didn't say anything else.\" Colonoscopy, 09/04/2015. Normal. Says she has been recalled for repeat EGD  Genitourinary:   She has no urinary burning, frequency, dribbling, or discoloration. She has no need to urinate frequently through the night. She has no difficulty controlling her bladder. No vaginal bleeding. She denies any vaginal discharge. Says she had breakthrough bleeding last 12/2016 but was seen by Dr. Graham (GYN). \"She did a pelvic exam and " "ran a bunch of tests and everything was okay.\" No repeat episodes since.  Musculoskeletal:   She has no unexplained myalgias or nighttime leg cramping. Has only mild aching, especially in the hands, lower back, hips, and shoulders. Now knows she has spinal degenerative joint disease (DJD), (MRI, 10/09/2008; 07/2012). She has no distal radiculitis. She is still using Lortab ~ 3-4 daily. She is also on nabumetone (2 times a day - 2x/week). Says epidural/spinal injections per Dr. Walters last 08/2021 have not been as healthy.  Is contemplating back surgery as a result.  Has been on Savella which has helped her fibromyalgia pains.  Extremities:   She has no trouble with fluid retention or significant leg swelling.  Endocrine:   The hot flashes are tolerable. She has no problems with excess thirst, excessive urination, or unexplained fatigue.  Heme/Lymphatic:   She has no unexplained bleeding, bruising, petechial rash, or swollen glands.  Skin:   She has no itching or rashes.  Neuro:   She has no loss of consciousness, seizures, fainting spells, or dizziness. She has no weakness of her face, arms, or legs. She has no difficulty with speech. She has no tremors.  Psych:   She has chronic depression and anxiety which sometimes interferes with her sleep patterns. She still feels that the anxiety is triggered due to the back pains. Says her symptoms are modulated by her pain medications and Savella.         VITAL SIGNS: /80   Pulse 57   Temp 97.4 °F (36.3 °C)   Resp 16   Ht 162.6 cm (64\")   Wt 66.8 kg (147 lb 4.8 oz)   SpO2 100%   Breastfeeding No   BMI 25.28 kg/m² Body surface area is 1.72 meters squared.  Pain Score    02/18/22 1052   PainSc:   8   PainLoc: Back         PHYSICAL EXAMINATION:   General:   She is a pleasant, perennially-anxious, more heavyset, well-nourished, well-appearing, well-kept middle-aged female in no distress. ECOG PS = 0.  Head/Neck:   The patient is anicteric and atraumatic. She is " wearing a surgical mask today.  The trachea is midline. The neck is supple without evidence of jugular venous distention or cervical adenopathy. There is no frontal sinus tenderness.  Eyes:   The extraocular movements are full. There is no scleral jaundice or erythema. There is no conjunctival pallor.  Chest:   The respiratory efforts are normal and unhindered. The breath sounds are distant with scattered soft wheezing but no rhonchi, rales, or asymmetry of breath sounds. The port site is well healed (has been removed).  Breasts:   The right breast is remarkable for the presence of a saline implant. The left breast is remarkable for the presence of a saline implant. Both are well seated. There are no overt chest wall nodules or masses to suggest local recurrence. There is no axillary or supraclavicular adenopathy.  Cardiovascular:   The patient has a regular cardiac rate and rhythm without murmurs, rubs, or gallops. The peripheral pulses are equal and full.  Extremities:   There is no evidence of cyanosis, clubbing, or edema.  Rheumatologic:   There is no overt evidence of rheumatoid deformities of the hands. There is no sausaging of the fingers. There is no sign of active synovitis. The gait is normal.  Cutaneous:   There are no overt rashes, disseminated lesions, purpura, or petechiae.  Lymphatics:   There is no evidence of adenopathy in the cervical, supraclavicular, or axillary areas.  Neurologic:   The patient is alert, oriented, cooperative, and pleasant. She is appropriately conversant. She ambulated into the exam room without assistance and transferred from chair to exam table unaided. There is no overt dysfunction of the motor, sensory, or cerebellar systems.  Psych:   Mood and affect are appropriate for circumstance. Eye contact is appropriate.        LABS    Lab Results - Last 18 Months   Lab Units 02/11/22  1409 08/11/21  1345   HEMOGLOBIN g/dL 12.0 11.8*   HEMATOCRIT % 39.3 38.6   MCV fL 94.7 95.5   WBC  10*3/mm3 6.15 6.64   RDW % 13.3 13.2   MPV fL 10.5 11.3   PLATELETS 10*3/mm3 213 232   IMM GRAN % % 0.2 0.3   NEUTROS ABS 10*3/mm3 4.02 4.65   LYMPHS ABS 10*3/mm3 1.65 1.54   MONOS ABS 10*3/mm3 0.41 0.37   EOS ABS 10*3/mm3 0.04 0.03   BASOS ABS 10*3/mm3 0.02 0.03   IMMATURE GRANS (ABS) 10*3/mm3 0.01 0.02   NRBC /100 WBC 0.0 0.0       Lab Results - Last 18 Months   Lab Units 02/11/22  1409 08/11/21  1345   GLUCOSE mg/dL 96 95   SODIUM mmol/L 141 143   POTASSIUM mmol/L 4.1 3.4*   CO2 mmol/L 27.0 27.0   CHLORIDE mmol/L 106 107   ANION GAP mmol/L 8.0 9.0   CREATININE mg/dL 1.15* 1.00   BUN mg/dL 9 7   BUN / CREAT RATIO  7.8 7.0   CALCIUM mg/dL 9.5 9.7   EGFR IF NONAFRICN AM mL/min/1.73 49* 57*   ALK PHOS U/L 48 48   TOTAL PROTEIN g/dL 6.2 6.7   ALT (SGPT) U/L 12 10   AST (SGOT) U/L 14 14   BILIRUBIN mg/dL 0.2 0.2   ALBUMIN g/dL 4.10 4.30   GLOBULIN gm/dL 2.1 2.4       Lab Results - Last 18 Months   Lab Units 02/11/22  1409 08/11/21  1345   CEA ng/mL 2.39 2.77       Lab Results - Last 18 Months   Lab Units 02/11/22  1409 08/11/21  1345   IRON mcg/dL 125 65   TIBC mcg/dL 353 314   IRON SATURATION % 35 21   FERRITIN ng/mL 63.95 54.01   FOLATE ng/mL >20.00 >20.00     I have reviewed the assessment and plan and verified the accuracy of it. No changes to assessment and plan since the information was documented. Dave oGnzalez MD 02/18/22     ASSESSMENT:   1.  History of breast cancer, infiltrating ductal carcinoma:   Stage: I (pT1c, pN0, Mx; G2), ER 94% (+), HI 7% (+), HER-2 negative.   Tumor [place]: 1.9 cm right breast mass, 0/18 lymph nodes.   Complications of Tumor: Breast pain.   Tumor Status: No clinical or biochemical evidence of disease recurrence.     2. Normocytic anemia.   --Improved.  Hgb 12; MCV 94.7, 02/11/2022 (prior range: Hgb 11.7 - 12.4; MCV 94.4 - 102):  a. Colonoscopy, 09/04/2015. Normal. Repeat surveillance 10 years.   b. Normal B12, normal folate, normal T4, normal TSH, normal SPIEP, normal LDH, and  peripheral blood flow comprehensive report (above) showing reactive appearing T-cell large granular lymphocytes.  3. History of Francisco's esophagus, gastritis, and hiatal hernia. Asymptomatic on proton pump inhibitor (PPI):  a. Esophagogastroduodenoscopy (EGD), 07/12/2013 (see above) with small hiatal hernia. May still be contributory to #5.   b. EGD last 08/18/2016. Report pending.  4. Tobacco addiction/excess. Has not smoked since 12/31/2017.   5. Abnormal CEA. Stable (2.37, 02/11/2022:. Chip 2.37 - Peak 13). She admits to smoking. No other clinical or radiographic (PET scan, 02/24/2010 - no abnormal uptake) correlate. Continued followup warranted.   6. Chronic obstructive pulmonary disease (COPD).   7. 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 Confirmed by renal ultrasound, 11/10/2009.   8. Iron deficiency with negative repeat colonoscopy and esophagogastroduodenoscopy (EGD) that showed gastritis (see above). Repleted iron on oral iron repletion.   9. Arthralgias, mild. Perhaps Femara associated. Well-modulated on Lyrica and Lortab.   10. (Mild) Vasomotor symptoms secondary to iatrogenic menopause. Subjectively not worse.   11. Anxiety, unchanged.   12. Lumbar spinal degenerative disk disease (MRI, 10/09/2008, and again 07/2012). Symptomatically not as well modulated by her current pain regimen. Has been seen by Dr. Goldstein and receives intraspinal injections by Dr. Walters, due on 08/23/2016.   13. Osteopenia with moderate fracture risk (see above). Already on calcium + vitamin D. Boniva intolerant.   --On Prolia every 6 months beginning 02/2018. Dr. Leung following.   14.  Chronic kidney disease, stage 3.    --GFR, 49 mL/min on 02/11/2022 (prior:  GFR 53- 67 mL/min)    PLAN:   1.  Re: Apprise of labs from 02/11/2022 with resolution of anemia, resolution of macrocytosis, slightly worse GFR, repleted B12/folate/ iron levels, Fe sat% 35%, slightly depressed ferritin (64; from 54; 55), stable  chest x-ray, normal CEA, normal CA-27-29.   2.  Apprised of chest x-ray, 02/11/2022.  Impression: No acute findings.  3. Previously discussed labs from 08/22/2016 with macrocytosis () and 08/30/2016 with normal B12, normal folate, normal T4, normal TSH, normal SPIEP, normal LDH, and peripheral blood flow comprehensive report (above) showing ractive appearing T-cell large granular lymphocytes.   4. Again applaud her success at smoking cessation (has not smoked since 12/31/2017).    5. Previously discussed the abnormal (but stable) CEA. Other than malignancy (colon, breast, lung), I again noted that increased CEA levels can also be associated with inflammation, cirrhosis, peptic ulcer, ulcerative colitis, rectal polyps, emphysema, and benign breast disease. Needs continued followup.   6. Rx:  Ferrous sulfate 325 po qd # 30 x 6 RF  7. Continue ongoing management per primary care physician.   8. Continue other currently identified medications.    9. Return to the [place] office in 6 months with pre-office chest x-ray, serum iron, Fe saturation, ferritin, TIBC, B12, folate, CMP, CEA, CA27-29, and CBC with differential.    I spent ~ 30 minutes caring for Carol on this date of service. This time includes time spent by me in the following activities: preparing for the visit, reviewing tests, performing a medically appropriate examination and/or evaluation, counseling and educating the patient/family/caregiver, ordering medications, tests, or procedures and documenting information in the medical record        cc: Erna Leung DO

## 2022-02-18 ENCOUNTER — OFFICE VISIT (OUTPATIENT)
Dept: ONCOLOGY | Facility: CLINIC | Age: 57
End: 2022-02-18

## 2022-02-18 VITALS
OXYGEN SATURATION: 100 % | RESPIRATION RATE: 16 BRPM | HEIGHT: 64 IN | BODY MASS INDEX: 25.15 KG/M2 | TEMPERATURE: 97.4 F | HEART RATE: 57 BPM | SYSTOLIC BLOOD PRESSURE: 124 MMHG | WEIGHT: 147.3 LBS | DIASTOLIC BLOOD PRESSURE: 80 MMHG

## 2022-02-18 DIAGNOSIS — Z17.0 MALIGNANT NEOPLASM OF RIGHT BREAST IN FEMALE, ESTROGEN RECEPTOR POSITIVE, UNSPECIFIED SITE OF BREAST: Primary | ICD-10-CM

## 2022-02-18 DIAGNOSIS — C50.911 MALIGNANT NEOPLASM OF RIGHT BREAST IN FEMALE, ESTROGEN RECEPTOR POSITIVE, UNSPECIFIED SITE OF BREAST: Primary | ICD-10-CM

## 2022-02-18 PROCEDURE — 99214 OFFICE O/P EST MOD 30 MIN: CPT | Performed by: INTERNAL MEDICINE

## 2022-02-18 RX ORDER — DEXLANSOPRAZOLE 60 MG/1
CAPSULE, DELAYED RELEASE ORAL
COMMUNITY
Start: 2022-02-13 | End: 2022-10-17

## 2022-03-10 DIAGNOSIS — C50.911 MALIGNANT NEOPLASM OF RIGHT BREAST IN FEMALE, ESTROGEN RECEPTOR POSITIVE, UNSPECIFIED SITE OF BREAST: Primary | ICD-10-CM

## 2022-03-10 DIAGNOSIS — Z17.0 MALIGNANT NEOPLASM OF RIGHT BREAST IN FEMALE, ESTROGEN RECEPTOR POSITIVE, UNSPECIFIED SITE OF BREAST: Primary | ICD-10-CM

## 2022-03-10 RX ORDER — FERROUS SULFATE 325(65) MG
325 TABLET ORAL DAILY
Qty: 30 TABLET | Refills: 6 | Status: SHIPPED | OUTPATIENT
Start: 2022-03-10 | End: 2022-08-30 | Stop reason: SDUPTHER

## 2022-03-15 ENCOUNTER — HOSPITAL ENCOUNTER (OUTPATIENT)
Dept: PAIN MANAGEMENT | Age: 57
Discharge: HOME | End: 2022-03-15
Payer: COMMERCIAL

## 2022-03-15 VITALS
RESPIRATION RATE: 18 BRPM | TEMPERATURE: 98.2 F | OXYGEN SATURATION: 100 % | HEART RATE: 66 BPM | DIASTOLIC BLOOD PRESSURE: 83 MMHG | SYSTOLIC BLOOD PRESSURE: 137 MMHG

## 2022-03-15 DIAGNOSIS — R52 PAIN MANAGEMENT: ICD-10-CM

## 2022-03-15 PROCEDURE — 6360000002 HC RX W HCPCS

## 2022-03-15 PROCEDURE — 2500000003 HC RX 250 WO HCPCS

## 2022-03-15 PROCEDURE — 64493 INJ PARAVERT F JNT L/S 1 LEV: CPT

## 2022-03-15 PROCEDURE — 3209999900 FLUORO FOR SURGICAL PROCEDURES

## 2022-03-15 PROCEDURE — 64494 INJ PARAVERT F JNT L/S 2 LEV: CPT

## 2022-03-15 RX ORDER — LIDOCAINE HYDROCHLORIDE 10 MG/ML
19.5 INJECTION, SOLUTION EPIDURAL; INFILTRATION; INTRACAUDAL; PERINEURAL ONCE
Status: DISCONTINUED | OUTPATIENT
Start: 2022-03-15 | End: 2022-03-17 | Stop reason: HOSPADM

## 2022-03-15 RX ORDER — METHYLPREDNISOLONE ACETATE 40 MG/ML
80 INJECTION, SUSPENSION INTRA-ARTICULAR; INTRALESIONAL; INTRAMUSCULAR; SOFT TISSUE ONCE
Status: DISCONTINUED | OUTPATIENT
Start: 2022-03-15 | End: 2022-03-17 | Stop reason: HOSPADM

## 2022-03-15 RX ORDER — BUPIVACAINE HYDROCHLORIDE 5 MG/ML
4.5 INJECTION, SOLUTION EPIDURAL; INTRACAUDAL ONCE
Status: DISCONTINUED | OUTPATIENT
Start: 2022-03-15 | End: 2022-03-17 | Stop reason: HOSPADM

## 2022-03-15 ASSESSMENT — PAIN SCALES - GENERAL: PAINLEVEL_OUTOF10: 9

## 2022-03-15 ASSESSMENT — PAIN DESCRIPTION - LOCATION: LOCATION: BACK

## 2022-03-15 ASSESSMENT — PAIN - FUNCTIONAL ASSESSMENT: PAIN_FUNCTIONAL_ASSESSMENT: 0-10

## 2022-03-15 ASSESSMENT — PAIN DESCRIPTION - ORIENTATION: ORIENTATION: LOWER

## 2022-03-15 ASSESSMENT — PAIN DESCRIPTION - PAIN TYPE: TYPE: CHRONIC PAIN

## 2022-03-15 NOTE — PROGRESS NOTES

## 2022-03-15 NOTE — INTERVAL H&P NOTE
Update History & Physical    The patient's History and Physical  was reviewed with the patient and I examined the patient. There was  NO CHANGE:[id number]}. The surgical site was confirmed by the patient and me. Plan: The risks, benefits, expected outcome, and alternative to the recommended procedure have been discussed with the patient. Patient understands and wants to proceed with the procedure.      Electronically signed by Carie Harrington MD on 3/15/2022 at 9:42 AM

## 2022-05-10 ENCOUNTER — HOSPITAL ENCOUNTER (OUTPATIENT)
Dept: PAIN MANAGEMENT | Age: 57
Discharge: HOME | End: 2022-05-10
Payer: COMMERCIAL

## 2022-05-10 VITALS
RESPIRATION RATE: 18 BRPM | TEMPERATURE: 96.8 F | OXYGEN SATURATION: 98 % | SYSTOLIC BLOOD PRESSURE: 138 MMHG | HEART RATE: 66 BPM | DIASTOLIC BLOOD PRESSURE: 77 MMHG

## 2022-05-10 DIAGNOSIS — M51.36 LUMBAR DEGENERATIVE DISC DISEASE: ICD-10-CM

## 2022-05-10 PROCEDURE — A4216 STERILE WATER/SALINE, 10 ML: HCPCS

## 2022-05-10 PROCEDURE — G0260 INJ FOR SACROILIAC JT ANESTH: HCPCS

## 2022-05-10 PROCEDURE — 2500000003 HC RX 250 WO HCPCS

## 2022-05-10 PROCEDURE — 6360000002 HC RX W HCPCS

## 2022-05-10 PROCEDURE — 3209999900 FLUORO FOR SURGICAL PROCEDURES

## 2022-05-10 PROCEDURE — 2580000003 HC RX 258

## 2022-05-10 RX ORDER — LIDOCAINE HYDROCHLORIDE 10 MG/ML
7 INJECTION, SOLUTION EPIDURAL; INFILTRATION; INTRACAUDAL; PERINEURAL ONCE
Status: DISCONTINUED | OUTPATIENT
Start: 2022-05-10 | End: 2022-05-12 | Stop reason: HOSPADM

## 2022-05-10 RX ORDER — BUPIVACAINE HYDROCHLORIDE 5 MG/ML
2 INJECTION, SOLUTION EPIDURAL; INTRACAUDAL ONCE
Status: DISCONTINUED | OUTPATIENT
Start: 2022-05-10 | End: 2022-05-12 | Stop reason: HOSPADM

## 2022-05-10 RX ORDER — METHYLPREDNISOLONE ACETATE 40 MG/ML
80 INJECTION, SUSPENSION INTRA-ARTICULAR; INTRALESIONAL; INTRAMUSCULAR; SOFT TISSUE ONCE
Status: DISCONTINUED | OUTPATIENT
Start: 2022-05-10 | End: 2022-05-12 | Stop reason: HOSPADM

## 2022-05-10 RX ORDER — SODIUM CHLORIDE 9 MG/ML
3 INJECTION INTRAVENOUS 2 TIMES DAILY
Status: DISCONTINUED | OUTPATIENT
Start: 2022-05-10 | End: 2022-05-12 | Stop reason: HOSPADM

## 2022-05-10 ASSESSMENT — PAIN - FUNCTIONAL ASSESSMENT: PAIN_FUNCTIONAL_ASSESSMENT: 0-10

## 2022-05-10 NOTE — INTERVAL H&P NOTE
Update History & Physical    The patient's History and Physical  was reviewed with the patient and I examined the patient. There was NO CHANGE:[id number]}. The surgical site was confirmed by the patient and me. Plan: The risks, benefits, expected outcome, and alternative to the recommended procedure have been discussed with the patient. Patient understands and wants to proceed with the procedure.      Electronically signed by Deshaun Jordan MD on 5/10/2022 at 10:01 AM

## 2022-07-04 ENCOUNTER — APPOINTMENT (OUTPATIENT)
Dept: GENERAL RADIOLOGY | Facility: HOSPITAL | Age: 57
End: 2022-07-04

## 2022-07-04 ENCOUNTER — APPOINTMENT (OUTPATIENT)
Dept: CT IMAGING | Facility: HOSPITAL | Age: 57
End: 2022-07-04

## 2022-07-04 ENCOUNTER — HOSPITAL ENCOUNTER (EMERGENCY)
Facility: HOSPITAL | Age: 57
Discharge: HOME | End: 2022-07-04
Attending: STUDENT IN AN ORGANIZED HEALTH CARE EDUCATION/TRAINING PROGRAM | Admitting: STUDENT IN AN ORGANIZED HEALTH CARE EDUCATION/TRAINING PROGRAM

## 2022-07-04 VITALS
TEMPERATURE: 98 F | SYSTOLIC BLOOD PRESSURE: 150 MMHG | WEIGHT: 144 LBS | OXYGEN SATURATION: 99 % | HEIGHT: 64 IN | RESPIRATION RATE: 20 BRPM | HEART RATE: 84 BPM | DIASTOLIC BLOOD PRESSURE: 76 MMHG | BODY MASS INDEX: 24.59 KG/M2

## 2022-07-04 DIAGNOSIS — I25.84 CALCIFICATION OF CORONARY ARTERY: ICD-10-CM

## 2022-07-04 DIAGNOSIS — I25.10 CALCIFICATION OF CORONARY ARTERY: ICD-10-CM

## 2022-07-04 DIAGNOSIS — S22.32XA CLOSED FRACTURE OF ONE RIB OF LEFT SIDE, INITIAL ENCOUNTER: Primary | ICD-10-CM

## 2022-07-04 LAB
ALBUMIN SERPL-MCNC: 4 G/DL (ref 3.5–5.2)
ALBUMIN/GLOB SERPL: 1.5 G/DL
ALP SERPL-CCNC: 47 U/L (ref 39–117)
ALT SERPL W P-5'-P-CCNC: 17 U/L (ref 1–33)
ANION GAP SERPL CALCULATED.3IONS-SCNC: 10 MMOL/L (ref 5–15)
AST SERPL-CCNC: 18 U/L (ref 1–32)
BASOPHILS # BLD MANUAL: 0.11 10*3/MM3 (ref 0–0.2)
BASOPHILS NFR BLD MANUAL: 2 % (ref 0–1.5)
BILIRUB SERPL-MCNC: <0.2 MG/DL (ref 0–1.2)
BUN SERPL-MCNC: 17 MG/DL (ref 6–20)
BUN/CREAT SERPL: 15.6 (ref 7–25)
CALCIUM SPEC-SCNC: 9.5 MG/DL (ref 8.6–10.5)
CHLORIDE SERPL-SCNC: 105 MMOL/L (ref 98–107)
CO2 SERPL-SCNC: 25 MMOL/L (ref 22–29)
CREAT SERPL-MCNC: 1.09 MG/DL (ref 0.57–1)
DEPRECATED RDW RBC AUTO: 46.5 FL (ref 37–54)
EGFRCR SERPLBLD CKD-EPI 2021: 59.4 ML/MIN/1.73
EOSINOPHIL # BLD MANUAL: 0.05 10*3/MM3 (ref 0–0.4)
EOSINOPHIL NFR BLD MANUAL: 1 % (ref 0.3–6.2)
ERYTHROCYTE [DISTWIDTH] IN BLOOD BY AUTOMATED COUNT: 13.6 % (ref 12.3–15.4)
GLOBULIN UR ELPH-MCNC: 2.6 GM/DL
GLUCOSE SERPL-MCNC: 97 MG/DL (ref 65–99)
HCT VFR BLD AUTO: 38.6 % (ref 34–46.6)
HGB BLD-MCNC: 12.5 G/DL (ref 12–15.9)
LYMPHOCYTES # BLD MANUAL: 1.74 10*3/MM3 (ref 0.7–3.1)
LYMPHOCYTES NFR BLD MANUAL: 9 % (ref 5–12)
MCH RBC QN AUTO: 30 PG (ref 26.6–33)
MCHC RBC AUTO-ENTMCNC: 32.4 G/DL (ref 31.5–35.7)
MCV RBC AUTO: 92.6 FL (ref 79–97)
MONOCYTES # BLD: 0.49 10*3/MM3 (ref 0.1–0.9)
NEUTROPHILS # BLD AUTO: 3.04 10*3/MM3 (ref 1.7–7)
NEUTROPHILS NFR BLD MANUAL: 56 % (ref 42.7–76)
PLAT MORPH BLD: NORMAL
PLATELET # BLD AUTO: 237 10*3/MM3 (ref 140–450)
PMV BLD AUTO: 10.1 FL (ref 6–12)
POTASSIUM SERPL-SCNC: 3.4 MMOL/L (ref 3.5–5.2)
PROT SERPL-MCNC: 6.6 G/DL (ref 6–8.5)
RBC # BLD AUTO: 4.17 10*6/MM3 (ref 3.77–5.28)
RBC MORPH BLD: NORMAL
SODIUM SERPL-SCNC: 140 MMOL/L (ref 136–145)
TROPONIN T SERPL-MCNC: <0.01 NG/ML (ref 0–0.03)
VARIANT LYMPHS NFR BLD MANUAL: 28 % (ref 19.6–45.3)
VARIANT LYMPHS NFR BLD MANUAL: 4 % (ref 0–5)
WBC MORPH BLD: NORMAL
WBC NRBC COR # BLD: 5.43 10*3/MM3 (ref 3.4–10.8)

## 2022-07-04 PROCEDURE — 96372 THER/PROPH/DIAG INJ SC/IM: CPT

## 2022-07-04 PROCEDURE — 25010000002 KETOROLAC TROMETHAMINE PER 15 MG: Performed by: STUDENT IN AN ORGANIZED HEALTH CARE EDUCATION/TRAINING PROGRAM

## 2022-07-04 PROCEDURE — 36415 COLL VENOUS BLD VENIPUNCTURE: CPT

## 2022-07-04 PROCEDURE — 93005 ELECTROCARDIOGRAM TRACING: CPT | Performed by: STUDENT IN AN ORGANIZED HEALTH CARE EDUCATION/TRAINING PROGRAM

## 2022-07-04 PROCEDURE — 85007 BL SMEAR W/DIFF WBC COUNT: CPT | Performed by: STUDENT IN AN ORGANIZED HEALTH CARE EDUCATION/TRAINING PROGRAM

## 2022-07-04 PROCEDURE — 99283 EMERGENCY DEPT VISIT LOW MDM: CPT

## 2022-07-04 PROCEDURE — 71250 CT THORAX DX C-: CPT

## 2022-07-04 PROCEDURE — 71045 X-RAY EXAM CHEST 1 VIEW: CPT

## 2022-07-04 PROCEDURE — 93010 ELECTROCARDIOGRAM REPORT: CPT | Performed by: INTERNAL MEDICINE

## 2022-07-04 PROCEDURE — 85025 COMPLETE CBC W/AUTO DIFF WBC: CPT | Performed by: STUDENT IN AN ORGANIZED HEALTH CARE EDUCATION/TRAINING PROGRAM

## 2022-07-04 PROCEDURE — 80053 COMPREHEN METABOLIC PANEL: CPT | Performed by: STUDENT IN AN ORGANIZED HEALTH CARE EDUCATION/TRAINING PROGRAM

## 2022-07-04 PROCEDURE — 84484 ASSAY OF TROPONIN QUANT: CPT | Performed by: STUDENT IN AN ORGANIZED HEALTH CARE EDUCATION/TRAINING PROGRAM

## 2022-07-04 RX ORDER — KETOROLAC TROMETHAMINE 30 MG/ML
30 INJECTION, SOLUTION INTRAMUSCULAR; INTRAVENOUS ONCE
Status: COMPLETED | OUTPATIENT
Start: 2022-07-04 | End: 2022-07-04

## 2022-07-04 RX ORDER — PROPRANOLOL HYDROCHLORIDE 40 MG/1
40 TABLET ORAL 3 TIMES DAILY
COMMUNITY
End: 2022-10-17 | Stop reason: SDUPTHER

## 2022-07-04 RX ADMIN — KETOROLAC TROMETHAMINE 30 MG: 30 INJECTION, SOLUTION INTRAMUSCULAR; INTRAVENOUS at 20:41

## 2022-07-05 NOTE — DISCHARGE INSTRUCTIONS
It was very nice to meet you, Carol. Thank you for allowing us to take care of you today at [facility].    You were evaluated in the ER for a rib fracture and coronary calcifications. Please follow up with your PCP and use the incentive spirometer.  Your work-up today did not show any emergent findings or emergent indications for admission to the hospital. While it is unclear what exactly is the cause of your symptoms, please understand that an ER evaluation is considered to be just the start of your evaluation. We will do what we can in one visit, but we are often unable to fully figure out what is causing your symptoms from one evaluation. Thus our primary goal is to determine whether you need to be evaluated in the hospital or if it is safe for you to go home and see other doctors such as a primary care physician or a specialist on an outpatient basis. A copy of your results should be included in your paperwork.     It is VERY IMPORTANT that you follow up (call them to set up an appointment) with your primary care doctor* within the next few days or as soon as possible so that you can be re-evaluated for improvement in your symptoms or for any other questions. If you were prescribed any medications, please take them as directed or call us back with any questions.     Please return to the emergency room within 12-48 hours if you experience fever, chills, chest pain or shortness of breath, pain with inspiration/expiration, pain that travels to your arms, neck or back, nausea, vomiting, severe headache, tearing pain in your chest, dizziness, feel as though you are about to pass out, have any worsening symptoms, or any other concerns.
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
## 2022-07-06 LAB
QT INTERVAL: 430 MS
QTC INTERVAL: 440 MS

## 2022-07-09 DIAGNOSIS — K22.70 BARRETT'S ESOPHAGUS DETERMINED BY BIOPSY: ICD-10-CM

## 2022-07-09 DIAGNOSIS — R12 CHRONIC HEARTBURN: ICD-10-CM

## 2022-07-11 RX ORDER — DEXLANSOPRAZOLE 60 MG/1
CAPSULE, DELAYED RELEASE ORAL
Qty: 90 CAPSULE | Refills: 3 | OUTPATIENT
Start: 2022-07-11

## 2022-08-12 ENCOUNTER — HOSPITAL ENCOUNTER (OUTPATIENT)
Dept: GENERAL RADIOLOGY | Facility: HOSPITAL | Age: 57
Discharge: HOME | End: 2022-08-12

## 2022-08-12 ENCOUNTER — TELEPHONE (OUTPATIENT)
Dept: ONCOLOGY | Facility: CLINIC | Age: 57
End: 2022-08-12

## 2022-08-12 ENCOUNTER — LAB (OUTPATIENT)
Dept: LAB | Facility: HOSPITAL | Age: 57
End: 2022-08-12

## 2022-08-12 DIAGNOSIS — Z17.0 MALIGNANT NEOPLASM OF RIGHT BREAST IN FEMALE, ESTROGEN RECEPTOR POSITIVE, UNSPECIFIED SITE OF BREAST: ICD-10-CM

## 2022-08-12 DIAGNOSIS — C50.911 MALIGNANT NEOPLASM OF RIGHT BREAST IN FEMALE, ESTROGEN RECEPTOR POSITIVE, UNSPECIFIED SITE OF BREAST: ICD-10-CM

## 2022-08-12 LAB
ALBUMIN SERPL-MCNC: 4.1 G/DL (ref 3.5–5.2)
ALBUMIN/GLOB SERPL: 1.7 G/DL
ALP SERPL-CCNC: 51 U/L (ref 39–117)
ALT SERPL W P-5'-P-CCNC: 14 U/L (ref 1–33)
ANION GAP SERPL CALCULATED.3IONS-SCNC: 9 MMOL/L (ref 5–15)
AST SERPL-CCNC: 16 U/L (ref 1–32)
BASOPHILS # BLD AUTO: 0.04 10*3/MM3 (ref 0–0.2)
BASOPHILS NFR BLD AUTO: 0.9 % (ref 0–1.5)
BILIRUB SERPL-MCNC: 0.2 MG/DL (ref 0–1.2)
BUN SERPL-MCNC: 9 MG/DL (ref 6–20)
BUN/CREAT SERPL: 8 (ref 7–25)
CALCIUM SPEC-SCNC: 9.9 MG/DL (ref 8.6–10.5)
CEA SERPL-MCNC: 2.22 NG/ML
CHLORIDE SERPL-SCNC: 105 MMOL/L (ref 98–107)
CO2 SERPL-SCNC: 27 MMOL/L (ref 22–29)
CREAT SERPL-MCNC: 1.12 MG/DL (ref 0.57–1)
DEPRECATED RDW RBC AUTO: 46.4 FL (ref 37–54)
EGFRCR SERPLBLD CKD-EPI 2021: 57.5 ML/MIN/1.73
EOSINOPHIL # BLD AUTO: 0.12 10*3/MM3 (ref 0–0.4)
EOSINOPHIL NFR BLD AUTO: 2.6 % (ref 0.3–6.2)
ERYTHROCYTE [DISTWIDTH] IN BLOOD BY AUTOMATED COUNT: 13.2 % (ref 12.3–15.4)
FERRITIN SERPL-MCNC: 63.45 NG/ML (ref 13–150)
FOLATE SERPL-MCNC: >20 NG/ML (ref 4.78–24.2)
GLOBULIN UR ELPH-MCNC: 2.4 GM/DL
GLUCOSE SERPL-MCNC: 94 MG/DL (ref 65–99)
HCT VFR BLD AUTO: 38.6 % (ref 34–46.6)
HGB BLD-MCNC: 11.8 G/DL (ref 12–15.9)
IMM GRANULOCYTES # BLD AUTO: 0.03 10*3/MM3 (ref 0–0.05)
IMM GRANULOCYTES NFR BLD AUTO: 0.7 % (ref 0–0.5)
IRON 24H UR-MRATE: 88 MCG/DL (ref 37–145)
IRON SATN MFR SERPL: 28 % (ref 20–50)
LYMPHOCYTES # BLD AUTO: 1.67 10*3/MM3 (ref 0.7–3.1)
LYMPHOCYTES NFR BLD AUTO: 36.5 % (ref 19.6–45.3)
MCH RBC QN AUTO: 28.9 PG (ref 26.6–33)
MCHC RBC AUTO-ENTMCNC: 30.6 G/DL (ref 31.5–35.7)
MCV RBC AUTO: 94.4 FL (ref 79–97)
MONOCYTES # BLD AUTO: 0.36 10*3/MM3 (ref 0.1–0.9)
MONOCYTES NFR BLD AUTO: 7.9 % (ref 5–12)
NEUTROPHILS NFR BLD AUTO: 2.36 10*3/MM3 (ref 1.7–7)
NEUTROPHILS NFR BLD AUTO: 51.4 % (ref 42.7–76)
NRBC BLD AUTO-RTO: 0 /100 WBC (ref 0–0.2)
PLATELET # BLD AUTO: 232 10*3/MM3 (ref 140–450)
PMV BLD AUTO: 11.6 FL (ref 6–12)
POTASSIUM SERPL-SCNC: 3.3 MMOL/L (ref 3.5–5.2)
PROT SERPL-MCNC: 6.5 G/DL (ref 6–8.5)
RBC # BLD AUTO: 4.09 10*6/MM3 (ref 3.77–5.28)
SODIUM SERPL-SCNC: 141 MMOL/L (ref 136–145)
TIBC SERPL-MCNC: 311 MCG/DL (ref 298–536)
TRANSFERRIN SERPL-MCNC: 209 MG/DL (ref 200–360)
VIT B12 BLD-MCNC: 827 PG/ML (ref 211–946)
WBC NRBC COR # BLD: 4.58 10*3/MM3 (ref 3.4–10.8)

## 2022-08-12 PROCEDURE — 80053 COMPREHEN METABOLIC PANEL: CPT

## 2022-08-12 PROCEDURE — 84466 ASSAY OF TRANSFERRIN: CPT

## 2022-08-12 PROCEDURE — 82607 VITAMIN B-12: CPT

## 2022-08-12 PROCEDURE — 83540 ASSAY OF IRON: CPT

## 2022-08-12 PROCEDURE — 71046 X-RAY EXAM CHEST 2 VIEWS: CPT

## 2022-08-12 PROCEDURE — 86300 IMMUNOASSAY TUMOR CA 15-3: CPT

## 2022-08-12 PROCEDURE — 36415 COLL VENOUS BLD VENIPUNCTURE: CPT

## 2022-08-12 PROCEDURE — 82378 CARCINOEMBRYONIC ANTIGEN: CPT

## 2022-08-12 PROCEDURE — 82746 ASSAY OF FOLIC ACID SERUM: CPT

## 2022-08-12 PROCEDURE — 82728 ASSAY OF FERRITIN: CPT

## 2022-08-12 PROCEDURE — 85025 COMPLETE CBC W/AUTO DIFF WBC: CPT

## 2022-08-12 RX ORDER — POTASSIUM CHLORIDE 20 MEQ/1
20 TABLET, EXTENDED RELEASE ORAL 3 TIMES DAILY
Qty: 9 TABLET | Refills: 0 | Status: SHIPPED | OUTPATIENT
Start: 2022-08-12 | End: 2022-10-17

## 2022-08-12 NOTE — TELEPHONE ENCOUNTER
Patient notified her K+ was 3.3 a prescription for oral potassium is to be sent to pharmacy for  to help bring her potassium level up.   Patient to  prescription

## 2022-08-12 NOTE — TELEPHONE ENCOUNTER
----- Message from Dave Gonzalez MD sent at 8/12/2022  1:12 PM CDT -----  Kdur 20 meq po tid x 3 d

## 2022-08-13 LAB — CANCER AG27-29 SERPL-ACNC: 13.2 U/ML (ref 0–38.6)

## 2022-08-13 NOTE — PROGRESS NOTES
[facility] ONC [facility] HEMATOLOGY AND ONCOLOGY  [address]  [facility] [address] [phone]    Patient Name: Carol Rodriguez  Encounter Date: 08/18/2022  YOB: 1965  Patient Number: [id number]      REASON FOR VISIT: Ms. Carol Rodrgiuez is a 57-year-old female who returns in follow-up of breast cancer. She is seen nearly 176 months since completion of adjuvant chemotherapy and over 127 months since cessation of adjuvant Femara (completed 60 months of therapy). The patient is here alone.      I have reviewed the HPI and verified with the patient the accuracy of it. No changes to interval history since the information was documented. Dave Gonzalez MD 08/18/22     DIAGNOSTIC ABNORMALITIES: Breast carcinoma   1. Unilateral mammogram, right breast, 08/23/2006, [facility]. Approximately a 2 cm spiculated area in the upper-outer quadrant of the right breast at the 10 o'clock to 11 o'clock position.  2. Right breast ultrasound, 08/28/2006, [facility]. A solid nodule at 10 o’clock in the right breast measuring up to 2 cm is felt to correspond to the abnormality seen on the mammograms, including spot views today. There are also small cysts in this region. Excisional biopsy should be considered.  3. Bone scan, 09/07/2006, [facility]. Small areas of vaguely increased activity in the upper thoracic area and at the lumbosacral junction, probably arthritic. No definite metastatic change is identified.  4. CT of the brain, 09/07/2006, [facility]. Chronic sinus disease involving the left half of the sphenoid sinus. Otherwise unremarkable enhanced CT of the brain with no convincing evidence of metastatic disease.  5. CT of the chest, 09/07/2006, [facility] small cortical cyst involving the upper pole of the right kidney. Otherwise unremarkable CT of the chest with no evidence of metastatic disease.  6. CT of the pelvis,  09/07/2006, [facility]. Sclerotic lesion involving the right femoral head which I would favor to represent a bony enostosis. Considering the lack of other metastatic disease, I feel a solitary bony lesion to the right femoral head would be an unusual manifestation but warranting followup nonetheless. Bone scan may be helpful for further evaluation.  7. Unilateral mammogram of breast, 09/11/2006, [facility]. Lobular areas of density in the left breast consistent with several left breast masses. Ultrasound was performed. There are no spiculated densities. No suspicious microcalcifications are identified. Screening exam of the left breast is recommended in 1 year.  8. Right breast biopsy, 09/29/2006, [facility]. Infiltrating mammary carcinoma. Part A: Primary tumor diagnosis is infiltrating ductal carcinoma. Part B: Right axillary lymph node was benign. Part C: Right breast tissue showed right modified radical mastectomy with deep margin without obvious histopathologic alteration. No residual tumor present at previous biopsy site. Fibrocystic mastopathy including fibrosis, adenosis, duct ectasia, papillary apocrine metaplasia, sclerosing adenosis, multiple cysts, and intraductal microcalcifications. Seventeen (17) benign lymph nodes. Part D: Left breast tissue reveals no tumor present. Fibrocystic mastopathy including fibrosis, adenosis, duct ectasia, multiple cysts, apocrine metaplasia, ruptured tension cysts, periductal chronic inflammation, intraductal hyperplasia of usual type, and intraductal microcalcifications (multiple). Part E: Right breast tissue shows portions of benign loose fibrofatty tissue. Part F: Left breast tissue show portions of benign loose fibrofatty tissue and fatty breast tissue. ER 94% (+), IA 7% (+), HER-2 negative.  9. Labs, 10/10/2006: CMP was unrevealing, CEA of 1.3, and CA27-29 of 14.1.  10. 2D echo, 10/17/2006, [facility]. Normal left ventricular chamber size and  wall motion. The left ventricular ejection fraction is felt to be in excess of 60%. No pericardial effusion or intracavitary thrombus noted. No valvular abnormalities are noted. Spectral and color flow Doppler studies reveal trivial tricuspid regurgitation. The right ventricular systolic pressure is normal and was estimated at 19 mmHg.  11. Labs, 08/22/2016: Hemoglobin 12.4, hematocrit 39.4,  (elevated).  12. Labs, 08/30/2016:  (313 to 615), TSH 1.9, B12 of 1055, folate greater than 20, T4 9.6 (4.9 to 12.3), SPIEP: IgG 527 (791 to 1643), IgM 261, IgA 76.6 (each normal). DAVION: No monoclonal immunoglobulins detected.  13. Comprehensive blood report, 08/30/2016. MILD ANEMIA; INCREASED T-CELL LGL'S. COMPREHENSIVE DIAGNOSIS. Review of peripheral blood smear: Mild anemia with borderline macrocytic features. Normal white blood cell and platelet counts and morphology. Mildly expanded population of reactive appearing T-cell large granular lymphocytes detected on flow cytometric studies. COMPREHENSIVE COMMENT. The exact etiology of this patient's mild anemia with borderline macrocytic features is not apparent from review of the specimen. Given the lack of significant atypia and normal white blood cell and platelet values, myelodysplasia appears less likely. Non-neoplastic etiologies to consider include liver disease, thyroid disease, immune mediated disorders, vitamin/nutritional deficiencies and drugs/toxins. Correlation recommended. Flow cytometry study after erythrolysis reveals no circulating myeloid or lymphoid blasts. Myeloid and monocytic lineages are represented by mature granulocytes and monocytes. Basophils and eosinophils are not increased. It must be noted that the diagnosis of a myeloid stem cell disorder, if clinically suspected, is best made using bone marrow specimens. Peripheral blood is not always representative of abnormalities involving the bone marrow. The B cells show no evidence of  clonality or antigenic aberrancy. The majority of the T cells show normal expression of the pan T-cell antigens. A mildly expanded population of T-cell LGL is detected accounting for approximately 35.9% of the total T cells and 12.6% of the total white blood cells. The overall immunophenotype of the T-LGL and a normal CD4:CD8 ratio favors reactive process over T-cell neoplasia. The presence of these large granular lymphocytes raises the possibility of an immune-mediated etiology for this patient's anemia.    PREVIOUS INTERVENTIONS: Breast carcinoma.   1. Adriamycin, 11/02/2006 through 12/14/2006. Four cycles completed.  2. Taxotere, 12/28/2006 through 02/09/2007. Four cycles completed.  3. Cytoxan, 02/22/2007 through 04/05/2007. Four cycles completed.  4. Femara 2.5 mg daily, 04/12/2007 through 04/12/2012.    DIAGNOSTIC ABNORMALITIES: Anemia.   1. CBC, 05/15/2006. Hemoglobin 7.9 and hematocrit 25.3. Additional lab same date revealed retic count 1.4%, serum iron 7 (50 to 170), iron saturation 2%, TIBC 345 (273 to 456), TSH 1.72, and T4 0.9 (each normal).  2. CBC (post transfusion 2 units of PRBCs), 05/24/2006. Hemoglobin 9.3, an hematocrit 29.6, an MCV 74.9, platelets 309,000 and a WBC 6.5 with a normal differential.  3. Anemia substrate evaluation, 07/12/2006. Fe 116, TIBC 343, Fe sat 34%, ferritin 14, B12 of 655, folate 23.1, retic 0.8%, and stools for occult blood (OB) 0/3 +.    PREVIOUS INTERVENTIONS: Anemia.   1. Two units RBCs, 05/22/2006.  2. Transabdominal hysterectomy and bilateral oophorectomy, 06/07/2006. Pathology report is not immediately available to this examiner.  3. Chromagen by mouth 07/12/2006 through present (currently on 1 daily).        Problem List Items Addressed This Visit        Other    Malignant neoplasm of right breast in female, estrogen receptor positive (HCC) - Primary    Relevant Orders    XR Chest PA & Lateral    Iron Profile    Ferritin    Vitamin B12 & Folate    Comprehensive  Metabolic Panel    CEA    Cancer Antigen 27.29    CBC & Differential        Oncology/Hematology History Overview Note   DIAGNOSTIC ABNORMALITIES:  Breast carcinoma    1.Unilateral mammogram, right breast, 08/23/2006, [facility].  Approximately a 2 cm spiculated area in the upper-outer quadrant of the right breast at the 10 o'clock to 11 o'clock position.  2.Right breast ultrasound, 08/28/2006, [facility].  A solid nodule at 10 o’clock in the right breast measuring up to 2 cm is felt to correspond to the abnormality seen on the mammograms, including spot views today.  There are also small cysts in this region.  Excisional biopsy should be considered.  3.Bone scan, 09/07/2006, [facility]. Small areas of vaguely increased activity in the upper thoracic area and at the lumbosacral junction, probably arthritic.  No definite metastatic change is identified.  4.CT of the brain, 09/07/2006, [facility]. Chronic sinus disease involving the left half of the sphenoid sinus. Otherwise unremarkable enhanced CT of the brain with no convincing evidence of metastatic disease.  5.CT of the chest, 09/07/2006, [facility] small cortical cyst involving the upper pole of the right kidney. Otherwise unremarkable CT of the chest with no evidence of metastatic disease.  6.CT of the pelvis, 09/07/2006, [facility].  Sclerotic lesion involving the right femoral head which I would favor to represent a bony enostosis. Considering the lack of other metastatic disease, I feel a solitary bony lesion to the right femoral head would be an unusual manifestation but warranting followup nonetheless.  Bone scan may be helpful for further evaluation.  7.Unilateral mammogram of breast, 09/11/2006, [facility]. Lobular areas of density in the left breast consistent with several left breast masses. Ultrasound was performed. There are no spiculated densities. No suspicious microcalcifications are identified. Screening  exam of the left breast is recommended in 1 year.  8.Right breast biopsy, 09/29/2006, [facility]. Infiltrating mammary carcinoma.  Part A: Primary tumor diagnosis is infiltrating ductal carcinoma.  Part B: Right axillary lymph node was benign.  Part C: Right breast tissue showed right modified radical mastectomy with deep margin without obvious histopathologic alteration.  No residual tumor present at previous biopsy site.  Fibrocystic mastopathy including fibrosis, adenosis, duct ectasia, papillary apocrine metaplasia, sclerosing adenosis, multiple cysts, and intraductal microcalcifications.  Seventeen (17) benign lymph nodes.  Part D:  Left breast tissue reveals no tumor present.   Fibrocystic mastopathy including fibrosis, adenosis, duct ectasia, multiple cysts, apocrine metaplasia, ruptured tension cysts, periductal chronic inflammation, intraductal hyperplasia of usual type, and intraductal microcalcifications (multiple).  Part E:  Right breast tissue shows portions of benign loose fibrofatty tissue.  Part F:  Left breast tissue show portions of benign loose fibrofatty tissue and fatty breast tissue.  ER 94% (+), OK 7% (+), HER-2 negative.  9.Labs, 10/10/2006: CMP was unrevealing, CEA of 1.3, and CA27-29 of 14.1.  10.2D echo, 10/17/2006, [facility].  Normal left ventricular chamber size and wall motion.  The left ventricular ejection fraction is felt to be in excess of 60%. No pericardial effusion or intracavitary thrombus noted. No valvular abnormalities are noted.  Spectral and color flow Doppler studies reveal trivial tricuspid regurgitation. The right ventricular systolic pressure is normal and was estimated at 19 mmHg.  11.Labs, 08/22/2016: Hemoglobin 12.4, hematocrit 39.4,  (elevated).  12.Labs, 08/30/2016:  (313 to 615), TSH 1.9, B12 of 1055, folate greater than 20, T4 9.6 (4.9 to 12.3), SPIEP: IgG 527 (791 to 1643), IgM 261, IgA 76.6 (each normal). DAVION: No monoclonal  immunoglobulins detected.  13.Comprehensive blood report, 08/30/2016.   MILD ANEMIA; INCREASED T-CELL LGL'S. COMPREHENSIVE DIAGNOSIS.  Review of peripheral blood smear: Mild anemia with borderline macrocytic features. Normal white blood cell and platelet counts and morphology. Mildly expanded population of reactive appearing T-cell large granular lymphocytes detected on flow cytometric studies.  COMPREHENSIVE COMMENT.  The exact etiology of this patient's mild anemia with borderline macrocytic features is not apparent from review of the specimen. Given the lack of significant atypia and normal white blood cell and platelet values, myelodysplasia appears less likely. Non-neoplastic etiologies to consider include liver disease, thyroid disease, immune mediated disorders, vitamin/nutritional deficiencies and drugs/toxins. Correlation recommended. Flow cytometry study after erythrolysis reveals no circulating myeloid or lymphoid blasts. Myeloid and monocytic lineages are represented by mature granulocytes and monocytes. Basophils and eosinophils are not increased. It must be noted that the diagnosis of a myeloid stem cell disorder, if clinically suspected, is best made using bone marrow specimens. Peripheral blood is not always representative of abnormalities involving the bone marrow. The B cells show no evidence of clonality or antigenic aberrancy. The majority of the T cells show normal expression of the pan T-cell antigens. A mildly expanded population of T-cell LGL is detected accounting for approximately 35.9% of the total T cells and 12.6% of the total white blood cells. The overall immunophenotype of the T-LGL and a normal CD4:CD8 ratio favors reactive process over T-cell neoplasia. The presence of these large granular lymphocytes raises the possibility of an immune-mediated etiology for this patient's anemia.    PREVIOUS INTERVENTIONS:  Breast carcinoma.    1.Adriamycin, 11/02/2006 through 12/14/2006.  Four  cycles completed.  2.Taxotere, 12/28/2006 through 02/09/2007.  Four cycles completed.  3.Cytoxan, 02/22/2007 through 04/05/2007.  Four cycles completed.  4.Femara 2.5 mg daily, 04/12/2007 through 04/12/2012.    DIAGNOSTIC ABNORMALITIES:  Anemia.    1.CBC, 05/15/2006. Hemoglobin 7.9 and hematocrit 25.3. Additional lab same date revealed retic count 1.4%, serum iron 7 (50 to 170), iron saturation 2%, TIBC 345 (273 to 456), TSH 1.72, and T4 0.9 (each normal).  2.CBC (post transfusion 2 units of PRBCs), 05/24/2006.  Hemoglobin 9.3, an hematocrit 29.6, an MCV 74.9, platelets 309,000 and a WBC 6.5 with a normal differential.  3.Anemia substrate evaluation, 07/12/2006. Fe 116, TIBC 343, Fe sat 34%, ferritin 14, B12 of 655, folate 23.1, retic 0.8%, and stools for occult blood (OB) 0/3 +.    PREVIOUS INTERVENTIONS:  Anemia.    1.Two units RBCs, 05/22/2006.  2.Transabdominal hysterectomy and bilateral oophorectomy, 06/07/2006. Pathology report is not immediately available to this examiner.  3.Chromagen by mouth 07/12/2006 through present (currently on 1 daily).       Malignant neoplasm of right breast in female, estrogen receptor positive (HCC)   8/16/2019 Initial Diagnosis    Malignant neoplasm of right breast in female, estrogen receptor positive (CMS/HCC)         PAST MEDICAL HISTORY:  ALLERGIES:  No Known Allergies  CURRENT MEDICATIONS:  Outpatient Encounter Medications as of 8/18/2022   Medication Sig Dispense Refill   • amitriptyline (ELAVIL) 50 MG tablet TK 1 T PO Q NIGHT  2   • amphetamine-dextroamphetamine (ADDERALL) 30 MG tablet   2 times daily     • Calcium 500 MG tablet Take 500 mg by mouth.     • dexlansoprazole (DEXILANT) 60 MG capsule      • diazePAM (VALIUM) 10 MG tablet TK 1/2 TO 1 T PO BID  2   • ferrous sulfate 325 (65 FE) MG tablet Take 1 tablet by mouth Daily. 30 tablet 6   • HYDROcodone-acetaminophen (NORCO)  MG per tablet Take 1 tablet by mouth.     • Omega-3 Fatty Acids (FISH OIL) 1000 MG capsule  capsule      • pantoprazole (PROTONIX) 40 MG EC tablet Take 40 mg by mouth Daily.     • potassium chloride (K-DUR,KLOR-CON) 20 MEQ CR tablet Take 1 tablet by mouth 3 (Three) Times a Day. Until finished 9 tablet 0   • pregabalin (LYRICA) 150 MG capsule Take 150 mg by mouth 3 (Three) Times a Day.     • Prolia 60 MG/ML solution prefilled syringe syringe      • propranolol (INDERAL) 20 MG tablet Take 40 mg by mouth.     • propranolol (INDERAL) 40 MG tablet Take 40 mg by mouth 3 (Three) Times a Day.     • therapeutic multivitamin-minerals (THERAGRAN-M) tablet Take 1 tablet by mouth.     • tiZANidine (ZANAFLEX) 4 MG tablet Take 4 mg by mouth 3 (Three) Times a Day As Needed.     • topiramate (TOPAMAX) 100 MG tablet 100 mg.     • [DISCONTINUED] milnacipran (SAVELLA) 50 MG tablet tablet Take 75 mg by mouth 2 (Two) Times a Day.     • [DISCONTINUED] pregabalin (LYRICA) 75 MG capsule Take 75 mg by mouth.     • [DISCONTINUED] tiZANidine (ZANAFLEX) 2 MG tablet Take 4 mg by mouth.       No facility-administered encounter medications on file as of 2022.     ADULT ILLNESSES:   Infiltrating ductal carcinoma of breast ( ER Status: Positive; W[place] Status: Positive; )   Anxiety   Arthralgia   Francisco's esophagus (disorder)   Carcinoembryonic antigen present (finding)   Fibrocystic breast disease   Hypokalemia   Iron deficiency anemia   Osteopenia   Tobacco user (finding)    SURGERIES:   Total abdominal hysterectomy with bilateral salpingo-oophorectomy, (DANIEL/BSO), 2006    section, x2. Most recently in    Modified radical mastectomy, right, 2006   Simple mastectomy, prophylactic, left with left breast reconstruction, 2006   Biopsy of breast, right, benign fibrocystic changes, 2006   Carpal tunnel release, left 2011; right 2011      ADULT ILLNESSES:  Patient Active Problem List   Diagnosis Code   • Malignant neoplasm of right breast in female, estrogen receptor positive (HCC) C50.911, Z17.0    • Anemia D64.9   • Francisco esophagus K22.70   • GERD (gastroesophageal reflux disease) K21.9   • Hiatal hernia K44.9   • Irritable bowel syndrome K58.9   • Lumbar radicular pain M54.16   • Tortuous colon Q43.8     SURGERIES:  Past Surgical History:   Procedure Laterality Date   • BREAST BIOPSY Right 2006    Biopsy of breast, right, benign fibrocystic changes   • CARPAL TUNNEL RELEASE      left 2011; right 2011   •  SECTION      x2. Most recently in    • MASTECTOMY MODIFIED RADICAL / SIMPLE / COMPLETE Right 2006   • SIMPLE MASTECTOMY Left 2006     prophylactic, left with left breast reconstruction   • TOTAL ABDOMINAL HYSTERECTOMY WITH SALPINGO OOPHORECTOMY  2006     HEALTH MAINTENANCE ITEMS:  Health Maintenance Due   Topic Date Due   • MAMMOGRAM  Never done   • ANNUAL PHYSICAL  Never done   • TDAP/TD VACCINES (1 - Tdap) Never done   • ZOSTER VACCINE (1 of 2) Never done   • HEPATITIS C SCREENING  Never done   • PAP SMEAR  Never done   • COVID-19 Vaccine (3 - Booster for Moderna series) 2021       <no information>  Last Completed Colonoscopy          COLORECTAL CANCER SCREENING (COLONOSCOPY - Every 10 Years) Next due on 2015  [facility] Colonoscopy component of [facility] COLONOSCOPY    2015  SCANNED - COLONOSCOPY    2014  Colonoscopy                There is no immunization history on file for this patient.  Last Completed Mammogram     This patient has no relevant Health Maintenance data.            FAMILY HISTORY:  Family History   Problem Relation Age of Onset   • Polymyalgia rheumatica Mother    • Diabetes Father    • Hypertension Father    • Other Father         heart issues   • No Known Problems Brother    • No Known Problems Maternal Grandmother    • Heart attack Maternal Grandfather         52 years old    • No Known Problems Paternal Grandmother    • Diabetes Paternal Grandfather    • No Known Problems Brother    • No Known Problems  "Brother    • No Known Problems Brother      SOCIAL HISTORY:  Social History     Socioeconomic History   • Marital status:    Tobacco Use   • Smoking status: Former Smoker     Packs/day: 1.00     Years: 25.00     Pack years: 25.00     Types: Cigarettes     Quit date: 2017     Years since quittin.7   • Smokeless tobacco: Never Used   Substance and Sexual Activity   • Alcohol use: No   • Drug use: No   • Sexual activity: Defer       REVIEW OF SYSTEMS:  Constitutional:   The patient reports a fairly good appetite and energy is variable, \"still comes and goes.\" She manages all her ADLs. She retired last 2021. She has no weight changes (had gained 21 pounds at her 3 prior visits) in the interval.  States her fibromyalgia slows her down.  Has been going to [facility] rehab since 2022.  She has no fevers, chills, or drenching night sweats. Still has intermittent hot flashes, \"still occasionally.\" Her sleep habits are fairly good even off Restoril.  Ear/Nose/Mouth/Throat:   She has chronic rhinitis with no sinus congestion, ear discomfort, or discolored drainage. She has no sore throat, nosebleeds, or sore tongue. She has fewer bouts of headaches.  Ocular:   She still has bouts of blurry vision. She is still followed by ophthalmology. No eye pain or double vision.  Respiratory:   No chest congestion.  No cough.  Only occasional exertional dyspnea, no significant shortness of breathing at rest, or unexplained chest wall pain. She has not smoked since 2017. Is aware that she has chronic obstructive pulmonary disease (COPD) on x-ray.   Cardiovascular:   She has no exertional chest pain, chest pressure, or chest heaviness. She has no claudication. She has no palpitations or symptomatic orthostasis.  Gastrointestinal:   She has no dysphagia, nausea, vomiting, postprandial abdominal pain (PPI), bloating, cramping, or change in bowel habits. She has not had dark stools or rectal bleeding. She has no diarrhea, " "and her bowels have been regular (still every other day) without stool softeners. EGD last 08/18/2016 per Dr. Sheppard. \"He took a biopsy but didn't say anything else.\" Colonoscopy, 09/04/2015. Normal. Says she has been recalled for repeat EGD  Genitourinary:   She has no urinary burning, frequency, dribbling, or discoloration. She has no need to urinate frequently through the night. She has no difficulty controlling her bladder. No vaginal bleeding. She denies any vaginal discharge. Says she had breakthrough bleeding last 12/2016 but was seen by Dr. Graham (GYN). \"She did a pelvic exam and ran a bunch of tests and everything was okay.\" No repeat episodes since.  Musculoskeletal:   She has no unexplained myalgias or nighttime leg cramping. Has only mild aching, especially in the hands, lower back, hips, and shoulders.  She has spinal degenerative joint disease (DJD), (MRI, 10/09/2008; 07/2012). She has no distal radiculitis. She is still using Lortab ~ 3-4 daily. Nabumetone has been stopped. Says hip injections per [facility] has been helpful. Says she received \"prp\" injections to her spine x 4.  \"Not sure it's helped yet.\"  No longer sees Dr. Walters for epidural injections. Is contemplating back surgery but wants to avoid that.  Has been on Savella which has helped her fibromyalgia pains.  Extremities:   She has no trouble with fluid retention or significant leg swelling.  Endocrine:   The hot flashes are tolerable. She has no problems with excess thirst, excessive urination, or unexplained fatigue.  Heme/Lymphatic:   She has no unexplained bleeding, bruising, petechial rash, or swollen glands.  Skin:   She has no itching or rashes.  Neuro:   She has no loss of consciousness, seizures, fainting spells, or dizziness. She has no weakness of her face, arms, or legs. She has no difficulty with speech. She has no tremors.  Psych:   She has chronic depression and anxiety which sometimes interferes with her sleep patterns. " "She still feels that the anxiety is triggered due to the back pains. Says her symptoms are modulated by her pain medications and Savella.     VITAL SIGNS: /82   Pulse 80   Temp 97.4 °F (36.3 °C)   Resp 18   Ht 162.6 cm (64\")   Wt 67 kg (147 lb 12.8 oz)   SpO2 98%   Breastfeeding No   BMI 25.37 kg/m² Body surface area is 1.72 meters squared.  Pain Score    08/18/22 1046   PainSc:   3   PainLoc: Back     I have reexamined the patient and the results are consistent with the previously documented exam. Dave Gonzalez MD     PHYSICAL EXAMINATION:   General:   She is a pleasant, perennially-anxious, more heavyset, well-nourished, well-appearing, well-kept middle-aged female in no distress. ECOG PS = 0.  Head/Neck:   The patient is anicteric and atraumatic. She is wearing a surgical mask today.  The trachea is midline. The neck is supple without evidence of jugular venous distention or cervical adenopathy. There is no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

## 2022-08-18 ENCOUNTER — OFFICE VISIT (OUTPATIENT)
Dept: ONCOLOGY | Facility: CLINIC | Age: 57
End: 2022-08-18

## 2022-08-18 VITALS
TEMPERATURE: 97.4 F | BODY MASS INDEX: 25.23 KG/M2 | WEIGHT: 147.8 LBS | HEIGHT: 64 IN | OXYGEN SATURATION: 98 % | DIASTOLIC BLOOD PRESSURE: 82 MMHG | HEART RATE: 80 BPM | RESPIRATION RATE: 18 BRPM | SYSTOLIC BLOOD PRESSURE: 126 MMHG

## 2022-08-18 DIAGNOSIS — C50.911 MALIGNANT NEOPLASM OF RIGHT BREAST IN FEMALE, ESTROGEN RECEPTOR POSITIVE, UNSPECIFIED SITE OF BREAST: Primary | ICD-10-CM

## 2022-08-18 DIAGNOSIS — Z17.0 MALIGNANT NEOPLASM OF RIGHT BREAST IN FEMALE, ESTROGEN RECEPTOR POSITIVE, UNSPECIFIED SITE OF BREAST: Primary | ICD-10-CM

## 2022-08-18 PROCEDURE — 99214 OFFICE O/P EST MOD 30 MIN: CPT | Performed by: INTERNAL MEDICINE

## 2022-08-18 RX ORDER — TIZANIDINE 4 MG/1
4 TABLET ORAL 3 TIMES DAILY PRN
COMMUNITY
Start: 2022-07-27

## 2022-08-18 RX ORDER — PANTOPRAZOLE SODIUM 40 MG/1
40 TABLET, DELAYED RELEASE ORAL DAILY
COMMUNITY
Start: 2022-07-20

## 2022-08-18 RX ORDER — DENOSUMAB 60 MG/ML
INJECTION SUBCUTANEOUS
COMMUNITY
Start: 2022-07-05

## 2022-08-18 RX ORDER — PREGABALIN 150 MG/1
150 CAPSULE ORAL 3 TIMES DAILY
COMMUNITY
Start: 2022-06-30

## 2022-08-30 ENCOUNTER — TRANSCRIBE ORDERS (OUTPATIENT)
Dept: ADMINISTRATIVE | Facility: HOSPITAL | Age: 57
End: 2022-08-30

## 2022-08-30 ENCOUNTER — TELEPHONE (OUTPATIENT)
Dept: ONCOLOGY | Facility: CLINIC | Age: 57
End: 2022-08-30

## 2022-08-30 DIAGNOSIS — I25.10 ATHEROSCLEROSIS OF NATIVE CORONARY ARTERY WITHOUT ANGINA PECTORIS, UNSPECIFIED WHETHER NATIVE OR TRANSPLANTED HEART: Primary | ICD-10-CM

## 2022-08-30 DIAGNOSIS — C50.911 MALIGNANT NEOPLASM OF RIGHT BREAST IN FEMALE, ESTROGEN RECEPTOR POSITIVE, UNSPECIFIED SITE OF BREAST: ICD-10-CM

## 2022-08-30 DIAGNOSIS — Z17.0 MALIGNANT NEOPLASM OF RIGHT BREAST IN FEMALE, ESTROGEN RECEPTOR POSITIVE, UNSPECIFIED SITE OF BREAST: ICD-10-CM

## 2022-08-30 RX ORDER — FERROUS SULFATE 325(65) MG
325 TABLET ORAL DAILY
Qty: 30 TABLET | Refills: 6 | Status: SHIPPED | OUTPATIENT
Start: 2022-08-30 | End: 2022-09-29

## 2022-08-30 NOTE — TELEPHONE ENCOUNTER
Caller: Carol Rodriguez    Relationship: Self    Best call back number: [phone]    Requested Prescriptions:   FERROUS SULFATE 325 (65FE) MG        Pharmacy where request should be sent:    [id number] - [facility] [address] AT [address] & [facility] - [phone]  - [phone] FX      Does the patient have less than a 3 day supply:  [] Yes  [x] No    Ingris JARA Rep   08/30/22 12:34 CDT

## 2022-09-16 ENCOUNTER — HOSPITAL ENCOUNTER (OUTPATIENT)
Dept: CARDIOLOGY | Facility: HOSPITAL | Age: 57
Discharge: HOME | End: 2022-09-16
Admitting: FAMILY MEDICINE

## 2022-09-16 VITALS
WEIGHT: 145 LBS | HEIGHT: 64 IN | DIASTOLIC BLOOD PRESSURE: 91 MMHG | SYSTOLIC BLOOD PRESSURE: 134 MMHG | BODY MASS INDEX: 24.75 KG/M2 | HEART RATE: 61 BPM

## 2022-09-16 DIAGNOSIS — I25.10 ATHEROSCLEROSIS OF NATIVE CORONARY ARTERY WITHOUT ANGINA PECTORIS, UNSPECIFIED WHETHER NATIVE OR TRANSPLANTED HEART: ICD-10-CM

## 2022-09-16 LAB
BH CV STRESS BP STAGE 1: NORMAL
BH CV STRESS BP STAGE 2: NORMAL
BH CV STRESS BP STAGE 3: NORMAL
BH CV STRESS DURATION MIN STAGE 1: 3
BH CV STRESS DURATION MIN STAGE 2: 3
BH CV STRESS DURATION MIN STAGE 3: 1
BH CV STRESS DURATION SEC STAGE 1: 0
BH CV STRESS DURATION SEC STAGE 2: 0
BH CV STRESS DURATION SEC STAGE 3: 0
BH CV STRESS GRADE STAGE 1: 10
BH CV STRESS GRADE STAGE 2: 12
BH CV STRESS GRADE STAGE 3: 14
BH CV STRESS HR STAGE 1: 108
BH CV STRESS HR STAGE 2: 125
BH CV STRESS HR STAGE 3: 139
BH CV STRESS METS STAGE 1: 5
BH CV STRESS METS STAGE 2: 7.5
BH CV STRESS METS STAGE 3: 10
BH CV STRESS PROTOCOL 1: NORMAL
BH CV STRESS RECOVERY BP: NORMAL MMHG
BH CV STRESS RECOVERY HR: 60 BPM
BH CV STRESS SPEED STAGE 1: 1.7
BH CV STRESS SPEED STAGE 2: 2.5
BH CV STRESS SPEED STAGE 3: 3.4
BH CV STRESS STAGE 1: 1
BH CV STRESS STAGE 2: 2
BH CV STRESS STAGE 3: 3
MAXIMAL PREDICTED HEART RATE: 163 BPM
PERCENT MAX PREDICTED HR: 85.28 %
STRESS BASELINE BP: NORMAL MMHG
STRESS BASELINE HR: 62 BPM
STRESS PERCENT HR: 100 %
STRESS POST ESTIMATED WORKLOAD: 10 METS
STRESS POST EXERCISE DUR MIN: 7 MIN
STRESS POST EXERCISE DUR SEC: 0 SEC
STRESS POST PEAK BP: NORMAL MMHG
STRESS POST PEAK HR: 139 BPM
STRESS TARGET HR: 139 BPM

## 2022-09-16 PROCEDURE — 93352 ADMIN ECG CONTRAST AGENT: CPT | Performed by: HOSPITALIST

## 2022-09-16 PROCEDURE — 93017 CV STRESS TEST TRACING ONLY: CPT

## 2022-09-16 PROCEDURE — 93350 STRESS TTE ONLY: CPT | Performed by: HOSPITALIST

## 2022-09-16 PROCEDURE — 93350 STRESS TTE ONLY: CPT

## 2022-09-16 PROCEDURE — 25010000002 PERFLUTREN 6.52 MG/ML SUSPENSION: Performed by: HOSPITALIST

## 2022-09-16 PROCEDURE — 93018 CV STRESS TEST I&R ONLY: CPT | Performed by: HOSPITALIST

## 2022-09-16 RX ADMIN — PERFLUTREN 8.48 MG: 6.52 INJECTION, SUSPENSION INTRAVENOUS at 10:53

## 2022-09-29 DIAGNOSIS — C50.911 MALIGNANT NEOPLASM OF RIGHT BREAST IN FEMALE, ESTROGEN RECEPTOR POSITIVE, UNSPECIFIED SITE OF BREAST: ICD-10-CM

## 2022-09-29 DIAGNOSIS — Z17.0 MALIGNANT NEOPLASM OF RIGHT BREAST IN FEMALE, ESTROGEN RECEPTOR POSITIVE, UNSPECIFIED SITE OF BREAST: ICD-10-CM

## 2022-09-29 RX ORDER — FERROUS SULFATE 325(65) MG
TABLET ORAL
Qty: 30 TABLET | Refills: 6 | Status: ON HOLD | OUTPATIENT
Start: 2022-09-29 | End: 2023-02-20

## 2022-10-04 NOTE — INTERVAL H&P NOTE
Update History & Physical    The patient's History and Physical  was reviewed with the patient and I examined the patient. There was NO CHANGE:[id number]}. The surgical site was confirmed by the patient and me. Plan: The risks, benefits, expected outcome, and alternative to the recommended procedure have been discussed with the patient. Patient understands and wants to proceed with the procedure.      Electronically signed by Sundar De La Cruz MD on 12/1/2020 at 8:41 AM Opt out

## 2022-10-17 ENCOUNTER — OFFICE VISIT (OUTPATIENT)
Dept: CARDIOLOGY | Facility: CLINIC | Age: 57
End: 2022-10-17

## 2022-10-17 VITALS
HEART RATE: 77 BPM | BODY MASS INDEX: 24.75 KG/M2 | SYSTOLIC BLOOD PRESSURE: 138 MMHG | HEIGHT: 64 IN | WEIGHT: 145 LBS | DIASTOLIC BLOOD PRESSURE: 80 MMHG | OXYGEN SATURATION: 98 %

## 2022-10-17 DIAGNOSIS — E78.5 DYSLIPIDEMIA: ICD-10-CM

## 2022-10-17 DIAGNOSIS — I25.84 CORONARY ARTERY CALCIFICATION: Primary | ICD-10-CM

## 2022-10-17 DIAGNOSIS — I10 ESSENTIAL HYPERTENSION: ICD-10-CM

## 2022-10-17 DIAGNOSIS — I25.10 CORONARY ARTERY CALCIFICATION: Primary | ICD-10-CM

## 2022-10-17 PROCEDURE — 99204 OFFICE O/P NEW MOD 45 MIN: CPT | Performed by: INTERNAL MEDICINE

## 2022-10-17 PROCEDURE — 93000 ELECTROCARDIOGRAM COMPLETE: CPT | Performed by: INTERNAL MEDICINE

## 2022-10-17 NOTE — PROGRESS NOTES
Reason for Visit: 20 artery calcification.    HPI:  Carol Rodriguez is a 57 y.o. female is being seen for consultation today at the request of JOSE DANIEL Higuera for evaluation of coronary artery calcifications it was noted incidentally on CT of the chest from 2022.  She had a stress echo on 2022 that did not show any evidence of ischemia but there was poor visualization of the apical segments.  She reports feeling well and denies any cardiac symptoms including any chest pain, palpitations, shortness of breath, dizziness, syncope, PND, or orthopnea.  She does not have any significant difficulty with exertion.    Previous Cardiac Testing and Procedures:  -CT chest (2022) no acute traumatic intrathoracic abnormality, dependent basilar atelectasis, mild coronary artery calcification, bilateral breast implants  -Stress echo (2022) visualization of the apical segments, otherwise negative for ischemia clinically electrically negative, normal exercise capacity    Patient Active Problem List   Diagnosis   • Malignant neoplasm of right breast in female, estrogen receptor positive (HCC)   • Anemia   • Francisco esophagus   • GERD (gastroesophageal reflux disease)   • Hiatal hernia   • Irritable bowel syndrome   • Lumbar radicular pain   • Tortuous colon   • Coronary artery calcification       Social History     Tobacco Use   • Smoking status: Former     Packs/day: 1.00     Years: 25.00     Pack years: 25.00     Types: Cigarettes     Quit date: 2017     Years since quittin.8   • Smokeless tobacco: Never   Vaping Use   • Vaping Use: Every day   • Substances: Nicotine   • Devices: Pre-filled pod   Substance Use Topics   • Alcohol use: No   • Drug use: No       Family History   Problem Relation Age of Onset   • Polymyalgia rheumatica Mother    • Diabetes Father    • Hypertension Father    • Other Father         heart issues   • No Known Problems Brother    • No Known Problems Maternal Grandmother    •  Heart attack Maternal Grandfather         52 years old    • No Known Problems Paternal Grandmother    • Diabetes Paternal Grandfather    • No Known Problems Brother    • No Known Problems Brother    • No Known Problems Brother        The following portions of the patient's history were reviewed and updated as appropriate: allergies, current medications, past family history, past medical history, past social history, past surgical history and problem list.      Current Outpatient Medications:   •  amphetamine-dextroamphetamine (ADDERALL) 30 MG tablet,  2 times daily, Disp: , Rfl:   •  Calcium 500 MG tablet, Take 500 mg by mouth., Disp: , Rfl:   •  diazePAM (VALIUM) 10 MG tablet, TK 1/2 TO 1 T PO BID, Disp: , Rfl: 2  •  FeroSul 325 (65 Fe) MG tablet, TAKE 1 TABLET BY MOUTH EVERY DAY, Disp: 30 tablet, Rfl: 6  •  HYDROcodone-acetaminophen (NORCO)  MG per tablet, Take 1 tablet by mouth., Disp: , Rfl:   •  Omega-3 Fatty Acids (FISH OIL) 1000 MG capsule capsule, , Disp: , Rfl:   •  pantoprazole (PROTONIX) 40 MG EC tablet, Take 40 mg by mouth Daily., Disp: , Rfl:   •  pregabalin (LYRICA) 150 MG capsule, Take 150 mg by mouth 3 (Three) Times a Day., Disp: , Rfl:   •  Prolia 60 MG/ML solution prefilled syringe syringe, , Disp: , Rfl:   •  propranolol (INDERAL) 40 MG tablet, Take 1 tablet by mouth., Disp: , Rfl:   •  therapeutic multivitamin-minerals (THERAGRAN-M) tablet, Take 1 tablet by mouth., Disp: , Rfl:   •  tiZANidine (ZANAFLEX) 4 MG tablet, Take 4 mg by mouth 3 (Three) Times a Day As Needed., Disp: , Rfl:   •  topiramate (TOPAMAX) 100 MG tablet, 100 mg., Disp: , Rfl:     Review of Systems   Constitutional: Negative for chills and fever.   Cardiovascular: Negative for chest pain, dyspnea on exertion, irregular heartbeat and paroxysmal nocturnal dyspnea.   Respiratory: Negative for cough and shortness of breath.    Skin: Negative for rash.   Gastrointestinal: Negative for abdominal pain and heartburn.  "  Neurological: Negative for dizziness and numbness.       Objective   /80 (BP Location: Left arm, Patient Position: Sitting, Cuff Size: Adult)   Pulse 77   Ht 162.6 cm (64\")   Wt 65.8 kg (145 lb)   SpO2 98%   BMI 24.89 kg/m²   Constitutional:       Appearance: Well-developed and normal weight.   HENT:      Head: Normocephalic and atraumatic.   Pulmonary:      Effort: Pulmonary effort is normal.      Breath sounds: Normal breath sounds.   Cardiovascular:      Normal rate. Regular rhythm.      Murmurs: There is no murmur.      No gallop. No click.   Edema:     Peripheral edema absent.   Skin:     General: Skin is warm and dry.   Neurological:      Mental Status: Alert and oriented to person, place, and time.         ECG 12 Lead    Date/Time: 10/17/2022 1:35 PM  Performed by: Wilbert Pascual MD  Authorized by: Wilbert Pascual MD   Comparison: compared with previous ECG from 7/4/2022  Similar to previous ECG  Rhythm: sinus rhythm    Clinical impression: normal ECG              ICD-10-CM ICD-9-CM   1. Coronary artery calcification  I25.10 414.00    I25.84 414.4   2. Essential hypertension  I10 401.9   3. Dyslipidemia  E78.5 272.4         Assessment/Plan:  1.  Coronary artery calcifications: Noted on CT chest from 7/4/2022.  No evidence of ischemia on nuclear stress 9/16/2022; however, there was poor visitation of the apical segments.  Given lack of any ischemic symptoms, further stress testing is not indicated.  Recommended lifestyle modification and risk factor reduction.  Consider aspirin 81 mg daily.  Order lipid panel and consider statin therapy based on results.      2.  Essential hypertension: Blood pressure is mildly elevated today.  Continue to monitor and antihypertensive therapy if remains elevated at follow-up.  Continue propanolol.    3.  Dyslipidemia: Currently on omega-3 fish oil.  Continue this and check lipid panel.  Consider statin based on results given coronary artery calcification.    "

## 2023-01-24 ENCOUNTER — TELEPHONE (OUTPATIENT)
Dept: VASCULAR SURGERY | Facility: CLINIC | Age: 58
End: 2023-01-24
Payer: COMMERCIAL

## 2023-01-24 NOTE — TELEPHONE ENCOUNTER
Notified pt of new scheduled appt with Dr. Macario on 02/09/23 at 2:45.  Pt voiced understanding at this time.

## 2023-01-25 ENCOUNTER — TRANSCRIBE ORDERS (OUTPATIENT)
Dept: ADMINISTRATIVE | Facility: HOSPITAL | Age: 58
End: 2023-01-25
Payer: COMMERCIAL

## 2023-01-25 DIAGNOSIS — M54.16 LUMBAR RADICULOPATHY: Primary | ICD-10-CM

## 2023-02-08 ENCOUNTER — TELEPHONE (OUTPATIENT)
Dept: VASCULAR SURGERY | Facility: CLINIC | Age: 58
End: 2023-02-08
Payer: COMMERCIAL

## 2023-02-08 ENCOUNTER — HOSPITAL ENCOUNTER (OUTPATIENT)
Dept: CT IMAGING | Facility: HOSPITAL | Age: 58
Discharge: HOME | End: 2023-02-08
Admitting: ORTHOPAEDIC SURGERY
Payer: COMMERCIAL

## 2023-02-08 DIAGNOSIS — M54.16 LUMBAR RADICULOPATHY: ICD-10-CM

## 2023-02-08 PROCEDURE — 72131 CT LUMBAR SPINE W/O DYE: CPT

## 2023-02-08 NOTE — TELEPHONE ENCOUNTER
UNABLE TO CONFIRM APPOINTMENT FOR BICKING ON 2/9/23. LEFT VM WITH TIME, LOCATION AND CONTACT INFO.

## 2023-02-09 ENCOUNTER — HOSPITAL ENCOUNTER (OUTPATIENT)
Dept: GENERAL RADIOLOGY | Facility: HOSPITAL | Age: 58
Discharge: HOME | End: 2023-02-09
Payer: COMMERCIAL

## 2023-02-09 ENCOUNTER — OFFICE VISIT (OUTPATIENT)
Dept: VASCULAR SURGERY | Facility: CLINIC | Age: 58
End: 2023-02-09
Payer: COMMERCIAL

## 2023-02-09 ENCOUNTER — PRE-ADMISSION TESTING (OUTPATIENT)
Dept: PREADMISSION TESTING | Facility: HOSPITAL | Age: 58
End: 2023-02-09
Payer: COMMERCIAL

## 2023-02-09 VITALS
SYSTOLIC BLOOD PRESSURE: 144 MMHG | OXYGEN SATURATION: 100 % | HEIGHT: 64 IN | BODY MASS INDEX: 23.39 KG/M2 | HEART RATE: 79 BPM | WEIGHT: 137 LBS | DIASTOLIC BLOOD PRESSURE: 80 MMHG

## 2023-02-09 VITALS
DIASTOLIC BLOOD PRESSURE: 75 MMHG | HEIGHT: 59 IN | HEART RATE: 73 BPM | OXYGEN SATURATION: 98 % | BODY MASS INDEX: 28.31 KG/M2 | RESPIRATION RATE: 16 BRPM | SYSTOLIC BLOOD PRESSURE: 153 MMHG | WEIGHT: 140.43 LBS

## 2023-02-09 DIAGNOSIS — M54.50 CHRONIC MIDLINE LOW BACK PAIN, UNSPECIFIED WHETHER SCIATICA PRESENT: Primary | ICD-10-CM

## 2023-02-09 DIAGNOSIS — G89.29 CHRONIC MIDLINE LOW BACK PAIN, UNSPECIFIED WHETHER SCIATICA PRESENT: Primary | ICD-10-CM

## 2023-02-09 LAB
ALBUMIN SERPL-MCNC: 4.3 G/DL (ref 3.5–5.2)
ALBUMIN/GLOB SERPL: 1.7 G/DL
ALP SERPL-CCNC: 79 U/L (ref 39–117)
ALT SERPL W P-5'-P-CCNC: 15 U/L (ref 1–33)
ANION GAP SERPL CALCULATED.3IONS-SCNC: 8 MMOL/L (ref 5–15)
APTT PPP: 34.1 SECONDS (ref 24.1–35)
AST SERPL-CCNC: 20 U/L (ref 1–32)
BACTERIA UR QL AUTO: ABNORMAL /HPF
BILIRUB SERPL-MCNC: 0.2 MG/DL (ref 0–1.2)
BILIRUB UR QL STRIP: NEGATIVE
BUN SERPL-MCNC: 9 MG/DL (ref 6–20)
BUN/CREAT SERPL: 9.9 (ref 7–25)
CALCIUM SPEC-SCNC: 10 MG/DL (ref 8.6–10.5)
CHLORIDE SERPL-SCNC: 103 MMOL/L (ref 98–107)
CLARITY UR: CLEAR
CO2 SERPL-SCNC: 30 MMOL/L (ref 22–29)
COLOR UR: YELLOW
CREAT SERPL-MCNC: 0.91 MG/DL (ref 0.57–1)
DEPRECATED RDW RBC AUTO: 46.6 FL (ref 37–54)
EGFRCR SERPLBLD CKD-EPI 2021: 73.7 ML/MIN/1.73
ERYTHROCYTE [DISTWIDTH] IN BLOOD BY AUTOMATED COUNT: 13.6 % (ref 12.3–15.4)
GLOBULIN UR ELPH-MCNC: 2.5 GM/DL
GLUCOSE SERPL-MCNC: 109 MG/DL (ref 65–99)
GLUCOSE UR STRIP-MCNC: NEGATIVE MG/DL
HCT VFR BLD AUTO: 38.2 % (ref 34–46.6)
HGB BLD-MCNC: 12.2 G/DL (ref 12–15.9)
HGB UR QL STRIP.AUTO: ABNORMAL
HYALINE CASTS UR QL AUTO: ABNORMAL /LPF
INR PPP: 0.9 (ref 0.91–1.09)
KETONES UR QL STRIP: NEGATIVE
LEUKOCYTE ESTERASE UR QL STRIP.AUTO: ABNORMAL
MCH RBC QN AUTO: 29.4 PG (ref 26.6–33)
MCHC RBC AUTO-ENTMCNC: 31.9 G/DL (ref 31.5–35.7)
MCV RBC AUTO: 92 FL (ref 79–97)
NITRITE UR QL STRIP: NEGATIVE
PH UR STRIP.AUTO: 7 [PH] (ref 5–8)
PLATELET # BLD AUTO: 228 10*3/MM3 (ref 140–450)
PMV BLD AUTO: 11.6 FL (ref 6–12)
POTASSIUM SERPL-SCNC: 3.6 MMOL/L (ref 3.5–5.2)
PROT SERPL-MCNC: 6.8 G/DL (ref 6–8.5)
PROT UR QL STRIP: NEGATIVE
PROTHROMBIN TIME: 12.3 SECONDS (ref 11.8–14.8)
RBC # BLD AUTO: 4.15 10*6/MM3 (ref 3.77–5.28)
RBC # UR STRIP: ABNORMAL /HPF
REF LAB TEST METHOD: ABNORMAL
SODIUM SERPL-SCNC: 141 MMOL/L (ref 136–145)
SP GR UR STRIP: 1.01 (ref 1–1.03)
SQUAMOUS #/AREA URNS HPF: ABNORMAL /HPF
UROBILINOGEN UR QL STRIP: ABNORMAL
WBC # UR STRIP: ABNORMAL /HPF
WBC NRBC COR # BLD: 4.99 10*3/MM3 (ref 3.4–10.8)

## 2023-02-09 PROCEDURE — 93005 ELECTROCARDIOGRAM TRACING: CPT

## 2023-02-09 PROCEDURE — 80053 COMPREHEN METABOLIC PANEL: CPT

## 2023-02-09 PROCEDURE — 85730 THROMBOPLASTIN TIME PARTIAL: CPT

## 2023-02-09 PROCEDURE — 71045 X-RAY EXAM CHEST 1 VIEW: CPT

## 2023-02-09 PROCEDURE — 99204 OFFICE O/P NEW MOD 45 MIN: CPT | Performed by: SURGERY

## 2023-02-09 PROCEDURE — 81001 URINALYSIS AUTO W/SCOPE: CPT

## 2023-02-09 PROCEDURE — 93010 ELECTROCARDIOGRAM REPORT: CPT | Performed by: INTERNAL MEDICINE

## 2023-02-09 PROCEDURE — 36415 COLL VENOUS BLD VENIPUNCTURE: CPT

## 2023-02-09 PROCEDURE — 85610 PROTHROMBIN TIME: CPT

## 2023-02-09 PROCEDURE — 85027 COMPLETE CBC AUTOMATED: CPT

## 2023-02-09 NOTE — DISCHARGE INSTRUCTIONS
Before you come to the hospital        Arrival time: AS DIRECTED BY OFFICE     YOU MAY TAKE THE FOLLOWING MEDICATION(S) THE MORNING OF SURGERY WITH A SIP OF WATER: VALIUM, NORCO, LYRICA           ALL OTHER HOME MEDICATION CHECK WITH YOUR PHYSICIAN (especially if   you are taking diabetes medicines or blood thinners)      If you were given and instructed to use a germ- killing soap, use as directed the night before surgery and again the morning of surgery or as directed by your surgeon. (Use one-half of the bottle with each shower.)   See attached information for How to Use Chlorhexidine for Bathing if applicable.            Eating and drinking restrictions prior to scheduled arrival time    2 Hours before arrival time STOP   Drinking Clear liquids (water, apple juice-no pulp)     6 Hours before arrival time STOP   Milk or drinks that contain milk, full liquids    6 Hours before arrival time STOP   Light meals or foods, such as toast or cereal    8 Hours before arrival time STOP   Heavy foods, such as meat, fried foods, or fatty foods    (It is extremely important that you follow these guidelines to prevent delay or cancelation of your procedure)     Clear Liquids  Water and flavored water                                                                      Clear Fruit juices, such as cranberry juice and apple juice.  Black coffee (NO cream of any kind, including powdered).  Plain tea  Clear bouillon or broth.  Flavored gelatin.  Soda.  Gatorade or Powerade.  Full liquid examples  Juices that have pulp.  Frozen ice pops that contain fruit pieces.  Coffee with creamer  Milk.  Yogurt.                MANAGING PAIN AFTER SURGERY    We know you are probably wondering what your pain will be like after surgery.  Following surgery it is unrealistic to expect you will not have pain.   Pain is how our bodies let us know that something is wrong or cautions us to be careful.  That said, our goal is to make your pain  tolerable.    Methods we may use to treat your pain include (oral or IV medications, PCAs, epidurals, nerve blocks, etc.)   While some procedures require IV pain medications for a short time after surgery, transitioning to pain medications by mouth allows for better management of pain.   Your nurse will encourage you to take oral pain medications whenever possible.  IV medications work almost immediately, but only last a short while.  Taking medications by mouth allows for a more constant level of medication in your blood stream for a longer period of time.      Once your pain is out of control it is harder to get back under control.  It is important you are aware when your next dose of pain medication is due.  If you are admitted, your nurse may write the time of your next dose on the white board in your room to help you remember.      We are interested in your pain and encourage you to inform us about aggravating factors during your visit.   Many times a simple repositioning every few hours can make a big difference.    If your physician says it is okay, do not let your pain prevent you from getting out of bed. Be sure to call your nurse for assistance prior to getting up so you do not fall.      Before surgery, please decide your tolerable pain goal.  These faces help describe the pain ratings we use on a 0-10 scale.   Be prepared to tell us your goal and whether or not you take pain or anxiety medications at home.          Preparing for Surgery  Preparing for surgery is an important part of your care. It can make things go more smoothly and help you avoid complications. The steps leading up to surgery may vary among hospitals. Follow all instructions given to you by your health care providers. Ask questions if you do not understand something. Talk about any concerns that you have.  Here are some questions to consider asking before your surgery:  If my surgery is not an emergency (is elective), when would be the  best time to have the surgery?  What arrangements do I need to make for work, home, or school?  What will my recovery be like? How long will it be before I can return to normal activities?  Will I need to prepare my home? Will I need to arrange care for me or my children?  Should I expect to have pain after surgery? What are my pain management options? Are there nonmedical options that I can try for pain?  Tell a health care provider about:  Any allergies you have.  All medicines you are taking, including vitamins, herbs, eye drops, creams, and over-the-counter medicines.  Any problems you or family members have had with anesthetic medicines.  Any blood disorders you have.  Any surgeries you have had.  Any medical conditions you have.  Whether you are pregnant or may be pregnant.  What are the risks?  The risks and complications of surgery depend on the specific procedure that you have. Discuss all the risks with your health care providers before your surgery. Ask about common surgical complications, which may include:  Infection.  Bleeding or a need for blood replacement (transfusion).  Allergic reactions to medicines.  Damage to surrounding nerves, tissues, or structures.  A blood clot.  Scarring.  Failure of the surgery to correct the problem.  Follow these instructions before the procedure:  Several days or weeks before your procedure  You may have a physical exam by your primary health care provider to make sure it is safe for you to have surgery.  You may have testing. This may include a chest X-ray, blood and urine tests, electrocardiogram (ECG), or other testing.  Ask your health care provider about:  Changing or stopping your regular medicines. This is especially important if you are taking diabetes medicines or blood thinners.  Taking medicines such as aspirin and ibuprofen. These medicines can thin your blood. Do not take these medicines unless your health care provider tells you to take them.  Taking  over-the-counter medicines, vitamins, herbs, and supplements.  Do not use any products that contain nicotine or tobacco, such as cigarettes and e-cigarettes. If you need help quitting, ask your health care provider.  Avoid alcohol.  Ask your health care provider if there are exercises you can do to prepare for surgery.  Eat a healthy diet.   Plan to have someone take you home from the hospital or clinic.  Plan to have a responsible adult care for you for at least 24 hours after you leave the hospital or clinic. This is important.  The day before your procedure  You may be given antibiotic medicine to take by mouth to help prevent infection. Take it as told by your health care provider.  You may be asked to shower with a germ-killing soap.  Follow instructions from your health care provider about eating and drinking restrictions. This includes gum, mints and hard candy.  Pack comfortable clothes according to your procedure.   The day of your procedure  You may need to take another shower with a germ-killing soap before you leave home in the morning.  With a small sip of water, take only the medicines that you are told to take.  Remove all jewelry including rings.   Leave anything you consider valuable at home except hearing aids if needed.  You do not need to bring your home medications into the hospital.   Do not wear any makeup, nail polish, powder, deodorant, lotion, hair accessories, or anything on your skin or body except your clothes.  If you will be staying in the hospital, bring a case to hold your glasses, contacts, or dentures. You may also want to bring your robe and non-skid footwear.       (Do not use denture adhesives since you will be asked to remove them during  surgery).   If you wear oxygen at home, bring it with you the day of surgery.  If instructed by your health care provider, bring your sleep apnea device with you on the day of your surgery (if this applies to you).  You may want to leave your  suitcase and sleep apnea device in the car until after surgery.   Arrive at the hospital as scheduled.  Bring a friend or family member with you who can help to answer questions and be present while you meet with your health care provider.  At the hospital  When you arrive at the hospital:  Go to registration located at the main entrance of the hospital. You will be registered and given a beeper and a sticker sheet. Take the stickers to the Outpatient nurses desk and place in the black tray. This is to notify staff that you have arrived. Then return to the lobby to wait.   When your beeper lights up and vibrates proceed through the double doors, under the stairs, and a member of the Outpatient Surgery staff will escort you to your preoperative room.  You may have to wear compression sleeves. These help to prevent blood clots and reduce swelling in your legs.  An IV may be inserted into one of your veins.              In the operating room, you may be given one or more of the following:        A medicine to help you relax (sedative).        A medicine to numb the area (local anesthetic).        A medicine to make you fall asleep (general anesthetic).        A medicine that is injected into an area of your body to numb everything below the                      injection site (regional anesthetic).  You may be given an antibiotic through your IV to help prevent infection.  Your surgical site will be marked or identified.    Contact a health care provider if you:  Develop a fever of more than 100.4°F (38°C) or other feelings of illness during the 48 hours before your surgery.  Have symptoms that get worse.  Have questions or concerns about your surgery.  Summary  Preparing for surgery can make the procedure go more smoothly and lower your risk of complications.  Before surgery, make a list of questions and concerns to discuss with your surgeon. Ask about the risks and possible complications.  In the days or weeks before  your surgery, follow all instructions from your health care provider. You may need to stop smoking, avoid alcohol, follow eating restrictions, and change or stop your regular medicines.  Contact your surgeon if you develop a fever or other signs of illness during the few days before your surgery.  This information is not intended to replace advice given to you by your health care provider. Make sure you discuss any questions you have with your health care provider.  Document Revised: 12/21/2018 Document Reviewed: 10/23/2018  Elsevier Patient Education © 2021 [employer]

## 2023-02-09 NOTE — PROGRESS NOTES
2023      CAMILA Rae MD  [address] [place] [id number]    Carol Rodriguez  1965    Chief Complaint   Patient presents with   • BRADEN RAE 23        Dear CAMILA Rae MD:      HPI  I had the pleasure of seeing your patient Carol Rodriguez in the office today.  Thank you kindly for this consultation.  As you recall, Carol Rodriguez is a 57 y.o.  female who you are currently following for chronic back pain.  Carol Carrolls scheduled for anterior lumbar interbody fusion of L5-S1 with Dr. Rae on 23.  The patient denies any history of DVT.  She has two previous  sections and hysterectomy.    Past Medical History:   Diagnosis Date   • Anxiety    • Arthralgia    • Arthritis    • Francisco's esophagus    • Carcinoembryonic antigen present    • Chronic kidney disease    • COPD (chronic obstructive pulmonary disease) (HCC)    • Coronary artery calcification 10/17/2022    PT STATES CARDIOLOGIST AND DR.J DELCID HAVE CLEARED HER OF THIS, CHOLESTEROL IS GOOD, NO PROBLEMS AT THIS TIME   • Fibrocystic breast disease    • GERD (gastroesophageal reflux disease)    • Hypertension    • Hypokalemia    • Infiltrating ductal carcinoma of breast (HCC)      ER Status: Positive; VA Status: Positive   • Iron deficiency anemia    • Osteopenia    • Tobacco user        Past Surgical History:   Procedure Laterality Date   • BREAST BIOPSY Right 2006    Biopsy of breast, right, benign fibrocystic changes   • CARPAL TUNNEL RELEASE      left 2011; right 2011   •  SECTION      x2. Most recently in    • COLONOSCOPY     • ENDOSCOPY     • MASTECTOMY MODIFIED RADICAL / SIMPLE / COMPLETE Right 2006   • SIMPLE MASTECTOMY Left 2006     prophylactic, left with left breast reconstruction   • TONSILLECTOMY     • TOTAL ABDOMINAL HYSTERECTOMY WITH SALPINGO OOPHORECTOMY  2006       Family History   Problem Relation Age of Onset   • Polymyalgia rheumatica Mother     • Heart attack Mother    • Kidney disease Mother    • Diabetes Father    • Hypertension Father    • Other Father         heart issues   • No Known Problems Brother    • No Known Problems Maternal Grandmother    • Heart attack Maternal Grandfather         52 years old    • No Known Problems Paternal Grandmother    • Diabetes Paternal Grandfather    • No Known Problems Brother    • No Known Problems Brother    • No Known Problems Brother        Social History     Socioeconomic History   • Marital status:    Tobacco Use   • Smoking status: Former     Packs/day: 1.00     Years: 25.00     Pack years: 25.00     Types: Cigarettes     Quit date: 2017     Years since quittin.1   • Smokeless tobacco: Never   Vaping Use   • Vaping Use: Former   • Quit date: 8/10/2022   • Substances: Nicotine   • Devices: Pre-filled pod   Substance and Sexual Activity   • Alcohol use: No   • Drug use: No   • Sexual activity: Defer       No Known Allergies    Current Outpatient Medications   Medication Instructions   • Calcium 500 mg, Oral   • diazePAM (VALIUM) 10 MG tablet TK 1/2 TO 1 T PO BID   • FeroSul 325 (65 Fe) MG tablet TAKE 1 TABLET BY MOUTH EVERY DAY   • HYDROcodone-acetaminophen (NORCO)  MG per tablet 1 tablet, Oral   • lisdexamfetamine (VYVANSE) 40 mg, Oral, Every Morning   • Omega-3 Fatty Acids (FISH OIL) 1000 MG capsule capsule Does not apply   • pantoprazole (PROTONIX) 40 mg, Oral, Daily   • pregabalin (LYRICA) 150 mg, Oral, 3 Times Daily   • Prolia 60 MG/ML solution prefilled syringe syringe TWICE A YEAR   • therapeutic multivitamin-minerals (THERAGRAN-M) tablet 1 tablet, Oral   • tiZANidine (ZANAFLEX) 4 mg, Oral, 3 Times Daily PRN         Review of Systems   Constitutional: Negative.    HENT: Negative.    Eyes: Negative.    Respiratory: Negative.    Cardiovascular: Negative.    Gastrointestinal: Negative.    Endocrine: Negative.    Genitourinary: Negative.    Musculoskeletal: Positive for back pain.  "  Skin: Negative.    Allergic/Immunologic: Negative.    Neurological: Negative.    Hematological: Negative.    Psychiatric/Behavioral: Negative.    All other systems reviewed and are negative.      /80   Pulse 79   Ht 161.3 cm (63.5\")   Wt 62.1 kg (137 lb)   SpO2 100%   BMI 23.89 kg/m²   Physical Exam  Vitals and nursing note reviewed.   Constitutional:       Appearance: She is well-developed.   HENT:      Head: Normocephalic and atraumatic.   Eyes:      General: No scleral icterus.     Pupils: Pupils are equal, round, and reactive to light.   Neck:      Thyroid: No thyromegaly.      Vascular: No carotid bruit or JVD.   Cardiovascular:      Rate and Rhythm: Normal rate and regular rhythm.      Pulses:           Carotid pulses are 2+ on the right side and 2+ on the left side.       Femoral pulses are 2+ on the right side and 2+ on the left side.       Popliteal pulses are 2+ on the right side and 2+ on the left side.        Dorsalis pedis pulses are 2+ on the right side and 2+ on the left side.        Posterior tibial pulses are 2+ on the right side and 2+ on the left side.      Heart sounds: Normal heart sounds.   Pulmonary:      Effort: Pulmonary effort is normal.      Breath sounds: Normal breath sounds.   Abdominal:      General: Bowel sounds are normal. There is no distension or abdominal bruit.      Palpations: Abdomen is soft. There is no mass.      Tenderness: There is no abdominal tenderness.   Musculoskeletal:         General: Normal range of motion.      Cervical back: Neck supple.      Comments: Lumbar pain   Lymphadenopathy:      Cervical: No cervical adenopathy.   Skin:     General: Skin is warm and dry.   Neurological:      Mental Status: She is alert and oriented to person, place, and time.      Cranial Nerves: No cranial nerve deficit.      Sensory: No sensory deficit.         CT Lumbar Spine Without Contrast    Result Date: 2/8/2023  Narrative: EXAMINATION:  CT LUMBAR SPINE WO CONTRAST-  " 2/8/2023 2:09 PM CST  HISTORY: M54.16-Radiculopathy, lumbar region.  TECHNIQUE: Spiral CT was performed of the lumbar spine. Sagittal and coronal images were reconstructed.  DLP: 358 mGy-cm. Automated dosage reduction technique was utilized to reduce patient dosage.  COMPARISON: No comparison study.  FINDINGS: On the coronal images, there is lumbar scoliosis convex to the right with a Garza angle of 15 degrees. There is atheromatous disease of the aortoiliac vessels. There is no evidence of acute fracture.  DISC SPACES:  T12-L1: The disc is maintained in height. No spinal or foraminal stenosis.  L1-2: The disc is maintained in height. There is no spinal or foraminal stenosis.  L2-3: There is minimal disc narrowing. There is mild disc bulging producing dural sac compression. There is mild inferior recess foraminal narrowing.  L3-4: There is moderate to severe disc narrowing with vacuum disc phenomena. There is moderate disc bulging. There is endplate spurring. There is dural sac compression. There is mild facet arthropathy with thickening of ligamentum flavum. There is mild to moderate narrowing of the central canal/dural sac. There is moderate to severe left and moderate right-sided foraminal narrowing.  L4-5: There is severe disc narrowing with vacuum disc phenomena. There is disc bulging and endplate spurring producing dural sac compression. There is mild to moderate facet arthropathy. There is thickening of ligamentum flavum. There is moderate to severe narrowing of the central canal/dural sac. There is moderate to severe bilateral foraminal stenosis.  L5-S1: There is severe disc narrowing with disc bulging and endplate spurring producing dural sac compression. There is moderate to severe facet arthropathy. There is thickening of ligamentum flavum. There is mild to moderate narrowing of the central canal-dural sac. There is moderate to severe bilateral foraminal stenosis.      Impression: 1. Scoliosis and  degenerative changes of the lumbar spine, as described. 2. Atheromatous disease of the aortoiliac vessels. This report was finalized on 02/08/2023 15:00 by Dr. Bam Salas MD.    XR Chest 1 View    Result Date: 2/9/2023  Narrative: EXAM/TECHNIQUE: XR CHEST 1 VW-  INDICATION: Preanesthesia testing  COMPARISON: 08/12/2022  FINDINGS:  Cardiac silhouette is normal size. No pleural effusion, pneumothorax, or focal consolidation. Mild chronic interstitial coarsening. No acute osseous finding.      Impression:  No acute findings. This report was finalized on 02/09/2023 10:23 by Dr. Yasmani Villegas MD.      Patient Active Problem List   Diagnosis   • Malignant neoplasm of right breast in female, estrogen receptor positive (HCC)   • Anemia   • Francisco esophagus   • GERD (gastroesophageal reflux disease)   • Hiatal hernia   • Irritable bowel syndrome   • Lumbar radicular pain   • Tortuous colon   • Coronary artery calcification         ICD-10-CM ICD-9-CM   1. Chronic midline low back pain, unspecified whether sciatica present  M54.50 724.2    G89.29 338.29         Plan: After thoroughly evaluating Carol Rodriguez, I believe the best course of action is to proceed with anterior lumbar interbody fusion of L5-S1.  Risks of ALIF were discussed and include, but are not limited to, bleeding, infection, nerve damage, vessel damage, retrograde ejaculation, bowel damage, ureteral damage, DVT, MI, stroke, and death.  The patient understands these risks and wishes to proceed with procedure.  The patient can continue taking their current medication regimen as previously planned.  This was all discussed in full with complete understanding.    Thank you for allowing me to participate in the care of your patient.  Please do not hesitate with any questions or concerns.  I will keep you aware of any further encounters with Carol Rodriguez.        Sincerely yours,         Jordy Macario, DO

## 2023-02-10 LAB
QT INTERVAL: 428 MS
QTC INTERVAL: 445 MS

## 2023-02-13 ENCOUNTER — HOSPITAL ENCOUNTER (INPATIENT)
Facility: HOSPITAL | Age: 58
LOS: 1 days | Discharge: HOME | DRG: 458 | End: 2023-02-14
Attending: ORTHOPAEDIC SURGERY | Admitting: ORTHOPAEDIC SURGERY
Payer: COMMERCIAL

## 2023-02-13 ENCOUNTER — APPOINTMENT (OUTPATIENT)
Dept: GENERAL RADIOLOGY | Facility: HOSPITAL | Age: 58
DRG: 458 | End: 2023-02-13
Payer: COMMERCIAL

## 2023-02-13 ENCOUNTER — ANESTHESIA EVENT (OUTPATIENT)
Dept: PERIOP | Facility: HOSPITAL | Age: 58
DRG: 458 | End: 2023-02-13
Payer: COMMERCIAL

## 2023-02-13 ENCOUNTER — ANESTHESIA (OUTPATIENT)
Dept: PERIOP | Facility: HOSPITAL | Age: 58
DRG: 458 | End: 2023-02-13
Payer: COMMERCIAL

## 2023-02-13 DIAGNOSIS — Z78.9 DECREASED ACTIVITIES OF DAILY LIVING (ADL): ICD-10-CM

## 2023-02-13 DIAGNOSIS — M48.061 SPINAL STENOSIS OF LUMBAR REGION WITHOUT NEUROGENIC CLAUDICATION: Primary | ICD-10-CM

## 2023-02-13 DIAGNOSIS — Z74.09 IMPAIRED MOBILITY: ICD-10-CM

## 2023-02-13 PROBLEM — M81.0 OSTEOPOROSIS: Chronic | Status: ACTIVE | Noted: 2023-02-13

## 2023-02-13 PROBLEM — K59.03 DRUG INDUCED CONSTIPATION: Status: ACTIVE | Noted: 2023-02-13

## 2023-02-13 LAB
ABO GROUP BLD: NORMAL
BLD GP AB SCN SERPL QL: NEGATIVE
RH BLD: POSITIVE
T&S EXPIRATION DATE: NORMAL

## 2023-02-13 PROCEDURE — 63710000001 DIPHENHYDRAMINE PER 50 MG: Performed by: ORTHOPAEDIC SURGERY

## 2023-02-13 PROCEDURE — 25010000002 HYDROMORPHONE PER 4 MG: Performed by: ANESTHESIOLOGY

## 2023-02-13 PROCEDURE — 25010000002 ONDANSETRON PER 1 MG: Performed by: NURSE ANESTHETIST, CERTIFIED REGISTERED

## 2023-02-13 PROCEDURE — 74018 RADEX ABDOMEN 1 VIEW: CPT

## 2023-02-13 PROCEDURE — 01NB0ZZ RELEASE LUMBAR NERVE, OPEN APPROACH: ICD-10-PCS | Performed by: ORTHOPAEDIC SURGERY

## 2023-02-13 PROCEDURE — 97165 OT EVAL LOW COMPLEX 30 MIN: CPT | Performed by: OCCUPATIONAL THERAPIST

## 2023-02-13 PROCEDURE — C1713 ANCHOR/SCREW BN/BN,TIS/BN: HCPCS | Performed by: ORTHOPAEDIC SURGERY

## 2023-02-13 PROCEDURE — 25010000002 MIDAZOLAM PER 1 MG: Performed by: ANESTHESIOLOGY

## 2023-02-13 PROCEDURE — 86901 BLOOD TYPING SEROLOGIC RH(D): CPT | Performed by: ORTHOPAEDIC SURGERY

## 2023-02-13 PROCEDURE — 72100 X-RAY EXAM L-S SPINE 2/3 VWS: CPT

## 2023-02-13 PROCEDURE — 25010000002 CEFAZOLIN PER 500 MG: Performed by: ORTHOPAEDIC SURGERY

## 2023-02-13 PROCEDURE — 25010000002 FENTANYL CITRATE (PF) 250 MCG/5ML SOLUTION: Performed by: NURSE ANESTHETIST, CERTIFIED REGISTERED

## 2023-02-13 PROCEDURE — 0ST40ZZ RESECTION OF LUMBOSACRAL DISC, OPEN APPROACH: ICD-10-PCS | Performed by: ORTHOPAEDIC SURGERY

## 2023-02-13 PROCEDURE — 76000 FLUOROSCOPY <1 HR PHYS/QHP: CPT

## 2023-02-13 PROCEDURE — 0 HYDROMORPHONE 1 MG/ML SOLUTION: Performed by: ORTHOPAEDIC SURGERY

## 2023-02-13 PROCEDURE — 01NR0ZZ RELEASE SACRAL NERVE, OPEN APPROACH: ICD-10-PCS | Performed by: ORTHOPAEDIC SURGERY

## 2023-02-13 PROCEDURE — 25010000002 FENTANYL CITRATE (PF) 50 MCG/ML SOLUTION: Performed by: ANESTHESIOLOGY

## 2023-02-13 PROCEDURE — C1765 ADHESION BARRIER: HCPCS | Performed by: ORTHOPAEDIC SURGERY

## 2023-02-13 PROCEDURE — 97161 PT EVAL LOW COMPLEX 20 MIN: CPT | Performed by: PHYSICAL THERAPIST

## 2023-02-13 PROCEDURE — 22558 ARTHRD ANT NTRBD MIN DSC LUM: CPT | Performed by: SURGERY

## 2023-02-13 PROCEDURE — 25010000002 PROPOFOL 10 MG/ML EMULSION: Performed by: NURSE ANESTHETIST, CERTIFIED REGISTERED

## 2023-02-13 PROCEDURE — 25010000002 DROPERIDOL PER 5 MG: Performed by: ANESTHESIOLOGY

## 2023-02-13 PROCEDURE — 25010000002 DEXAMETHASONE PER 1 MG: Performed by: ANESTHESIOLOGY

## 2023-02-13 PROCEDURE — 86900 BLOOD TYPING SEROLOGIC ABO: CPT | Performed by: ORTHOPAEDIC SURGERY

## 2023-02-13 PROCEDURE — 0SG30A0 FUSION OF LUMBOSACRAL JOINT WITH INTERBODY FUSION DEVICE, ANTERIOR APPROACH, ANTERIOR COLUMN, OPEN APPROACH: ICD-10-PCS | Performed by: ORTHOPAEDIC SURGERY

## 2023-02-13 PROCEDURE — 86850 RBC ANTIBODY SCREEN: CPT | Performed by: ORTHOPAEDIC SURGERY

## 2023-02-13 DEVICE — IDENTITI ALIF SA LATERAL SCREW, Ø5 X 30 MM
Type: IMPLANTABLE DEVICE | Site: SPINE LUMBAR | Status: FUNCTIONAL
Brand: IDENTITI

## 2023-02-13 DEVICE — VERSAWRAP IS AN ABSORBABLE IMPLANT (DEVICE), DESIGNED TO SERVE AS AN INTERFACE BETWEEN TARGET TISSUES AND SURROUNDING TISSUES TO PROVIDE A NON-CONSTRICTING, PROTECTIVE ENCASEMENT. VERSAWRAP CONSISTS OF A CLEAR SHEET AND A WETTING SOLUTION. THE CLEAR SHEET IS A THIN MEMBRANE OF CROSSLINKED CALCIUM ALGINATE AND GLYCOSAMINOGLYCAN. VERSAWRAP SHEET IS EASY TO HANDLE, CONFORMABLE, AND IS DESIGNED FOR PLACEMENT UNDER, AROUND, OR OVER INJURED TISSUES AND/OR SURROUNDING TISSUES. VERSAWRAP SHEET IS SUPPLIED STERILE, NON-PYROGENIC, FOR SINGLE USE, IN DOUBLE PEEL POUCHES. THE VERSAWRAP SOLUTION IS APPLIED TO THE SHEET TO RENDER THE SHEET A GELATINOUS, TISSUE ADHERENT LAYER (GEL IN SITU). THE AQUEOUS CITRATE SOLUTION IS PROVIDED STERILE, NON-PYROGENIC, FOR SINGLE USE, IN A DROPPER, PACKAGED IN A DOUBLE PEEL POUCH.
Type: IMPLANTABLE DEVICE | Site: SPINE LUMBAR | Status: FUNCTIONAL
Brand: VERSAWRAP

## 2023-02-13 DEVICE — IDENTITI ALIF SA SPACER, 7 X 38 X 28 MM, 15°
Type: IMPLANTABLE DEVICE | Site: SPINE LUMBAR | Status: FUNCTIONAL
Brand: IDENTITI

## 2023-02-13 DEVICE — LIGACLIP MCA MULTIPLE CLIP APPLIERS, 30 MEDIUM CLIPS
Type: IMPLANTABLE DEVICE | Site: ABDOMEN | Status: FUNCTIONAL
Brand: LIGACLIP

## 2023-02-13 DEVICE — PUTTY GRFT BONE CATALYST NANOSYNTHETIC 10CC: Type: IMPLANTABLE DEVICE | Site: SPINE LUMBAR | Status: FUNCTIONAL

## 2023-02-13 DEVICE — KT HEMOST ABS SURGIFOAM PORCN 1GRAM: Type: IMPLANTABLE DEVICE | Site: SPINE LUMBAR | Status: FUNCTIONAL

## 2023-02-13 DEVICE — LIGACLIP MCA MULTIPLE CLIP APPLIERS, 20 SMALL CLIPS
Type: IMPLANTABLE DEVICE | Site: ABDOMEN | Status: FUNCTIONAL
Brand: LIGACLIP

## 2023-02-13 DEVICE — 14MM SACRAL PLATE
Type: IMPLANTABLE DEVICE | Site: SPINE LUMBAR | Status: FUNCTIONAL
Brand: ASPIDA

## 2023-02-13 DEVICE — ALIF SCREW, 6.0MM X 30MM
Type: IMPLANTABLE DEVICE | Site: SPINE LUMBAR | Status: FUNCTIONAL
Brand: ASPIDA

## 2023-02-13 DEVICE — IDENTITI ALIF SA GRAFT BOLT, Ø8.5 X 30 MM
Type: IMPLANTABLE DEVICE | Site: SPINE LUMBAR | Status: FUNCTIONAL
Brand: IDENTITI

## 2023-02-13 DEVICE — HEMOST ABS SURGIFOAM SZ100 8X12 10MM: Type: IMPLANTABLE DEVICE | Site: SPINE LUMBAR | Status: FUNCTIONAL

## 2023-02-13 RX ORDER — DROPERIDOL 2.5 MG/ML
0.62 INJECTION, SOLUTION INTRAMUSCULAR; INTRAVENOUS ONCE AS NEEDED
Status: COMPLETED | OUTPATIENT
Start: 2023-02-13 | End: 2023-02-13

## 2023-02-13 RX ORDER — OXYCODONE HYDROCHLORIDE AND ACETAMINOPHEN 5; 325 MG/1; MG/1
1 TABLET ORAL EVERY 4 HOURS PRN
Status: DISCONTINUED | OUTPATIENT
Start: 2023-02-13 | End: 2023-02-14 | Stop reason: HOSPADM

## 2023-02-13 RX ORDER — ONDANSETRON 2 MG/ML
4 INJECTION INTRAMUSCULAR; INTRAVENOUS
Status: DISCONTINUED | OUTPATIENT
Start: 2023-02-13 | End: 2023-02-13 | Stop reason: HOSPADM

## 2023-02-13 RX ORDER — TIZANIDINE 4 MG/1
4 TABLET ORAL EVERY 8 HOURS PRN
Status: DISCONTINUED | OUTPATIENT
Start: 2023-02-13 | End: 2023-02-14 | Stop reason: HOSPADM

## 2023-02-13 RX ORDER — DIPHENHYDRAMINE HCL 25 MG
25 CAPSULE ORAL NIGHTLY PRN
Status: DISCONTINUED | OUTPATIENT
Start: 2023-02-13 | End: 2023-02-14 | Stop reason: HOSPADM

## 2023-02-13 RX ORDER — AMOXICILLIN 250 MG
1 CAPSULE ORAL NIGHTLY PRN
Status: DISCONTINUED | OUTPATIENT
Start: 2023-02-13 | End: 2023-02-13

## 2023-02-13 RX ORDER — SODIUM CHLORIDE 0.9 % (FLUSH) 0.9 %
3-10 SYRINGE (ML) INJECTION AS NEEDED
Status: DISCONTINUED | OUTPATIENT
Start: 2023-02-13 | End: 2023-02-13 | Stop reason: HOSPADM

## 2023-02-13 RX ORDER — MIDAZOLAM HYDROCHLORIDE 1 MG/ML
2 INJECTION INTRAMUSCULAR; INTRAVENOUS
Status: DISCONTINUED | OUTPATIENT
Start: 2023-02-13 | End: 2023-02-13 | Stop reason: HOSPADM

## 2023-02-13 RX ORDER — ROCURONIUM BROMIDE 10 MG/ML
INJECTION, SOLUTION INTRAVENOUS AS NEEDED
Status: DISCONTINUED | OUTPATIENT
Start: 2023-02-13 | End: 2023-02-13 | Stop reason: SURG

## 2023-02-13 RX ORDER — NALOXONE HCL 0.4 MG/ML
0.4 VIAL (ML) INJECTION
Status: DISCONTINUED | OUTPATIENT
Start: 2023-02-13 | End: 2023-02-14 | Stop reason: HOSPADM

## 2023-02-13 RX ORDER — FAMOTIDINE 20 MG/1
20 TABLET, FILM COATED ORAL EVERY 12 HOURS SCHEDULED
Status: DISCONTINUED | OUTPATIENT
Start: 2023-02-13 | End: 2023-02-14 | Stop reason: HOSPADM

## 2023-02-13 RX ORDER — AMOXICILLIN 250 MG
1 CAPSULE ORAL 2 TIMES DAILY
Status: DISCONTINUED | OUTPATIENT
Start: 2023-02-13 | End: 2023-02-14 | Stop reason: HOSPADM

## 2023-02-13 RX ORDER — ACETAMINOPHEN 500 MG
1000 TABLET ORAL ONCE
Status: COMPLETED | OUTPATIENT
Start: 2023-02-13 | End: 2023-02-13

## 2023-02-13 RX ORDER — ONDANSETRON 2 MG/ML
4 INJECTION INTRAMUSCULAR; INTRAVENOUS EVERY 6 HOURS PRN
Status: DISCONTINUED | OUTPATIENT
Start: 2023-02-13 | End: 2023-02-13 | Stop reason: SDUPTHER

## 2023-02-13 RX ORDER — FLUMAZENIL 0.1 MG/ML
0.2 INJECTION INTRAVENOUS AS NEEDED
Status: DISCONTINUED | OUTPATIENT
Start: 2023-02-13 | End: 2023-02-13 | Stop reason: HOSPADM

## 2023-02-13 RX ORDER — SCOLOPAMINE TRANSDERMAL SYSTEM 1 MG/1
1 PATCH, EXTENDED RELEASE TRANSDERMAL ONCE
Status: DISCONTINUED | OUTPATIENT
Start: 2023-02-13 | End: 2023-02-13

## 2023-02-13 RX ORDER — FAMOTIDINE 10 MG/ML
20 INJECTION, SOLUTION INTRAVENOUS EVERY 12 HOURS SCHEDULED
Status: DISCONTINUED | OUTPATIENT
Start: 2023-02-13 | End: 2023-02-14 | Stop reason: HOSPADM

## 2023-02-13 RX ORDER — ONDANSETRON 4 MG/1
4 TABLET, FILM COATED ORAL EVERY 6 HOURS PRN
Status: DISCONTINUED | OUTPATIENT
Start: 2023-02-13 | End: 2023-02-14 | Stop reason: HOSPADM

## 2023-02-13 RX ORDER — SODIUM CHLORIDE, SODIUM LACTATE, POTASSIUM CHLORIDE, CALCIUM CHLORIDE 600; 310; 30; 20 MG/100ML; MG/100ML; MG/100ML; MG/100ML
100 INJECTION, SOLUTION INTRAVENOUS CONTINUOUS PRN
Status: DISCONTINUED | OUTPATIENT
Start: 2023-02-13 | End: 2023-02-13 | Stop reason: SDUPTHER

## 2023-02-13 RX ORDER — LABETALOL HYDROCHLORIDE 5 MG/ML
5 INJECTION, SOLUTION INTRAVENOUS
Status: DISCONTINUED | OUTPATIENT
Start: 2023-02-13 | End: 2023-02-13 | Stop reason: HOSPADM

## 2023-02-13 RX ORDER — DESVENLAFAXINE 25 MG/1
25 TABLET, EXTENDED RELEASE ORAL DAILY
COMMUNITY

## 2023-02-13 RX ORDER — SODIUM CHLORIDE, SODIUM LACTATE, POTASSIUM CHLORIDE, CALCIUM CHLORIDE 600; 310; 30; 20 MG/100ML; MG/100ML; MG/100ML; MG/100ML
1000 INJECTION, SOLUTION INTRAVENOUS CONTINUOUS
Status: DISCONTINUED | OUTPATIENT
Start: 2023-02-13 | End: 2023-02-13 | Stop reason: SDUPTHER

## 2023-02-13 RX ORDER — SODIUM CHLORIDE 9 MG/ML
75 INJECTION, SOLUTION INTRAVENOUS CONTINUOUS
Status: DISCONTINUED | OUTPATIENT
Start: 2023-02-13 | End: 2023-02-13 | Stop reason: SDUPTHER

## 2023-02-13 RX ORDER — ALBUTEROL SULFATE 90 UG/1
2 POWDER, METERED RESPIRATORY (INHALATION) EVERY 4 HOURS PRN
COMMUNITY

## 2023-02-13 RX ORDER — SODIUM CHLORIDE 0.9 % (FLUSH) 0.9 %
10 SYRINGE (ML) INJECTION AS NEEDED
Status: DISCONTINUED | OUTPATIENT
Start: 2023-02-13 | End: 2023-02-14 | Stop reason: HOSPADM

## 2023-02-13 RX ORDER — ONDANSETRON 2 MG/ML
4 INJECTION INTRAMUSCULAR; INTRAVENOUS EVERY 6 HOURS PRN
Status: DISCONTINUED | OUTPATIENT
Start: 2023-02-13 | End: 2023-02-14 | Stop reason: HOSPADM

## 2023-02-13 RX ORDER — LIDOCAINE HYDROCHLORIDE 10 MG/ML
0.5 INJECTION, SOLUTION EPIDURAL; INFILTRATION; INTRACAUDAL; PERINEURAL ONCE AS NEEDED
Status: DISCONTINUED | OUTPATIENT
Start: 2023-02-13 | End: 2023-02-13 | Stop reason: HOSPADM

## 2023-02-13 RX ORDER — SODIUM CHLORIDE 0.9 % (FLUSH) 0.9 %
10 SYRINGE (ML) INJECTION EVERY 12 HOURS SCHEDULED
Status: DISCONTINUED | OUTPATIENT
Start: 2023-02-13 | End: 2023-02-13 | Stop reason: HOSPADM

## 2023-02-13 RX ORDER — DEXAMETHASONE SODIUM PHOSPHATE 4 MG/ML
4 INJECTION, SOLUTION INTRA-ARTICULAR; INTRALESIONAL; INTRAMUSCULAR; INTRAVENOUS; SOFT TISSUE ONCE AS NEEDED
Status: COMPLETED | OUTPATIENT
Start: 2023-02-13 | End: 2023-02-13

## 2023-02-13 RX ORDER — PREGABALIN 75 MG/1
150 CAPSULE ORAL 3 TIMES DAILY
Status: DISCONTINUED | OUTPATIENT
Start: 2023-02-13 | End: 2023-02-14 | Stop reason: HOSPADM

## 2023-02-13 RX ORDER — SODIUM CHLORIDE, SODIUM LACTATE, POTASSIUM CHLORIDE, CALCIUM CHLORIDE 600; 310; 30; 20 MG/100ML; MG/100ML; MG/100ML; MG/100ML
100 INJECTION, SOLUTION INTRAVENOUS CONTINUOUS
Status: DISCONTINUED | OUTPATIENT
Start: 2023-02-13 | End: 2023-02-13 | Stop reason: SDUPTHER

## 2023-02-13 RX ORDER — DIAZEPAM 10 MG/1
10 TABLET ORAL EVERY 12 HOURS PRN
Status: DISCONTINUED | OUTPATIENT
Start: 2023-02-13 | End: 2023-02-14 | Stop reason: HOSPADM

## 2023-02-13 RX ORDER — HYDROMORPHONE HYDROCHLORIDE 1 MG/ML
0.5 INJECTION, SOLUTION INTRAMUSCULAR; INTRAVENOUS; SUBCUTANEOUS
Status: DISCONTINUED | OUTPATIENT
Start: 2023-02-13 | End: 2023-02-13 | Stop reason: HOSPADM

## 2023-02-13 RX ORDER — SODIUM CHLORIDE 0.9 % (FLUSH) 0.9 %
3 SYRINGE (ML) INJECTION AS NEEDED
Status: DISCONTINUED | OUTPATIENT
Start: 2023-02-13 | End: 2023-02-13 | Stop reason: HOSPADM

## 2023-02-13 RX ORDER — SODIUM CHLORIDE 0.9 % (FLUSH) 0.9 %
10 SYRINGE (ML) INJECTION AS NEEDED
Status: DISCONTINUED | OUTPATIENT
Start: 2023-02-13 | End: 2023-02-13 | Stop reason: HOSPADM

## 2023-02-13 RX ORDER — ACETAMINOPHEN 325 MG/1
650 TABLET ORAL EVERY 8 HOURS SCHEDULED
Status: DISCONTINUED | OUTPATIENT
Start: 2023-02-13 | End: 2023-02-14 | Stop reason: HOSPADM

## 2023-02-13 RX ORDER — OXYCODONE AND ACETAMINOPHEN 10; 325 MG/1; MG/1
1 TABLET ORAL ONCE AS NEEDED
Status: DISCONTINUED | OUTPATIENT
Start: 2023-02-13 | End: 2023-02-13 | Stop reason: HOSPADM

## 2023-02-13 RX ORDER — EPHEDRINE SULFATE 50 MG/ML
INJECTION INTRAVENOUS AS NEEDED
Status: DISCONTINUED | OUTPATIENT
Start: 2023-02-13 | End: 2023-02-13 | Stop reason: SURG

## 2023-02-13 RX ORDER — FENTANYL CITRATE 50 UG/ML
INJECTION, SOLUTION INTRAMUSCULAR; INTRAVENOUS AS NEEDED
Status: DISCONTINUED | OUTPATIENT
Start: 2023-02-13 | End: 2023-02-13 | Stop reason: SURG

## 2023-02-13 RX ORDER — SODIUM CHLORIDE 9 MG/ML
75 INJECTION, SOLUTION INTRAVENOUS CONTINUOUS
Status: DISCONTINUED | OUTPATIENT
Start: 2023-02-13 | End: 2023-02-14 | Stop reason: HOSPADM

## 2023-02-13 RX ORDER — POLYETHYLENE GLYCOL 3350 17 G/17G
17 POWDER, FOR SOLUTION ORAL 3 TIMES DAILY
Status: DISCONTINUED | OUTPATIENT
Start: 2023-02-13 | End: 2023-02-14 | Stop reason: HOSPADM

## 2023-02-13 RX ORDER — SUCCINYLCHOLINE/SOD CL,ISO/PF 200MG/10ML
SYRINGE (ML) INTRAVENOUS AS NEEDED
Status: DISCONTINUED | OUTPATIENT
Start: 2023-02-13 | End: 2023-02-13 | Stop reason: SURG

## 2023-02-13 RX ORDER — SODIUM CHLORIDE 0.9 % (FLUSH) 0.9 %
3 SYRINGE (ML) INJECTION EVERY 12 HOURS SCHEDULED
Status: DISCONTINUED | OUTPATIENT
Start: 2023-02-13 | End: 2023-02-13 | Stop reason: HOSPADM

## 2023-02-13 RX ORDER — GINGER ROOT/GINGER ROOT EXT 262.5 MG
1 CAPSULE ORAL DAILY
Status: DISCONTINUED | OUTPATIENT
Start: 2023-02-13 | End: 2023-02-14 | Stop reason: HOSPADM

## 2023-02-13 RX ORDER — LIDOCAINE HYDROCHLORIDE 20 MG/ML
INJECTION, SOLUTION EPIDURAL; INFILTRATION; INTRACAUDAL; PERINEURAL AS NEEDED
Status: DISCONTINUED | OUTPATIENT
Start: 2023-02-13 | End: 2023-02-13 | Stop reason: SURG

## 2023-02-13 RX ORDER — SODIUM CHLORIDE 9 MG/ML
40 INJECTION, SOLUTION INTRAVENOUS AS NEEDED
Status: DISCONTINUED | OUTPATIENT
Start: 2023-02-13 | End: 2023-02-13 | Stop reason: HOSPADM

## 2023-02-13 RX ORDER — MAGNESIUM HYDROXIDE 1200 MG/15ML
LIQUID ORAL AS NEEDED
Status: DISCONTINUED | OUTPATIENT
Start: 2023-02-13 | End: 2023-02-13 | Stop reason: HOSPADM

## 2023-02-13 RX ORDER — PROPOFOL 10 MG/ML
VIAL (ML) INTRAVENOUS AS NEEDED
Status: DISCONTINUED | OUTPATIENT
Start: 2023-02-13 | End: 2023-02-13 | Stop reason: SURG

## 2023-02-13 RX ORDER — NALOXONE HCL 0.4 MG/ML
0.04 VIAL (ML) INJECTION AS NEEDED
Status: DISCONTINUED | OUTPATIENT
Start: 2023-02-13 | End: 2023-02-13 | Stop reason: HOSPADM

## 2023-02-13 RX ORDER — FENTANYL CITRATE 50 UG/ML
25 INJECTION, SOLUTION INTRAMUSCULAR; INTRAVENOUS
Status: DISCONTINUED | OUTPATIENT
Start: 2023-02-13 | End: 2023-02-13 | Stop reason: HOSPADM

## 2023-02-13 RX ORDER — OXYCODONE AND ACETAMINOPHEN 10; 325 MG/1; MG/1
1 TABLET ORAL EVERY 4 HOURS PRN
Status: DISCONTINUED | OUTPATIENT
Start: 2023-02-13 | End: 2023-02-14 | Stop reason: HOSPADM

## 2023-02-13 RX ORDER — OXYCODONE HCL 20 MG/1
20 TABLET, FILM COATED, EXTENDED RELEASE ORAL ONCE
Status: COMPLETED | OUTPATIENT
Start: 2023-02-13 | End: 2023-02-13

## 2023-02-13 RX ORDER — SODIUM CHLORIDE 9 MG/ML
INJECTION, SOLUTION INTRAVENOUS AS NEEDED
Status: DISCONTINUED | OUTPATIENT
Start: 2023-02-13 | End: 2023-02-13 | Stop reason: HOSPADM

## 2023-02-13 RX ORDER — FERROUS SULFATE 325(65) MG
325 TABLET ORAL DAILY
Status: DISCONTINUED | OUTPATIENT
Start: 2023-02-13 | End: 2023-02-14 | Stop reason: HOSPADM

## 2023-02-13 RX ORDER — ONDANSETRON 2 MG/ML
INJECTION INTRAMUSCULAR; INTRAVENOUS AS NEEDED
Status: DISCONTINUED | OUTPATIENT
Start: 2023-02-13 | End: 2023-02-13 | Stop reason: SURG

## 2023-02-13 RX ORDER — NEOSTIGMINE METHYLSULFATE 5 MG/5 ML
SYRINGE (ML) INTRAVENOUS AS NEEDED
Status: DISCONTINUED | OUTPATIENT
Start: 2023-02-13 | End: 2023-02-13 | Stop reason: SURG

## 2023-02-13 RX ORDER — SODIUM CHLORIDE 9 MG/ML
40 INJECTION, SOLUTION INTRAVENOUS AS NEEDED
Status: DISCONTINUED | OUTPATIENT
Start: 2023-02-13 | End: 2023-02-14 | Stop reason: HOSPADM

## 2023-02-13 RX ORDER — SODIUM CHLORIDE 0.9 % (FLUSH) 0.9 %
3 SYRINGE (ML) INJECTION EVERY 12 HOURS SCHEDULED
Status: DISCONTINUED | OUTPATIENT
Start: 2023-02-13 | End: 2023-02-14 | Stop reason: HOSPADM

## 2023-02-13 RX ORDER — GLYCOPYRROLATE 0.2 MG/ML
INJECTION INTRAMUSCULAR; INTRAVENOUS AS NEEDED
Status: DISCONTINUED | OUTPATIENT
Start: 2023-02-13 | End: 2023-02-13 | Stop reason: SURG

## 2023-02-13 RX ADMIN — PREGABALIN 150 MG: 75 CAPSULE ORAL at 16:24

## 2023-02-13 RX ADMIN — LIDOCAINE HYDROCHLORIDE 100 MG: 20 INJECTION, SOLUTION EPIDURAL; INFILTRATION; INTRACAUDAL; PERINEURAL at 07:22

## 2023-02-13 RX ADMIN — HYDROMORPHONE HYDROCHLORIDE 1 MG: 1 INJECTION, SOLUTION INTRAMUSCULAR; INTRAVENOUS; SUBCUTANEOUS at 16:31

## 2023-02-13 RX ADMIN — FENTANYL CITRATE 100 MCG: 50 INJECTION, SOLUTION INTRAMUSCULAR; INTRAVENOUS at 07:18

## 2023-02-13 RX ADMIN — PREGABALIN 150 MG: 75 CAPSULE ORAL at 20:33

## 2023-02-13 RX ADMIN — DIPHENHYDRAMINE HYDROCHLORIDE 25 MG: 25 CAPSULE ORAL at 20:33

## 2023-02-13 RX ADMIN — EPHEDRINE SULFATE 15 MG: 50 INJECTION INTRAVENOUS at 07:35

## 2023-02-13 RX ADMIN — SODIUM CHLORIDE, POTASSIUM CHLORIDE, SODIUM LACTATE AND CALCIUM CHLORIDE 1000 ML: 600; 310; 30; 20 INJECTION, SOLUTION INTRAVENOUS at 06:09

## 2023-02-13 RX ADMIN — DROPERIDOL 0.62 MG: 2.5 INJECTION, SOLUTION INTRAMUSCULAR; INTRAVENOUS at 10:18

## 2023-02-13 RX ADMIN — LIDOCAINE HYDROCHLORIDE 100 MG: 20 INJECTION, SOLUTION EPIDURAL; INFILTRATION; INTRACAUDAL; PERINEURAL at 09:54

## 2023-02-13 RX ADMIN — Medication 120 MG: at 07:22

## 2023-02-13 RX ADMIN — GLYCOPYRROLATE 0.4 MG: 0.2 INJECTION INTRAMUSCULAR; INTRAVENOUS at 09:30

## 2023-02-13 RX ADMIN — PROPOFOL 150 MG: 10 INJECTION, EMULSION INTRAVENOUS at 07:22

## 2023-02-13 RX ADMIN — Medication 3 MG: at 09:20

## 2023-02-13 RX ADMIN — SCOPALAMINE 1 PATCH: 1 PATCH, EXTENDED RELEASE TRANSDERMAL at 06:54

## 2023-02-13 RX ADMIN — OXYCODONE AND ACETAMINOPHEN 1 TABLET: 325; 10 TABLET ORAL at 20:33

## 2023-02-13 RX ADMIN — EPHEDRINE SULFATE 15 MG: 50 INJECTION INTRAVENOUS at 07:40

## 2023-02-13 RX ADMIN — FAMOTIDINE 20 MG: 20 TABLET, FILM COATED ORAL at 12:14

## 2023-02-13 RX ADMIN — HYDROMORPHONE HYDROCHLORIDE 0.5 MG: 1 INJECTION, SOLUTION INTRAMUSCULAR; INTRAVENOUS; SUBCUTANEOUS at 10:16

## 2023-02-13 RX ADMIN — FENTANYL CITRATE 50 MCG: 50 INJECTION, SOLUTION INTRAMUSCULAR; INTRAVENOUS at 08:18

## 2023-02-13 RX ADMIN — ONDANSETRON 4 MG: 2 INJECTION INTRAMUSCULAR; INTRAVENOUS at 09:20

## 2023-02-13 RX ADMIN — Medication 3 ML: at 20:33

## 2023-02-13 RX ADMIN — Medication 1 TABLET: at 12:14

## 2023-02-13 RX ADMIN — FERROUS SULFATE TAB 325 MG (65 MG ELEMENTAL FE) 325 MG: 325 (65 FE) TAB at 12:14

## 2023-02-13 RX ADMIN — ACETAMINOPHEN 1000 MG: 500 TABLET, FILM COATED ORAL at 06:53

## 2023-02-13 RX ADMIN — PROPOFOL 50 MG: 10 INJECTION, EMULSION INTRAVENOUS at 08:22

## 2023-02-13 RX ADMIN — ACETAMINOPHEN 650 MG: 325 TABLET ORAL at 16:24

## 2023-02-13 RX ADMIN — HYDROMORPHONE HYDROCHLORIDE 0.5 MG: 1 INJECTION, SOLUTION INTRAMUSCULAR; INTRAVENOUS; SUBCUTANEOUS at 10:26

## 2023-02-13 RX ADMIN — FAMOTIDINE 20 MG: 20 TABLET, FILM COATED ORAL at 20:33

## 2023-02-13 RX ADMIN — FENTANYL CITRATE 100 MCG: 50 INJECTION, SOLUTION INTRAMUSCULAR; INTRAVENOUS at 08:30

## 2023-02-13 RX ADMIN — DEXAMETHASONE SODIUM PHOSPHATE 4 MG: 4 INJECTION, SOLUTION INTRA-ARTICULAR; INTRALESIONAL; INTRAMUSCULAR; INTRAVENOUS; SOFT TISSUE at 06:58

## 2023-02-13 RX ADMIN — FENTANYL CITRATE 25 MCG: 50 INJECTION INTRAMUSCULAR; INTRAVENOUS at 10:19

## 2023-02-13 RX ADMIN — CEFAZOLIN SODIUM 1 G: 1 INJECTION, POWDER, FOR SOLUTION INTRAMUSCULAR; INTRAVENOUS at 16:24

## 2023-02-13 RX ADMIN — Medication 3 ML: at 12:14

## 2023-02-13 RX ADMIN — ROCURONIUM BROMIDE 10 MG: 10 INJECTION, SOLUTION INTRAVENOUS at 07:22

## 2023-02-13 RX ADMIN — ROCURONIUM BROMIDE 40 MG: 10 INJECTION, SOLUTION INTRAVENOUS at 08:00

## 2023-02-13 RX ADMIN — OXYCODONE HYDROCHLORIDE 20 MG: 20 TABLET, FILM COATED, EXTENDED RELEASE ORAL at 05:55

## 2023-02-13 RX ADMIN — FENTANYL CITRATE 50 MCG: 50 INJECTION, SOLUTION INTRAMUSCULAR; INTRAVENOUS at 08:22

## 2023-02-13 RX ADMIN — FENTANYL CITRATE 25 MCG: 50 INJECTION INTRAMUSCULAR; INTRAVENOUS at 10:29

## 2023-02-13 RX ADMIN — DOCUSATE SODIUM 50 MG AND SENNOSIDES 8.6 MG 1 TABLET: 8.6; 5 TABLET, FILM COATED ORAL at 20:33

## 2023-02-13 RX ADMIN — OXYCODONE AND ACETAMINOPHEN 1 TABLET: 325; 10 TABLET ORAL at 12:20

## 2023-02-13 RX ADMIN — GLYCOPYRROLATE 0.4 MG: 0.2 INJECTION INTRAMUSCULAR; INTRAVENOUS at 09:20

## 2023-02-13 RX ADMIN — POLYETHYLENE GLYCOL 3350 17 G: 17 POWDER, FOR SOLUTION ORAL at 20:33

## 2023-02-13 RX ADMIN — SODIUM CHLORIDE 75 ML/HR: 9 INJECTION, SOLUTION INTRAVENOUS at 11:27

## 2023-02-13 RX ADMIN — HYDROMORPHONE HYDROCHLORIDE 0.5 MG: 1 INJECTION, SOLUTION INTRAMUSCULAR; INTRAVENOUS; SUBCUTANEOUS at 10:36

## 2023-02-13 RX ADMIN — EPHEDRINE SULFATE 20 MG: 50 INJECTION INTRAVENOUS at 07:44

## 2023-02-13 RX ADMIN — FENTANYL CITRATE 50 MCG: 50 INJECTION, SOLUTION INTRAMUSCULAR; INTRAVENOUS at 08:12

## 2023-02-13 RX ADMIN — MIDAZOLAM 2 MG: 1 INJECTION INTRAMUSCULAR; INTRAVENOUS at 06:54

## 2023-02-13 NOTE — ANESTHESIA PREPROCEDURE EVALUATION
Anesthesia Evaluation     Patient summary reviewed   no history of anesthetic complications:  NPO Solid Status: > 8 hours  NPO Liquid Status: > 8 hours           Airway   Mallampati: II  TM distance: >3 FB  Neck ROM: full  Dental          Pulmonary    (+) a smoker Former, COPD,   (-) asthma, sleep apnea  Cardiovascular   Exercise tolerance: good (4-7 METS)    (-) hypertension, past MI, CAD, dysrhythmias, cardiac stents, hyperlipidemia    ROS comment: Echo:  Overall non-diagnostic study given poor imaging with inadequate visualization of the apical segments precluding assessment of ischemia in this region. In the visualized segments, the stress echo is normal.  No ECG evidence of myocardial ischemia. Negative clinical evidence of myocardial ischemia. Findings consistent with a normal ECG stress test for ischemic evaluation.  Normal exercise capacity  During recovery the patient's rhythm was predominantly sinus with intermittent mobitz I AV block as well as intermittent wandering ectopic atrial rhythm. No symptoms were reported during recovery.      Neuro/Psych  (+) numbness,    (-) seizures, TIA, CVA  GI/Hepatic/Renal/Endo    (+)  GERD,  renal disease CRI,   (-) liver disease, diabetes    Musculoskeletal     (+) back pain,   Abdominal    Substance History      OB/GYN          Other      history of cancer (breast) remission                    Anesthesia Plan    ASA 2     general     intravenous induction     Anesthetic plan, risks, benefits, and alternatives have been provided, discussed and informed consent has been obtained with: patient.        CODE STATUS:

## 2023-02-13 NOTE — OP NOTE
LUMBAR ANTERIOR INTERBODY FUSION  Procedure Note    Carol Rodriguez  2/13/2023    Pre-op Diagnosis:    1. Increasing chronic back pain.   2. Bilateral buttock, thigh and leg radiculopathy, now right worse than left.   3. Severe neurogenic claudication.   4. Degenerative disc disease L2 to S1, severe at L3 to S1.   5. Multilevel lumbar facet arthropathy, worse at L3 to S1.   6. Degenerative scoliosis, concave right L3-4, concave left L5-S1.   7. Central and foraminal stenosis L3 to S1, worse left L3-4, bilateral L4 to S1.   8. Limb length discrepancy, right shorter than left.     Post-op Diagnosis:    same    Procedure/CPT® Codes:     1. Anterior discectomy decompression with bilateral neural foraminotomy L5-S1  2. Anterior lumbar interbody fusion L5-S1  3. Anterior spinal instrumentation L5-S1 ([employer] anterior plate and screws)  4. Use of titanium interbody biomechanical device for fusion L5-S1 ([employer] titanium spacer and screws)  5. Use of allograft bone matrix for fusion L5-S1 ([employer] Catalyst)  6. Use of fluoroscopy for confirmation of surgical level, placement of interbody spacer and instrumentation  7. Intraoperative neural monitoring     Anesthesia: General     Surgeon: CHRISTINA Goldstein MD     Co Surgeon: Dr. Jordy Macario D.O.     Assistant: Norm Choudhury PA-C     Estimated Blood Loss: 50 mL     Complications: None     Condition: Stable to PACU.     Indications:     The patient is a 57-year-old who sees Dr. Erna Leung for medical issues.  She presented to the office with increasing chronic low back pain along with bilateral buttock, thigh, and leg radiculopathy, with the right side worse than the left as well as symptoms consistent with severe neurogenic claudication.  Imaging studies revealed degenerative disc disease and facet arthropathy that was severe from L3-S1 associated with a degenerative scoliosis concave to the right at L3-4 and concave to the left at L5-S1.  The degenerative changes created  central and foraminal stenosis from L3-S1 that was felt to be contributing to the vast majority of her symptoms.    After failing all conservative measures, it was mutually decided that surgery would be the best option.  Risks, benefits, and complications of surgery were discussed in detail. The patient appeared well informed and wished to proceed. We specifically discussed the risk of infection, blood loss, nerve root injury, CSF leak, and the possibility of incomplete resolution of symptoms. We also discussed the possible risk of a nonunion and the potential need for additional surgery in the event of a pseudoarthrosis or hardware failure.    We elected to proceed with a staged operation.  Today we are performing an anterior decompression and fusion of L5-S1, it is planned that we will be returning to surgery for a second lateral procedure at a later date to address L3-4 and L4-5.  The L5-S1 level requires a separate stage as it is not accessible through a lateral approach.  The patient will also require a final posterior procedure involving a posterior spinal fusion with instrumentation spanning L3-S1.     Operative Procedure:     After obtaining informed consent and verifying the correct operative level, the patient was brought to the operating room and placed supine on an operating table. A general anesthetic was provided by the anesthesia service with the assistance of an endotracheal tube. Once this was appropriately positioned and secured, the anterior abdominal region was prepped and draped in usual sterile fashion. A surgical timeout was taken to confirm this was the correct patient, we were working at the correct level, and that preoperative antibiotics were given in a timely fashion.     At this point, Dr. Jordy Macario D.O. provided vascular access to the L5-S1 level. He performed a left sided anterior retroperitoneal approach to the L5-S1 segment. Please see his separate dictated operative report  regarding the details on the approach itself.  When I entered the procedure, self retaining retractors were already in position with excellent exposure of the L5-S1 disc space.     After confirming we were at the correct level using fluoroscopy, I used a long handle 10 blade scalpel to cut into the L5-S1 disc space. A Garza elevator was used to remove disc material off of the endplates. Disc material was retrieved using pituitaries and Kerrisons. A disc space distractor was then placed into the L5-S1 disc space and I used curettes to remove posterior disc material. Kerrisons were used to remove posterior osteophytes across the endplates of L5 and S1. There was high-grade stenosis centrally but also foraminally. I then performed a bilateral neural foraminotomy with Kerrisons and curettes. The decompression was much more involved than what is usually required for an anterior lumbar interbody fusion by itself and required significantly more time to perform.  This was due to the severe disc space collapse and high-grade foraminal stenosis.     After the decompression was completed bilaterally and centrally, I used a series of endplate scrapers to prepare the endplates for interbody fusion. Trial spacers were then malleted into position and it was felt that a 14 mm titanium spacer with 15 degrees lordosis from the [employer] instrumentation set would be the best fit to restore disc height also restoring foraminal height and providing some indirect decompression. The disc space was then thoroughly irrigated with saline solution.  Gelfoam powder with thrombin was used to control epidural bleeding.     A 14 mm titanium spacer from the [employer] instrumentation set was then packed as tightly as possible with an allograft bone matrix called Catalyst from [employer]. This spacer was then malleted into the L5-S1 disc space under fluoroscopic guidance. It was placed as an interbody biomechanical device to assist with fusion.  I then placed an  8.5 mm x 30 mm graft bolt through the spacer into L5 and a 5.0 mm x 30 mm screw through the spacer into S1.  The screws were placed to help maintain position of the spacer as well as to assist with the fusion process.     A 4-hole anterior plate from the [facility] instrumentation set was then chosen. The 4 holes were drilled and four 30 mm screws were used to fix the plate across the L5-S1 segment augmenting the fusion.      We then inspected the entire operative field for any signs of bleeding.  Bleeding was once again controlled using thrombin with Gelfoam powder and bipolar cautery.  Once we ensured that adequate hemostasis was accomplished, final fluoroscopy imaging was taken to confirm adequate position of our implants. There was excellent restoration of disc height and the plate and screw construct appeared to be adequately positioned across L5-S1.     Please see Dr. Jordy Macario's separate dictated operative report regarding the closure of the wound. Once this was accomplished, the patient was extubated and sent to the recovery room in good stable condition. We estimated blood loss to be approximately 50 mL and the patient remained hemodynamically stable during the procedure.     Intraoperative neuro monitoring was ordered and carried out throughout the procedure to add an increased level of safety for the patient.  The interpreting physician was available by means of real-time continuous, bidirectional, remote audio and visual communication as needed throughout the entire procedure.  Modalities used during the procedure included SSEP, MEP, EMG, and TOF.  There were no neuro monitoring signal changes during the procedure.    Dr. Jordy Macario D.O. provided vascular access to the L5-S1 level and acted as a co-surgeon in this fashion.  Norm Choudhury PA-C provided critical assistance during the decompression at L5-S1 as well as during the placement of the titanium spacer, bone graft and instrumentation to obtain  a fusion across L5-S1.    CHRISTINA Goldstein MD    Date: 2/13/2023  Time: 14:38 CST

## 2023-02-13 NOTE — ANESTHESIA PROCEDURE NOTES
Airway  Urgency: elective    Date/Time: 2/13/2023 7:23 AM  Airway not difficult    General Information and Staff    Patient location during procedure: OR  CRNA/CAA: Alhaji Harding CRNA    Indications and Patient Condition  Indications for airway management: airway protection    Preoxygenated: yes  Mask difficulty assessment: 1 - vent by mask    Final Airway Details  Final airway type: endotracheal airway      Successful airway: ETT  Cuffed: yes   Successful intubation technique: video laryngoscopy  Facilitating devices/methods: intubating stylet  Endotracheal tube insertion site: oral  Blade: Gutierrez  Blade size: 3  Cormack-Lehane Classification: grade I - full view of glottis  Placement verified by: capnometry   Measured from: lips  Number of attempts at approach: 1  Assessment: lips, teeth, and gum same as pre-op and atraumatic intubation

## 2023-02-13 NOTE — OP NOTE
Carol Rodriguez  2023     PREOPERATIVE DIAGNOSIS:   1. Increasing chronic back pain.   2. Bilateral buttock, thigh and leg radiculopathy, now right worse than left.   3. Severe neurogenic claudication.   4. Degenerative disc disease L2 to S1, severe at L3 to S1.   5. Multilevel lumbar facet arthropathy, worse at L3 to S1.   6. Degenerative scoliosis, concave right L3-4, concave left L5-S1.   7. Central and foraminal stenosis L3 to S1, worse left L3-4, bilateral L4 to S1.   8. Limb length discrepancy, right shorter than left.      POSTOPERATIVE DIAGNOSIS: same     PROCEDURE PERFORMED:   1.  Anterior lumbar interbody fusion of L5-S1 with instrumentation     SURGEON: Jordy Macario DO   COSURGEON: Ruben Goldstein MD     ANESTHESIA: General.    PREPARATION: Routine.    STAFF: Circulator: Kyle Choudhury RN  Scrub Person: Arianna Mario Steven  Vendor Representative: Yasmani Block  Assistant: Norm Choudhury PA-C    ESTIMATED BLOOD LOSS: 50 mL    SPECIMENS: None    COMPLICATIONS: None    INDICATIONS: Carol Rodriguez is a 57 y.o. female who you are currently following for chronic back pain.  Carol Carrolls scheduled for anterior lumbar interbody fusion of L5-S1 with Dr. Goldstein on 23.  The patient denies any history of DVT.  She has two previous  sections and hysterectomy. The indications, risks, and possible complications of the procedure were explained to the patient, who voiced understanding and wished to proceed with surgery.     PROCEDURE IN DETAIL:   The patient was taken to the operating room and placed on the operating table in a supine position. After general anesthesia was obtained, the abdomen was prepped and draped in a sterile manner.  A transverse incision was then made in the left lower quadrant.  Careful dissection was made down through the subcutaneous tissues using the Bovie cautery to ensure hemostasis.  Any crossing veins were ligated with 3-0 silk suture and  hemoclips.  The rectus fascia was identified.  It was incised with the Bovie cautery.  Kocher clamps are placed on each side of the rectus fascia and subfascial planes were established in a cephalad and caudad direction.  Once the subfascial planes were established the attention was then turned to the left rectus muscle.  Blunt mobilization was made of the left rectus muscle including its blood supply medially and to the right.  Once it was fully mobilized the attention was then turned laterally to the peritoneal reflection.  Entrance into the retroperitoneal space was established with the use of a sponge stick and the Bovie cautery.  Continued blunt mobilization was made with my hand using a finger sweeping motion moving the peritoneal contents and sacral fat pad medially and to the right.  Once it was fully mobilized the Leonid-Choudhury retractor system was set up.  The retractor blades were set in place.  The left iliac vein was then carefully dissected free and placed safely behind the retractor blade.  The sacral vessels were carefully taken down with hemoclips.  At this point full exposure was established of the L5-S1 disc space.  The next part of the case will be dictated by Dr. Goldstein. Upon completion of Dr. Goldstein's part of the case the wound bed was irrigated with antibiotic saline and hemostasis was observed.  The retractor blades were carefully taken out one at a time. VersaWrap was carefully placed over the L5-S1 disc base repair. The structures were then placed back in their normal anatomic positions.  The rectus fascia was then closed with a #1 PDS in a running fashion to meet in the midline.  The deep layers were closed with a 2-0 Vicryl in a running fashion.  The subcutaneous layers were closed with a 3-0 Vicryl in a running fashion.  The skin was then reapproximated using a 4-0 Monocryl in a subcuticular fashion.  The wound was then cleaned.  Sterile dressings were applied. The patient tolerated  the procedure well. Sponge and needle counts were correct. The patient was then awakened and extubated in the operating room and taken to the recovery room in good condition.    Jordy Macario,     Date: 2/13/2023 Time: 10:08 CST

## 2023-02-13 NOTE — THERAPY EVALUATION
Patient Name: Carol Rodriguez  : 1965    MRN: [id number]                              Today's Date: 2023       Admit Date: 2023    Visit Dx:     ICD-10-CM ICD-9-CM   1. Decreased activities of daily living (ADL)  Z78.9 V49.89     Patient Active Problem List   Diagnosis   • Malignant neoplasm of right breast in female, estrogen receptor positive (HCC)   • Anemia   • Francisco esophagus   • GERD (gastroesophageal reflux disease)   • Hiatal hernia   • Irritable bowel syndrome   • Lumbar radicular pain   • Tortuous colon   • Coronary artery calcification   • Lumbar stenosis     Past Medical History:   Diagnosis Date   • Anxiety    • Arthralgia    • Arthritis    • Francisco's esophagus    • Carcinoembryonic antigen present    • Chronic kidney disease    • COPD (chronic obstructive pulmonary disease) (HCC)    • Coronary artery calcification 10/17/2022     STATES CARDIOLOGIST AND DR.J DELCID HAVE CLEARED HER OF THIS, CHOLESTEROL IS GOOD, NO PROBLEMS AT THIS TIME   • Fibrocystic breast disease    • GERD (gastroesophageal reflux disease)    • Hypertension    • Hypokalemia    • Infiltrating ductal carcinoma of breast (HCC)      ER Status: Positive; NC Status: Positive   • Iron deficiency anemia    • Osteopenia    • Tobacco user      Past Surgical History:   Procedure Laterality Date   • BREAST BIOPSY Right 2006    Biopsy of breast, right, benign fibrocystic changes   • CARPAL TUNNEL RELEASE      left 2011; right 2011   •  SECTION      x2. Most recently in    • COLONOSCOPY     • ENDOSCOPY     • MASTECTOMY MODIFIED RADICAL / SIMPLE / COMPLETE Right 2006   • SIMPLE MASTECTOMY Left 2006     prophylactic, left with left breast reconstruction   • TONSILLECTOMY     • TOTAL ABDOMINAL HYSTERECTOMY WITH SALPINGO OOPHORECTOMY  2006      General Information     Row Name 23 1300          OT Time and Intention    Document Type evaluation  s/p anterior decompression, ALIF  with instrumentation L5-S1;  dx: lumbar stenosis;  hx: B buttock, thigh, leg radiculopathy R>L, severe neurogenic claudication, degenerative disc disease L2-S1, limb length discrepancy R>L...  -JJ (r) PB (t) JJ (c)     Mode of Treatment occupational therapy  multilevel lumbar facet arthropathy, degenerative scoliosis  -JJ (r) PB (t) JJ (c)     Row Name 02/13/23 1300          General Information    Patient Profile Reviewed yes  -JJ (r) PB (t) JJ (c)     Prior Level of Function independent:;all household mobility;community mobility;gait;transfer;bed mobility;ADL's;driving  -JJ (r) PB (t) JJ (c)     Existing Precautions/Restrictions LSO;brace worn when out of bed;spinal  -JJ (r) PB (t) JJ (c)     Barriers to Rehab medically complex  -JJ (r) PB (t) JJ (c)     Row Name 02/13/23 1300          Occupational Profile    Environmental Supports and Barriers (Occupational Profile) walk in shower  -JJ (r) PB (t) JJ (c)     Row Name 02/13/23 1300          Living Environment    People in Home spouse  -JJ (r) PB (t) JJ (c)     Row Name 02/13/23 1300          Home Main Entrance    Number of Stairs, Main Entrance three  -JJ (r) PB (t) JJ (c)     Stair Railings, Main Entrance none  -JJ (r) PB (t) JJ (c)     Row Name 02/13/23 1300          Stairs Within Home, Primary    Number of Stairs, Within Home, Primary none  -JJ (r) PB (t) JJ (c)     Row Name 02/13/23 1300          Cognition    Orientation Status (Cognition) oriented x 4  -JJ (r) PB (t) JJ (c)     Row Name 02/13/23 1300          Safety Issues, Functional Mobility    Safety Issues Affecting Function (Mobility) awareness of need for assistance;safety precaution awareness;safety precautions follow-through/compliance  -JJ (r) PB (t) JJ (c)     Impairments Affecting Function (Mobility) balance;endurance/activity tolerance  -JJ (r) PB (t) JJ (c)           User Key  (r) = Recorded By, (t) = Taken By, (c) = Cosigned By    Initials Name Provider Type    Erna Diaz, OTR/BELLA, CSRS  Occupational Therapist    Carina Medrano, OT Student OT Student                 Mobility/ADL's     Row Name 02/13/23 1300          Bed Mobility    Bed Mobility rolling left;sidelying-sit;sit-sidelying  -JJ (r) PB (t) JJ (c)     Rolling Left [place] (Bed Mobility) contact guard;verbal cues  -JJ (r) PB (t) JJ (c)     Sidelying-Sit [place] (Bed Mobility) contact guard;verbal cues  -JJ (r) PB (t) JJ (c)     Sit-Sidelying [place] (Bed Mobility) contact guard;verbal cues  -JJ (r) PB (t) JJ (c)     Assistive Device (Bed Mobility) bed rails  -JJ (r) PB (t) JJ (c)     Row Name 02/13/23 1300          Transfers    Transfers sit-stand transfer;stand-sit transfer;toilet transfer  -JJ (r) PB (t) JJ (c)     Row Name 02/13/23 1300          Sit-Stand Transfer    Sit-Stand [place] (Transfers) contact guard;verbal cues  -JJ (r) PB (t) JJ (c)     Assistive Device (Sit-Stand Transfers) other (see comments)  HHA  -JJ (r) PB (t) JJ (c)     Row Name 02/13/23 1300          Stand-Sit Transfer    Stand-Sit [place] (Transfers) contact guard;verbal cues  -JJ (r) PB (t) JJ (c)     Row Name 02/13/23 1300          Toilet Transfer    Type (Toilet Transfer) sit-stand;stand-sit  -JJ (r) PB (t) JJ (c)     [place] Level (Toilet Transfer) contact guard;verbal cues  -JJ (r) PB (t) JJ (c)     Assistive Device (Toilet Transfer) other (see comments)  HHA  -JJ (r) PB (t) JJ (c)     Row Name 02/13/23 1300          Functional Mobility    Functional Mobility- Ind. Level contact guard assist;verbal cues required  -JJ (r) PB (t) JJ (c)     Functional Mobility- Device other (see comments)  HHA  -JJ (r) PB (t) JJ (c)     Functional Mobility- Safety Issues other (see comments)  R LE shorter than L LE  -JJ (r) PB (t) JJ (jacky)     Row Name 02/13/23 1300          Activities of Daily Living    BADL Assessment/Intervention toileting;feeding  -LATESHA (nallely) MYRA stein) LATESHA (jacky)     Row Name 02/13/23 1300          Toileting Assessment/Training     [place] Level (Toileting) adjust/manage clothing;perform perineal hygiene;standby assist  -JJ (r) PB (t) JJ (c)     Position (Toileting) supported sitting  -JJ (r) PB (t) JJ (c)     Row Name 02/13/23 1300          Self-Feeding Assessment/Training    [place] Level (Feeding) liquids to mouth;independent  -JJ (r) PB (t) JJ (c)     Position (Self-Feeding) sitting up in bed  -JJ (r) PB (t) JJ (c)           User Key  (r) = Recorded By, (t) = Taken By, (c) = Cosigned By    Initials Name Provider Type    Erna Diaz, OTR/L, CSRS Occupational Therapist    Carina Medrano OT Student OT Student               Obj/Interventions     Row Name 02/13/23 1300          Sensory Assessment (Somatosensory)    Sensory Assessment (Somatosensory) UE sensation intact  -JJ (r) PB (t) JJ (c)     Row Name 02/13/23 1300          Vision Assessment/Intervention    Visual Impairment/Limitations WFL;corrective lenses full-time  -JJ (r) PB (t) JJ (c)     Row Name 02/13/23 1300          Range of Motion Comprehensive    General Range of Motion bilateral upper extremity ROM WFL  -JJ (r) PB (t) JJ (c)     Row Name 02/13/23 1300          Strength Comprehensive (MMT)    General Manual Muscle Testing (MMT) Assessment no strength deficits identified  -JJ (r) PB (t) JJ (c)     Comment, General Manual Muscle Testing (MMT) Assessment B UE 4/5 throughout  -JJ (r) PB (t) JJ (c)     Row Name 02/13/23 1300          Balance    Balance Assessment sitting static balance;sitting dynamic balance;sit to stand dynamic balance;standing static balance;standing dynamic balance  -JJ (r) PB (t) JJ (c)     Static Sitting Balance standby assist;verbal cues  -JJ (r) PB (t) JJ (c)     Dynamic Sitting Balance supervision;verbal cues  -JJ (r) PB (t) JJ (c)     Position, Sitting Balance sitting edge of bed;unsupported  -JJ (r) PB (t) JJ (c)     Sit to Stand Dynamic Balance contact guard;verbal cues  -JJ (r) PB (t) JJ (c)     Static Standing Balance contact  guard;verbal cues  -JJ (r) PB (t) JJ (c)     Dynamic Standing Balance contact guard;verbal cues  -JJ (r) PB (t) JJ (c)     Position/Device Used, Standing Balance other (see comments)  HHA  -JJ (r) PB (t) JJ (c)           User Key  (r) = Recorded By, (t) = Taken By, (c) = Cosigned By    Initials Name Provider Type    Erna Diaz, OTR/L, CSRS Occupational Therapist    Carina Medrano, OT Student OT Student               Goals/Plan     Row Name 02/13/23 1300          Bed Mobility Goal 1 (OT)    Activity/Assistive Device (Bed Mobility Goal 1, OT) bed mobility activities, all;bridging;rolling to left;rolling to right;scooting;sidelying to sit;sit to sidelying  -JJ (r) PB (t) JJ (c)     [place] Level/Cues Needed (Bed Mobility Goal 1, OT) independent;other (see comments)  maintaining spinal precautions  -JJ (r) PB (t) JJ (c)     Time Frame (Bed Mobility Goal 1, OT) long term goal (LTG);by discharge  -JJ (r) PB (t) JJ (c)     Progress/Outcomes (Bed Mobility Goal 1, OT) new goal  -JJ (r) PB (t) JJ (c)     Row Name 02/13/23 1300          Dressing Goal 1 (OT)    Activity/Device (Dressing Goal 1, OT) dressing skills, all  -JJ (r) PB (t) JJ (c)     [place]/Cues Needed (Dressing Goal 1, OT) standby assist;verbal cues required  -JJ (r) PB (t) JJ (c)     Time Frame (Dressing Goal 1, OT) long term goal (LTG);by discharge  -JJ (r) PB (t) JJ (c)     Progress/Outcome (Dressing Goal 1, OT) new goal  -JJ (r) PB (t) JJ (c)     Row Name 02/13/23 1300          Toileting Goal 1 (OT)    Activity/Device (Toileting Goal 1, OT) toileting skills, all  -JJ (r) PB (t) JJ (c)     [place] Level/Cues Needed (Toileting Goal 1, OT) independent  -JJ (r) PB (t) JJ (c)     Time Frame (Toileting Goal 1, OT) long term goal (LTG);by discharge  -JJ (r) PB (t) JJ (c)     Progress/Outcome (Toileting Goal 1, OT) new goal  -JJ (r) PB (t) JJ (c)     Row Name 02/13/23 1300          Therapy Assessment/Plan (OT)    Planned Therapy  Interventions (OT) activity tolerance training;adaptive equipment training;BADL retraining;functional balance retraining;occupation/activity based interventions;patient/caregiver education/training;orthotic fabrication/fitting/training;transfer/mobility retraining  -JJ (r) PB (t) JJ (c)           User Key  (r) = Recorded By, (t) = Taken By, (c) = Cosigned By    Initials Name Provider Type    Erna Diaz, OTR/L, CSRS Occupational Therapist    Carina Medrano, OT Student OT Student               Clinical Impression     Row Name 02/13/23 1300          Pain Assessment    Pretreatment Pain Rating 0/10 - no pain  -JJ (r) PB (t) JJ (c)     Posttreatment Pain Rating 5/10  -JJ (r) PB (t) JJ (c)     Pain Location incisional  -JJ (r) PB (t) JJ (c)     Pain Location - abdomen  -JJ (r) PB (t) JJ (c)     Pre/Posttreatment Pain Comment weakness & fatigue  -JJ (r) PB (t) JJ (c)     Pain Intervention(s) Ambulation/increased activity;Repositioned  -JJ (r) PB (t) JJ (c)     Row Name 02/13/23 1300          Plan of Care Review    Plan of Care Reviewed With patient;spouse;mother  -JJ (r) PB (t) JJ (c)     Outcome Evaluation OT eval this date. Ox4 with some fatigue on arrival. PLOF indep with all adls and mobility. Pt educ on precautions, body mechanics, LSO wear/care. Pt completed rolling L and sidelying<>sit t/fs with CGA and vcs. Pt sat EOB for 8 minutes and donned brace with Min A and vcs for educ. Pt completed sit<>stand t/fs and amb bed>BR with CGA. Pt completed toileting with SBA. Pt amb to hallway and back with CGA. Pt doffed LSO sitting EOB with cues. B UE ROM WFL, B UE strength 4/5 throughout. Pt demonstrates decreased functional endurance, balance, increased pain and fatigue. Pt would benefit from continued educ on spinal precautions and LSO information. Skilled OT necessary to address deficits. Anticipated d/c to home with assist.  -JJ (r) PB (t) JJ (c)     Row Name 02/13/23 1300          Therapy Assessment/Plan  (OT)    Rehab Potential (OT) good, to achieve stated therapy goals  -JJ (r) PB (t) JJ (c)     Criteria for Skilled Therapeutic Interventions Met (OT) yes;skilled treatment is necessary  -JJ (r) PB (t) JJ (c)     Therapy Frequency (OT) 5 times/wk  -JJ (r) PB (t) JJ (c)     Predicted Duration of Therapy Intervention (OT) 10 days  -JJ (r) PB (t) JJ (c)     Row Name 02/13/23 1300          Therapy Plan Review/Discharge Plan (OT)    Anticipated Discharge Disposition (OT) home with assist  -JJ (r) PB (t) JJ (c)     Row Name 02/13/23 1300          Vital Signs    O2 Delivery Pre Treatment nasal cannula  -JJ (r) PB (t) JJ (c)     O2 Delivery Intra Treatment nasal cannula  -JJ (r) PB (t) JJ (c)     O2 Delivery Post Treatment nasal cannula  -JJ (r) PB (t) JJ (c)     Pre Patient Position Supine  -JJ (r) PB (t) JJ (c)     Intra Patient Position Standing  -JJ (r) PB (t) JJ (c)     Post Patient Position Supine  -JJ (r) PB (t) JJ (c)     Row Name 02/13/23 1300          Positioning and Restraints    Pre-Treatment Position in bed  -JJ (r) PB (t) JJ (c)     Post Treatment Position bed  -JJ (r) PB (t) JJ (c)     In Bed notified nsg;fowlers;call light within reach;encouraged to call for assist;exit alarm on;side rails up x3;with family/caregiver  -JJ (r) PB (t) JJ (c)           User Key  (r) = Recorded By, (t) = Taken By, (c) = Cosigned By    Initials Name Provider Type    Erna Diaz, CRISTINA/L, CSRS Occupational Therapist    Carina Medrano, OT Student OT Student               Outcome Measures     Row Name 02/13/23 1300          How much help from another is currently needed...    Putting on and taking off regular lower body clothing? 3  -JJ (r) PB (t) JJ (c)     Bathing (including washing, rinsing, and drying) 3  -JJ (r) PB (t) JJ (c)     Toileting (which includes using toilet bed pan or urinal) 3  -JJ (r) PB (t) JJ (c)     Putting on and taking off regular upper body clothing 3  -JJ (r) PB (t) JJ (c)     Taking care of  personal grooming (such as brushing teeth) 4  -JJ (r) PB (t) JJ (c)     Eating meals 4  -JJ (r) PB (t) JJ (c)     AM-PAC 6 Clicks Score (OT) 20  -JJ (r) PB (t)     Row Name 02/13/23 1310          How much help from another person do you currently need...    Turning from your back to your side while in flat bed without using bedrails? 4  -SB     Moving from lying on back to sitting on the side of a flat bed without bedrails? 3  -SB     Moving to and from a bed to a chair (including a wheelchair)? 3  -SB     Standing up from a chair using your arms (e.g., wheelchair, bedside chair)? 3  -SB     Climbing 3-5 steps with a railing? 3  -SB     To walk in hospital room? 3  -SB     AM-PAC 6 Clicks Score (PT) 19  -SB     Highest level of mobility 6 --> Walked 10 steps or more  -SB     Row Name 02/13/23 1310 02/13/23 1300       Functional Assessment    Outcome Measure Options AM-PAC 6 Clicks Basic Mobility (PT)  -SB AM-PAC 6 Clicks Daily Activity (OT)  -JJ (r) PB (t) JJ (c)          User Key  (r) = Recorded By, (t) = Taken By, (c) = Cosigned By    Initials Name Provider Type    Violetta Oglesby, PT DPT Physical Therapist    Erna Diaz, OTR/L, CSRS Occupational Therapist    Carina Medrano, OT Student OT Student                Occupational Therapy Education     Title: PT OT SLP Therapies (In Progress)     Topic: Occupational Therapy (In Progress)     Point: ADL training (Done)     Description:   Instruct learner(s) on proper safety adaptation and remediation techniques during self care or transfers.   Instruct in proper use of assistive devices.              Learning Progress Summary           Patient IVORY Dewitt D, VU,DU,NR by PB at 2/13/2023 1300   Significant Other IVORY Dewitt D, VU,DU,NR by PB at 2/13/2023 1300   Mother IVORY Dewitt,PHONG, VU,DU,NR by PB at 2/13/2023 1300                   Point: Home exercise program (Not Started)     Description:   Instruct learner(s) on appropriate technique for monitoring, assisting  and/or progressing therapeutic exercises/activities.              Learner Progress:  Not documented in this visit.          Point: Precautions (Done)     Description:   Instruct learner(s) on prescribed precautions during self-care and functional transfers.              Learning Progress Summary           Patient Eager, E,D, VU,DU,NR by PB at 2/13/2023 1300   Significant Other Eager, E,D, VU,DU,NR by PB at 2/13/2023 1300   Mother Eager, E,D, VU,DU,NR by PB at 2/13/2023 1300                   Point: Body mechanics (Done)     Description:   Instruct learner(s) on proper positioning and spine alignment during self-care, functional mobility activities and/or exercises.              Learning Progress Summary           Patient Eager, E,D, VU,DU,NR by PB at 2/13/2023 1300   Significant Other Eager, E,D, VU,DU,NR by PB at 2/13/2023 1300   Mother Eager, E,D, VU,DU,NR by PB at 2/13/2023 1300                               User Key     Initials Effective Dates Name Provider Type Discipline     01/03/23 -  Carina De La O, OT Student OT Student OT              OT Recommendation and Plan  Planned Therapy Interventions (OT): activity tolerance training, adaptive equipment training, BADL retraining, functional balance retraining, occupation/activity based interventions, patient/caregiver education/training, orthotic fabrication/fitting/training, transfer/mobility retraining  Therapy Frequency (OT): 5 times/wk  Plan of Care Review  Plan of Care Reviewed With: patient, spouse, mother  Outcome Evaluation: OT eval this date. Ox4 with some fatigue on arrival. PLOF indep with all adls and mobility. Pt educ on precautions, body mechanics, LSO wear/care. Pt completed rolling L and sidelying<>sit t/fs with CGA and vcs. Pt sat EOB for 8 minutes and donned brace with Min A and vcs for educ. Pt completed sit<>stand t/fs and amb bed>BR with CGA. Pt completed toileting with SBA. Pt amb to hallway and back with CGA. Pt doffed LSO sitting EOB  with cues. B UE ROM WFL, B UE strength 4/5 throughout. Pt demonstrates decreased functional endurance, balance, increased pain and fatigue. Pt would benefit from continued educ on spinal precautions and LSO information. Skilled OT necessary to address deficits. Anticipated d/c to home with assist.     Time Calculation:    Time Calculation- OT     Row Name 02/13/23 1300             Time Calculation- OT    OT Start Time 1300  -JJ (r) PB (t) JJ (c)      OT Stop Time 1354  -JJ (r) PB (t) JJ (c)      OT Time Calculation (min) 54 min  -JJ (r) PB (t)      OT Received On 02/13/23  -JJ (r) PB (t) JJ (c)      OT Goal Re-Cert Due Date 02/23/23  -JJ (r) PB (t) JJ (c)            User Key  (r) = Recorded By, (t) = Taken By, (c) = Cosigned By    Initials Name Provider Type    Erna Diaz, RUDDYR/L, CSRS Occupational Therapist    Carina Medrano OT Student OT Student                       Carina De La O OT Student  2/13/2023

## 2023-02-13 NOTE — THERAPY EVALUATION
Patient Name: Carol Rodriguez  : 1965    MRN: [id number]                              Today's Date: 2023       Admit Date: 2023    Visit Dx:     ICD-10-CM ICD-9-CM   1. Decreased activities of daily living (ADL)  Z78.9 V49.89   2. Impaired mobility  Z74.09 799.89     Patient Active Problem List   Diagnosis   • Malignant neoplasm of right breast in female, estrogen receptor positive (HCC)   • Anemia   • Francisco esophagus   • GERD (gastroesophageal reflux disease)   • Hiatal hernia   • Irritable bowel syndrome   • Lumbar radicular pain   • Tortuous colon   • Coronary artery calcification   • Lumbar stenosis     Past Medical History:   Diagnosis Date   • Anxiety    • Arthralgia    • Arthritis    • Francisco's esophagus    • Carcinoembryonic antigen present    • Chronic kidney disease    • COPD (chronic obstructive pulmonary disease) (HCC)    • Coronary artery calcification 10/17/2022    PT STATES CARDIOLOGIST AND DR.J DELCID HAVE CLEARED HER OF THIS, CHOLESTEROL IS GOOD, NO PROBLEMS AT THIS TIME   • Fibrocystic breast disease    • GERD (gastroesophageal reflux disease)    • Hypertension    • Hypokalemia    • Infiltrating ductal carcinoma of breast (HCC)      ER Status: Positive; IA Status: Positive   • Iron deficiency anemia    • Osteopenia    • Tobacco user      Past Surgical History:   Procedure Laterality Date   • BREAST BIOPSY Right 2006    Biopsy of breast, right, benign fibrocystic changes   • CARPAL TUNNEL RELEASE      left 2011; right 2011   •  SECTION      x2. Most recently in    • COLONOSCOPY     • ENDOSCOPY     • MASTECTOMY MODIFIED RADICAL / SIMPLE / COMPLETE Right 2006   • SIMPLE MASTECTOMY Left 2006     prophylactic, left with left breast reconstruction   • TONSILLECTOMY     • TOTAL ABDOMINAL HYSTERECTOMY WITH SALPINGO OOPHORECTOMY  2006      General Information     Row Name 23 1310          Physical Therapy Time and Intention    Document  Type evaluation  pt presents with increasing chronic back pain, neurogenic claudication and B buttock, thigh and leg radiculopathy R>L; s/p Anterior lumbar interbody fusion of L5-S1 with instrumentation 2/13  -SB     Mode of Treatment physical therapy  -SB     Row Name 02/13/23 1310          General Information    Patient Profile Reviewed yes  -SB     Prior Level of Function independent:;all household mobility;ADL's;cooking;driving;cleaning  walk in shower, tub  -SB     Existing Precautions/Restrictions LSO;brace worn when out of bed;spinal  -SB     Barriers to Rehab medically complex  -SB     Row Name 02/13/23 1310          Living Environment    People in Home spouse  -SB     Row Name 02/13/23 1310          Home Main Entrance    Number of Stairs, Main Entrance three  -SB     Stair Railings, Main Entrance none  -SB     Row Name 02/13/23 1310          Stairs Within Home, Primary    Number of Stairs, Within Home, Primary none  -SB     Row Name 02/13/23 1310          Cognition    Orientation Status (Cognition) oriented x 4  -SB     Row Name 02/13/23 1310          Safety Issues, Functional Mobility    Safety Issues Affecting Function (Mobility) safety precaution awareness;safety precautions follow-through/compliance  -SB     Impairments Affecting Function (Mobility) balance;endurance/activity tolerance;strength  -SB           User Key  (r) = Recorded By, (t) = Taken By, (c) = Cosigned By    Initials Name Provider Type    SB Violetta Marcelino, PT DPT Physical Therapist               Mobility     Row Name 02/13/23 1310          Bed Mobility    Bed Mobility rolling left;sidelying-sit;sit-sidelying  -SB     Rolling Left [place] (Bed Mobility) contact guard;verbal cues  -SB     Sidelying-Sit [place] (Bed Mobility) contact guard;verbal cues  -SB     Sit-Sidelying [place] (Bed Mobility) contact guard;verbal cues  -SB     Assistive Device (Bed Mobility) bed rails  -SB     Row Name 02/13/23 1310          Sit-Stand  Transfer    Sit-Stand [place] (Transfers) contact guard;verbal cues  -SB     Assistive Device (Sit-Stand Transfers) other (see comments)  HHA  -SB     Row Name 02/13/23 1310          Gait/Stairs (Locomotion)    [place] Level (Gait) contact guard  -SB     Assistive Device (Gait) --  HHA  -SB     Ambulated day of surgery or within 4 hours of PACU discharge yes  -SB     Distance in Feet (Gait) 120  -SB     Deviations/Abnormal Patterns (Gait) gait speed decreased  -SB           User Key  (r) = Recorded By, (t) = Taken By, (c) = Cosigned By    Initials Name Provider Type    SB Violetta Marcelino PT DPT Physical Therapist               Obj/Interventions     Row Name 02/13/23 1310          Range of Motion Comprehensive    General Range of Motion bilateral lower extremity ROM WFL  -SB     Row Name 02/13/23 1310          Strength Comprehensive (MMT)    General Manual Muscle Testing (MMT) Assessment lower extremity strength deficits identified  -SB     Comment, General Manual Muscle Testing (MMT) Assessment B hip flex, knee ext, knee flex and EHL 5/5; B DF 4/5  -SB     Row Name 02/13/23 1310          Balance    Balance Assessment sitting static balance;sitting dynamic balance;standing static balance;standing dynamic balance  -SB     Static Sitting Balance standby assist  -SB     Dynamic Sitting Balance standby assist  -SB     Position, Sitting Balance sitting edge of bed;unsupported  -SB     Static Standing Balance contact guard  -SB     Dynamic Standing Balance contact guard  -SB     Position/Device Used, Standing Balance supported  HHA  -SB     Row Name 02/13/23 1310          Sensory Assessment (Somatosensory)    Sensory Assessment (Somatosensory) LE sensation intact  -SB           User Key  (r) = Recorded By, (t) = Taken By, (c) = Cosigned By    Initials Name Provider Type    Violetta Oglesby PT DPT Physical Therapist               Goals/Plan     Row Name 02/13/23 1310          Bed Mobility Goal 1 (PT)     Activity/Assistive Device (Bed Mobility Goal 1, PT) rolling to left;rolling to right;sidelying to sit;sit to sidelying  -SB     [place] Level/Cues Needed (Bed Mobility Goal 1, PT) independent  -SB     Time Frame (Bed Mobility Goal 1, PT) long term goal (LTG)  -SB     Progress/Outcomes (Bed Mobility Goal 1, PT) new goal  -SB     Row Name 02/13/23 1310          Transfer Goal 1 (PT)    Activity/Assistive Device (Transfer Goal 1, PT) sit-to-stand/stand-to-sit;bed-to-chair/chair-to-bed  -SB     [place] Level/Cues Needed (Transfer Goal 1, PT) independent  -SB     Time Frame (Transfer Goal 1, PT) long term goal (LTG)  -SB     Progress/Outcome (Transfer Goal 1, PT) new goal  -SB     Row Name 02/13/23 1310          Gait Training Goal 1 (PT)    Activity/Assistive Device (Gait Training Goal 1, PT) gait (walking locomotion);increase endurance/gait distance;improve balance and speed  -SB     [place] Level (Gait Training Goal 1, PT) supervision required  -SB     Distance (Gait Training Goal 1, PT) 200  -SB     Time Frame (Gait Training Goal 1, PT) long term goal (LTG)  -SB     Progress/Outcome (Gait Training Goal 1, PT) new goal  -SB     Row Name 02/13/23 1310          Stairs Goal 1 (PT)    Activity/Assistive Device (Stairs Goal 1, PT) stairs, all skills;ascending stairs;descending stairs  -SB     [place] Level/Cues Needed (Stairs Goal 1, PT) independent  -SB     Number of Stairs (Stairs Goal 1, PT) 3  -SB     Time Frame (Stairs Goal 1, PT) long term goal (LTG)  -SB     Progress/Outcome (Stairs Goal 1, PT) new goal  -SB     Row Name 02/13/23 1310          Problem Specific Goal 1 (PT)    Problem Specific Goal 1 (PT) Pt will evelyn/doff LSO brace independently  -SB     Time Frame (Problem Specific Goal 1, PT) long-term goal (LTG)  -SB     Progress/Outcome (Problem Specific Goal 1, PT) new goal  -SB     Row Name 02/13/23 1310          Therapy Assessment/Plan (PT)    Planned Therapy Interventions (PT) balance  training;bed mobility training;gait training;patient/family education;orthotic fitting/training;transfer training;strengthening;stair training  -SB           User Key  (r) = Recorded By, (t) = Taken By, (c) = Cosigned By    Initials Name Provider Type    Violetta Oglesby, PT DPT Physical Therapist               Clinical Impression     Row Name 02/13/23 1310          Pain    Pretreatment Pain Rating 0/10 - no pain  -SB     Posttreatment Pain Rating 5/10  -SB     Pain Location incisional  -SB     Pain Location - abdomen  -SB     Pre/Posttreatment Pain Comment c/o weakness, fatigue  -SB     Pain Intervention(s) Medication (See MAR);Repositioned;Ambulation/increased activity  -SB     Additional Documentation Pain Scale: Numbers Pre/Post-Treatment (Group)  -SB     Row Name 02/13/23 1310          Plan of Care Review    Plan of Care Reviewed With patient;mother;spouse  -SB     Progress no change  -SB     Outcome Evaluation PT eval completed. Pt oriented x4 but lethargic upon arrival, with O2 on 2L via NC with sats at 100%. Turned to RA and sats remain in mid 90s throughout session. Pt's spouse and mother present. Pt edu on spinal precautions and brace wear schedule. Pt performs rolling and sidelying to sit with CGA and verbal cues. Pt with mild strength deficits in B DF, but otherwise good LE strength and sensation intact. Pt performs sit to stand t/f and gait training with CGA, utilizing HHA for balance due to feeling slightly unsteady. Pt with decreased gait speed and step length, but ambulates into hallway. Pt will benefit from skilled PT to improve precautions, bracing, gait, stairs and balance. Recommend d/c home with assist.  -SB     Row Name 02/13/23 1310          Therapy Assessment/Plan (PT)    Patient/Family Therapy Goals Statement (PT) improve function  -SB     Rehab Potential (PT) good, to achieve stated therapy goals  -SB     Criteria for Skilled Interventions Met (PT) yes;meets criteria;skilled treatment is  necessary  -SB     Therapy Frequency (PT) 2 times/day  -SB     Predicted Duration of Therapy Intervention (PT) until d/c or goals met  -SB     Row Name 02/13/23 1310          Vital Signs    Pre SpO2 (%) 100  -SB     O2 Delivery Pre Treatment nasal cannula  2L  -SB     Intra SpO2 (%) 95  -SB     O2 Delivery Intra Treatment room air  -SB     Post SpO2 (%) 99  -SB     O2 Delivery Post Treatment nasal cannula  -SB     Row Name 02/13/23 1310          Positioning and Restraints    Pre-Treatment Position in bed  -SB     Post Treatment Position bed  -SB     In Bed notified nsg;fowlers;call light within reach;encouraged to call for assist;exit alarm on;with family/caregiver;SCD pump applied  -SB           User Key  (r) = Recorded By, (t) = Taken By, (c) = Cosigned By    Initials Name Provider Type    Violetta Oglesby, PT DPT Physical Therapist               Outcome Measures     Row Name 02/13/23 1310          How much help from another person do you currently need...    Turning from your back to your side while in flat bed without using bedrails? 4  -SB     Moving from lying on back to sitting on the side of a flat bed without bedrails? 3  -SB     Moving to and from a bed to a chair (including a wheelchair)? 3  -SB     Standing up from a chair using your arms (e.g., wheelchair, bedside chair)? 3  -SB     Climbing 3-5 steps with a railing? 3  -SB     To walk in hospital room? 3  -SB     AM-PAC 6 Clicks Score (PT) 19  -SB     Highest level of mobility 6 --> Walked 10 steps or more  -SB     Row Name 02/13/23 1310 02/13/23 1300       Functional Assessment    Outcome Measure Options AM-PAC 6 Clicks Basic Mobility (PT)  -SB AM-PAC 6 Clicks Daily Activity (OT)  -JJ (r) PB (t) JJ (c)          User Key  (r) = Recorded By, (t) = Taken By, (c) = Cosigned By    Initials Name Provider Type    Violetta Oglesby, PT DPT Physical Therapist    Erna Diaz, OTR/L, CSRS Occupational Therapist    Carina Medrano, OT Student OT  Student                             Physical Therapy Education     Title: PT OT SLP Therapies (In Progress)     Topic: Physical Therapy (In Progress)     Point: Mobility training (Done)     Learning Progress Summary           Patient Acceptance, E, VU,NR by SB at 2/13/2023 1323    Comment: pt edu on POC, benefits of act, spinal precautions, bracing, d/c plans                   Point: Home exercise program (Not Started)     Learner Progress:  Not documented in this visit.          Point: Body mechanics (Done)     Learning Progress Summary           Patient Acceptance, E, VU,NR by SB at 2/13/2023 1323    Comment: pt edu on POC, benefits of act, spinal precautions, bracing, d/c plans                   Point: Precautions (Done)     Learning Progress Summary           Patient Acceptance, E, VU,NR by SB at 2/13/2023 1323    Comment: pt edu on POC, benefits of act, spinal precautions, bracing, d/c plans                               User Key     Initials Effective Dates Name Provider Type Discipline    SB 06/16/21 -  Violetta Marcelino, PT DPT Physical Therapist PT              PT Recommendation and Plan  Planned Therapy Interventions (PT): balance training, bed mobility training, gait training, patient/family education, orthotic fitting/training, transfer training, strengthening, stair training  Plan of Care Reviewed With: patient, mother, spouse  Progress: no change  Outcome Evaluation: PT eval completed. Pt oriented x4 but lethargic upon arrival, with O2 on 2L via NC with sats at 100%. Turned to RA and sats remain in mid 90s throughout session. Pt's spouse and mother present. Pt edu on spinal precautions and brace wear schedule. Pt performs rolling and sidelying to sit with CGA and verbal cues. Pt with mild strength deficits in B DF, but otherwise good LE strength and sensation intact. Pt performs sit to stand t/f and gait training with CGA, utilizing HHA for balance due to feeling slightly unsteady. Pt with decreased gait  speed and step length, but ambulates into hallway. Pt will benefit from skilled PT to improve precautions, bracing, gait, stairs and balance. Recommend d/c home with assist.     Time Calculation:    PT Charges     Row Name 02/13/23 1543             Time Calculation    Start Time 1310  10 min CR for a total of 55 min  -SB      Stop Time 1355  -SB      Time Calculation (min) 45 min  -SB      PT Received On 02/13/23  -SB      PT Goal Re-Cert Due Date 02/23/23  -SB            User Key  (r) = Recorded By, (t) = Taken By, (c) = Cosigned By    Initials Name Provider Type    SB Violetta Marcelino, PT DPT Physical Therapist              Therapy Charges for Today     Code Description Service Date Service Provider Modifiers Qty    16800647536 HC PT EVAL LOW COMPLEXITY 4 2/13/2023 Violetta Marcelino PT DPT GP 1          PT G-Codes  Outcome Measure Options: AM-PAC 6 Clicks Basic Mobility (PT)  AM-PAC 6 Clicks Score (PT): 19  AM-PAC 6 Clicks Score (OT): 20  PT Discharge Summary  Anticipated Discharge Disposition (PT): home with assist    Violetta Marcelino PT DPT  2/13/2023

## 2023-02-13 NOTE — ANESTHESIA POSTPROCEDURE EVALUATION
"Patient: Carol Rodriguez    Procedure Summary     Date: 02/13/23 Room / Location: [facility] OR  /  [facility] OR    Anesthesia Start: 0717 Anesthesia Stop: 1016    Procedures:       ANTERIOR DECOMPRESSION, ANTERIOR LUMBAR INTERBODY FUSION WITH INSTRUMENTATION L5-S1 (Spine Lumbar)      ANTERIOR LUMBAR EXPOSURE (Abdomen) Diagnosis: (M54.16)    Surgeons: CAMILA Goldstein MD; Jordy Macario DO Provider: Alhaji Harding CRNA    Anesthesia Type: general ASA Status: 2          Anesthesia Type: general    Vitals  Vitals Value Taken Time   /63 02/13/23 1115   Temp 97.6 °F (36.4 °C) 02/13/23 1115   Pulse 62 02/13/23 1115   Resp 16 02/13/23 1115   SpO2 100 % 02/13/23 1115           Post Anesthesia Care and Evaluation    Patient location during evaluation: PACU  Patient participation: complete - patient participated  Level of consciousness: awake and alert  Pain management: adequate    Airway patency: patent  Anesthetic complications: No anesthetic complications    Cardiovascular status: acceptable  Respiratory status: acceptable  Hydration status: acceptable    Comments: Blood pressure 136/63, pulse 62, temperature 97.6 °F (36.4 °C), temperature source Oral, resp. rate 16, height 151 cm (59.45\"), weight 65.1 kg (143 lb 8 oz), SpO2 100 %, not currently breastfeeding.    Pt discharged from PACU based on devorah score >8  No anesthesia care post op    "

## 2023-02-13 NOTE — PLAN OF CARE
Goal Outcome Evaluation:  Plan of Care Reviewed With: patient, spouse, mother           Outcome Evaluation: OT elie this date. Ox4 with some fatigue on arrival. PLOF indep with all adls and mobility. Pt educ on precautions, body mechanics, LSO wear/care. Pt completed rolling L and sidelying<>sit t/fs with CGA and vcs. Pt sat EOB for 8 minutes and donned brace with Min A and vcs for educ. Pt completed sit<>stand t/fs and amb bed>BR with CGA. Pt completed toileting with SBA. Pt amb to hallway and back with CGA. Pt doffed LSO sitting EOB with cues. B UE ROM WFL, B UE strength 4/5 throughout. Pt demonstrates decreased functional endurance, balance, increased pain and fatigue. Pt would benefit from continued educ on spinal precautions and LSO information. Skilled OT necessary to address deficits. Anticipated d/c to home with assist.

## 2023-02-13 NOTE — PLAN OF CARE
Goal Outcome Evaluation:  Plan of Care Reviewed With: patient, mother, spouse        Progress: no change  Outcome Evaluation: PT eval completed. Pt oriented x4 but lethargic upon arrival, with O2 on 2L via NC with sats at 100%. Turned to RA and sats remain in mid 90s throughout session. Pt's spouse and mother present. Pt edu on spinal precautions and brace wear schedule. Pt performs rolling and sidelying to sit with CGA and verbal cues. Pt with mild strength deficits in B DF, but otherwise good LE strength and sensation intact. Pt performs sit to stand t/f and gait training with CGA, utilizing HHA for balance due to feeling slightly unsteady. Pt with decreased gait speed and step length, but ambulates into hallway. Pt will benefit from skilled PT to improve precautions, bracing, gait, stairs and balance. Recommend d/c home with assist.

## 2023-02-14 VITALS
RESPIRATION RATE: 16 BRPM | OXYGEN SATURATION: 96 % | HEART RATE: 68 BPM | SYSTOLIC BLOOD PRESSURE: 120 MMHG | HEIGHT: 59 IN | BODY MASS INDEX: 28.93 KG/M2 | TEMPERATURE: 98.3 F | WEIGHT: 143.5 LBS | DIASTOLIC BLOOD PRESSURE: 62 MMHG

## 2023-02-14 LAB
ANION GAP SERPL CALCULATED.3IONS-SCNC: 6 MMOL/L (ref 5–15)
BASOPHILS # BLD AUTO: 0.02 10*3/MM3 (ref 0–0.2)
BASOPHILS NFR BLD AUTO: 0.3 % (ref 0–1.5)
BUN SERPL-MCNC: 8 MG/DL (ref 6–20)
BUN/CREAT SERPL: 9.6 (ref 7–25)
CALCIUM SPEC-SCNC: 9 MG/DL (ref 8.6–10.5)
CHLORIDE SERPL-SCNC: 107 MMOL/L (ref 98–107)
CO2 SERPL-SCNC: 29 MMOL/L (ref 22–29)
CREAT SERPL-MCNC: 0.83 MG/DL (ref 0.57–1)
DEPRECATED RDW RBC AUTO: 47.2 FL (ref 37–54)
EGFRCR SERPLBLD CKD-EPI 2021: 82.3 ML/MIN/1.73
EOSINOPHIL # BLD AUTO: 0.01 10*3/MM3 (ref 0–0.4)
EOSINOPHIL NFR BLD AUTO: 0.2 % (ref 0.3–6.2)
ERYTHROCYTE [DISTWIDTH] IN BLOOD BY AUTOMATED COUNT: 13.9 % (ref 12.3–15.4)
FERRITIN SERPL-MCNC: 99.52 NG/ML (ref 13–150)
FOLATE SERPL-MCNC: >20 NG/ML (ref 4.78–24.2)
GLUCOSE SERPL-MCNC: 90 MG/DL (ref 65–99)
HCT VFR BLD AUTO: 32.4 % (ref 34–46.6)
HGB BLD-MCNC: 10.1 G/DL (ref 12–15.9)
IMM GRANULOCYTES # BLD AUTO: 0.02 10*3/MM3 (ref 0–0.05)
IMM GRANULOCYTES NFR BLD AUTO: 0.3 % (ref 0–0.5)
IRON 24H UR-MRATE: 25 MCG/DL (ref 37–145)
IRON SATN MFR SERPL: 10 % (ref 20–50)
LYMPHOCYTES # BLD AUTO: 1.54 10*3/MM3 (ref 0.7–3.1)
LYMPHOCYTES NFR BLD AUTO: 23.8 % (ref 19.6–45.3)
MCH RBC QN AUTO: 29.1 PG (ref 26.6–33)
MCHC RBC AUTO-ENTMCNC: 31.2 G/DL (ref 31.5–35.7)
MCV RBC AUTO: 93.4 FL (ref 79–97)
MONOCYTES # BLD AUTO: 0.55 10*3/MM3 (ref 0.1–0.9)
MONOCYTES NFR BLD AUTO: 8.5 % (ref 5–12)
NEUTROPHILS NFR BLD AUTO: 4.33 10*3/MM3 (ref 1.7–7)
NEUTROPHILS NFR BLD AUTO: 66.9 % (ref 42.7–76)
NRBC BLD AUTO-RTO: 0 /100 WBC (ref 0–0.2)
PLATELET # BLD AUTO: 202 10*3/MM3 (ref 140–450)
PMV BLD AUTO: 11.4 FL (ref 6–12)
POTASSIUM SERPL-SCNC: 4.1 MMOL/L (ref 3.5–5.2)
RBC # BLD AUTO: 3.47 10*6/MM3 (ref 3.77–5.28)
SODIUM SERPL-SCNC: 142 MMOL/L (ref 136–145)
TIBC SERPL-MCNC: 253 MCG/DL (ref 298–536)
TRANSFERRIN SERPL-MCNC: 170 MG/DL (ref 200–360)
VIT B12 BLD-MCNC: 649 PG/ML (ref 211–946)
WBC NRBC COR # BLD: 6.47 10*3/MM3 (ref 3.4–10.8)

## 2023-02-14 PROCEDURE — 82728 ASSAY OF FERRITIN: CPT | Performed by: STUDENT IN AN ORGANIZED HEALTH CARE EDUCATION/TRAINING PROGRAM

## 2023-02-14 PROCEDURE — 25010000002 CEFAZOLIN PER 500 MG: Performed by: ORTHOPAEDIC SURGERY

## 2023-02-14 PROCEDURE — 97116 GAIT TRAINING THERAPY: CPT

## 2023-02-14 PROCEDURE — 82746 ASSAY OF FOLIC ACID SERUM: CPT | Performed by: STUDENT IN AN ORGANIZED HEALTH CARE EDUCATION/TRAINING PROGRAM

## 2023-02-14 PROCEDURE — 84466 ASSAY OF TRANSFERRIN: CPT | Performed by: STUDENT IN AN ORGANIZED HEALTH CARE EDUCATION/TRAINING PROGRAM

## 2023-02-14 PROCEDURE — 82607 VITAMIN B-12: CPT | Performed by: STUDENT IN AN ORGANIZED HEALTH CARE EDUCATION/TRAINING PROGRAM

## 2023-02-14 PROCEDURE — 85025 COMPLETE CBC W/AUTO DIFF WBC: CPT | Performed by: STUDENT IN AN ORGANIZED HEALTH CARE EDUCATION/TRAINING PROGRAM

## 2023-02-14 PROCEDURE — 83540 ASSAY OF IRON: CPT | Performed by: STUDENT IN AN ORGANIZED HEALTH CARE EDUCATION/TRAINING PROGRAM

## 2023-02-14 PROCEDURE — 80048 BASIC METABOLIC PNL TOTAL CA: CPT | Performed by: STUDENT IN AN ORGANIZED HEALTH CARE EDUCATION/TRAINING PROGRAM

## 2023-02-14 RX ORDER — DESVENLAFAXINE 25 MG/1
25 TABLET, EXTENDED RELEASE ORAL DAILY
Status: DISCONTINUED | OUTPATIENT
Start: 2023-02-14 | End: 2023-02-14 | Stop reason: HOSPADM

## 2023-02-14 RX ORDER — OXYCODONE AND ACETAMINOPHEN 10; 325 MG/1; MG/1
1 TABLET ORAL EVERY 4 HOURS PRN
Qty: 30 TABLET | Refills: 0 | Status: ON HOLD | OUTPATIENT
Start: 2023-02-14 | End: 2023-02-23 | Stop reason: SDUPTHER

## 2023-02-14 RX ORDER — ALBUTEROL SULFATE 2.5 MG/3ML
2.5 SOLUTION RESPIRATORY (INHALATION) EVERY 4 HOURS PRN
Status: DISCONTINUED | OUTPATIENT
Start: 2023-02-14 | End: 2023-02-14 | Stop reason: HOSPADM

## 2023-02-14 RX ORDER — NALOXONE HYDROCHLORIDE 4 MG/.1ML
1 SPRAY NASAL AS NEEDED
Qty: 2 EACH | Refills: 1 | Status: SHIPPED | OUTPATIENT
Start: 2023-02-14

## 2023-02-14 RX ADMIN — Medication 3 ML: at 08:54

## 2023-02-14 RX ADMIN — OXYCODONE AND ACETAMINOPHEN 1 TABLET: 325; 10 TABLET ORAL at 04:22

## 2023-02-14 RX ADMIN — OXYCODONE AND ACETAMINOPHEN 1 TABLET: 325; 10 TABLET ORAL at 09:04

## 2023-02-14 RX ADMIN — FERROUS SULFATE TAB 325 MG (65 MG ELEMENTAL FE) 325 MG: 325 (65 FE) TAB at 08:54

## 2023-02-14 RX ADMIN — PREGABALIN 150 MG: 75 CAPSULE ORAL at 08:53

## 2023-02-14 RX ADMIN — POLYETHYLENE GLYCOL 3350 17 G: 17 POWDER, FOR SOLUTION ORAL at 08:54

## 2023-02-14 RX ADMIN — CEFAZOLIN SODIUM 1 G: 1 INJECTION, POWDER, FOR SOLUTION INTRAMUSCULAR; INTRAVENOUS at 08:54

## 2023-02-14 RX ADMIN — FAMOTIDINE 20 MG: 20 TABLET, FILM COATED ORAL at 08:54

## 2023-02-14 RX ADMIN — CEFAZOLIN SODIUM 1 G: 1 INJECTION, POWDER, FOR SOLUTION INTRAMUSCULAR; INTRAVENOUS at 00:20

## 2023-02-14 RX ADMIN — Medication 1 TABLET: at 08:53

## 2023-02-14 RX ADMIN — DOCUSATE SODIUM 50 MG AND SENNOSIDES 8.6 MG 1 TABLET: 8.6; 5 TABLET, FILM COATED ORAL at 08:53

## 2023-02-14 NOTE — CONSULTS
Consult Note    Referring Provider: Dr Goldstein  Reason for Consultation: Medical management    Patient Care Team:  Erna Leung DO as PCP - General  Erna Leung DO as PCP - Family Medicine  Lydia Springer PA as Referring Physician (Physician Assistant)  Wilbert Pascual MD as Cardiologist (Cardiology)    Chief complaint low back pain with radiculopathy    Subjective .     History of present illness:  The patient presents today for surgical correction after failing conservative management.  Outpatient work-up has been reviewed through provided outpatient notes per attending.  They have been through anti-inflammatories, muscle relaxers, and pain medication.  The pain has progressed to the point in time where it is affecting their activities of daily living and after being explained all their options, elected to undergo surgical correction.  Their primary care physician does not attend here at [facility]; therefore, I have been asked to take care of their primary medical needs in the perioperative period.  The postoperative pain is as expected.  There are no other precipitating or relieving factors. I have been requested by the Attending Physician to provide Medical Consultation in the perioperative period. The patient understands my role in their hospitalization and agrees with my treatment plan. They understand the importance of follow up with their PCP upon discharge  from [facility] for any concerns or abnormalities.  All questions were encouraged and answered to the best of my ability.      REVIEW OF SYSTEMS:    CONSTITUTIONAL:  Negative for anorexia, chills, fevers, night sweats and weight loss  EYES:  negative for eye dryness, icterus and redness  HEENT:   negative for dental problems, epistaxis, facial trauma and thrush  RESPIRATORY:  negative for chest tightness, cough, dyspnea on exertion, pneumonia and sputum  CARDIOVASCULAR: negative for chest pain, dyspnea,  exertional chest pressure/discomfort, irregular heart beat, palpitations, paroxysmal nocturnal dyspnea and syncope  GASTROINTESTINAL:  negative for abdominal pain, hematemesis, jaundice, melena and rectal bleeding. No significant changes in bowel habits preoperatively.   MUSCULOSKELETAL:  negative for muscle weakness, myalgias and neck pain, outside of surgical issues noted above  NEUROLOGICAL:   negative for dizziness, headaches, seizures, speech problems, tremors and vertigo  INTEGUMENT: negative for pruritus, rash, skin color change and skin lesion(s)         History    Past Medical History:   Diagnosis Date   • Anxiety    • Arthralgia    • Arthritis    • Francisco's esophagus    • Carcinoembryonic antigen present    • Chronic kidney disease    • COPD (chronic obstructive pulmonary disease) (HCC)    • Coronary artery calcification 10/17/2022    PT STATES CARDIOLOGIST AND DR.J DELCID HAVE CLEARED HER OF THIS, CHOLESTEROL IS GOOD, NO PROBLEMS AT THIS TIME   • Fibrocystic breast disease    • GERD (gastroesophageal reflux disease)    • Hypertension    • Hypokalemia    • Infiltrating ductal carcinoma of breast (HCC)      ER Status: Positive; IL Status: Positive   • Iron deficiency anemia    • Osteopenia    • Tobacco user      Past Surgical History:   Procedure Laterality Date   • ANTERIOR LUMBAR EXPOSURE N/A 2023    Procedure: ANTERIOR LUMBAR EXPOSURE;  Surgeon: Jordy Macario DO;  Location: [facility] OR;  Service: Vascular;  Laterality: N/A;   • BREAST BIOPSY Right 2006    Biopsy of breast, right, benign fibrocystic changes   • CARPAL TUNNEL RELEASE      left 2011; right 2011   •  SECTION      x2. Most recently in    • COLONOSCOPY     • ENDOSCOPY     • LUMBAR FUSION N/A 2023    Procedure: ANTERIOR DECOMPRESSION, ANTERIOR LUMBAR INTERBODY FUSION WITH INSTRUMENTATION L5-S1;  Surgeon: CAMILA Goldstein MD;  Location: [facility] OR;  Service: Orthopedic Spine;  Laterality: N/A;   •  MASTECTOMY MODIFIED RADICAL / SIMPLE / COMPLETE Right 2006   • SIMPLE MASTECTOMY Left 2006     prophylactic, left with left breast reconstruction   • TONSILLECTOMY     • TOTAL ABDOMINAL HYSTERECTOMY WITH SALPINGO OOPHORECTOMY  2006     Family History   Problem Relation Age of Onset   • Polymyalgia rheumatica Mother    • Heart attack Mother    • Kidney disease Mother    • Diabetes Father    • Hypertension Father    • Other Father         heart issues   • No Known Problems Brother    • No Known Problems Maternal Grandmother    • Heart attack Maternal Grandfather         52 years old    • No Known Problems Paternal Grandmother    • Diabetes Paternal Grandfather    • No Known Problems Brother    • No Known Problems Brother    • No Known Problems Brother      Social History     Tobacco Use   • Smoking status: Former     Packs/day: 1.00     Years: 25.00     Pack years: 25.00     Types: Cigarettes     Quit date: 2017     Years since quittin.2   • Smokeless tobacco: Never   Vaping Use   • Vaping Use: Former   • Quit date: 8/10/2022   • Substances: Nicotine   • Devices: Pre-filled pod   Substance Use Topics   • Alcohol use: No   • Drug use: No     Medications Prior to Admission   Medication Sig Dispense Refill Last Dose   • Calcium 500 MG tablet Take 500 mg by mouth Daily.   2023 at 2000   • FeroSul 325 (65 Fe) MG tablet TAKE 1 TABLET BY MOUTH EVERY DAY 30 tablet 6 2023 at 0900   • HYDROcodone-acetaminophen (NORCO)  MG per tablet Take 1 tablet by mouth Every 4 (Four) Hours As Needed.   2023 at 1500   • lisdexamfetamine (VYVANSE) 40 MG capsule Take 40 mg by mouth Every Morning   2023 at 0900   • Omega-3 Fatty Acids (FISH OIL) 1000 MG capsule capsule    2023 at 2000   • pantoprazole (PROTONIX) 40 MG EC tablet Take 40 mg by mouth Daily.   2023 at 0900   • pregabalin (LYRICA) 150 MG capsule Take 150 mg by mouth 3 (Three) Times a Day.   2023 at 2000   •  therapeutic multivitamin-minerals (THERAGRAN-M) tablet Take 1 tablet by mouth.   2/12/2023 at 0900   • tiZANidine (ZANAFLEX) 4 MG tablet Take 4 mg by mouth 3 (Three) Times a Day As Needed.   2/12/2023 at 1200   • albuterol (ProAir RespiClick) 108 (90 Base) MCG/ACT inhaler Inhale 2 puffs Every 4 (Four) Hours As Needed for Wheezing.      • Desvenlafaxine Succinate ER 25 MG tablet sustained-release 24 hour Take 25 mg by mouth Daily.      • diazePAM (VALIUM) 10 MG tablet Take 10 mg by mouth Every 12 (Twelve) Hours As Needed for Anxiety.  2 2/11/2023   • Prolia 60 MG/ML solution prefilled syringe syringe TWICE A YEAR   More than a month       Allergies:  Patient has no known allergies.    Objective     Vital Signs   Temp:  [97.6 °F (36.4 °C)-98.4 °F (36.9 °C)] 97.9 °F (36.6 °C)  Heart Rate:  [55-80] 68  Resp:  [12-18] 16  BP: (114-163)/(57-75) 115/62          Physical Exam:  Constitutional: oriented to person, place, and time. appears well-developed.   Head: Normocephalic and atraumatic.   Eyes: Pupils are equal, round, and reactive to light.  No icterus or erythema  Neck: Neck supple.  Without masses or carotid bruit  Cardiovascular: Regular rhythm and normal heart sounds.  No significant lift, rub or murmur noted  Pulmonary/Chest: Effort normal and breath sounds normal. CTAB, encourage deep breathing.  Abdominal: Soft. Bowel sounds are normal to hypoactive. No significant distension. There is no rebound and no guarding.   Musculoskeletal: Normal range of motion and no edema or tenderness outside of surgical area.   Neurological: Pt is alert and oriented to person, place, and time.  normal reflexes present.  Somewhat sedated from anesthesia and pain medicine noted  Skin: Skin is warm and dry.  No new rashes of concern.    Results Review:   I reviewed the patient's new imaging results and agree with the interpretation.      Assessment & Plan     1.  Lumbar stenosis--- postop care  2.  GERD without esophagitis--Pepcid  twice daily, antireflux measures  3.  Osteoporosis-- continue current medication regimen  4.  Drug induced constipation--bowel regimen  5.  COPD/former smoker-- O2 and nebs as needed, monitor O2 sats, currently stable  6.  Iron deficient anemia--stable, hemoglobin 10.3, continue with iron replacement and monitor daily labs    I-S every hour while awake  Early mobilization, maximize efforts with therapy  Wound care and monitor for infection  Follow along with daily labs     Very pleasant 57-year-old female followed by Dr. Erna moss for PCP care needs.  PMHx COPD, GERD, iron deficient anemia, chronic low back pain followed by Dr. Reynolds for injection therapy 10+ years.  Medically stable postop day #1.  Pain controlled.  Plan for step 2 of 2 OR tomorrow.  We will follow along for general medical care needs.  Recommendations otherwise as above.    JOSE DANIEL Barajas  02/14/23  07:09 CST

## 2023-02-14 NOTE — PLAN OF CARE
Goal Outcome Evaluation:  Plan of Care Reviewed With: patient        Progress: improving  Outcome Evaluation: Pt. improving with therapy. Pt was Modified Independent for all bed mobility. Pt. was SBA for transfers and CGA for gait. She walked 250' with an antalgic gait pattern. She was able to go up and down 3 steps with CGA. Pt. is supposed to be discharged home today.

## 2023-02-14 NOTE — PLAN OF CARE
Goal Outcome Evaluation:  Plan of Care Reviewed With: patient        Progress: no change  Outcome Evaluation: Pt A&Ox4. , room air. Denies n/t. C/o pain, prn pain medication given with some relief. monroe SARMIENTO with LSO. voiding. abd drsg - CDI. SCD, VTE when in bed. IV abx. Call light within reach. Safety maintained. Pt to be DC home today.

## 2023-02-14 NOTE — PAYOR COMM NOTE
"Carol Choudhury (57 y.o. Female) [id number]  Admit 2/13   [facility] phone    Fax        Date of Birth   1965    Social Security Number       Address   [address] [place] [address]    Home Phone   [phone]    MRN   [id number]       Muslim   Druze    Marital Status                               Admission Date   2/13/23    Admission Type   Elective    Admitting Provider   CAMILA Goldstein MD    Attending Provider   CAMILA Goldstein MD    Department, Room/Bed   [facility] 3A, 328/1       Discharge Date       Discharge Disposition       Discharge Destination                               Attending Provider: CAMILA Goldstein MD    Allergies: No Known Allergies    Isolation: None   Infection: None   Code Status: CPR    Ht: 151 cm (59.45\")   Wt: 65.1 kg (143 lb 8 oz)    Admission Cmt: None   Principal Problem: Lumbar stenosis [M48.061]                 Active Insurance as of 2/13/2023     Primary Coverage     Payor Plan Insurance Group Employer/Plan Group    [facility] BLUE CROSS [id number]     Payor Plan Address Payor Plan Phone Number Payor Plan Fax Number Effective Dates    [address]   9/2/2018 - None Entered    [place] [address]       Subscriber Name Subscriber Birth Date Member ID       CAROL CHOUDHURY 1965 [id number]                 Emergency Contacts      (Rel.) Home Phone Work Phone Mobile Phone    Mehdi Choudhury (Spouse) [phone] -- --              Current Facility-Administered Medications   Medication Dose Route Frequency Provider Last Rate Last Admin   • acetaminophen (TYLENOL) tablet 650 mg  650 mg Oral Q8H CAMILA Goldstein MD   650 mg at 02/13/23 1624   • albuterol sulfate HFA (PROVENTIL HFA;VENTOLIN HFA;PROAIR HFA) inhaler 1 puff  1 puff Inhalation Q4H PRN Immanuel Mitchell PA       • Calcium Carb-Cholecalciferol 600-20 MG-MCG tablet 1 tablet  1 tablet Oral Daily CAMILA Goldstein " MD Ruben   1 tablet at 02/13/23 1214   • ceFAZolin (ANCEF) 1 g/100 mL 0.9% NS IVPB (mbp)  1 g Intravenous Q8H CAMILA Goldstein MD   1 g at 02/14/23 0020   • Desvenlafaxine Succinate ER 25 mg  25 mg Oral Daily Immanuel Mitchell PA       • diazePAM (VALIUM) tablet 10 mg  10 mg Oral Q12H PRN CAMILA Goldstein MD       • diphenhydrAMINE (BENADRYL) capsule 25 mg  25 mg Oral Nightly PRN CAMILA Goldstein MD   25 mg at 02/13/23 2033   • famotidine (PEPCID) injection 20 mg  20 mg Intravenous Q12H CAMILA Goldstein MD        Or   • famotidine (PEPCID) tablet 20 mg  20 mg Oral Q12H CAMILA Goldstein MD   20 mg at 02/13/23 2033   • ferrous sulfate tablet 325 mg  325 mg Oral Daily CAMILA Goldstein MD   325 mg at 02/13/23 1214   • HYDROmorphone (DILAUDID) injection 1 mg  1 mg Intravenous Q2H PRN CAMILA Goldstein MD   1 mg at 02/13/23 1631    And   • naloxone (NARCAN) injection 0.4 mg  0.4 mg Intravenous Q5 Min PRN CAMILA Goldstein MD       • ondansetron (ZOFRAN) tablet 4 mg  4 mg Oral Q6H PRN CAMILA Goldstein MD        Or   • ondansetron (ZOFRAN) injection 4 mg  4 mg Intravenous Q6H PRN CAMILA Goldstein MD       • oxyCODONE-acetaminophen (PERCOCET)  MG per tablet 1 tablet  1 tablet Oral Q4H PRN CAMILA Goldstein MD   1 tablet at 02/14/23 0422   • oxyCODONE-acetaminophen (PERCOCET) 5-325 MG per tablet 1 tablet  1 tablet Oral Q4H PRN CAMILA Goldtsein MD       • polyethylene glycol (MIRALAX) packet 17 g  17 g Oral TID Kwadwo Choudhury MD   17 g at 02/13/23 2033   • pregabalin (LYRICA) capsule 150 mg  150 mg Oral TID CAMILA Goldstein MD   150 mg at 02/13/23 2033   • sennosides-docusate (PERICOLACE) 8.6-50 MG per tablet 1 tablet  1 tablet Oral BID Kwadwo Choudhury MD   1 tablet at 02/13/23 2033   • sodium chloride 0.9 % flush 10 mL  10 mL Intravenous PRN CAMILA Goldstein MD       • sodium chloride 0.9 % flush 3 mL  3 mL Intravenous Q12H CAMILA Goldstein MD   3 mL at 02/13/23 2033    • sodium chloride 0.9 % infusion 40 mL  40 mL Intravenous PRN CAMILA Goldstein MD       • sodium chloride 0.9 % infusion  75 mL/hr Intravenous Continuous CAMILA Goldstein MD 75 mL/hr at 02/13/23 1127 75 mL/hr at 02/13/23 1127   • tiZANidine (ZANAFLEX) tablet 4 mg  4 mg Oral Q8H PRN CAMILA Goldstein MD            Operative/Procedure Notes (last 48 hours)      CAMILA Goldstein MD at 02/13/23 0650        LUMBAR ANTERIOR INTERBODY FUSION  Procedure Note    Carol Rodriguez  2/13/2023    Pre-op Diagnosis:    1. Increasing chronic back pain.   2. Bilateral buttock, thigh and leg radiculopathy, now right worse than left.   3. Severe neurogenic claudication.   4. Degenerative disc disease L2 to S1, severe at L3 to S1.   5. Multilevel lumbar facet arthropathy, worse at L3 to S1.   6. Degenerative scoliosis, concave right L3-4, concave left L5-S1.   7. Central and foraminal stenosis L3 to S1, worse left L3-4, bilateral L4 to S1.   8. Limb length discrepancy, right shorter than left.     Post-op Diagnosis:    same    Procedure/CPT® Codes:     1. Anterior discectomy decompression with bilateral neural foraminotomy L5-S1  2. Anterior lumbar interbody fusion L5-S1  3. Anterior spinal instrumentation L5-S1 (ATE anterior plate and screws)  4. Use of titanium interbody biomechanical device for fusion L5-S1 (ATEC titanium spacer and screws)  5. Use of allograft bone matrix for fusion L5-S1 ([employer] Catalyst)  6. Use of fluoroscopy for confirmation of surgical level, placement of interbody spacer and instrumentation  7. Intraoperative neural monitoring     Anesthesia: General     Surgeon: CHRISTINA Goldstein MD     Co Surgeon: Dr. Jordy Macario D.O.     Assistant: Norm Choudhury PA-C     Estimated Blood Loss: 50 mL     Complications: None     Condition: Stable to PACU.     Indications:     The patient is a 57-year-old who sees Dr. Erna Leung for medical issues.  She presented to the office with increasing chronic  low back pain along with bilateral buttock, thigh, and leg radiculopathy, with the right side worse than the left as well as symptoms consistent with severe neurogenic claudication.  Imaging studies revealed degenerative disc disease and facet arthropathy that was severe from L3-S1 associated with a degenerative scoliosis concave to the right at L3-4 and concave to the left at L5-S1.  The degenerative changes created central and foraminal stenosis from L3-S1 that was felt to be contributing to the vast majority of her symptoms.    After failing all conservative measures, it was mutually decided that surgery would be the best option.  Risks, benefits, and complications of surgery were discussed in detail. The patient appeared well informed and wished to proceed. We specifically discussed the risk of infection, blood loss, nerve root injury, CSF leak, and the possibility of incomplete resolution of symptoms. We also discussed the possible risk of a nonunion and the potential need for additional surgery in the event of a pseudoarthrosis or hardware failure.    We elected to proceed with a staged operation.  Today we are performing an anterior decompression and fusion of L5-S1, it is planned that we will be returning to surgery for a second lateral procedure at a later date to address L3-4 and L4-5.  The L5-S1 level requires a separate stage as it is not accessible through a lateral approach.  The patient will also require a final posterior procedure involving a posterior spinal fusion with instrumentation spanning L3-S1.     Operative Procedure:     After obtaining informed consent and verifying the correct operative level, the patient was brought to the operating room and placed supine on an operating table. A general anesthetic was provided by the anesthesia service with the assistance of an endotracheal tube. Once this was appropriately positioned and secured, the anterior abdominal region was prepped and draped in  usual sterile fashion. A surgical timeout was taken to confirm this was the correct patient, we were working at the correct level, and that preoperative antibiotics were given in a timely fashion.     At this point, Dr. Jordy Macario D.O. provided vascular access to the L5-S1 level. He performed a left sided anterior retroperitoneal approach to the L5-S1 segment. Please see his separate dictated operative report regarding the details on the approach itself.  When I entered the procedure, self retaining retractors were already in position with excellent exposure of the L5-S1 disc space.     After confirming we were at the correct level using fluoroscopy, I used a long handle 10 blade scalpel to cut into the L5-S1 disc space. A Garza elevator was used to remove disc material off of the endplates. Disc material was retrieved using pituitaries and Kerrisons. A disc space distractor was then placed into the L5-S1 disc space and I used curettes to remove posterior disc material. Kerrisons were used to remove posterior osteophytes across the endplates of L5 and S1. There was high-grade stenosis centrally but also foraminally. I then performed a bilateral neural foraminotomy with Kerrisons and curettes. The decompression was much more involved than what is usually required for an anterior lumbar interbody fusion by itself and required significantly more time to perform.  This was due to the severe disc space collapse and high-grade foraminal stenosis.     After the decompression was completed bilaterally and centrally, I used a series of endplate scrapers to prepare the endplates for interbody fusion. Trial spacers were then malleted into position and it was felt that a 14 mm titanium spacer with 15 degrees lordosis from the [facility] instrumentation set would be the best fit to restore disc height also restoring foraminal height and providing some indirect decompression. The disc space was then thoroughly irrigated with saline  solution.  Gelfoam powder with thrombin was used to control epidural bleeding.     A 14 mm titanium spacer from the ATEC instrumentation set was then packed as tightly as possible with an allograft bone matrix called Catalyst from [employer]. This spacer was then malleted into the L5-S1 disc space under fluoroscopic guidance. It was placed as an interbody biomechanical device to assist with fusion.  I then placed an 8.5 mm x 30 mm graft bolt through the spacer into L5 and a 5.0 mm x 30 mm screw through the spacer into S1.  The screws were placed to help maintain position of the spacer as well as to assist with the fusion process.     A 4-hole anterior plate from the ATEC instrumentation set was then chosen. The 4 holes were drilled and four 30 mm screws were used to fix the plate across the L5-S1 segment augmenting the fusion.      We then inspected the entire operative field for any signs of bleeding.  Bleeding was once again controlled using thrombin with Gelfoam powder and bipolar cautery.  Once we ensured that adequate hemostasis was accomplished, final fluoroscopy imaging was taken to confirm adequate position of our implants. There was excellent restoration of disc height and the plate and screw construct appeared to be adequately positioned across L5-S1.     Please see Dr. Jordy Macario's separate dictated operative report regarding the closure of the wound. Once this was accomplished, the patient was extubated and sent to the recovery room in good stable condition. We estimated blood loss to be approximately 50 mL and the patient remained hemodynamically stable during the procedure.     Intraoperative neuro monitoring was ordered and carried out throughout the procedure to add an increased level of safety for the patient.  The interpreting physician was available by means of real-time continuous, bidirectional, remote audio and visual communication as needed throughout the entire procedure.  Modalities used  during the procedure included SSEP, MEP, EMG, and TOF.  There were no neuro monitoring signal changes during the procedure.    Dr. Jordy Macario D.O. provided vascular access to the L5-S1 level and acted as a co-surgeon in this fashion.  Norm Choudhury PA-C provided critical assistance during the decompression at L5-S1 as well as during the placement of the titanium spacer, bone graft and instrumentation to obtain a fusion across L5-S1.    CHRISTINA Goldstein MD    Date: 2023  Time: 14:38 CST    Electronically signed by CAMILA Goldstein MD at 23 1438     Jordy Macario DO at 23 0803        Carol Rodriguez  2023     PREOPERATIVE DIAGNOSIS:   1. Increasing chronic back pain.   2. Bilateral buttock, thigh and leg radiculopathy, now right worse than left.   3. Severe neurogenic claudication.   4. Degenerative disc disease L2 to S1, severe at L3 to S1.   5. Multilevel lumbar facet arthropathy, worse at L3 to S1.   6. Degenerative scoliosis, concave right L3-4, concave left L5-S1.   7. Central and foraminal stenosis L3 to S1, worse left L3-4, bilateral L4 to S1.   8. Limb length discrepancy, right shorter than left.      POSTOPERATIVE DIAGNOSIS: same     PROCEDURE PERFORMED:   1.  Anterior lumbar interbody fusion of L5-S1 with instrumentation     SURGEON: Jordy Macario DO   COSURGEON: Ruben Goldstein MD     ANESTHESIA: General.    PREPARATION: Routine.    STAFF: Circulator: Kyle Choudhury RN  Scrub Person: Arianna Mario Steven  Vendor Representative: Yasmani Block  Assistant: Norm Choudhury PA-C    ESTIMATED BLOOD LOSS: 50 mL    SPECIMENS: None    COMPLICATIONS: None    INDICATIONS: Carol Rodriguez is a 57 y.o. female who you are currently following for chronic back pain.  Carol Carrolls scheduled for anterior lumbar interbody fusion of L5-S1 with Dr. Goldstein on 23.  The patient denies any history of DVT.  She has two previous  sections and hysterectomy.  The indications, risks, and possible complications of the procedure were explained to the patient, who voiced understanding and wished to proceed with surgery.     PROCEDURE IN DETAIL:   The patient was taken to the operating room and placed on the operating table in a supine position. After general anesthesia was obtained, the abdomen was prepped and draped in a sterile manner.  A transverse incision was then made in the left lower quadrant.  Careful dissection was made down through the subcutaneous tissues using the Bovie cautery to ensure hemostasis.  Any crossing veins were ligated with 3-0 silk suture and hemoclips.  The rectus fascia was identified.  It was incised with the Bovie cautery.  Kocher clamps are placed on each side of the rectus fascia and subfascial planes were established in a cephalad and caudad direction.  Once the subfascial planes were established the attention was then turned to the left rectus muscle.  Blunt mobilization was made of the left rectus muscle including its blood supply medially and to the right.  Once it was fully mobilized the attention was then turned laterally to the peritoneal reflection.  Entrance into the retroperitoneal space was established with the use of a sponge stick and the Bovie cautery.  Continued blunt mobilization was made with my hand using a finger sweeping motion moving the peritoneal contents and sacral fat pad medially and to the right.  Once it was fully mobilized the Brau-Choudhury retractor system was set up.  The retractor blades were set in place.  The left iliac vein was then carefully dissected free and placed safely behind the retractor blade.  The sacral vessels were carefully taken down with hemoclips.  At this point full exposure was established of the L5-S1 disc space.  The next part of the case will be dictated by Dr. Goldstein. Upon completion of Dr. Goldstein's part of the case the wound bed was irrigated with antibiotic saline and hemostasis was  observed.  The retractor blades were carefully taken out one at a time. VersaWrap was carefully placed over the L5-S1 disc base repair. The structures were then placed back in their normal anatomic positions.  The rectus fascia was then closed with a #1 PDS in a running fashion to meet in the midline.  The deep layers were closed with a 2-0 Vicryl in a running fashion.  The subcutaneous layers were closed with a 3-0 Vicryl in a running fashion.  The skin was then reapproximated using a 4-0 Monocryl in a subcuticular fashion.  The wound was then cleaned.  Sterile dressings were applied. The patient tolerated the procedure well. Sponge and needle counts were correct. The patient was then awakened and extubated in the operating room and taken to the recovery room in good condition.    Jordy Macario DO    Date: 2/13/2023 Time: 10:08 CST      Electronically signed by Jordy Macario DO at 02/13/23 1009          Consult Notes (last 24 hours)      Immanuel Mitchell PA at 02/14/23 0709      Consult Orders    1. Inpatient [facility] Consult [[id number]] ordered by CAMILA Goldstein MD at 02/13/23 0904          Attestation signed by Kwadwo Choudhury MD at 02/14/23 0754    Assessment: 57-year-old female with past medical history of ADHD, depression/anxiety, GERD, and COPD who presents postoperatively from lumbar back pain.  She underwent step 1 on 2/13 and planning for step 2 on 2/15.    Plan:    Perioperative care  Starting bowel regimen, counseled on incentive spirometry and working with physical and Occupational Therapy.    COPD  Added inhalers this morning.  Stable.    ADHD  Recommend holding Vyvanse in the perioperative period due to vasoconstrictive risk.    Anemia  Mixed iron studies, possibly slightly iron deficient but this can be addressed as an outpatient.  We will continue to monitor.    I have reviewed this documentation and agree.                      Consult Note    Referring Provider:   Yasminee  Reason for Consultation: Medical management    Patient Care Team:  Erna Leung DO as PCP - General  Erna Leung DO as PCP - Family Medicine  Lydia Springer PA as Referring Physician (Physician Assistant)  Wilbert Pascual MD as Cardiologist (Cardiology)    Chief complaint low back pain with radiculopathy    Subjective .     History of present illness:  The patient presents today for surgical correction after failing conservative management.  Outpatient work-up has been reviewed through provided outpatient notes per attending.  They have been through anti-inflammatories, muscle relaxers, and pain medication.  The pain has progressed to the point in time where it is affecting their activities of daily living and after being explained all their options, elected to undergo surgical correction.  Their primary care physician does not attend here at [facility]; therefore, I have been asked to take care of their primary medical needs in the perioperative period.  The postoperative pain is as expected.  There are no other precipitating or relieving factors. I have been requested by the Attending Physician to provide Medical Consultation in the perioperative period. The patient understands my role in their hospitalization and agrees with my treatment plan. They understand the importance of follow up with their PCP upon discharge  from [facility] for any concerns or abnormalities.  All questions were encouraged and answered to the best of my ability.      REVIEW OF SYSTEMS:    CONSTITUTIONAL:  Negative for anorexia, chills, fevers, night sweats and weight loss  EYES:  negative for eye dryness, icterus and redness  HEENT:   negative for dental problems, epistaxis, facial trauma and thrush  RESPIRATORY:  negative for chest tightness, cough, dyspnea on exertion, pneumonia and sputum  CARDIOVASCULAR: negative for chest pain, dyspnea, exertional chest pressure/discomfort,  irregular heart beat, palpitations, paroxysmal nocturnal dyspnea and syncope  GASTROINTESTINAL:  negative for abdominal pain, hematemesis, jaundice, melena and rectal bleeding. No significant changes in bowel habits preoperatively.   MUSCULOSKELETAL:  negative for muscle weakness, myalgias and neck pain, outside of surgical issues noted above  NEUROLOGICAL:   negative for dizziness, headaches, seizures, speech problems, tremors and vertigo  INTEGUMENT: negative for pruritus, rash, skin color change and skin lesion(s)         History    Past Medical History:   Diagnosis Date   • Anxiety    • Arthralgia    • Arthritis    • Francisco's esophagus    • Carcinoembryonic antigen present    • Chronic kidney disease    • COPD (chronic obstructive pulmonary disease) (HCC)    • Coronary artery calcification 10/17/2022    PT STATES CARDIOLOGIST AND DR.J DELCID HAVE CLEARED HER OF THIS, CHOLESTEROL IS GOOD, NO PROBLEMS AT THIS TIME   • Fibrocystic breast disease    • GERD (gastroesophageal reflux disease)    • Hypertension    • Hypokalemia    • Infiltrating ductal carcinoma of breast (HCC)      ER Status: Positive; VT Status: Positive   • Iron deficiency anemia    • Osteopenia    • Tobacco user      Past Surgical History:   Procedure Laterality Date   • ANTERIOR LUMBAR EXPOSURE N/A 2023    Procedure: ANTERIOR LUMBAR EXPOSURE;  Surgeon: Jordy Macario DO;  Location: [facility] OR;  Service: Vascular;  Laterality: N/A;   • BREAST BIOPSY Right 2006    Biopsy of breast, right, benign fibrocystic changes   • CARPAL TUNNEL RELEASE      left 2011; right 2011   •  SECTION      x2. Most recently in    • COLONOSCOPY     • ENDOSCOPY     • LUMBAR FUSION N/A 2023    Procedure: ANTERIOR DECOMPRESSION, ANTERIOR LUMBAR INTERBODY FUSION WITH INSTRUMENTATION L5-S1;  Surgeon: CAMILA Goldstein MD;  Location:  [facility] OR;  Service: Orthopedic Spine;  Laterality: N/A;   • MASTECTOMY MODIFIED RADICAL / SIMPLE /  COMPLETE Right 2006   • SIMPLE MASTECTOMY Left 2006     prophylactic, left with left breast reconstruction   • TONSILLECTOMY     • TOTAL ABDOMINAL HYSTERECTOMY WITH SALPINGO OOPHORECTOMY  2006     Family History   Problem Relation Age of Onset   • Polymyalgia rheumatica Mother    • Heart attack Mother    • Kidney disease Mother    • Diabetes Father    • Hypertension Father    • Other Father         heart issues   • No Known Problems Brother    • No Known Problems Maternal Grandmother    • Heart attack Maternal Grandfather         52 years old    • No Known Problems Paternal Grandmother    • Diabetes Paternal Grandfather    • No Known Problems Brother    • No Known Problems Brother    • No Known Problems Brother      Social History     Tobacco Use   • Smoking status: Former     Packs/day: 1.00     Years: 25.00     Pack years: 25.00     Types: Cigarettes     Quit date: 2017     Years since quittin.2   • Smokeless tobacco: Never   Vaping Use   • Vaping Use: Former   • Quit date: 8/10/2022   • Substances: Nicotine   • Devices: Pre-filled pod   Substance Use Topics   • Alcohol use: No   • Drug use: No     Medications Prior to Admission   Medication Sig Dispense Refill Last Dose   • Calcium 500 MG tablet Take 500 mg by mouth Daily.   2023 at    • FeroSul 325 (65 Fe) MG tablet TAKE 1 TABLET BY MOUTH EVERY DAY 30 tablet 6 2023 at 0900   • HYDROcodone-acetaminophen (NORCO)  MG per tablet Take 1 tablet by mouth Every 4 (Four) Hours As Needed.   2023 at 1500   • lisdexamfetamine (VYVANSE) 40 MG capsule Take 40 mg by mouth Every Morning   2023 at 0900   • Omega-3 Fatty Acids (FISH OIL) 1000 MG capsule capsule    2023 at 2000   • pantoprazole (PROTONIX) 40 MG EC tablet Take 40 mg by mouth Daily.   2023 at 0900   • pregabalin (LYRICA) 150 MG capsule Take 150 mg by mouth 3 (Three) Times a Day.   2023 at    • therapeutic multivitamin-minerals  (THERAGRAN-M) tablet Take 1 tablet by mouth.   2/12/2023 at 0900   • tiZANidine (ZANAFLEX) 4 MG tablet Take 4 mg by mouth 3 (Three) Times a Day As Needed.   2/12/2023 at 1200   • albuterol (ProAir RespiClick) 108 (90 Base) MCG/ACT inhaler Inhale 2 puffs Every 4 (Four) Hours As Needed for Wheezing.      • Desvenlafaxine Succinate ER 25 MG tablet sustained-release 24 hour Take 25 mg by mouth Daily.      • diazePAM (VALIUM) 10 MG tablet Take 10 mg by mouth Every 12 (Twelve) Hours As Needed for Anxiety.  2 2/11/2023   • Prolia 60 MG/ML solution prefilled syringe syringe TWICE A YEAR   More than a month       Allergies:  Patient has no known allergies.    Objective     Vital Signs   Temp:  [97.6 °F (36.4 °C)-98.4 °F (36.9 °C)] 97.9 °F (36.6 °C)  Heart Rate:  [55-80] 68  Resp:  [12-18] 16  BP: (114-163)/(57-75) 115/62          Physical Exam:  Constitutional: oriented to person, place, and time. appears well-developed.   Head: Normocephalic and atraumatic.   Eyes: Pupils are equal, round, and reactive to light.  No icterus or erythema  Neck: Neck supple.  Without masses or carotid bruit  Cardiovascular: Regular rhythm and normal heart sounds.  No significant lift, rub or murmur noted  Pulmonary/Chest: Effort normal and breath sounds normal. CTAB, encourage deep breathing.  Abdominal: Soft. Bowel sounds are normal to hypoactive. No significant distension. There is no rebound and no guarding.   Musculoskeletal: Normal range of motion and no edema or tenderness outside of surgical area.   Neurological: Pt is alert and oriented to person, place, and time.  normal reflexes present.  Somewhat sedated from anesthesia and pain medicine noted  Skin: Skin is warm and dry.  No new rashes of concern.    Results Review:   I reviewed the patient's new imaging results and agree with the interpretation.      Assessment & Plan     1.  Lumbar stenosis--- postop care  2.  GERD without esophagitis--Pepcid twice daily, antireflux measures  3.   Osteoporosis-- continue current medication regimen  4.  Drug induced constipation--bowel regimen  5.  COPD/former smoker-- O2 and nebs as needed, monitor O2 sats, currently stable  6.  Iron deficient anemia--stable, hemoglobin 10.3, continue with iron replacement and monitor daily labs    I-S every hour while awake  Early mobilization, maximize efforts with therapy  Wound care and monitor for infection  Follow along with daily labs     Very pleasant 57-year-old female followed by Dr. Erna moss for PCP care needs.  PMHx COPD, GERD, iron deficient anemia, chronic low back pain followed by Dr. Reynolds for injection therapy 10+ years.  Medically stable postop day #1.  Pain controlled.  Plan for step 2 of 2 OR tomorrow.  We will follow along for general medical care needs.  Recommendations otherwise as above.    JOSE DANIEL Barajas  02/14/23  07:09 CST            Electronically signed by Kwadwo Choudhury MD at 02/14/23 [id number]

## 2023-02-14 NOTE — PLAN OF CARE
Goal Outcome Evaluation:  Plan of Care Reviewed With: patient        Progress: no change  Outcome Evaluation: Pt A&Ox4. , 2 L NC. SARMIENTO, upx1 with LSO. DTV. c/o pain, prn pain medication given with good relief. PPP. abd drsg - CDI. IVF initiated. IV abx. Denies n/t. Call light within reach. Safety maintained. Alarm set.

## 2023-02-14 NOTE — DISCHARGE SUMMARY
Date of Discharge:  2/14/2023    Admission Diagnosis: M54.16    Discharge Diagnosis:   1. Increasing chronic back pain.   2. Bilateral buttock, thigh and leg radiculopathy, now right worse than left.   3. Severe neurogenic claudication.   4. Degenerative disc disease L2 to S1, severe at L3 to S1.   5. Multilevel lumbar facet arthropathy, worse at L3 to S1.   6. Degenerative scoliosis, concave right L3-4, concave left L5-S1.   7. Central and foraminal stenosis L3 to S1, worse left L3-4, bilateral L4 to S1.   8. Limb length discrepancy, right shorter than left.   9. Status post Anterior discectomy decompression with bilateral neural foraminotomy L5-S1, ALIF with instrumentation L5-S1, 2/13/23.     Consults During Admission: Immanuel Mitchell PA-C    Hospital Course  Patient is a 57 y.o. female Known to our practice. Admitted for the above lumbar fusion.  This has been well tolerated and the patient will be discharged home today in good stable condition with instructions for brace when ambulating. She is scheduled for her next stage this coming Monday.  No driving until directed. They will call if problems arise.       Condition on Discharge:  STABLE    Vital Signs  Temp:  [97.6 °F (36.4 °C)-98.4 °F (36.9 °C)] 98.3 °F (36.8 °C)  Heart Rate:  [55-80] 68  Resp:  [12-18] 16  BP: (114-163)/(57-75) 120/62    Physical Exam:  General: No fever, chills, night sweats, or abnormal weight change.  HEENT: No HA, dizziness, blurred vision, sore throat, or discharge.  Cardiac: No palpitations, MACKEY, edema, or chest pain  Pulm: No cough, congestion, SOB, wheezing, or hemoptysis.   GI: No anorexia, dysphagia, N/V, abdominal pain or diarrhea.  Endo: No polyuria, polydipsia, cold or heat intolerance.   : No dysuria, urgency, nocturia, incontinence, or discharge.  MS: No myalgia, back pain beyond surgical site pain, radiculopathy, joint pain, or joint swelling.  Neuro: No memory loss, confusion, weakness, ataxia, tremors, or  paraesthesias.  Psych: No anxiety, depression, insomnia, agitation, hallucinations, or disorientation.      Discharge Disposition      Discharge Medications     Discharge Medications      ASK your doctor about these medications      Instructions Start Date   Calcium 500 MG tablet   500 mg, Oral, Daily      Desvenlafaxine Succinate ER 25 MG tablet sustained-release 24 hour   25 mg, Oral, Daily      diazePAM 10 MG tablet  Commonly known as: VALIUM   10 mg, Oral, Every 12 Hours PRN      FeroSul 325 (65 FE) MG tablet  Generic drug: ferrous sulfate   TAKE 1 TABLET BY MOUTH EVERY DAY      fish oil 1000 MG capsule capsule   Does not apply      HYDROcodone-acetaminophen  MG per tablet  Commonly known as: NORCO   1 tablet, Oral, Every 4 Hours PRN      lisdexamfetamine 40 MG capsule  Commonly known as: VYVANSE   40 mg, Oral, Every Morning      pantoprazole 40 MG EC tablet  Commonly known as: PROTONIX   40 mg, Oral, Daily      pregabalin 150 MG capsule  Commonly known as: LYRICA   150 mg, Oral, 3 Times Daily      ProAir RespiClick 108 (90 Base) MCG/ACT inhaler  Generic drug: albuterol   2 puffs, Inhalation, Every 4 Hours PRN      Prolia 60 MG/ML solution prefilled syringe syringe  Generic drug: denosumab   TWICE A YEAR      therapeutic multivitamin-minerals tablet tablet  Generic drug: multivitamin with minerals   1 tablet, Oral      tiZANidine 4 MG tablet  Commonly known as: ZANAFLEX   4 mg, Oral, 3 Times Daily PRN             Discharge Diet: Resume Home diet, advance as tolerated    Activity at Discharge: Resume home activity, advance as tolerated, no lifting, no twisting, no bending, brace as directed, no driving until directed. Return to hospital on Monday for second stage surgery.            Norm Choudhury PA-C  02/14/23  08:27 CST

## 2023-02-14 NOTE — THERAPY DISCHARGE NOTE
Acute Care - Physical Therapy Discharge Summary  [facility]       Patient Name: Carol Rodriguez  : 1965  MRN: [id number]    Today's Date: 2023                 Admit Date: 2023      PT Recommendation and Plan    Visit Dx:    ICD-10-CM ICD-9-CM   1. Spinal stenosis of lumbar region without neurogenic claudication  M48.061 724.02   2. Decreased activities of daily living (ADL)  Z78.9 V49.89   3. Impaired mobility  Z74.09 799.89        Outcome Measures     Row Name 23 0925             How much help from another person do you currently need...    Turning from your back to your side while in flat bed without using bedrails? 4  -MF      Moving from lying on back to sitting on the side of a flat bed without bedrails? 4  -MF      Moving to and from a bed to a chair (including a wheelchair)? 3  -MF      Standing up from a chair using your arms (e.g., wheelchair, bedside chair)? 3  -MF      Climbing 3-5 steps with a railing? 3  -MF      To walk in hospital room? 3  -MF      AM-PAC 6 Clicks Score (PT) 20  -MF         Functional Assessment    Outcome Measure Options AM-PAC 6 Clicks Basic Mobility (PT)  -MF            User Key  (r) = Recorded By, (t) = Taken By, (c) = Cosigned By    Initials Name Provider Type    Anny Palacios PTA Physical Therapist Assistant                 PT Charges     Row Name 23 1202             Time Calculation    Start Time 0925  -MF      Stop Time 0955  -MF      Time Calculation (min) 30 min  -MF      PT Received On 23  -MF         Time Calculation- PT    Total Timed Code Minutes- PT 30 minute(s)  -MF         Timed Charges    89128 - Gait Training Minutes  30  -MF         Total Minutes    Timed Charges Total Minutes 30  -MF       Total Minutes 30  -MF            User Key  (r) = Recorded By, (t) = Taken By, (c) = Cosigned By    Initials Name Provider Type    Anny Palacios PTA Physical Therapist Assistant                 PT Rehab Goals     Row Name  Meal provided to patient.         Cuong Wyatt RN  08/21/21 1736 02/14/23 1449             Bed Mobility Goal 1 (PT)    Activity/Assistive Device (Bed Mobility Goal 1, PT) rolling to left;rolling to right;sidelying to sit;sit to sidelying  -NW      [place] Level/Cues Needed (Bed Mobility Goal 1, PT) independent  -NW      Time Frame (Bed Mobility Goal 1, PT) long term goal (LTG)  -NW      Progress/Outcomes (Bed Mobility Goal 1, PT) goal met  -NW         Transfer Goal 1 (PT)    Activity/Assistive Device (Transfer Goal 1, PT) sit-to-stand/stand-to-sit;bed-to-chair/chair-to-bed  -NW      [place] Level/Cues Needed (Transfer Goal 1, PT) independent  -NW      Time Frame (Transfer Goal 1, PT) long term goal (LTG)  -NW      Progress/Outcome (Transfer Goal 1, PT) goal not met  -NW         Gait Training Goal 1 (PT)    Activity/Assistive Device (Gait Training Goal 1, PT) gait (walking locomotion);increase endurance/gait distance;improve balance and speed  -NW      [place] Level (Gait Training Goal 1, PT) supervision required  -NW      Distance (Gait Training Goal 1, PT) 200  -NW      Time Frame (Gait Training Goal 1, PT) long term goal (LTG)  -NW      Progress/Outcome (Gait Training Goal 1, PT) goal not met  -NW         Stairs Goal 1 (PT)    Activity/Assistive Device (Stairs Goal 1, PT) stairs, all skills;ascending stairs;descending stairs  -NW      [place] Level/Cues Needed (Stairs Goal 1, PT) independent  -NW      Number of Stairs (Stairs Goal 1, PT) 3  -NW      Time Frame (Stairs Goal 1, PT) long term goal (LTG)  -NW      Progress/Outcome (Stairs Goal 1, PT) goal not met  -NW         Problem Specific Goal 1 (PT)    Problem Specific Goal 1 (PT) Pt will evelyn/doff LSO brace independently  -NW      Time Frame (Problem Specific Goal 1, PT) long-term goal (LTG)  -NW      Progress/Outcome (Problem Specific Goal 1, PT) goal not met  -NW            User Key  (r) = Recorded By, (t) = Taken By, (c) = Cosigned By    Initials Name Provider Type Discipline    NW Priya Cruz, PTA  Physical Therapist Assistant PT                    PT Discharge Summary  Anticipated Discharge Disposition (PT): home  Reason for Discharge: Discharge from facility  Outcomes Achieved: Refer to plan of care for updates on goals achieved  Discharge Destination: Home      Priya Cruz, PTA   2/14/2023

## 2023-02-14 NOTE — THERAPY TREATMENT NOTE
Acute Care - Physical Therapy Treatment Note  [facility]     Patient Name: Carol Rodriguez  : 1965  MRN: [id number]  Today's Date: 2023      Visit Dx:     ICD-10-CM ICD-9-CM   1. Spinal stenosis of lumbar region without neurogenic claudication  M48.061 724.02   2. Decreased activities of daily living (ADL)  Z78.9 V49.89   3. Impaired mobility  Z74.09 799.89     Patient Active Problem List   Diagnosis   • Malignant neoplasm of right breast in female, estrogen receptor positive (HCC)   • Anemia   • Francisco esophagus   • GERD without esophagitis   • Hiatal hernia   • Irritable bowel syndrome   • Lumbar radicular pain   • Tortuous colon   • Coronary artery calcification   • Lumbar stenosis   • Osteoporosis   • Drug induced constipation     Past Medical History:   Diagnosis Date   • Anxiety    • Arthralgia    • Arthritis    • Francisco's esophagus    • Carcinoembryonic antigen present    • Chronic kidney disease    • COPD (chronic obstructive pulmonary disease) (HCC)    • Coronary artery calcification 10/17/2022     STATES CARDIOLOGIST AND DR.J DELCID HAVE CLEARED HER OF THIS, CHOLESTEROL IS GOOD, NO PROBLEMS AT THIS TIME   • Fibrocystic breast disease    • GERD (gastroesophageal reflux disease)    • Hypertension    • Hypokalemia    • Infiltrating ductal carcinoma of breast (HCC)      ER Status: Positive; LA Status: Positive   • Iron deficiency anemia    • Osteopenia    • Tobacco user      Past Surgical History:   Procedure Laterality Date   • ANTERIOR LUMBAR EXPOSURE N/A 2023    Procedure: ANTERIOR LUMBAR EXPOSURE;  Surgeon: Jordy Macario DO;  Location: [facility];  Service: Vascular;  Laterality: N/A;   • BREAST BIOPSY Right 2006    Biopsy of breast, right, benign fibrocystic changes   • CARPAL TUNNEL RELEASE      left 2011; right 2011   •  SECTION      x2. Most recently in    • COLONOSCOPY     • ENDOSCOPY     • LUMBAR FUSION N/A 2023    Procedure: ANTERIOR  DECOMPRESSION, ANTERIOR LUMBAR INTERBODY FUSION WITH INSTRUMENTATION L5-S1;  Surgeon: CAMILA Goldstein MD;  Location: [facility];  Service: Orthopedic Spine;  Laterality: N/A;   • MASTECTOMY MODIFIED RADICAL / SIMPLE / COMPLETE Right 09/29/2006   • SIMPLE MASTECTOMY Left 09/29/2006     prophylactic, left with left breast reconstruction   • TONSILLECTOMY     • TOTAL ABDOMINAL HYSTERECTOMY WITH SALPINGO OOPHORECTOMY  06/07/2006     PT Assessment (last 12 hours)     PT Evaluation and Treatment     Row Name 02/14/23 0925          Physical Therapy Time and Intention    Subjective Information complains of;pain  -     Document Type therapy note (daily note)  -     Mode of Treatment physical therapy  -     Comment Reviewed spinal precautions.  -     Row Name 02/14/23 0925          General Information    Existing Precautions/Restrictions fall;LSO;brace worn when out of bed  -     Row Name 02/14/23 0925          Pain    Pretreatment Pain Rating 9/10  -     Posttreatment Pain Rating 9/10  -     Pain Location incisional  -     Pain Location - abdomen;back  -     Pain Intervention(s) Repositioned;Ambulation/increased activity  -     Row Name 02/14/23 0925          Bed Mobility    Rolling Right [place] (Bed Mobility) modified independence  -     Sidelying-Sit [place] (Bed Mobility) modified independence  -     Sit-Sidelying [place] (Bed Mobility) modified independence  -     Assistive Device (Bed Mobility) head of bed elevated;bed rails  -     Row Name 02/14/23 0925          Sit-Stand Transfer    Sit-Stand [place] (Transfers) standby assist  -     Row Name 02/14/23 0925          Stand-Sit Transfer    Stand-Sit [place] (Transfers) standby assist  -     Row Name 02/14/23 0925          Gait/Stairs (Locomotion)    [place] Level (Gait) contact guard  -     Distance in Feet (Gait) 250  -     Deviations/Abnormal Patterns (Gait) stride length decreased;breann decreased   -MF     [place] Level (Stairs) contact guard  -MF     Handrail Location (Stairs) left side (ascending);right side (descending)  -     Number of Steps (Stairs) 3  -MF     Ascending Technique (Stairs) step-to-step  -MF     Descending Technique (Stairs) step-to-step  -MF     Row Name             Wound 02/13/23 0803 Left lower abdomen Incision    Wound - Properties Group Placement Date: 02/13/23  -DT Placement Time: 0803  -DT Present on Hospital Admission: N  -DT Side: Left  -DT Orientation: lower  -DT Location: abdomen  -DT Primary Wound Type: Incision  -DT    Retired Wound - Properties Group Placement Date: 02/13/23  -DT Placement Time: 0803  -DT Present on Hospital Admission: N  -DT Side: Left  -DT Orientation: lower  -DT Location: abdomen  -DT Primary Wound Type: Incision  -DT    Retired Wound - Properties Group Date first assessed: 02/13/23  -DT Time first assessed: 0803  -DT Present on Hospital Admission: N  -DT Side: Left  -DT Location: abdomen  -DT Primary Wound Type: Incision  -DT    Row Name 02/14/23 0925          Plan of Care Review    Plan of Care Reviewed With patient  -MF     Progress improving  -MF     Outcome Evaluation Pt. improving with therapy. Pt was Modified Independent for all bed mobility. Pt. was SBA for transfers and CGA for gait. She walked 250' with an antalgic gait pattern. She was able to go up and down 3 steps with CGA. Pt. is supposed to be discharged home today.  -     Row Name 02/14/23 0925          Positioning and Restraints    Pre-Treatment Position in bed  -MF     Post Treatment Position bed  -MF     In Bed fowlers;call light within reach;encouraged to call for assist;side rails up x2  -           User Key  (r) = Recorded By, (t) = Taken By, (c) = Cosigned By    Initials Name Provider Type    Anny Palacios PTA Physical Therapist Assistant    Kyle Duggan, RN Registered Nurse                Physical Therapy Education     Title: PT OT SLP Therapies (In  Progress)     Topic: Physical Therapy (In Progress)     Point: Mobility training (Done)     Learning Progress Summary           Patient Acceptance, E, VU,NR by SB at 2/13/2023 1323    Comment: pt edu on POC, benefits of act, spinal precautions, bracing, d/c plans                   Point: Home exercise program (Not Started)     Learner Progress:  Not documented in this visit.          Point: Body mechanics (Done)     Learning Progress Summary           Patient Acceptance, E, VU,NR by SB at 2/13/2023 1323    Comment: pt edu on POC, benefits of act, spinal precautions, bracing, d/c plans                   Point: Precautions (Done)     Learning Progress Summary           Patient Acceptance, E, VU,NR by SB at 2/13/2023 1323    Comment: pt edu on POC, benefits of act, spinal precautions, bracing, d/c plans                               User Key     Initials Effective Dates Name Provider Type Discipline    SB 06/16/21 -  Violetta Marcelino, PT DPT Physical Therapist PT              PT Recommendation and Plan     Plan of Care Reviewed With: patient  Progress: improving  Outcome Evaluation: Pt. improving with therapy. Pt was Modified Independent for all bed mobility. Pt. was SBA for transfers and CGA for gait. She walked 250' with an antalgic gait pattern. She was able to go up and down 3 steps with CGA. Pt. is supposed to be discharged home today.   Outcome Measures     Row Name 02/14/23 0925             How much help from another person do you currently need...    Turning from your back to your side while in flat bed without using bedrails? 4  -MF      Moving from lying on back to sitting on the side of a flat bed without bedrails? 4  -MF      Moving to and from a bed to a chair (including a wheelchair)? 3  -MF      Standing up from a chair using your arms (e.g., wheelchair, bedside chair)? 3  -MF      Climbing 3-5 steps with a railing? 3  -MF      To walk in hospital room? 3  -MF      AM-PAC 6 Clicks Score (PT) 20  -MF          Functional Assessment    Outcome Measure Options AM-PAC 6 Clicks Basic Mobility (PT)  -MF            User Key  (r) = Recorded By, (t) = Taken By, (c) = Cosigned By    Initials Name Provider Type    Anny Palacios PTA Physical Therapist Assistant                 Time Calculation:    PT Charges     Row Name 02/14/23 1202             Time Calculation    Start Time 0925  -MF      Stop Time 0955  -MF      Time Calculation (min) 30 min  -MF      PT Received On 02/14/23  -MF         Time Calculation- PT    Total Timed Code Minutes- PT 30 minute(s)  -MF         Timed Charges    68865 - Gait Training Minutes  30  -MF         Total Minutes    Timed Charges Total Minutes 30  -MF       Total Minutes 30  -MF            User Key  (r) = Recorded By, (t) = Taken By, (c) = Cosigned By    Initials Name Provider Type    Anny Palacios PTA Physical Therapist Assistant              Therapy Charges for Today     Code Description Service Date Service Provider Modifiers Qty    [id number] HC GAIT TRAINING EA 15 MIN 2/14/2023 Anny Issa PTA GP 2          PT G-Codes  Outcome Measure Options: AM-PAC 6 Clicks Basic Mobility (PT)  AM-PAC 6 Clicks Score (PT): 20  TERRY-PAC 6 Clicks Score (OT): 20    Anny Issa PTA  2/14/2023

## 2023-02-14 NOTE — PAYOR COMM NOTE
"REF:   [id number]    [facility]  [place],   [phone]  OR  [phone]  OR  FAX   [phone]      Carol Choudhury (57 y.o. Female)     Date of Birth   1965    Social Security Number       Address   [address] [place] [address]    Home Phone   [phone]    MRN   [id number]       [employer]   [employer]    Marital Status                               Admission Date   2/13/23    Admission Type   Elective    Admitting Provider   CAMILA Goldstein MD    Attending Provider       Department, Room/Bed   [facility] 3A, 328/1       Discharge Date   2/14/2023    Discharge Disposition   Home or Self Care    Discharge Destination                               Attending Provider: (none)   Allergies: No Known Allergies    Isolation: None   Infection: None   Code Status: CPR    Ht: 151 cm (59.45\")   Wt: 65.1 kg (143 lb 8 oz)    Admission Cmt: None   Principal Problem: Lumbar stenosis [M48.061]                 Active Insurance as of 2/13/2023     Primary Coverage     Payor Plan Insurance Group Employer/Plan Group    ANTH [employer]UE CROSS [id number]     Payor Plan Address Payor Plan Phone Number Payor Plan Fax Number Effective Dates    [address]   9/2/2018 - None Entered    [place] [address]       Subscriber Name Subscriber Birth Date Member ID       CAROL CHOUDHURY 1965 [id number]                 Emergency Contacts      (Rel.) Home Phone Work Phone Mobile Phone    Mehdi Choudhury (Spouse) [phone] -- --               Discharge Summary      Norm Choudhury PA-C at 02/14/23 0826            Date of Discharge:  2/14/2023    Admission Diagnosis: M54.16    Discharge Diagnosis:   1. Increasing chronic back pain.   2. Bilateral buttock, thigh and leg radiculopathy, now right worse than left.   3. Severe neurogenic claudication.   4. Degenerative disc disease L2 to S1, severe at L3 to S1.   5. Multilevel lumbar facet arthropathy, worse at L3 to S1.   6. " Degenerative scoliosis, concave right L3-4, concave left L5-S1.   7. Central and foraminal stenosis L3 to S1, worse left L3-4, bilateral L4 to S1.   8. Limb length discrepancy, right shorter than left.   9. Status post Anterior discectomy decompression with bilateral neural foraminotomy L5-S1, ALIF with instrumentation L5-S1, 2/13/23.     Consults During Admission: Immanuel Mitchell PA-C    Hospital Course  Patient is a 57 y.o. female Known to our practice. Admitted for the above lumbar fusion.  This has been well tolerated and the patient will be discharged home today in good stable condition with instructions for brace when ambulating. She is scheduled for her next stage this coming Monday.  No driving until directed. They will call if problems arise.       Condition on Discharge:  STABLE    Vital Signs  Temp:  [97.6 °F (36.4 °C)-98.4 °F (36.9 °C)] 98.3 °F (36.8 °C)  Heart Rate:  [55-80] 68  Resp:  [12-18] 16  BP: (114-163)/(57-75) 120/62    Physical Exam:  General: No fever, chills, night sweats, or abnormal weight change.  HEENT: No HA, dizziness, blurred vision, sore throat, or discharge.  Cardiac: No palpitations, MACKEY, edema, or chest pain  Pulm: No cough, congestion, SOB, wheezing, or hemoptysis.   GI: No anorexia, dysphagia, N/V, abdominal pain or diarrhea.  Endo: No polyuria, polydipsia, cold or heat intolerance.   : No dysuria, urgency, nocturia, incontinence, or discharge.  MS: No myalgia, back pain beyond surgical site pain, radiculopathy, joint pain, or joint swelling.  Neuro: No memory loss, confusion, weakness, ataxia, tremors, or paraesthesias.  Psych: No anxiety, depression, insomnia, agitation, hallucinations, or disorientation.      Discharge Disposition      Discharge Medications     Discharge Medications      ASK your doctor about these medications      Instructions Start Date   Calcium 500 MG tablet   500 mg, Oral, Daily      Desvenlafaxine Succinate ER 25 MG tablet sustained-release 24 hour   25  mg, Oral, Daily      diazePAM 10 MG tablet  Commonly known as: VALIUM   10 mg, Oral, Every 12 Hours PRN      FeroSul 325 (65 FE) MG tablet  Generic drug: ferrous sulfate   TAKE 1 TABLET BY MOUTH EVERY DAY      fish oil 1000 MG capsule capsule   Does not apply      HYDROcodone-acetaminophen  MG per tablet  Commonly known as: NORCO   1 tablet, Oral, Every 4 Hours PRN      lisdexamfetamine 40 MG capsule  Commonly known as: VYVANSE   40 mg, Oral, Every Morning      pantoprazole 40 MG EC tablet  Commonly known as: PROTONIX   40 mg, Oral, Daily      pregabalin 150 MG capsule  Commonly known as: LYRICA   150 mg, Oral, 3 Times Daily      ProAir RespiClick 108 (90 Base) MCG/ACT inhaler  Generic drug: albuterol   2 puffs, Inhalation, Every 4 Hours PRN      Prolia 60 MG/ML solution prefilled syringe syringe  Generic drug: denosumab   TWICE A YEAR      therapeutic multivitamin-minerals tablet tablet  Generic drug: multivitamin with minerals   1 tablet, Oral      tiZANidine 4 MG tablet  Commonly known as: ZANAFLEX   4 mg, Oral, 3 Times Daily PRN             Discharge Diet: Resume Home diet, advance as tolerated    Activity at Discharge: Resume home activity, advance as tolerated, no lifting, no twisting, no bending, brace as directed, no driving until directed. Return to hospital on Monday for second stage surgery.            Norm Choudhury PA-C  02/14/23  08:27 CST              Electronically signed by Norm Choudhury PA-C at 02/14/23 0829       Discharge Order (From admission, onward)     Start     Ordered    02/14/23 0837  Discharge patient  Once        Expected Discharge Date: 02/14/23    Expected Discharge Time: Midday    Discharge Disposition: Home or Self Care    Physician of Record for Attribution - Please select from Treatment Team: CAMILA RAE [[id number]]    Review needed by CMO to determine Physician of Record: No       Question Answer Comment   Physician of Record for Attribution - Please select from  Treatment Team CAMILA RAE    Review needed by CMO to determine Physician of Record No        02/14/23 0837

## 2023-02-20 ENCOUNTER — HOSPITAL ENCOUNTER (INPATIENT)
Facility: HOSPITAL | Age: 58
LOS: 3 days | Discharge: HOME | DRG: 455 | End: 2023-02-23
Attending: ORTHOPAEDIC SURGERY | Admitting: ORTHOPAEDIC SURGERY
Payer: COMMERCIAL

## 2023-02-20 ENCOUNTER — ANESTHESIA EVENT (OUTPATIENT)
Dept: PERIOP | Facility: HOSPITAL | Age: 58
DRG: 455 | End: 2023-02-20
Payer: COMMERCIAL

## 2023-02-20 ENCOUNTER — ANESTHESIA (OUTPATIENT)
Dept: PERIOP | Facility: HOSPITAL | Age: 58
DRG: 455 | End: 2023-02-20
Payer: COMMERCIAL

## 2023-02-20 ENCOUNTER — APPOINTMENT (OUTPATIENT)
Dept: GENERAL RADIOLOGY | Facility: HOSPITAL | Age: 58
DRG: 455 | End: 2023-02-20
Payer: COMMERCIAL

## 2023-02-20 DIAGNOSIS — M48.061 SPINAL STENOSIS OF LUMBAR REGION WITHOUT NEUROGENIC CLAUDICATION: ICD-10-CM

## 2023-02-20 DIAGNOSIS — Z78.9 DECREASED ACTIVITIES OF DAILY LIVING (ADL): ICD-10-CM

## 2023-02-20 DIAGNOSIS — Z74.09 IMPAIRED MOBILITY: ICD-10-CM

## 2023-02-20 PROBLEM — G89.29 CHRONIC BILATERAL LOW BACK PAIN WITHOUT SCIATICA: Status: ACTIVE | Noted: 2023-02-20

## 2023-02-20 PROBLEM — M54.17 LUMBOSACRAL RADICULOPATHY: Status: ACTIVE | Noted: 2023-02-20

## 2023-02-20 PROBLEM — M54.50 CHRONIC BILATERAL LOW BACK PAIN WITHOUT SCIATICA: Status: ACTIVE | Noted: 2023-02-20

## 2023-02-20 PROBLEM — M51.36 DDD (DEGENERATIVE DISC DISEASE), LUMBAR: Status: ACTIVE | Noted: 2023-02-20

## 2023-02-20 PROCEDURE — 25010000002 HYDROMORPHONE PER 4 MG: Performed by: ANESTHESIOLOGY

## 2023-02-20 PROCEDURE — 0SB20ZZ EXCISION OF LUMBAR VERTEBRAL DISC, OPEN APPROACH: ICD-10-PCS | Performed by: ORTHOPAEDIC SURGERY

## 2023-02-20 PROCEDURE — C1713 ANCHOR/SCREW BN/BN,TIS/BN: HCPCS | Performed by: ORTHOPAEDIC SURGERY

## 2023-02-20 PROCEDURE — 25010000002 CEFAZOLIN PER 500 MG: Performed by: ORTHOPAEDIC SURGERY

## 2023-02-20 PROCEDURE — 72100 X-RAY EXAM L-S SPINE 2/3 VWS: CPT

## 2023-02-20 PROCEDURE — 25010000002 ONDANSETRON PER 1 MG: Performed by: NURSE ANESTHETIST, CERTIFIED REGISTERED

## 2023-02-20 PROCEDURE — C1769 GUIDE WIRE: HCPCS | Performed by: ORTHOPAEDIC SURGERY

## 2023-02-20 PROCEDURE — 0 HYDROMORPHONE 1 MG/ML SOLUTION: Performed by: ORTHOPAEDIC SURGERY

## 2023-02-20 PROCEDURE — 25010000002 FENTANYL CITRATE (PF) 50 MCG/ML SOLUTION: Performed by: ANESTHESIOLOGY

## 2023-02-20 PROCEDURE — 86900 BLOOD TYPING SEROLOGIC ABO: CPT | Performed by: ORTHOPAEDIC SURGERY

## 2023-02-20 PROCEDURE — 25010000002 PROPOFOL 10 MG/ML EMULSION: Performed by: NURSE ANESTHETIST, CERTIFIED REGISTERED

## 2023-02-20 PROCEDURE — 86850 RBC ANTIBODY SCREEN: CPT | Performed by: ORTHOPAEDIC SURGERY

## 2023-02-20 PROCEDURE — 76000 FLUOROSCOPY <1 HR PHYS/QHP: CPT

## 2023-02-20 PROCEDURE — 86901 BLOOD TYPING SEROLOGIC RH(D): CPT | Performed by: ORTHOPAEDIC SURGERY

## 2023-02-20 PROCEDURE — 25010000002 FENTANYL CITRATE (PF) 250 MCG/5ML SOLUTION: Performed by: NURSE ANESTHETIST, CERTIFIED REGISTERED

## 2023-02-20 PROCEDURE — 25010000002 ONDANSETRON PER 1 MG: Performed by: ORTHOPAEDIC SURGERY

## 2023-02-20 PROCEDURE — 0SG10A0 FUSION OF 2 OR MORE LUMBAR VERTEBRAL JOINTS WITH INTERBODY FUSION DEVICE, ANTERIOR APPROACH, ANTERIOR COLUMN, OPEN APPROACH: ICD-10-PCS | Performed by: ORTHOPAEDIC SURGERY

## 2023-02-20 DEVICE — IDENTITI NANOTEC LIF POROUS TI SPACER, 8 X 22 X 55 MM, 10°
Type: IMPLANTABLE DEVICE | Site: SPINE LUMBAR | Status: FUNCTIONAL
Brand: IDENTITI NANOTEC

## 2023-02-20 DEVICE — PUTTY GRFT BONE CATALYST NANOSYNTHETIC 10CC: Type: IMPLANTABLE DEVICE | Site: SPINE LUMBAR | Status: FUNCTIONAL

## 2023-02-20 DEVICE — KT HEMOST ABS SURGIFOAM PORCN 1GRAM: Type: IMPLANTABLE DEVICE | Site: EPIDURAL SPACE | Status: FUNCTIONAL

## 2023-02-20 DEVICE — LIF AMP, TWO SCREW PLATE, 04 AND CENTER SCREW, 15MM
Type: IMPLANTABLE DEVICE | Site: SPINE LUMBAR | Status: FUNCTIONAL
Brand: AMP

## 2023-02-20 DEVICE — LIF AMP, Ø5.5MM X 35MM BONE SCREW X2
Type: IMPLANTABLE DEVICE | Site: SPINE LUMBAR | Status: FUNCTIONAL
Brand: AMP

## 2023-02-20 RX ORDER — PROPOFOL 10 MG/ML
VIAL (ML) INTRAVENOUS AS NEEDED
Status: DISCONTINUED | OUTPATIENT
Start: 2023-02-20 | End: 2023-02-20 | Stop reason: SURG

## 2023-02-20 RX ORDER — SODIUM CHLORIDE 0.9 % (FLUSH) 0.9 %
10 SYRINGE (ML) INJECTION AS NEEDED
Status: DISCONTINUED | OUTPATIENT
Start: 2023-02-20 | End: 2023-02-23 | Stop reason: HOSPADM

## 2023-02-20 RX ORDER — OXYCODONE AND ACETAMINOPHEN 10; 325 MG/1; MG/1
1 TABLET ORAL ONCE AS NEEDED
Status: COMPLETED | OUTPATIENT
Start: 2023-02-20 | End: 2023-02-20

## 2023-02-20 RX ORDER — NALOXONE HCL 0.4 MG/ML
0.04 VIAL (ML) INJECTION AS NEEDED
Status: DISCONTINUED | OUTPATIENT
Start: 2023-02-20 | End: 2023-02-20 | Stop reason: HOSPADM

## 2023-02-20 RX ORDER — LIDOCAINE HYDROCHLORIDE 10 MG/ML
0.5 INJECTION, SOLUTION EPIDURAL; INFILTRATION; INTRACAUDAL; PERINEURAL ONCE AS NEEDED
Status: DISCONTINUED | OUTPATIENT
Start: 2023-02-20 | End: 2023-02-20 | Stop reason: SDUPTHER

## 2023-02-20 RX ORDER — LABETALOL HYDROCHLORIDE 5 MG/ML
5 INJECTION, SOLUTION INTRAVENOUS
Status: DISCONTINUED | OUTPATIENT
Start: 2023-02-20 | End: 2023-02-20 | Stop reason: HOSPADM

## 2023-02-20 RX ORDER — SODIUM CHLORIDE 9 MG/ML
INJECTION, SOLUTION INTRAVENOUS CONTINUOUS PRN
Status: COMPLETED | OUTPATIENT
Start: 2023-02-20 | End: 2023-02-20

## 2023-02-20 RX ORDER — MIDAZOLAM HYDROCHLORIDE 1 MG/ML
1 INJECTION INTRAMUSCULAR; INTRAVENOUS
Status: DISCONTINUED | OUTPATIENT
Start: 2023-02-20 | End: 2023-02-20 | Stop reason: HOSPADM

## 2023-02-20 RX ORDER — FERROUS SULFATE 325(65) MG
325 TABLET ORAL
COMMUNITY

## 2023-02-20 RX ORDER — OXYCODONE AND ACETAMINOPHEN 7.5; 325 MG/1; MG/1
1 TABLET ORAL EVERY 4 HOURS PRN
Status: DISCONTINUED | OUTPATIENT
Start: 2023-02-20 | End: 2023-02-23 | Stop reason: HOSPADM

## 2023-02-20 RX ORDER — SODIUM CHLORIDE 0.9 % (FLUSH) 0.9 %
3 SYRINGE (ML) INJECTION EVERY 12 HOURS SCHEDULED
Status: DISCONTINUED | OUTPATIENT
Start: 2023-02-20 | End: 2023-02-20 | Stop reason: HOSPADM

## 2023-02-20 RX ORDER — SODIUM CHLORIDE 9 MG/ML
40 INJECTION, SOLUTION INTRAVENOUS AS NEEDED
Status: DISCONTINUED | OUTPATIENT
Start: 2023-02-20 | End: 2023-02-23 | Stop reason: HOSPADM

## 2023-02-20 RX ORDER — ACETAMINOPHEN 500 MG
1000 TABLET ORAL ONCE
Status: COMPLETED | OUTPATIENT
Start: 2023-02-20 | End: 2023-02-20

## 2023-02-20 RX ORDER — EPHEDRINE SULFATE 50 MG/ML
INJECTION INTRAVENOUS AS NEEDED
Status: DISCONTINUED | OUTPATIENT
Start: 2023-02-20 | End: 2023-02-20 | Stop reason: SURG

## 2023-02-20 RX ORDER — AMOXICILLIN 250 MG
1 CAPSULE ORAL 2 TIMES DAILY
Status: DISCONTINUED | OUTPATIENT
Start: 2023-02-20 | End: 2023-02-23 | Stop reason: HOSPADM

## 2023-02-20 RX ORDER — MAGNESIUM HYDROXIDE 1200 MG/15ML
LIQUID ORAL AS NEEDED
Status: DISCONTINUED | OUTPATIENT
Start: 2023-02-20 | End: 2023-02-20 | Stop reason: HOSPADM

## 2023-02-20 RX ORDER — SODIUM CHLORIDE 0.9 % (FLUSH) 0.9 %
10 SYRINGE (ML) INJECTION AS NEEDED
Status: DISCONTINUED | OUTPATIENT
Start: 2023-02-20 | End: 2023-02-20 | Stop reason: HOSPADM

## 2023-02-20 RX ORDER — SODIUM CHLORIDE 9 MG/ML
40 INJECTION, SOLUTION INTRAVENOUS AS NEEDED
Status: DISCONTINUED | OUTPATIENT
Start: 2023-02-20 | End: 2023-02-20 | Stop reason: HOSPADM

## 2023-02-20 RX ORDER — SODIUM CHLORIDE 0.9 % (FLUSH) 0.9 %
3 SYRINGE (ML) INJECTION AS NEEDED
Status: DISCONTINUED | OUTPATIENT
Start: 2023-02-20 | End: 2023-02-20 | Stop reason: HOSPADM

## 2023-02-20 RX ORDER — BUPIVACAINE HCL/0.9 % NACL/PF 0.1 %
2 PLASTIC BAG, INJECTION (ML) EPIDURAL EVERY 8 HOURS
Status: COMPLETED | OUTPATIENT
Start: 2023-02-20 | End: 2023-02-21

## 2023-02-20 RX ORDER — OXYCODONE HCL 20 MG/1
20 TABLET, FILM COATED, EXTENDED RELEASE ORAL ONCE
Status: COMPLETED | OUTPATIENT
Start: 2023-02-20 | End: 2023-02-20

## 2023-02-20 RX ORDER — PREGABALIN 75 MG/1
150 CAPSULE ORAL 3 TIMES DAILY
Status: DISCONTINUED | OUTPATIENT
Start: 2023-02-20 | End: 2023-02-23 | Stop reason: HOSPADM

## 2023-02-20 RX ORDER — LIDOCAINE HYDROCHLORIDE 20 MG/ML
INJECTION, SOLUTION EPIDURAL; INFILTRATION; INTRACAUDAL; PERINEURAL AS NEEDED
Status: DISCONTINUED | OUTPATIENT
Start: 2023-02-20 | End: 2023-02-20 | Stop reason: SURG

## 2023-02-20 RX ORDER — SODIUM CHLORIDE 0.9 % (FLUSH) 0.9 %
3-10 SYRINGE (ML) INJECTION AS NEEDED
Status: DISCONTINUED | OUTPATIENT
Start: 2023-02-20 | End: 2023-02-20 | Stop reason: HOSPADM

## 2023-02-20 RX ORDER — BUPIVACAINE HCL/0.9 % NACL/PF 0.125 %
PLASTIC BAG, INJECTION (ML) EPIDURAL AS NEEDED
Status: DISCONTINUED | OUTPATIENT
Start: 2023-02-20 | End: 2023-02-20 | Stop reason: SURG

## 2023-02-20 RX ORDER — POLYETHYLENE GLYCOL 3350 17 G/17G
17 POWDER, FOR SOLUTION ORAL DAILY PRN
Status: DISCONTINUED | OUTPATIENT
Start: 2023-02-20 | End: 2023-02-20

## 2023-02-20 RX ORDER — SODIUM CHLORIDE, SODIUM LACTATE, POTASSIUM CHLORIDE, CALCIUM CHLORIDE 600; 310; 30; 20 MG/100ML; MG/100ML; MG/100ML; MG/100ML
1000 INJECTION, SOLUTION INTRAVENOUS CONTINUOUS
Status: DISCONTINUED | OUTPATIENT
Start: 2023-02-20 | End: 2023-02-20

## 2023-02-20 RX ORDER — HYDROMORPHONE HYDROCHLORIDE 1 MG/ML
0.5 INJECTION, SOLUTION INTRAMUSCULAR; INTRAVENOUS; SUBCUTANEOUS
Status: DISCONTINUED | OUTPATIENT
Start: 2023-02-20 | End: 2023-02-20 | Stop reason: HOSPADM

## 2023-02-20 RX ORDER — SUCCINYLCHOLINE/SOD CL,ISO/PF 200MG/10ML
SYRINGE (ML) INTRAVENOUS AS NEEDED
Status: DISCONTINUED | OUTPATIENT
Start: 2023-02-20 | End: 2023-02-20 | Stop reason: SURG

## 2023-02-20 RX ORDER — SODIUM CHLORIDE, SODIUM LACTATE, POTASSIUM CHLORIDE, CALCIUM CHLORIDE 600; 310; 30; 20 MG/100ML; MG/100ML; MG/100ML; MG/100ML
100 INJECTION, SOLUTION INTRAVENOUS CONTINUOUS PRN
Status: DISCONTINUED | OUTPATIENT
Start: 2023-02-20 | End: 2023-02-20 | Stop reason: HOSPADM

## 2023-02-20 RX ORDER — DROPERIDOL 2.5 MG/ML
0.62 INJECTION, SOLUTION INTRAMUSCULAR; INTRAVENOUS ONCE AS NEEDED
Status: DISCONTINUED | OUTPATIENT
Start: 2023-02-20 | End: 2023-02-20 | Stop reason: HOSPADM

## 2023-02-20 RX ORDER — DIAZEPAM 10 MG/1
10 TABLET ORAL EVERY 12 HOURS PRN
Status: DISCONTINUED | OUTPATIENT
Start: 2023-02-20 | End: 2023-02-23 | Stop reason: HOSPADM

## 2023-02-20 RX ORDER — ALBUTEROL SULFATE 2.5 MG/3ML
2.5 SOLUTION RESPIRATORY (INHALATION) EVERY 4 HOURS PRN
Status: DISCONTINUED | OUTPATIENT
Start: 2023-02-20 | End: 2023-02-23 | Stop reason: HOSPADM

## 2023-02-20 RX ORDER — SODIUM CHLORIDE, SODIUM LACTATE, POTASSIUM CHLORIDE, CALCIUM CHLORIDE 600; 310; 30; 20 MG/100ML; MG/100ML; MG/100ML; MG/100ML
100 INJECTION, SOLUTION INTRAVENOUS CONTINUOUS
Status: DISCONTINUED | OUTPATIENT
Start: 2023-02-20 | End: 2023-02-20

## 2023-02-20 RX ORDER — ACETAMINOPHEN 325 MG/1
650 TABLET ORAL EVERY 4 HOURS PRN
Status: DISCONTINUED | OUTPATIENT
Start: 2023-02-20 | End: 2023-02-23 | Stop reason: HOSPADM

## 2023-02-20 RX ORDER — FERROUS SULFATE 325(65) MG
325 TABLET ORAL DAILY
Status: DISCONTINUED | OUTPATIENT
Start: 2023-02-20 | End: 2023-02-23

## 2023-02-20 RX ORDER — ONDANSETRON 2 MG/ML
4 INJECTION INTRAMUSCULAR; INTRAVENOUS EVERY 6 HOURS PRN
Status: DISCONTINUED | OUTPATIENT
Start: 2023-02-20 | End: 2023-02-23 | Stop reason: HOSPADM

## 2023-02-20 RX ORDER — DESVENLAFAXINE 25 MG/1
25 TABLET, EXTENDED RELEASE ORAL DAILY
Status: DISCONTINUED | OUTPATIENT
Start: 2023-02-20 | End: 2023-02-21

## 2023-02-20 RX ORDER — TEMAZEPAM 15 MG/1
15 CAPSULE ORAL NIGHTLY PRN
COMMUNITY

## 2023-02-20 RX ORDER — ONDANSETRON 2 MG/ML
4 INJECTION INTRAMUSCULAR; INTRAVENOUS
Status: DISCONTINUED | OUTPATIENT
Start: 2023-02-20 | End: 2023-02-20 | Stop reason: HOSPADM

## 2023-02-20 RX ORDER — SODIUM CHLORIDE 0.9 % (FLUSH) 0.9 %
10 SYRINGE (ML) INJECTION EVERY 12 HOURS SCHEDULED
Status: DISCONTINUED | OUTPATIENT
Start: 2023-02-20 | End: 2023-02-20 | Stop reason: HOSPADM

## 2023-02-20 RX ORDER — ROCURONIUM BROMIDE 10 MG/ML
INJECTION, SOLUTION INTRAVENOUS AS NEEDED
Status: DISCONTINUED | OUTPATIENT
Start: 2023-02-20 | End: 2023-02-20 | Stop reason: SURG

## 2023-02-20 RX ORDER — OXYCODONE AND ACETAMINOPHEN 7.5; 325 MG/1; MG/1
2 TABLET ORAL EVERY 4 HOURS PRN
Status: DISCONTINUED | OUTPATIENT
Start: 2023-02-20 | End: 2023-02-23 | Stop reason: HOSPADM

## 2023-02-20 RX ORDER — SCOLOPAMINE TRANSDERMAL SYSTEM 1 MG/1
1 PATCH, EXTENDED RELEASE TRANSDERMAL ONCE
Status: DISCONTINUED | OUTPATIENT
Start: 2023-02-20 | End: 2023-02-20

## 2023-02-20 RX ORDER — SODIUM CHLORIDE 0.9 % (FLUSH) 0.9 %
3 SYRINGE (ML) INJECTION EVERY 12 HOURS SCHEDULED
Status: DISCONTINUED | OUTPATIENT
Start: 2023-02-20 | End: 2023-02-23 | Stop reason: HOSPADM

## 2023-02-20 RX ORDER — LIDOCAINE HYDROCHLORIDE 10 MG/ML
0.5 INJECTION, SOLUTION EPIDURAL; INFILTRATION; INTRACAUDAL; PERINEURAL ONCE AS NEEDED
Status: DISCONTINUED | OUTPATIENT
Start: 2023-02-20 | End: 2023-02-20 | Stop reason: HOSPADM

## 2023-02-20 RX ORDER — FENTANYL CITRATE 50 UG/ML
INJECTION, SOLUTION INTRAMUSCULAR; INTRAVENOUS AS NEEDED
Status: DISCONTINUED | OUTPATIENT
Start: 2023-02-20 | End: 2023-02-20 | Stop reason: SURG

## 2023-02-20 RX ORDER — ONDANSETRON 4 MG/1
4 TABLET, FILM COATED ORAL EVERY 6 HOURS PRN
Status: DISCONTINUED | OUTPATIENT
Start: 2023-02-20 | End: 2023-02-23 | Stop reason: HOSPADM

## 2023-02-20 RX ORDER — FLUMAZENIL 0.1 MG/ML
0.2 INJECTION INTRAVENOUS AS NEEDED
Status: DISCONTINUED | OUTPATIENT
Start: 2023-02-20 | End: 2023-02-20 | Stop reason: HOSPADM

## 2023-02-20 RX ORDER — POLYETHYLENE GLYCOL 3350 17 G/17G
17 POWDER, FOR SOLUTION ORAL 3 TIMES DAILY
Status: DISCONTINUED | OUTPATIENT
Start: 2023-02-20 | End: 2023-02-23 | Stop reason: HOSPADM

## 2023-02-20 RX ORDER — NALOXONE HCL 0.4 MG/ML
0.4 VIAL (ML) INJECTION
Status: DISCONTINUED | OUTPATIENT
Start: 2023-02-20 | End: 2023-02-23 | Stop reason: HOSPADM

## 2023-02-20 RX ORDER — AMOXICILLIN 250 MG
1 CAPSULE ORAL 2 TIMES DAILY
Status: DISCONTINUED | OUTPATIENT
Start: 2023-02-20 | End: 2023-02-20

## 2023-02-20 RX ORDER — BUPIVACAINE HCL/0.9 % NACL/PF 0.1 %
2 PLASTIC BAG, INJECTION (ML) EPIDURAL EVERY 8 HOURS
Status: DISCONTINUED | OUTPATIENT
Start: 2023-02-20 | End: 2023-02-20

## 2023-02-20 RX ORDER — IBUPROFEN 600 MG/1
600 TABLET ORAL ONCE AS NEEDED
Status: DISCONTINUED | OUTPATIENT
Start: 2023-02-20 | End: 2023-02-20 | Stop reason: HOSPADM

## 2023-02-20 RX ORDER — ONDANSETRON 2 MG/ML
INJECTION INTRAMUSCULAR; INTRAVENOUS AS NEEDED
Status: DISCONTINUED | OUTPATIENT
Start: 2023-02-20 | End: 2023-02-20 | Stop reason: SURG

## 2023-02-20 RX ORDER — PANTOPRAZOLE SODIUM 40 MG/1
40 TABLET, DELAYED RELEASE ORAL DAILY
Status: DISCONTINUED | OUTPATIENT
Start: 2023-02-20 | End: 2023-02-23 | Stop reason: HOSPADM

## 2023-02-20 RX ORDER — TIZANIDINE 4 MG/1
4 TABLET ORAL EVERY 8 HOURS PRN
Status: DISCONTINUED | OUTPATIENT
Start: 2023-02-20 | End: 2023-02-23 | Stop reason: HOSPADM

## 2023-02-20 RX ORDER — SODIUM CHLORIDE 9 MG/ML
100 INJECTION, SOLUTION INTRAVENOUS CONTINUOUS
Status: DISCONTINUED | OUTPATIENT
Start: 2023-02-20 | End: 2023-02-21

## 2023-02-20 RX ORDER — FENTANYL CITRATE 50 UG/ML
25 INJECTION, SOLUTION INTRAMUSCULAR; INTRAVENOUS
Status: DISCONTINUED | OUTPATIENT
Start: 2023-02-20 | End: 2023-02-20 | Stop reason: HOSPADM

## 2023-02-20 RX ADMIN — FENTANYL CITRATE 25 MCG: 50 INJECTION INTRAMUSCULAR; INTRAVENOUS at 12:24

## 2023-02-20 RX ADMIN — TIZANIDINE 4 MG: 4 TABLET ORAL at 14:22

## 2023-02-20 RX ADMIN — ROCURONIUM BROMIDE 10 MG: 10 INJECTION, SOLUTION INTRAVENOUS at 09:56

## 2023-02-20 RX ADMIN — HYDROMORPHONE HYDROCHLORIDE 1 MG: 1 INJECTION, SOLUTION INTRAMUSCULAR; INTRAVENOUS; SUBCUTANEOUS at 14:22

## 2023-02-20 RX ADMIN — FENTANYL CITRATE 50 MCG: 50 INJECTION, SOLUTION INTRAMUSCULAR; INTRAVENOUS at 10:47

## 2023-02-20 RX ADMIN — DIAZEPAM 10 MG: 10 TABLET ORAL at 15:12

## 2023-02-20 RX ADMIN — ONDANSETRON 4 MG: 2 INJECTION INTRAMUSCULAR; INTRAVENOUS at 11:38

## 2023-02-20 RX ADMIN — OXYCODONE HYDROCHLORIDE 20 MG: 20 TABLET, FILM COATED, EXTENDED RELEASE ORAL at 07:09

## 2023-02-20 RX ADMIN — OXYCODONE AND ACETAMINOPHEN 1 TABLET: 325; 10 TABLET ORAL at 12:59

## 2023-02-20 RX ADMIN — Medication 3 ML: at 20:03

## 2023-02-20 RX ADMIN — HYDROMORPHONE HYDROCHLORIDE 1 MG: 1 INJECTION, SOLUTION INTRAMUSCULAR; INTRAVENOUS; SUBCUTANEOUS at 20:06

## 2023-02-20 RX ADMIN — HYDROMORPHONE HYDROCHLORIDE 0.5 MG: 1 INJECTION, SOLUTION INTRAMUSCULAR; INTRAVENOUS; SUBCUTANEOUS at 12:21

## 2023-02-20 RX ADMIN — SODIUM CHLORIDE, POTASSIUM CHLORIDE, SODIUM LACTATE AND CALCIUM CHLORIDE 1000 ML: 600; 310; 30; 20 INJECTION, SOLUTION INTRAVENOUS at 07:05

## 2023-02-20 RX ADMIN — HYDROMORPHONE HYDROCHLORIDE 0.5 MG: 1 INJECTION, SOLUTION INTRAMUSCULAR; INTRAVENOUS; SUBCUTANEOUS at 12:33

## 2023-02-20 RX ADMIN — FENTANYL CITRATE 100 MCG: 50 INJECTION, SOLUTION INTRAMUSCULAR; INTRAVENOUS at 10:34

## 2023-02-20 RX ADMIN — SODIUM CHLORIDE, POTASSIUM CHLORIDE, SODIUM LACTATE AND CALCIUM CHLORIDE 1000 ML: 600; 310; 30; 20 INJECTION, SOLUTION INTRAVENOUS at 07:06

## 2023-02-20 RX ADMIN — SCOPALAMINE 1 PATCH: 1 PATCH, EXTENDED RELEASE TRANSDERMAL at 07:57

## 2023-02-20 RX ADMIN — SODIUM CHLORIDE, POTASSIUM CHLORIDE, SODIUM LACTATE AND CALCIUM CHLORIDE 100 ML/HR: 600; 310; 30; 20 INJECTION, SOLUTION INTRAVENOUS at 12:34

## 2023-02-20 RX ADMIN — OXYCODONE HYDROCHLORIDE AND ACETAMINOPHEN 2 TABLET: 7.5; 325 TABLET ORAL at 19:05

## 2023-02-20 RX ADMIN — PREGABALIN 150 MG: 75 CAPSULE ORAL at 20:03

## 2023-02-20 RX ADMIN — POLYETHYLENE GLYCOL 3350 17 G: 17 POWDER, FOR SOLUTION ORAL at 20:02

## 2023-02-20 RX ADMIN — PREGABALIN 150 MG: 75 CAPSULE ORAL at 15:12

## 2023-02-20 RX ADMIN — EPHEDRINE SULFATE 15 MG: 50 INJECTION INTRAVENOUS at 10:08

## 2023-02-20 RX ADMIN — Medication 140 MG: at 09:56

## 2023-02-20 RX ADMIN — DOCUSATE SODIUM 50 MG AND SENNOSIDES 8.6 MG 1 TABLET: 8.6; 5 TABLET, FILM COATED ORAL at 20:03

## 2023-02-20 RX ADMIN — ONDANSETRON 4 MG: 2 INJECTION INTRAMUSCULAR; INTRAVENOUS at 16:14

## 2023-02-20 RX ADMIN — Medication 200 MCG: at 10:00

## 2023-02-20 RX ADMIN — FENTANYL CITRATE 100 MCG: 50 INJECTION, SOLUTION INTRAMUSCULAR; INTRAVENOUS at 09:56

## 2023-02-20 RX ADMIN — LIDOCAINE HYDROCHLORIDE 100 MG: 20 INJECTION, SOLUTION EPIDURAL; INFILTRATION; INTRACAUDAL at 09:56

## 2023-02-20 RX ADMIN — PROPOFOL 200 MG: 10 INJECTION, EMULSION INTRAVENOUS at 09:56

## 2023-02-20 RX ADMIN — FENTANYL CITRATE 25 MCG: 50 INJECTION INTRAMUSCULAR; INTRAVENOUS at 12:29

## 2023-02-20 RX ADMIN — ACETAMINOPHEN 1000 MG: 500 TABLET, FILM COATED ORAL at 07:57

## 2023-02-20 RX ADMIN — CEFAZOLIN 2 G: 2 INJECTION, POWDER, FOR SOLUTION INTRAMUSCULAR; INTRAVENOUS at 19:59

## 2023-02-20 RX ADMIN — PANTOPRAZOLE SODIUM 40 MG: 40 TABLET, DELAYED RELEASE ORAL at 15:13

## 2023-02-20 RX ADMIN — EPHEDRINE SULFATE 15 MG: 50 INJECTION INTRAVENOUS at 10:50

## 2023-02-20 NOTE — ANESTHESIA PREPROCEDURE EVALUATION
Anesthesia Evaluation     Patient summary reviewed   history of anesthetic complications: PONV  NPO Solid Status: > 8 hours  NPO Liquid Status: > 8 hours           Airway   Mallampati: II  TM distance: >3 FB  Neck ROM: full  Dental          Pulmonary    (+) a smoker Former, COPD,   (-) asthma, sleep apnea  Cardiovascular   Exercise tolerance: good (4-7 METS)    (-) hypertension, past MI, CAD, dysrhythmias, cardiac stents, hyperlipidemia    ROS comment: Echo:  Overall non-diagnostic study given poor imaging with inadequate visualization of the apical segments precluding assessment of ischemia in this region. In the visualized segments, the stress echo is normal.  No ECG evidence of myocardial ischemia. Negative clinical evidence of myocardial ischemia. Findings consistent with a normal ECG stress test for ischemic evaluation.  Normal exercise capacity  During recovery the patient's rhythm was predominantly sinus with intermittent mobitz I AV block as well as intermittent wandering ectopic atrial rhythm. No symptoms were reported during recovery.      Neuro/Psych  (+) numbness,    (-) seizures, TIA, CVA  GI/Hepatic/Renal/Endo    (+)  GERD,  renal disease CRI,   (-) liver disease, diabetes    Musculoskeletal     (+) back pain,   Abdominal    Substance History      OB/GYN          Other      history of cancer (breast) remission                      Anesthesia Plan    ASA 2     general     intravenous induction     Anesthetic plan, risks, benefits, and alternatives have been provided, discussed and informed consent has been obtained with: patient.        CODE STATUS:

## 2023-02-20 NOTE — ANESTHESIA POSTPROCEDURE EVALUATION
"Patient: Carol Rodriguez    Procedure Summary     Date: 02/20/23 Room / Location:  [facility] OR  /  [facility] OR    Anesthesia Start: 0952 Anesthesia Stop: 1211    Procedure: RIGHT LATERAL LUMBAR INTERBODY FUSION L3-5 WITH INSTRUMENTATION L3-4 (Right: Spine Lumbar) Diagnosis: (M54.16)    Surgeons: CAMILA Goldstein MD Provider: Gaby Vickers CRNA    Anesthesia Type: general ASA Status: 2          Anesthesia Type: general    Vitals  Vitals Value Taken Time   /69 02/20/23 1344   Temp 97.6 °F (36.4 °C) 02/20/23 1335   Pulse 77 02/20/23 1345   Resp 14 02/20/23 1335   SpO2 100 % 02/20/23 1345   Vitals shown include unvalidated device data.        Post Anesthesia Care and Evaluation    Patient location during evaluation: PACU  Patient participation: complete - patient participated  Level of consciousness: awake and alert  Pain management: adequate    Airway patency: patent  Anesthetic complications: No anesthetic complications    Cardiovascular status: acceptable  Respiratory status: acceptable  Hydration status: acceptable    Comments: Blood pressure 139/78, pulse 73, temperature 97.9 °F (36.6 °C), temperature source Oral, resp. rate 16, height 162 cm (63.78\"), weight 64.1 kg (141 lb 5 oz), SpO2 99 %, not currently breastfeeding.    Pt discharged from PACU based on devorah score >8      "

## 2023-02-20 NOTE — ANESTHESIA PROCEDURE NOTES
Airway  Urgency: elective    Date/Time: 2/20/2023 9:58 AM  Airway not difficult    General Information and Staff    Patient location during procedure: OR  CRNA/CAA: Gaby Vickers CRNA    Indications and Patient Condition  Indications for airway management: airway protection    Preoxygenated: yes  Mask difficulty assessment: 1 - vent by mask    Final Airway Details  Final airway type: endotracheal airway      Successful airway: ETT  Cuffed: yes   Successful intubation technique: direct laryngoscopy  Facilitating devices/methods: intubating stylet  Endotracheal tube insertion site: oral  Blade: Bobbi  Blade size: 3.5  ETT size (mm): 7.0  Cormack-Lehane Classification: grade IIa - partial view of glottis  Placement verified by: chest auscultation and capnometry   Cuff volume (mL): 5  Measured from: lips  ETT/EBT  to lips (cm): 21  Number of attempts at approach: 1  Assessment: lips, teeth, and gum same as pre-op and atraumatic intubation

## 2023-02-21 LAB
ANION GAP SERPL CALCULATED.3IONS-SCNC: 10 MMOL/L (ref 5–15)
BASOPHILS # BLD AUTO: 0.02 10*3/MM3 (ref 0–0.2)
BASOPHILS NFR BLD AUTO: 0.3 % (ref 0–1.5)
BUN SERPL-MCNC: 7 MG/DL (ref 6–20)
BUN/CREAT SERPL: 9.3 (ref 7–25)
CALCIUM SPEC-SCNC: 8.9 MG/DL (ref 8.6–10.5)
CHLORIDE SERPL-SCNC: 101 MMOL/L (ref 98–107)
CO2 SERPL-SCNC: 29 MMOL/L (ref 22–29)
CREAT SERPL-MCNC: 0.75 MG/DL (ref 0.57–1)
DEPRECATED RDW RBC AUTO: 45.1 FL (ref 37–54)
EGFRCR SERPLBLD CKD-EPI 2021: 92.4 ML/MIN/1.73
EOSINOPHIL # BLD AUTO: 0.04 10*3/MM3 (ref 0–0.4)
EOSINOPHIL NFR BLD AUTO: 0.6 % (ref 0.3–6.2)
ERYTHROCYTE [DISTWIDTH] IN BLOOD BY AUTOMATED COUNT: 13.4 % (ref 12.3–15.4)
FERRITIN SERPL-MCNC: 181.1 NG/ML (ref 13–150)
FOLATE SERPL-MCNC: >20 NG/ML (ref 4.78–24.2)
GLUCOSE SERPL-MCNC: 116 MG/DL (ref 65–99)
HCT VFR BLD AUTO: 28.2 % (ref 34–46.6)
HGB BLD-MCNC: 9 G/DL (ref 12–15.9)
IMM GRANULOCYTES # BLD AUTO: 0.03 10*3/MM3 (ref 0–0.05)
IMM GRANULOCYTES NFR BLD AUTO: 0.4 % (ref 0–0.5)
IRON 24H UR-MRATE: 15 MCG/DL (ref 37–145)
IRON SATN MFR SERPL: 7 % (ref 20–50)
LYMPHOCYTES # BLD AUTO: 0.99 10*3/MM3 (ref 0.7–3.1)
LYMPHOCYTES NFR BLD AUTO: 14.8 % (ref 19.6–45.3)
MCH RBC QN AUTO: 29.3 PG (ref 26.6–33)
MCHC RBC AUTO-ENTMCNC: 31.9 G/DL (ref 31.5–35.7)
MCV RBC AUTO: 91.9 FL (ref 79–97)
MONOCYTES # BLD AUTO: 0.6 10*3/MM3 (ref 0.1–0.9)
MONOCYTES NFR BLD AUTO: 9 % (ref 5–12)
NEUTROPHILS NFR BLD AUTO: 4.99 10*3/MM3 (ref 1.7–7)
NEUTROPHILS NFR BLD AUTO: 74.9 % (ref 42.7–76)
NRBC BLD AUTO-RTO: 0 /100 WBC (ref 0–0.2)
PLATELET # BLD AUTO: 277 10*3/MM3 (ref 140–450)
PMV BLD AUTO: 10.8 FL (ref 6–12)
POTASSIUM SERPL-SCNC: 3.3 MMOL/L (ref 3.5–5.2)
RBC # BLD AUTO: 3.07 10*6/MM3 (ref 3.77–5.28)
SODIUM SERPL-SCNC: 140 MMOL/L (ref 136–145)
TIBC SERPL-MCNC: 225 MCG/DL (ref 298–536)
TRANSFERRIN SERPL-MCNC: 151 MG/DL (ref 200–360)
VIT B12 BLD-MCNC: 1460 PG/ML (ref 211–946)
WBC NRBC COR # BLD: 6.67 10*3/MM3 (ref 3.4–10.8)

## 2023-02-21 PROCEDURE — 82607 VITAMIN B-12: CPT | Performed by: STUDENT IN AN ORGANIZED HEALTH CARE EDUCATION/TRAINING PROGRAM

## 2023-02-21 PROCEDURE — 82746 ASSAY OF FOLIC ACID SERUM: CPT | Performed by: STUDENT IN AN ORGANIZED HEALTH CARE EDUCATION/TRAINING PROGRAM

## 2023-02-21 PROCEDURE — 82728 ASSAY OF FERRITIN: CPT | Performed by: STUDENT IN AN ORGANIZED HEALTH CARE EDUCATION/TRAINING PROGRAM

## 2023-02-21 PROCEDURE — 25010000002 CEFAZOLIN PER 500 MG: Performed by: ORTHOPAEDIC SURGERY

## 2023-02-21 PROCEDURE — 84466 ASSAY OF TRANSFERRIN: CPT | Performed by: STUDENT IN AN ORGANIZED HEALTH CARE EDUCATION/TRAINING PROGRAM

## 2023-02-21 PROCEDURE — 83540 ASSAY OF IRON: CPT | Performed by: STUDENT IN AN ORGANIZED HEALTH CARE EDUCATION/TRAINING PROGRAM

## 2023-02-21 PROCEDURE — 85025 COMPLETE CBC W/AUTO DIFF WBC: CPT | Performed by: STUDENT IN AN ORGANIZED HEALTH CARE EDUCATION/TRAINING PROGRAM

## 2023-02-21 PROCEDURE — 0 HYDROMORPHONE 1 MG/ML SOLUTION: Performed by: ORTHOPAEDIC SURGERY

## 2023-02-21 PROCEDURE — 97165 OT EVAL LOW COMPLEX 30 MIN: CPT

## 2023-02-21 PROCEDURE — 97161 PT EVAL LOW COMPLEX 20 MIN: CPT | Performed by: PHYSICAL THERAPIST

## 2023-02-21 PROCEDURE — 80048 BASIC METABOLIC PNL TOTAL CA: CPT | Performed by: STUDENT IN AN ORGANIZED HEALTH CARE EDUCATION/TRAINING PROGRAM

## 2023-02-21 PROCEDURE — 97116 GAIT TRAINING THERAPY: CPT | Performed by: PHYSICAL THERAPIST

## 2023-02-21 RX ORDER — BUPIVACAINE HCL/0.9 % NACL/PF 0.1 %
2 PLASTIC BAG, INJECTION (ML) EPIDURAL
Status: COMPLETED | OUTPATIENT
Start: 2023-02-22 | End: 2023-02-22

## 2023-02-21 RX ADMIN — PANTOPRAZOLE SODIUM 40 MG: 40 TABLET, DELAYED RELEASE ORAL at 08:56

## 2023-02-21 RX ADMIN — DOCUSATE SODIUM 50 MG AND SENNOSIDES 8.6 MG 1 TABLET: 8.6; 5 TABLET, FILM COATED ORAL at 20:47

## 2023-02-21 RX ADMIN — POLYETHYLENE GLYCOL 3350 17 G: 17 POWDER, FOR SOLUTION ORAL at 08:55

## 2023-02-21 RX ADMIN — TIZANIDINE 4 MG: 4 TABLET ORAL at 20:46

## 2023-02-21 RX ADMIN — CEFAZOLIN 2 G: 2 INJECTION, POWDER, FOR SOLUTION INTRAMUSCULAR; INTRAVENOUS at 05:24

## 2023-02-21 RX ADMIN — HYDROMORPHONE HYDROCHLORIDE 1 MG: 1 INJECTION, SOLUTION INTRAMUSCULAR; INTRAVENOUS; SUBCUTANEOUS at 03:00

## 2023-02-21 RX ADMIN — OXYCODONE HYDROCHLORIDE AND ACETAMINOPHEN 2 TABLET: 7.5; 325 TABLET ORAL at 12:27

## 2023-02-21 RX ADMIN — OXYCODONE HYDROCHLORIDE AND ACETAMINOPHEN 2 TABLET: 7.5; 325 TABLET ORAL at 00:37

## 2023-02-21 RX ADMIN — POLYETHYLENE GLYCOL 3350 17 G: 17 POWDER, FOR SOLUTION ORAL at 20:46

## 2023-02-21 RX ADMIN — POLYETHYLENE GLYCOL 3350 17 G: 17 POWDER, FOR SOLUTION ORAL at 16:35

## 2023-02-21 RX ADMIN — Medication 3 ML: at 08:57

## 2023-02-21 RX ADMIN — DIAZEPAM 10 MG: 10 TABLET ORAL at 20:47

## 2023-02-21 RX ADMIN — TIZANIDINE 4 MG: 4 TABLET ORAL at 00:37

## 2023-02-21 RX ADMIN — DOCUSATE SODIUM 50 MG AND SENNOSIDES 8.6 MG 1 TABLET: 8.6; 5 TABLET, FILM COATED ORAL at 08:56

## 2023-02-21 RX ADMIN — FERROUS SULFATE TAB 325 MG (65 MG ELEMENTAL FE) 325 MG: 325 (65 FE) TAB at 08:56

## 2023-02-21 RX ADMIN — OXYCODONE HYDROCHLORIDE AND ACETAMINOPHEN 2 TABLET: 7.5; 325 TABLET ORAL at 16:33

## 2023-02-21 RX ADMIN — OXYCODONE HYDROCHLORIDE AND ACETAMINOPHEN 2 TABLET: 7.5; 325 TABLET ORAL at 08:57

## 2023-02-21 RX ADMIN — PREGABALIN 150 MG: 75 CAPSULE ORAL at 16:34

## 2023-02-21 RX ADMIN — TIZANIDINE 4 MG: 4 TABLET ORAL at 08:56

## 2023-02-21 RX ADMIN — DIAZEPAM 10 MG: 10 TABLET ORAL at 05:27

## 2023-02-21 RX ADMIN — Medication 3 ML: at 20:47

## 2023-02-21 RX ADMIN — PREGABALIN 150 MG: 75 CAPSULE ORAL at 20:47

## 2023-02-21 RX ADMIN — OXYCODONE HYDROCHLORIDE AND ACETAMINOPHEN 2 TABLET: 7.5; 325 TABLET ORAL at 23:21

## 2023-02-21 RX ADMIN — PREGABALIN 150 MG: 75 CAPSULE ORAL at 08:56

## 2023-02-22 ENCOUNTER — APPOINTMENT (OUTPATIENT)
Dept: GENERAL RADIOLOGY | Facility: HOSPITAL | Age: 58
DRG: 455 | End: 2023-02-22
Payer: COMMERCIAL

## 2023-02-22 ENCOUNTER — ANESTHESIA EVENT (OUTPATIENT)
Dept: PERIOP | Facility: HOSPITAL | Age: 58
DRG: 455 | End: 2023-02-22
Payer: COMMERCIAL

## 2023-02-22 ENCOUNTER — ANESTHESIA (OUTPATIENT)
Dept: PERIOP | Facility: HOSPITAL | Age: 58
DRG: 455 | End: 2023-02-22
Payer: COMMERCIAL

## 2023-02-22 LAB
ANION GAP SERPL CALCULATED.3IONS-SCNC: 9 MMOL/L (ref 5–15)
BASOPHILS # BLD AUTO: 0.03 10*3/MM3 (ref 0–0.2)
BASOPHILS NFR BLD AUTO: 0.4 % (ref 0–1.5)
BUN SERPL-MCNC: 8 MG/DL (ref 6–20)
BUN/CREAT SERPL: 9.8 (ref 7–25)
CALCIUM SPEC-SCNC: 9.1 MG/DL (ref 8.6–10.5)
CHLORIDE SERPL-SCNC: 102 MMOL/L (ref 98–107)
CO2 SERPL-SCNC: 29 MMOL/L (ref 22–29)
CREAT SERPL-MCNC: 0.82 MG/DL (ref 0.57–1)
DEPRECATED RDW RBC AUTO: 47.6 FL (ref 37–54)
EGFRCR SERPLBLD CKD-EPI 2021: 83 ML/MIN/1.73
EOSINOPHIL # BLD AUTO: 0.08 10*3/MM3 (ref 0–0.4)
EOSINOPHIL NFR BLD AUTO: 1 % (ref 0.3–6.2)
ERYTHROCYTE [DISTWIDTH] IN BLOOD BY AUTOMATED COUNT: 13.7 % (ref 12.3–15.4)
GLUCOSE SERPL-MCNC: 100 MG/DL (ref 65–99)
HCT VFR BLD AUTO: 28.7 % (ref 34–46.6)
HGB BLD-MCNC: 8.8 G/DL (ref 12–15.9)
IMM GRANULOCYTES # BLD AUTO: 0.02 10*3/MM3 (ref 0–0.05)
IMM GRANULOCYTES NFR BLD AUTO: 0.3 % (ref 0–0.5)
LYMPHOCYTES # BLD AUTO: 1.37 10*3/MM3 (ref 0.7–3.1)
LYMPHOCYTES NFR BLD AUTO: 17.6 % (ref 19.6–45.3)
MCH RBC QN AUTO: 29.1 PG (ref 26.6–33)
MCHC RBC AUTO-ENTMCNC: 30.7 G/DL (ref 31.5–35.7)
MCV RBC AUTO: 95 FL (ref 79–97)
MONOCYTES # BLD AUTO: 0.87 10*3/MM3 (ref 0.1–0.9)
MONOCYTES NFR BLD AUTO: 11.2 % (ref 5–12)
NEUTROPHILS NFR BLD AUTO: 5.43 10*3/MM3 (ref 1.7–7)
NEUTROPHILS NFR BLD AUTO: 69.5 % (ref 42.7–76)
NRBC BLD AUTO-RTO: 0 /100 WBC (ref 0–0.2)
PLATELET # BLD AUTO: 284 10*3/MM3 (ref 140–450)
PMV BLD AUTO: 11.5 FL (ref 6–12)
POTASSIUM SERPL-SCNC: 3.5 MMOL/L (ref 3.5–5.2)
RBC # BLD AUTO: 3.02 10*6/MM3 (ref 3.77–5.28)
SODIUM SERPL-SCNC: 140 MMOL/L (ref 136–145)
WBC NRBC COR # BLD: 7.8 10*3/MM3 (ref 3.4–10.8)

## 2023-02-22 PROCEDURE — 25010000002 HYDROMORPHONE PER 4 MG: Performed by: ANESTHESIOLOGY

## 2023-02-22 PROCEDURE — 25010000002 FENTANYL CITRATE (PF) 50 MCG/ML SOLUTION: Performed by: ANESTHESIOLOGY

## 2023-02-22 PROCEDURE — C1769 GUIDE WIRE: HCPCS | Performed by: ORTHOPAEDIC SURGERY

## 2023-02-22 PROCEDURE — 97168 OT RE-EVAL EST PLAN CARE: CPT | Performed by: OCCUPATIONAL THERAPIST

## 2023-02-22 PROCEDURE — 85025 COMPLETE CBC W/AUTO DIFF WBC: CPT | Performed by: STUDENT IN AN ORGANIZED HEALTH CARE EDUCATION/TRAINING PROGRAM

## 2023-02-22 PROCEDURE — 0SG1071 FUSION OF 2 OR MORE LUMBAR VERTEBRAL JOINTS WITH AUTOLOGOUS TISSUE SUBSTITUTE, POSTERIOR APPROACH, POSTERIOR COLUMN, OPEN APPROACH: ICD-10-PCS | Performed by: ORTHOPAEDIC SURGERY

## 2023-02-22 PROCEDURE — 25010000002 ONDANSETRON PER 1 MG: Performed by: NURSE ANESTHETIST, CERTIFIED REGISTERED

## 2023-02-22 PROCEDURE — 0SG3071 FUSION OF LUMBOSACRAL JOINT WITH AUTOLOGOUS TISSUE SUBSTITUTE, POSTERIOR APPROACH, POSTERIOR COLUMN, OPEN APPROACH: ICD-10-PCS | Performed by: ORTHOPAEDIC SURGERY

## 2023-02-22 PROCEDURE — 25010000002 PROPOFOL 10 MG/ML EMULSION: Performed by: NURSE ANESTHETIST, CERTIFIED REGISTERED

## 2023-02-22 PROCEDURE — 25010000002 DROPERIDOL PER 5 MG: Performed by: ANESTHESIOLOGY

## 2023-02-22 PROCEDURE — C1713 ANCHOR/SCREW BN/BN,TIS/BN: HCPCS | Performed by: ORTHOPAEDIC SURGERY

## 2023-02-22 PROCEDURE — 80048 BASIC METABOLIC PNL TOTAL CA: CPT | Performed by: STUDENT IN AN ORGANIZED HEALTH CARE EDUCATION/TRAINING PROGRAM

## 2023-02-22 PROCEDURE — 76000 FLUOROSCOPY <1 HR PHYS/QHP: CPT

## 2023-02-22 PROCEDURE — 72100 X-RAY EXAM L-S SPINE 2/3 VWS: CPT

## 2023-02-22 PROCEDURE — 25010000002 CEFAZOLIN PER 500 MG: Performed by: ORTHOPAEDIC SURGERY

## 2023-02-22 PROCEDURE — 0 HYDROMORPHONE 1 MG/ML SOLUTION: Performed by: ORTHOPAEDIC SURGERY

## 2023-02-22 PROCEDURE — 25010000002 CEFAZOLIN PER 500 MG: Performed by: PHYSICIAN ASSISTANT

## 2023-02-22 PROCEDURE — C9290 INJ, BUPIVACAINE LIPOSOME: HCPCS | Performed by: ORTHOPAEDIC SURGERY

## 2023-02-22 PROCEDURE — 25010000002 FENTANYL CITRATE (PF) 250 MCG/5ML SOLUTION: Performed by: NURSE ANESTHETIST, CERTIFIED REGISTERED

## 2023-02-22 PROCEDURE — 0 BUPIVACAINE LIPOSOME 1.3 % SUSPENSION 10 ML VIAL: Performed by: ORTHOPAEDIC SURGERY

## 2023-02-22 PROCEDURE — 25010000002 MIDAZOLAM PER 1 MG: Performed by: ANESTHESIOLOGY

## 2023-02-22 DEVICE — SET SCREW
Type: IMPLANTABLE DEVICE | Site: SPINE LUMBAR | Status: FUNCTIONAL
Brand: INVICTUS

## 2023-02-22 DEVICE — TI, MIS LORDOTIC ROD, VI, 5.5MM X 90MM
Type: IMPLANTABLE DEVICE | Site: SPINE LUMBAR | Status: FUNCTIONAL
Brand: INVICTUS

## 2023-02-22 DEVICE — CANNULATED EXTENDED TAB POLYAXIAL REDUCTION SCREW, 6.5 MM X 45 MM
Type: IMPLANTABLE DEVICE | Site: SPINE LUMBAR | Status: FUNCTIONAL
Brand: INVICTUS

## 2023-02-22 RX ORDER — HYDROMORPHONE HYDROCHLORIDE 1 MG/ML
0.5 INJECTION, SOLUTION INTRAMUSCULAR; INTRAVENOUS; SUBCUTANEOUS
Status: DISCONTINUED | OUTPATIENT
Start: 2023-02-22 | End: 2023-02-22 | Stop reason: HOSPADM

## 2023-02-22 RX ORDER — LIDOCAINE HYDROCHLORIDE 10 MG/ML
0.5 INJECTION, SOLUTION EPIDURAL; INFILTRATION; INTRACAUDAL; PERINEURAL ONCE AS NEEDED
Status: DISCONTINUED | OUTPATIENT
Start: 2023-02-22 | End: 2023-02-22 | Stop reason: HOSPADM

## 2023-02-22 RX ORDER — IBUPROFEN 600 MG/1
600 TABLET ORAL ONCE AS NEEDED
Status: DISCONTINUED | OUTPATIENT
Start: 2023-02-22 | End: 2023-02-22 | Stop reason: HOSPADM

## 2023-02-22 RX ORDER — SODIUM CHLORIDE, SODIUM LACTATE, POTASSIUM CHLORIDE, CALCIUM CHLORIDE 600; 310; 30; 20 MG/100ML; MG/100ML; MG/100ML; MG/100ML
9 INJECTION, SOLUTION INTRAVENOUS CONTINUOUS
Status: DISCONTINUED | OUTPATIENT
Start: 2023-02-22 | End: 2023-02-23 | Stop reason: HOSPADM

## 2023-02-22 RX ORDER — BUPIVACAINE HCL/0.9 % NACL/PF 0.125 %
PLASTIC BAG, INJECTION (ML) EPIDURAL AS NEEDED
Status: DISCONTINUED | OUTPATIENT
Start: 2023-02-22 | End: 2023-02-22 | Stop reason: SURG

## 2023-02-22 RX ORDER — DROPERIDOL 2.5 MG/ML
0.62 INJECTION, SOLUTION INTRAMUSCULAR; INTRAVENOUS ONCE AS NEEDED
Status: COMPLETED | OUTPATIENT
Start: 2023-02-22 | End: 2023-02-22

## 2023-02-22 RX ORDER — FENTANYL CITRATE 50 UG/ML
INJECTION, SOLUTION INTRAMUSCULAR; INTRAVENOUS AS NEEDED
Status: DISCONTINUED | OUTPATIENT
Start: 2023-02-22 | End: 2023-02-22 | Stop reason: SURG

## 2023-02-22 RX ORDER — DEXTROSE MONOHYDRATE 25 G/50ML
12.5 INJECTION, SOLUTION INTRAVENOUS AS NEEDED
Status: DISCONTINUED | OUTPATIENT
Start: 2023-02-22 | End: 2023-02-22 | Stop reason: HOSPADM

## 2023-02-22 RX ORDER — FENTANYL CITRATE 50 UG/ML
25 INJECTION, SOLUTION INTRAMUSCULAR; INTRAVENOUS
Status: DISCONTINUED | OUTPATIENT
Start: 2023-02-22 | End: 2023-02-22 | Stop reason: HOSPADM

## 2023-02-22 RX ORDER — ONDANSETRON 2 MG/ML
INJECTION INTRAMUSCULAR; INTRAVENOUS AS NEEDED
Status: DISCONTINUED | OUTPATIENT
Start: 2023-02-22 | End: 2023-02-22 | Stop reason: SURG

## 2023-02-22 RX ORDER — SODIUM CHLORIDE 0.9 % (FLUSH) 0.9 %
10 SYRINGE (ML) INJECTION AS NEEDED
Status: DISCONTINUED | OUTPATIENT
Start: 2023-02-22 | End: 2023-02-22 | Stop reason: HOSPADM

## 2023-02-22 RX ORDER — OXYCODONE AND ACETAMINOPHEN 10; 325 MG/1; MG/1
1 TABLET ORAL ONCE AS NEEDED
Status: COMPLETED | OUTPATIENT
Start: 2023-02-22 | End: 2023-02-22

## 2023-02-22 RX ORDER — NEOSTIGMINE METHYLSULFATE 5 MG/5 ML
SYRINGE (ML) INTRAVENOUS AS NEEDED
Status: DISCONTINUED | OUTPATIENT
Start: 2023-02-22 | End: 2023-02-22 | Stop reason: SURG

## 2023-02-22 RX ORDER — SODIUM CHLORIDE, SODIUM LACTATE, POTASSIUM CHLORIDE, CALCIUM CHLORIDE 600; 310; 30; 20 MG/100ML; MG/100ML; MG/100ML; MG/100ML
100 INJECTION, SOLUTION INTRAVENOUS CONTINUOUS PRN
Status: DISCONTINUED | OUTPATIENT
Start: 2023-02-22 | End: 2023-02-23 | Stop reason: HOSPADM

## 2023-02-22 RX ORDER — FLUMAZENIL 0.1 MG/ML
0.2 INJECTION INTRAVENOUS AS NEEDED
Status: DISCONTINUED | OUTPATIENT
Start: 2023-02-22 | End: 2023-02-22 | Stop reason: HOSPADM

## 2023-02-22 RX ORDER — OXYCODONE HCL 20 MG/1
20 TABLET, FILM COATED, EXTENDED RELEASE ORAL ONCE
Status: COMPLETED | OUTPATIENT
Start: 2023-02-22 | End: 2023-02-22

## 2023-02-22 RX ORDER — SODIUM CHLORIDE 0.9 % (FLUSH) 0.9 %
10 SYRINGE (ML) INJECTION EVERY 12 HOURS SCHEDULED
Status: DISCONTINUED | OUTPATIENT
Start: 2023-02-22 | End: 2023-02-22 | Stop reason: HOSPADM

## 2023-02-22 RX ORDER — ONDANSETRON 2 MG/ML
4 INJECTION INTRAMUSCULAR; INTRAVENOUS
Status: DISCONTINUED | OUTPATIENT
Start: 2023-02-22 | End: 2023-02-22 | Stop reason: HOSPADM

## 2023-02-22 RX ORDER — BUPIVACAINE HCL/0.9 % NACL/PF 0.1 %
2 PLASTIC BAG, INJECTION (ML) EPIDURAL EVERY 8 HOURS
Status: COMPLETED | OUTPATIENT
Start: 2023-02-22 | End: 2023-02-23

## 2023-02-22 RX ORDER — MAGNESIUM HYDROXIDE 1200 MG/15ML
LIQUID ORAL AS NEEDED
Status: DISCONTINUED | OUTPATIENT
Start: 2023-02-22 | End: 2023-02-22 | Stop reason: HOSPADM

## 2023-02-22 RX ORDER — SODIUM CHLORIDE 9 MG/ML
40 INJECTION, SOLUTION INTRAVENOUS AS NEEDED
Status: DISCONTINUED | OUTPATIENT
Start: 2023-02-22 | End: 2023-02-22 | Stop reason: HOSPADM

## 2023-02-22 RX ORDER — MIDAZOLAM HYDROCHLORIDE 1 MG/ML
1 INJECTION INTRAMUSCULAR; INTRAVENOUS
Status: DISCONTINUED | OUTPATIENT
Start: 2023-02-22 | End: 2023-02-22 | Stop reason: HOSPADM

## 2023-02-22 RX ORDER — PROPOFOL 10 MG/ML
VIAL (ML) INTRAVENOUS AS NEEDED
Status: DISCONTINUED | OUTPATIENT
Start: 2023-02-22 | End: 2023-02-22 | Stop reason: SURG

## 2023-02-22 RX ORDER — NALOXONE HCL 0.4 MG/ML
0.04 VIAL (ML) INJECTION AS NEEDED
Status: DISCONTINUED | OUTPATIENT
Start: 2023-02-22 | End: 2023-02-22 | Stop reason: HOSPADM

## 2023-02-22 RX ORDER — FENTANYL CITRATE 50 UG/ML
25 INJECTION, SOLUTION INTRAMUSCULAR; INTRAVENOUS
Status: COMPLETED | OUTPATIENT
Start: 2023-02-22 | End: 2023-02-22

## 2023-02-22 RX ORDER — ROCURONIUM BROMIDE 10 MG/ML
INJECTION, SOLUTION INTRAVENOUS AS NEEDED
Status: DISCONTINUED | OUTPATIENT
Start: 2023-02-22 | End: 2023-02-22 | Stop reason: SURG

## 2023-02-22 RX ORDER — LABETALOL HYDROCHLORIDE 5 MG/ML
5 INJECTION, SOLUTION INTRAVENOUS
Status: DISCONTINUED | OUTPATIENT
Start: 2023-02-22 | End: 2023-02-22 | Stop reason: HOSPADM

## 2023-02-22 RX ADMIN — POLYETHYLENE GLYCOL 3350 17 G: 17 POWDER, FOR SOLUTION ORAL at 15:09

## 2023-02-22 RX ADMIN — Medication 100 MCG: at 09:57

## 2023-02-22 RX ADMIN — DROPERIDOL 0.62 MG: 2.5 INJECTION, SOLUTION INTRAMUSCULAR; INTRAVENOUS at 12:11

## 2023-02-22 RX ADMIN — GLYCOPYRROLATE 0.4 MG: 0.2 INJECTION INTRAMUSCULAR; INTRAVENOUS at 11:19

## 2023-02-22 RX ADMIN — Medication 100 MCG: at 10:51

## 2023-02-22 RX ADMIN — HYDROMORPHONE HYDROCHLORIDE 1 MG: 1 INJECTION, SOLUTION INTRAMUSCULAR; INTRAVENOUS; SUBCUTANEOUS at 15:15

## 2023-02-22 RX ADMIN — ROCURONIUM BROMIDE 20 MG: 10 INJECTION, SOLUTION INTRAVENOUS at 10:00

## 2023-02-22 RX ADMIN — FENTANYL CITRATE 25 MCG: 50 INJECTION INTRAMUSCULAR; INTRAVENOUS at 11:40

## 2023-02-22 RX ADMIN — OXYCODONE AND ACETAMINOPHEN 1 TABLET: 325; 10 TABLET ORAL at 12:14

## 2023-02-22 RX ADMIN — ROCURONIUM BROMIDE 30 MG: 10 INJECTION, SOLUTION INTRAVENOUS at 09:41

## 2023-02-22 RX ADMIN — PREGABALIN 150 MG: 75 CAPSULE ORAL at 20:40

## 2023-02-22 RX ADMIN — FENTANYL CITRATE 50 MCG: 50 INJECTION, SOLUTION INTRAMUSCULAR; INTRAVENOUS at 10:07

## 2023-02-22 RX ADMIN — Medication 2 G: at 09:47

## 2023-02-22 RX ADMIN — OXYCODONE HYDROCHLORIDE 20 MG: 20 TABLET, FILM COATED, EXTENDED RELEASE ORAL at 08:21

## 2023-02-22 RX ADMIN — FENTANYL CITRATE 100 MCG: 50 INJECTION, SOLUTION INTRAMUSCULAR; INTRAVENOUS at 09:37

## 2023-02-22 RX ADMIN — POLYETHYLENE GLYCOL 3350 17 G: 17 POWDER, FOR SOLUTION ORAL at 20:40

## 2023-02-22 RX ADMIN — ONDANSETRON 4 MG: 2 INJECTION INTRAMUSCULAR; INTRAVENOUS at 10:48

## 2023-02-22 RX ADMIN — PREGABALIN 150 MG: 75 CAPSULE ORAL at 15:09

## 2023-02-22 RX ADMIN — FENTANYL CITRATE 50 MCG: 50 INJECTION, SOLUTION INTRAMUSCULAR; INTRAVENOUS at 11:09

## 2023-02-22 RX ADMIN — OXYCODONE HYDROCHLORIDE AND ACETAMINOPHEN 2 TABLET: 7.5; 325 TABLET ORAL at 23:34

## 2023-02-22 RX ADMIN — Medication 100 MCG: at 10:54

## 2023-02-22 RX ADMIN — MIDAZOLAM HYDROCHLORIDE 1 MG: 2 INJECTION, SOLUTION INTRAMUSCULAR; INTRAVENOUS at 09:10

## 2023-02-22 RX ADMIN — Medication 3 MG: at 11:19

## 2023-02-22 RX ADMIN — DOCUSATE SODIUM 50 MG AND SENNOSIDES 8.6 MG 1 TABLET: 8.6; 5 TABLET, FILM COATED ORAL at 20:40

## 2023-02-22 RX ADMIN — FENTANYL CITRATE 25 MCG: 50 INJECTION INTRAMUSCULAR; INTRAVENOUS at 11:45

## 2023-02-22 RX ADMIN — HYDROMORPHONE HYDROCHLORIDE 0.5 MG: 1 INJECTION, SOLUTION INTRAMUSCULAR; INTRAVENOUS; SUBCUTANEOUS at 11:50

## 2023-02-22 RX ADMIN — SODIUM CHLORIDE, POTASSIUM CHLORIDE, SODIUM LACTATE AND CALCIUM CHLORIDE 100 ML/HR: 600; 310; 30; 20 INJECTION, SOLUTION INTRAVENOUS at 08:21

## 2023-02-22 RX ADMIN — FENTANYL CITRATE 25 MCG: 50 INJECTION INTRAMUSCULAR; INTRAVENOUS at 12:02

## 2023-02-22 RX ADMIN — TIZANIDINE 4 MG: 4 TABLET ORAL at 15:15

## 2023-02-22 RX ADMIN — Medication 3 ML: at 20:41

## 2023-02-22 RX ADMIN — FENTANYL CITRATE 25 MCG: 50 INJECTION INTRAMUSCULAR; INTRAVENOUS at 11:57

## 2023-02-22 RX ADMIN — SODIUM CHLORIDE, POTASSIUM CHLORIDE, SODIUM LACTATE AND CALCIUM CHLORIDE 9 ML/HR: 600; 310; 30; 20 INJECTION, SOLUTION INTRAVENOUS at 08:20

## 2023-02-22 RX ADMIN — Medication 100 MCG: at 10:48

## 2023-02-22 RX ADMIN — HYDROMORPHONE HYDROCHLORIDE 1 MG: 1 INJECTION, SOLUTION INTRAMUSCULAR; INTRAVENOUS; SUBCUTANEOUS at 20:42

## 2023-02-22 RX ADMIN — FENTANYL CITRATE 50 MCG: 50 INJECTION, SOLUTION INTRAMUSCULAR; INTRAVENOUS at 10:00

## 2023-02-22 RX ADMIN — HYDROMORPHONE HYDROCHLORIDE 0.5 MG: 1 INJECTION, SOLUTION INTRAMUSCULAR; INTRAVENOUS; SUBCUTANEOUS at 12:03

## 2023-02-22 RX ADMIN — CEFAZOLIN 2 G: 2 INJECTION, POWDER, FOR SOLUTION INTRAMUSCULAR; INTRAVENOUS at 17:59

## 2023-02-22 RX ADMIN — PROPOFOL 125 MG: 10 INJECTION, EMULSION INTRAVENOUS at 09:41

## 2023-02-22 NOTE — ANESTHESIA PREPROCEDURE EVALUATION
Anesthesia Evaluation     Patient summary reviewed   history of anesthetic complications: PONV  NPO Solid Status: > 8 hours  NPO Liquid Status: > 8 hours           Airway   Mallampati: II  TM distance: >3 FB  Neck ROM: full  Dental          Pulmonary    (+) a smoker Former, COPD,   (-) asthma, sleep apnea  Cardiovascular   Exercise tolerance: good (4-7 METS)    (-) hypertension, past MI, CAD, dysrhythmias, cardiac stents, hyperlipidemia    ROS comment: Echo:  Overall non-diagnostic study given poor imaging with inadequate visualization of the apical segments precluding assessment of ischemia in this region. In the visualized segments, the stress echo is normal.  No ECG evidence of myocardial ischemia. Negative clinical evidence of myocardial ischemia. Findings consistent with a normal ECG stress test for ischemic evaluation.  Normal exercise capacity  During recovery the patient's rhythm was predominantly sinus with intermittent mobitz I AV block as well as intermittent wandering ectopic atrial rhythm. No symptoms were reported during recovery.      Neuro/Psych  (+) numbness,    (-) seizures, TIA, CVA  GI/Hepatic/Renal/Endo    (+)  GERD,  renal disease CRI,   (-) liver disease, diabetes    Musculoskeletal     (+) back pain,   Abdominal    Substance History      OB/GYN          Other   blood dyscrasia anemia,   history of cancer (breast) remission                      Anesthesia Plan    ASA 2     general     intravenous induction     Anesthetic plan, risks, benefits, and alternatives have been provided, discussed and informed consent has been obtained with: patient.        CODE STATUS:

## 2023-02-22 NOTE — ANESTHESIA POSTPROCEDURE EVALUATION
"Patient: Carol Rodriguez    Procedure Summary     Date: 02/22/23 Room / Location:  [facility] OR  /  [facility] OR    Anesthesia Start: 0935 Anesthesia Stop: 1130    Procedure: POSTERIOR SPINAL FUSION WITH INSTRUMENTATION L3-S1 (Spine Lumbar) Diagnosis: (M54.16)    Surgeons: CAMILA Goldstein MD Provider: Wilbert Graham CRNA    Anesthesia Type: general ASA Status: 2          Anesthesia Type: general    Vitals  Vitals Value Taken Time   /51 02/22/23 1249   Temp 97.9 °F (36.6 °C) 02/22/23 1230   Pulse 83 02/22/23 1249   Resp 17 02/22/23 1230   SpO2 100 % 02/22/23 1247   Vitals shown include unvalidated device data.        Post Anesthesia Care and Evaluation    Patient location during evaluation: PACU  Patient participation: complete - patient participated  Level of consciousness: awake and alert  Pain management: adequate    Airway patency: patent  Anesthetic complications: No anesthetic complications    Cardiovascular status: acceptable  Respiratory status: acceptable  Hydration status: acceptable    Comments: Blood pressure 116/56, pulse 71, temperature 97.9 °F (36.6 °C), temperature source Temporal, resp. rate 17, height 162 cm (63.78\"), weight 64 kg (141 lb), SpO2 99 %, not currently breastfeeding.    Pt discharged from PACU based on devorah score >8      "

## 2023-02-22 NOTE — ANESTHESIA PROCEDURE NOTES
Airway  Urgency: elective    Date/Time: 2/22/2023 9:43 AM  Airway not difficult    General Information and Staff    Patient location during procedure: OR  CRNA/CAA: Wilbert Graham CRNA    Indications and Patient Condition  Indications for airway management: airway protection    Preoxygenated: yes  MILS maintained throughout  Mask difficulty assessment: 1 - vent by mask    Final Airway Details  Final airway type: endotracheal airway      Successful airway: ETT  Cuffed: yes   Successful intubation technique: direct laryngoscopy  Endotracheal tube insertion site: oral  Blade: Workman  Blade size: 2  ETT size (mm): 7.0  Cormack-Lehane Classification: grade I - full view of glottis  Placement verified by: chest auscultation and capnometry   Cuff volume (mL): 5  Measured from: gums  ETT/EBT to gums (cm): 21  Number of attempts at approach: 1  Assessment: lips, teeth, and gum same as pre-op and atraumatic intubation

## 2023-02-23 ENCOUNTER — READMISSION MANAGEMENT (OUTPATIENT)
Dept: CALL CENTER | Facility: HOSPITAL | Age: 58
End: 2023-02-23
Payer: COMMERCIAL

## 2023-02-23 VITALS
TEMPERATURE: 98.6 F | SYSTOLIC BLOOD PRESSURE: 119 MMHG | HEART RATE: 75 BPM | HEIGHT: 64 IN | DIASTOLIC BLOOD PRESSURE: 68 MMHG | WEIGHT: 141 LBS | RESPIRATION RATE: 18 BRPM | OXYGEN SATURATION: 97 % | BODY MASS INDEX: 24.07 KG/M2

## 2023-02-23 LAB
ANION GAP SERPL CALCULATED.3IONS-SCNC: 11 MMOL/L (ref 5–15)
ANION GAP SERPL CALCULATED.3IONS-SCNC: 8 MMOL/L (ref 5–15)
BASOPHILS # BLD AUTO: 0.02 10*3/MM3 (ref 0–0.2)
BASOPHILS # BLD AUTO: 0.03 10*3/MM3 (ref 0–0.2)
BASOPHILS NFR BLD AUTO: 0.2 % (ref 0–1.5)
BASOPHILS NFR BLD AUTO: 0.4 % (ref 0–1.5)
BUN SERPL-MCNC: 7 MG/DL (ref 6–20)
BUN SERPL-MCNC: 8 MG/DL (ref 6–20)
BUN/CREAT SERPL: 10.3 (ref 7–25)
BUN/CREAT SERPL: 8.2 (ref 7–25)
CALCIUM SPEC-SCNC: 7.1 MG/DL (ref 8.6–10.5)
CALCIUM SPEC-SCNC: 8.6 MG/DL (ref 8.6–10.5)
CHLORIDE SERPL-SCNC: 108 MMOL/L (ref 98–107)
CHLORIDE SERPL-SCNC: 98 MMOL/L (ref 98–107)
CO2 SERPL-SCNC: 24 MMOL/L (ref 22–29)
CO2 SERPL-SCNC: 28 MMOL/L (ref 22–29)
CREAT SERPL-MCNC: 0.68 MG/DL (ref 0.57–1)
CREAT SERPL-MCNC: 0.98 MG/DL (ref 0.57–1)
DEPRECATED RDW RBC AUTO: 46.7 FL (ref 37–54)
DEPRECATED RDW RBC AUTO: 48.2 FL (ref 37–54)
EGFRCR SERPLBLD CKD-EPI 2021: 101.1 ML/MIN/1.73
EGFRCR SERPLBLD CKD-EPI 2021: 67 ML/MIN/1.73
EOSINOPHIL # BLD AUTO: 0.05 10*3/MM3 (ref 0–0.4)
EOSINOPHIL # BLD AUTO: 0.07 10*3/MM3 (ref 0–0.4)
EOSINOPHIL NFR BLD AUTO: 0.7 % (ref 0.3–6.2)
EOSINOPHIL NFR BLD AUTO: 0.7 % (ref 0.3–6.2)
ERYTHROCYTE [DISTWIDTH] IN BLOOD BY AUTOMATED COUNT: 13.5 % (ref 12.3–15.4)
ERYTHROCYTE [DISTWIDTH] IN BLOOD BY AUTOMATED COUNT: 13.8 % (ref 12.3–15.4)
GLUCOSE SERPL-MCNC: 146 MG/DL (ref 65–99)
GLUCOSE SERPL-MCNC: 79 MG/DL (ref 65–99)
HCT VFR BLD AUTO: 23.1 % (ref 34–46.6)
HCT VFR BLD AUTO: 26.9 % (ref 34–46.6)
HGB BLD-MCNC: 7 G/DL (ref 12–15.9)
HGB BLD-MCNC: 8.3 G/DL (ref 12–15.9)
IMM GRANULOCYTES # BLD AUTO: 0.04 10*3/MM3 (ref 0–0.05)
IMM GRANULOCYTES # BLD AUTO: 0.04 10*3/MM3 (ref 0–0.05)
IMM GRANULOCYTES NFR BLD AUTO: 0.4 % (ref 0–0.5)
IMM GRANULOCYTES NFR BLD AUTO: 0.5 % (ref 0–0.5)
LYMPHOCYTES # BLD AUTO: 0.99 10*3/MM3 (ref 0.7–3.1)
LYMPHOCYTES # BLD AUTO: 1.09 10*3/MM3 (ref 0.7–3.1)
LYMPHOCYTES NFR BLD AUTO: 10.6 % (ref 19.6–45.3)
LYMPHOCYTES NFR BLD AUTO: 13.1 % (ref 19.6–45.3)
MCH RBC QN AUTO: 28.9 PG (ref 26.6–33)
MCH RBC QN AUTO: 29 PG (ref 26.6–33)
MCHC RBC AUTO-ENTMCNC: 30.3 G/DL (ref 31.5–35.7)
MCHC RBC AUTO-ENTMCNC: 30.9 G/DL (ref 31.5–35.7)
MCV RBC AUTO: 94.1 FL (ref 79–97)
MCV RBC AUTO: 95.5 FL (ref 79–97)
MONOCYTES # BLD AUTO: 0.78 10*3/MM3 (ref 0.1–0.9)
MONOCYTES # BLD AUTO: 0.86 10*3/MM3 (ref 0.1–0.9)
MONOCYTES NFR BLD AUTO: 10.3 % (ref 5–12)
MONOCYTES NFR BLD AUTO: 8.4 % (ref 5–12)
NEUTROPHILS NFR BLD AUTO: 5.65 10*3/MM3 (ref 1.7–7)
NEUTROPHILS NFR BLD AUTO: 75 % (ref 42.7–76)
NEUTROPHILS NFR BLD AUTO: 79.7 % (ref 42.7–76)
NEUTROPHILS NFR BLD AUTO: 8.16 10*3/MM3 (ref 1.7–7)
NRBC BLD AUTO-RTO: 0 /100 WBC (ref 0–0.2)
NRBC BLD AUTO-RTO: 0 /100 WBC (ref 0–0.2)
PLATELET # BLD AUTO: 244 10*3/MM3 (ref 140–450)
PLATELET # BLD AUTO: 316 10*3/MM3 (ref 140–450)
PMV BLD AUTO: 10.4 FL (ref 6–12)
PMV BLD AUTO: 11.1 FL (ref 6–12)
POTASSIUM SERPL-SCNC: 3 MMOL/L (ref 3.5–5.2)
POTASSIUM SERPL-SCNC: 3.6 MMOL/L (ref 3.5–5.2)
RBC # BLD AUTO: 2.42 10*6/MM3 (ref 3.77–5.28)
RBC # BLD AUTO: 2.86 10*6/MM3 (ref 3.77–5.28)
SODIUM SERPL-SCNC: 134 MMOL/L (ref 136–145)
SODIUM SERPL-SCNC: 143 MMOL/L (ref 136–145)
WBC NRBC COR # BLD: 10.24 10*3/MM3 (ref 3.4–10.8)
WBC NRBC COR # BLD: 7.54 10*3/MM3 (ref 3.4–10.8)

## 2023-02-23 PROCEDURE — 80048 BASIC METABOLIC PNL TOTAL CA: CPT | Performed by: ORTHOPAEDIC SURGERY

## 2023-02-23 PROCEDURE — 97535 SELF CARE MNGMENT TRAINING: CPT

## 2023-02-23 PROCEDURE — 85025 COMPLETE CBC W/AUTO DIFF WBC: CPT | Performed by: PHYSICIAN ASSISTANT

## 2023-02-23 PROCEDURE — 25010000002 CEFAZOLIN PER 500 MG: Performed by: ORTHOPAEDIC SURGERY

## 2023-02-23 PROCEDURE — 97116 GAIT TRAINING THERAPY: CPT | Performed by: PHYSICAL THERAPIST

## 2023-02-23 PROCEDURE — 85025 COMPLETE CBC W/AUTO DIFF WBC: CPT | Performed by: ORTHOPAEDIC SURGERY

## 2023-02-23 PROCEDURE — 97164 PT RE-EVAL EST PLAN CARE: CPT | Performed by: PHYSICAL THERAPIST

## 2023-02-23 PROCEDURE — 80048 BASIC METABOLIC PNL TOTAL CA: CPT | Performed by: PHYSICIAN ASSISTANT

## 2023-02-23 RX ORDER — FERROUS SULFATE 325(65) MG
325 TABLET ORAL 2 TIMES DAILY WITH MEALS
Status: DISCONTINUED | OUTPATIENT
Start: 2023-02-23 | End: 2023-02-23 | Stop reason: HOSPADM

## 2023-02-23 RX ORDER — OXYCODONE AND ACETAMINOPHEN 10; 325 MG/1; MG/1
1 TABLET ORAL EVERY 4 HOURS PRN
Qty: 45 TABLET | Refills: 0 | Status: SHIPPED | OUTPATIENT
Start: 2023-02-23

## 2023-02-23 RX ORDER — POTASSIUM CHLORIDE 750 MG/1
30 CAPSULE, EXTENDED RELEASE ORAL ONCE
Status: COMPLETED | OUTPATIENT
Start: 2023-02-23 | End: 2023-02-23

## 2023-02-23 RX ORDER — ONDANSETRON 4 MG/1
4 TABLET, ORALLY DISINTEGRATING ORAL EVERY 8 HOURS PRN
Qty: 60 TABLET | Refills: 0 | Status: SHIPPED | OUTPATIENT
Start: 2023-02-23

## 2023-02-23 RX ADMIN — ACETAMINOPHEN 650 MG: 325 TABLET, FILM COATED ORAL at 00:47

## 2023-02-23 RX ADMIN — POLYETHYLENE GLYCOL 3350 17 G: 17 POWDER, FOR SOLUTION ORAL at 08:05

## 2023-02-23 RX ADMIN — PANTOPRAZOLE SODIUM 40 MG: 40 TABLET, DELAYED RELEASE ORAL at 08:05

## 2023-02-23 RX ADMIN — OXYCODONE HYDROCHLORIDE AND ACETAMINOPHEN 1 TABLET: 7.5; 325 TABLET ORAL at 04:04

## 2023-02-23 RX ADMIN — PREGABALIN 150 MG: 75 CAPSULE ORAL at 08:05

## 2023-02-23 RX ADMIN — FERROUS SULFATE TAB 325 MG (65 MG ELEMENTAL FE) 325 MG: 325 (65 FE) TAB at 08:05

## 2023-02-23 RX ADMIN — DOCUSATE SODIUM 50 MG AND SENNOSIDES 8.6 MG 1 TABLET: 8.6; 5 TABLET, FILM COATED ORAL at 08:05

## 2023-02-23 RX ADMIN — OXYCODONE HYDROCHLORIDE AND ACETAMINOPHEN 2 TABLET: 7.5; 325 TABLET ORAL at 08:05

## 2023-02-23 RX ADMIN — POTASSIUM CHLORIDE 30 MEQ: 10 CAPSULE, COATED, EXTENDED RELEASE ORAL at 08:05

## 2023-02-23 RX ADMIN — OXYCODONE HYDROCHLORIDE AND ACETAMINOPHEN 2 TABLET: 7.5; 325 TABLET ORAL at 12:21

## 2023-02-23 RX ADMIN — CEFAZOLIN 2 G: 2 INJECTION, POWDER, FOR SOLUTION INTRAMUSCULAR; INTRAVENOUS at 00:47

## 2023-02-24 NOTE — OUTREACH NOTE
Prep Survey    Flowsheet Row Responses   Religious facility patient discharged from? [facility]   Is LACE score < 7 ? No   Eligibility Readm Mgmt   Discharge diagnosis Lumbosacral radiculopathy    Does the patient have one of the following disease processes/diagnoses(primary or secondary)? General Surgery   Does the patient have Home health ordered? No   Is there a DME ordered? No   Prep survey completed? Yes          Leila GIRON - Registered Nurse

## 2023-02-26 ENCOUNTER — APPOINTMENT (OUTPATIENT)
Dept: CT IMAGING | Facility: HOSPITAL | Age: 58
End: 2023-02-26
Payer: COMMERCIAL

## 2023-02-26 ENCOUNTER — HOSPITAL ENCOUNTER (EMERGENCY)
Facility: HOSPITAL | Age: 58
Discharge: HOME | End: 2023-02-26
Admitting: EMERGENCY MEDICINE
Payer: COMMERCIAL

## 2023-02-26 ENCOUNTER — NURSE TRIAGE (OUTPATIENT)
Dept: CALL CENTER | Facility: HOSPITAL | Age: 58
End: 2023-02-26
Payer: COMMERCIAL

## 2023-02-26 VITALS
BODY MASS INDEX: 30.12 KG/M2 | RESPIRATION RATE: 16 BRPM | WEIGHT: 170 LBS | DIASTOLIC BLOOD PRESSURE: 74 MMHG | SYSTOLIC BLOOD PRESSURE: 135 MMHG | HEART RATE: 69 BPM | HEIGHT: 63 IN | TEMPERATURE: 98.6 F | OXYGEN SATURATION: 97 %

## 2023-02-26 DIAGNOSIS — Z98.1 STATUS POST LUMBAR SPINAL FUSION: ICD-10-CM

## 2023-02-26 DIAGNOSIS — S32.039A CLOSED FRACTURE OF THIRD LUMBAR VERTEBRA, UNSPECIFIED FRACTURE MORPHOLOGY, INITIAL ENCOUNTER: Primary | ICD-10-CM

## 2023-02-26 LAB
ALBUMIN SERPL-MCNC: 3 G/DL (ref 3.5–5.2)
ALBUMIN/GLOB SERPL: 1.1 G/DL
ALP SERPL-CCNC: 87 U/L (ref 39–117)
ALT SERPL W P-5'-P-CCNC: 5 U/L (ref 1–33)
ANION GAP SERPL CALCULATED.3IONS-SCNC: 6 MMOL/L (ref 5–15)
AST SERPL-CCNC: 18 U/L (ref 1–32)
BASOPHILS # BLD AUTO: 0.01 10*3/MM3 (ref 0–0.2)
BASOPHILS NFR BLD AUTO: 0.2 % (ref 0–1.5)
BILIRUB SERPL-MCNC: 0.2 MG/DL (ref 0–1.2)
BUN SERPL-MCNC: 5 MG/DL (ref 6–20)
BUN/CREAT SERPL: 7.6 (ref 7–25)
CALCIUM SPEC-SCNC: 9 MG/DL (ref 8.6–10.5)
CHLORIDE SERPL-SCNC: 104 MMOL/L (ref 98–107)
CO2 SERPL-SCNC: 31 MMOL/L (ref 22–29)
CREAT SERPL-MCNC: 0.66 MG/DL (ref 0.57–1)
DEPRECATED RDW RBC AUTO: 46.5 FL (ref 37–54)
EGFRCR SERPLBLD CKD-EPI 2021: 101.8 ML/MIN/1.73
EOSINOPHIL # BLD AUTO: 0.12 10*3/MM3 (ref 0–0.4)
EOSINOPHIL NFR BLD AUTO: 2.5 % (ref 0.3–6.2)
ERYTHROCYTE [DISTWIDTH] IN BLOOD BY AUTOMATED COUNT: 13.6 % (ref 12.3–15.4)
GLOBULIN UR ELPH-MCNC: 2.8 GM/DL
GLUCOSE SERPL-MCNC: 106 MG/DL (ref 65–99)
HCT VFR BLD AUTO: 25.5 % (ref 34–46.6)
HGB BLD-MCNC: 7.9 G/DL (ref 12–15.9)
IMM GRANULOCYTES # BLD AUTO: 0.03 10*3/MM3 (ref 0–0.05)
IMM GRANULOCYTES NFR BLD AUTO: 0.6 % (ref 0–0.5)
LYMPHOCYTES # BLD AUTO: 0.46 10*3/MM3 (ref 0.7–3.1)
LYMPHOCYTES NFR BLD AUTO: 9.5 % (ref 19.6–45.3)
MCH RBC QN AUTO: 28.7 PG (ref 26.6–33)
MCHC RBC AUTO-ENTMCNC: 31 G/DL (ref 31.5–35.7)
MCV RBC AUTO: 92.7 FL (ref 79–97)
MONOCYTES # BLD AUTO: 0.39 10*3/MM3 (ref 0.1–0.9)
MONOCYTES NFR BLD AUTO: 8.1 % (ref 5–12)
NEUTROPHILS NFR BLD AUTO: 3.82 10*3/MM3 (ref 1.7–7)
NEUTROPHILS NFR BLD AUTO: 79.1 % (ref 42.7–76)
NRBC BLD AUTO-RTO: 0 /100 WBC (ref 0–0.2)
PLATELET # BLD AUTO: 377 10*3/MM3 (ref 140–450)
PMV BLD AUTO: 9.7 FL (ref 6–12)
POTASSIUM SERPL-SCNC: 3.8 MMOL/L (ref 3.5–5.2)
PROT SERPL-MCNC: 5.8 G/DL (ref 6–8.5)
RBC # BLD AUTO: 2.75 10*6/MM3 (ref 3.77–5.28)
SODIUM SERPL-SCNC: 141 MMOL/L (ref 136–145)
WBC NRBC COR # BLD: 4.83 10*3/MM3 (ref 3.4–10.8)

## 2023-02-26 PROCEDURE — 99284 EMERGENCY DEPT VISIT MOD MDM: CPT

## 2023-02-26 PROCEDURE — 85025 COMPLETE CBC W/AUTO DIFF WBC: CPT

## 2023-02-26 PROCEDURE — 80053 COMPREHEN METABOLIC PANEL: CPT

## 2023-02-26 PROCEDURE — 72131 CT LUMBAR SPINE W/O DYE: CPT

## 2023-02-26 RX ORDER — OXYCODONE AND ACETAMINOPHEN 7.5; 325 MG/1; MG/1
1 TABLET ORAL ONCE
Status: COMPLETED | OUTPATIENT
Start: 2023-02-26 | End: 2023-02-26

## 2023-02-26 RX ADMIN — OXYCODONE AND ACETAMINOPHEN 1 TABLET: 7.5; 325 TABLET ORAL at 10:28

## 2023-02-26 NOTE — TELEPHONE ENCOUNTER
"Post op pain so bad cannot stand it, they will try to call Dr. Goldstein and then will come to Er, says she cannot stand it. Can hardly get her in and out of bed, cannot get comfortable. Cannot get pain med till tomorrow She is crying in back ground,  is worried about her.     Reason for Disposition  • [1] SEVERE post-op pain (e.g., excruciating, pain scale 8-10) AND [2] not controlled with pain medications    Additional Information  • Negative: Sounds like a life-threatening emergency to the triager  • Negative: Chest pain  • Negative: Difficulty breathing  • Negative: Acting confused (e.g., disoriented, slurred speech) or excessively sleepy  • Negative: Surgical incision symptoms and questions  • Negative: [1] Discomfort (pain, burning or stinging) when passing urine AND [2] male  • Negative: [1] Discomfort (pain, burning or stinging) when passing urine AND [2] female  • Negative: Constipation  • Negative: New or worsening leg (calf, thigh) pain  • Negative: New or worsening leg swelling  • Negative: Dizziness is severe, or persists > 24 hours after surgery  • Negative: Pain, redness, swelling, or pus at IV Site  • Negative: Symptoms arising from use of a urinary catheter (Moore or Coude)  • Negative: Cast problems or questions  • Negative: Medication question  • Negative: [1] Widespread rash AND [2] bright red, sunburn-like  • Negative: [1] SEVERE headache AND [2] after spinal (epidural) anesthesia  • Negative: [1] Vomiting AND [2] persists > 4 hours  • Negative: [1] Vomiting AND [2] abdomen looks much more swollen than usual  • Negative: [1] Drinking very little AND [2] dehydration suspected (e.g., no urine > 12 hours, very dry mouth, very lightheaded)  • Negative: Patient sounds very sick or weak to the triager  • Negative: Sounds like a serious complication to the triager  • Negative: Fever > 100.4 F (38.0 C)    Answer Assessment - Initial Assessment Questions  1. SYMPTOM: \"What's the main symptom you're " "concerned about?\" (e.g., pain, fever, vomiting)      pain  2. ONSET: \"When did pain  start?\"      Since surgery  3. SURGERY: \"What surgery was performed?\"      Back surgery  4. DATE of SURGERY: \"When was surgery performed?\"       02/22  5. ANESTHESIA: \" What type of anesthesia did you have?\" (e.g., general, spinal, epidural, local)      general  6. PAIN: \"Is there any pain?\" If Yes, ask: \"How bad is it?\"  (Scale 1-10; or mild, moderate, severe)      Pain rated 10  7. FEVER: \"Do you have a fever?\" If Yes, ask: \"What is your temperature, how was it measured, and when did it start?\"      no  8. VOMITING: \"Is there any vomiting?\" If yes, ask: \"How many times?\"      no  9. BLEEDING: \"Is there any bleeding?\" If Yes, ask: \"How much?\" and \"Where?\"      no  10. OTHER SYMPTOMS: \"Do you have any other symptoms?\" (e.g., drainage from wound, painful urination, constipation)        no    Protocols used: POST-OP SYMPTOMS AND QUESTIONS-ADULT-AH      "

## 2023-02-27 ENCOUNTER — NURSE TRIAGE (OUTPATIENT)
Dept: CALL CENTER | Facility: HOSPITAL | Age: 58
End: 2023-02-27
Payer: COMMERCIAL

## 2023-02-27 NOTE — TELEPHONE ENCOUNTER
"Caller states patient was evaluated for back/leg pain yesterday in ER at [facility]. States dx with lumbar fx. Reports wearing brace. States pain continues in low back, legs that rates as a 10 plus on pain scale. States patient is taking pain medication as directed per AVS from ER visit. Advised per AVS-spouse states will contact Dr Goldstein immediately for evaluation, or will return to ER if any worsening of s/s.     Reason for Disposition  • [1] SEVERE back pain (e.g., excruciating) AND [2] sudden onset AND [3] age > 60 years    Additional Information  • Negative: Passed out (i.e., lost consciousness, collapsed and was not responding)  • Negative: Shock suspected (e.g., cold/pale/clammy skin, too weak to stand, low BP, rapid pulse)  • Negative: Sounds like a life-threatening emergency to the triager  • Negative: Major injury to the back (e.g., MVA, fall > 10 feet or 3 meters, penetrating injury, etc.)  • Negative: Followed a tailbone injury  • Negative: [1] Pain in the upper back over the ribs (rib cage) AND [2] radiates (travels, goes) into chest  • Negative: [1] Pain in the upper back over the ribs (rib cage) AND [2] worsened by coughing (or clearly increases with breathing)  • Negative: Back pain during pregnancy  • Negative: Pain mainly in flank (i.e., in the side, over the lower ribs or just below the ribs)    Answer Assessment - Initial Assessment Questions  1. ONSET: \"When did the pain begin?\"       Dx with lumbar fx yesterday in ER at [facility].  2. LOCATION: \"Where does it hurt?\" (upper, mid or lower back)      Lower back and legs.  3. SEVERITY: \"How bad is the pain?\"  (e.g., Scale 1-10; mild, moderate, or severe)    - MILD (1-3): doesn't interfere with normal activities     - MODERATE (4-7): interferes with normal activities or awakens from sleep     - SEVERE (8-10): excruciating pain, unable to do any normal activities       10 plus  4. PATTERN: \"Is the pain constant?\" (e.g., yes, no; constant, " "intermittent)       constant  5. RADIATION: \"Does the pain shoot into your legs or elsewhere?\"      na  6. CAUSE:  \"What do you think is causing the back pain?\"       Lumbar fx.   7. BACK OVERUSE:  \"Any recent lifting of heavy objects, strenuous work or exercise?\"      na  8. MEDICATIONS: \"What have you taken so far for the pain?\" (e.g., nothing, acetaminophen, NSAIDS)      Spouse states patient taking pain medication as directed per AVS.  9. NEUROLOGIC SYMPTOMS: \"Do you have any weakness, numbness, or problems with bowel/bladder control?\"      States numbness of legs continues.  10. OTHER SYMPTOMS: \"Do you have any other symptoms?\" (e.g., fever, abdominal pain, burning with urination, blood in urine)        na  11. PREGNANCY: \"Is there any chance you are pregnant?\" (e.g., yes, no; LMP)        na    Protocols used: BACK PAIN-ADULT-AH      "

## 2023-03-01 ENCOUNTER — READMISSION MANAGEMENT (OUTPATIENT)
Dept: CALL CENTER | Facility: HOSPITAL | Age: 58
End: 2023-03-01
Payer: COMMERCIAL

## 2023-03-01 NOTE — OUTREACH NOTE
General Surgery Week 1 Survey    Flowsheet Row Responses   [facility] patient discharged from? [facility]   Does the patient have one of the following disease processes/diagnoses(primary or secondary)? General Surgery   Week 1 attempt successful? Yes   Call start time 1123   Call end time 1139   Is patient permission given to speak with other caregiver? Yes   Person spoke with today (if not patient) and relationship Mehdi-spouse.   Meds reviewed with patient/caregiver? Yes   Is the patient having any side effects they believe may be caused by any medication additions or changes? No   Does the patient have all medications related to this admission filled (includes all antibiotics, pain medications, etc.) Yes   Is the patient taking all medications as directed (includes completed medication regime)? Yes   Does the patient have a follow up appointment scheduled with their surgeon? Yes   Has the patient kept scheduled appointments due by today? N/A   Comments Surgeon appt 03/07/23.   Has home health visited the patient within 72 hours of discharge? N/A   Psychosocial issues? No   Did the patient receive a copy of their discharge instructions? Yes   Nursing interventions Reviewed instructions with patient   What is the patient's perception of their health status since discharge? Same   Nursing interventions Nurse provided patient education   Is the patient /caregiver able to teach back basic post-op care? Continue use of incentive spirometry at least 1 week post discharge, Practice 'cough and deep breath', Drive as instructed by MD in discharge instructions, Take showers only when approved by MD-sponge bathe until then, No tub bath, swimming, or hot tub until instructed by MD, Keep incision areas clean,dry and protected, Lifting as instructed by MD in discharge instructions, Do not remove steri-strips   Is the patient/caregiver able to teach back signs and symptoms of incisional infection? Increased redness, swelling  or pain at the incisonal site, Increased drainage or bleeding, Incisional warmth, Pus or odor from incision, Fever   Is the patient/caregiver able to teach back steps to recovery at home? Set small, achievable goals for return to baseline health, Eat a well-balance diet, Rest and rebuild strength, gradually increase activity, Practice good oral hygiene, Make a list of questions for surgeon's appointment   If the patient is a current smoker, are they able to teach back resources for cessation? Not a smoker   Is the patient/caregiver able to teach back the hierarchy of who to call/visit for symptoms/problems? PCP, Specialist, Home health nurse, Urgent Care, ED, 911 Yes   Week 1 call completed? Yes   Is the patient interested in additional calls from an ambulatory ?  NOTE:  applies to high risk patients requiring additional follow-up. No   Wrap up additional comments Spouse states patient is still having pain unrelieved by pain medication. Denies any s/s of infection. States bandages intact-one with dime sized dry drainage noted. States left message at surgeon's office on Monday, and has not heard back from him. Message left with  at [facility] requesting call back to patient regarding continuing pain.           Rossy DARLING - Registered Nurse

## 2023-03-07 ENCOUNTER — TELEPHONE (OUTPATIENT)
Dept: ONCOLOGY | Facility: CLINIC | Age: 58
End: 2023-03-07

## 2023-03-07 NOTE — TELEPHONE ENCOUNTER
Caller: Carol Rodriguez    Relationship: Self    Best call back number: [phone]    What was the call regarding: PATIENT WANTED TO KNOW IF DR REINOSO WOULD STILL WANT TO SEE HER TOMORROW? SHE RECENTLY HAD BACK SURGERY AND STATED HER LABS MIGHT BE OFF     Do you require a callback: YES

## 2023-03-08 ENCOUNTER — READMISSION MANAGEMENT (OUTPATIENT)
Dept: CALL CENTER | Facility: HOSPITAL | Age: 58
End: 2023-03-08
Payer: COMMERCIAL

## 2023-03-10 ENCOUNTER — APPOINTMENT (OUTPATIENT)
Dept: MRI IMAGING | Facility: HOSPITAL | Age: 58
End: 2023-03-10
Payer: COMMERCIAL

## 2023-03-10 ENCOUNTER — HOSPITAL ENCOUNTER (EMERGENCY)
Facility: HOSPITAL | Age: 58
Discharge: HOME | End: 2023-03-10
Admitting: STUDENT IN AN ORGANIZED HEALTH CARE EDUCATION/TRAINING PROGRAM
Payer: COMMERCIAL

## 2023-03-10 VITALS
HEIGHT: 63 IN | TEMPERATURE: 98.1 F | BODY MASS INDEX: 25.16 KG/M2 | SYSTOLIC BLOOD PRESSURE: 148 MMHG | OXYGEN SATURATION: 98 % | DIASTOLIC BLOOD PRESSURE: 65 MMHG | WEIGHT: 142 LBS | HEART RATE: 68 BPM | RESPIRATION RATE: 20 BRPM

## 2023-03-10 DIAGNOSIS — G89.18 POST-OPERATIVE PAIN: Primary | ICD-10-CM

## 2023-03-10 DIAGNOSIS — Z98.1 S/P LUMBAR FUSION: ICD-10-CM

## 2023-03-10 LAB
ALBUMIN SERPL-MCNC: 3.5 G/DL (ref 3.5–5.2)
ALBUMIN/GLOB SERPL: 1.3 G/DL
ALP SERPL-CCNC: 129 U/L (ref 39–117)
ALT SERPL W P-5'-P-CCNC: 11 U/L (ref 1–33)
ANION GAP SERPL CALCULATED.3IONS-SCNC: 12 MMOL/L (ref 5–15)
APTT PPP: 49.2 SECONDS (ref 24.1–35)
AST SERPL-CCNC: 20 U/L (ref 1–32)
BACTERIA UR QL AUTO: ABNORMAL /HPF
BASOPHILS # BLD AUTO: 0.05 10*3/MM3 (ref 0–0.2)
BASOPHILS NFR BLD AUTO: 0.8 % (ref 0–1.5)
BILIRUB SERPL-MCNC: 0.2 MG/DL (ref 0–1.2)
BILIRUB UR QL STRIP: NEGATIVE
BUN SERPL-MCNC: 6 MG/DL (ref 6–20)
BUN/CREAT SERPL: 10 (ref 7–25)
CALCIUM SPEC-SCNC: 9.2 MG/DL (ref 8.6–10.5)
CHLORIDE SERPL-SCNC: 106 MMOL/L (ref 98–107)
CLARITY UR: CLEAR
CO2 SERPL-SCNC: 26 MMOL/L (ref 22–29)
COLOR UR: YELLOW
CREAT SERPL-MCNC: 0.6 MG/DL (ref 0.57–1)
CRP SERPL-MCNC: 1.11 MG/DL (ref 0–0.5)
D-LACTATE SERPL-SCNC: 0.6 MMOL/L (ref 0.5–2)
DEPRECATED RDW RBC AUTO: 53.7 FL (ref 37–54)
EGFRCR SERPLBLD CKD-EPI 2021: 104.2 ML/MIN/1.73
EOSINOPHIL # BLD AUTO: 0.14 10*3/MM3 (ref 0–0.4)
EOSINOPHIL NFR BLD AUTO: 2.3 % (ref 0.3–6.2)
ERYTHROCYTE [DISTWIDTH] IN BLOOD BY AUTOMATED COUNT: 15.7 % (ref 12.3–15.4)
ERYTHROCYTE [SEDIMENTATION RATE] IN BLOOD: 59 MM/HR (ref 0–30)
FLUAV RNA RESP QL NAA+PROBE: NOT DETECTED
FLUBV RNA RESP QL NAA+PROBE: NOT DETECTED
GLOBULIN UR ELPH-MCNC: 2.8 GM/DL
GLUCOSE SERPL-MCNC: 109 MG/DL (ref 65–99)
GLUCOSE UR STRIP-MCNC: NEGATIVE MG/DL
HCT VFR BLD AUTO: 29.5 % (ref 34–46.6)
HGB BLD-MCNC: 9 G/DL (ref 12–15.9)
HGB UR QL STRIP.AUTO: NEGATIVE
HYALINE CASTS UR QL AUTO: ABNORMAL /LPF
IMM GRANULOCYTES # BLD AUTO: 0.02 10*3/MM3 (ref 0–0.05)
IMM GRANULOCYTES NFR BLD AUTO: 0.3 % (ref 0–0.5)
INR PPP: 1 (ref 0.91–1.09)
KETONES UR QL STRIP: NEGATIVE
LEUKOCYTE ESTERASE UR QL STRIP.AUTO: ABNORMAL
LYMPHOCYTES # BLD AUTO: 0.76 10*3/MM3 (ref 0.7–3.1)
LYMPHOCYTES NFR BLD AUTO: 12.6 % (ref 19.6–45.3)
MCH RBC QN AUTO: 28.3 PG (ref 26.6–33)
MCHC RBC AUTO-ENTMCNC: 30.5 G/DL (ref 31.5–35.7)
MCV RBC AUTO: 92.8 FL (ref 79–97)
MONOCYTES # BLD AUTO: 0.29 10*3/MM3 (ref 0.1–0.9)
MONOCYTES NFR BLD AUTO: 4.8 % (ref 5–12)
NEUTROPHILS NFR BLD AUTO: 4.79 10*3/MM3 (ref 1.7–7)
NEUTROPHILS NFR BLD AUTO: 79.2 % (ref 42.7–76)
NITRITE UR QL STRIP: NEGATIVE
NRBC BLD AUTO-RTO: 0 /100 WBC (ref 0–0.2)
PH UR STRIP.AUTO: 8.5 [PH] (ref 5–8)
PLATELET # BLD AUTO: 382 10*3/MM3 (ref 140–450)
PMV BLD AUTO: 10.3 FL (ref 6–12)
POTASSIUM SERPL-SCNC: 3.6 MMOL/L (ref 3.5–5.2)
PROCALCITONIN SERPL-MCNC: 0.03 NG/ML (ref 0–0.25)
PROT SERPL-MCNC: 6.3 G/DL (ref 6–8.5)
PROT UR QL STRIP: NEGATIVE
PROTHROMBIN TIME: 13.3 SECONDS (ref 11.8–14.8)
RBC # BLD AUTO: 3.18 10*6/MM3 (ref 3.77–5.28)
RBC # UR STRIP: ABNORMAL /HPF
REF LAB TEST METHOD: ABNORMAL
RSV RNA NPH QL NAA+NON-PROBE: NOT DETECTED
SARS-COV-2 RNA RESP QL NAA+PROBE: NOT DETECTED
SODIUM SERPL-SCNC: 144 MMOL/L (ref 136–145)
SP GR UR STRIP: 1.01 (ref 1–1.03)
SQUAMOUS #/AREA URNS HPF: ABNORMAL /HPF
UROBILINOGEN UR QL STRIP: ABNORMAL
WBC # UR STRIP: ABNORMAL /HPF
WBC NRBC COR # BLD: 6.05 10*3/MM3 (ref 3.4–10.8)

## 2023-03-10 PROCEDURE — 99284 EMERGENCY DEPT VISIT MOD MDM: CPT

## 2023-03-10 PROCEDURE — 83605 ASSAY OF LACTIC ACID: CPT | Performed by: PHYSICIAN ASSISTANT

## 2023-03-10 PROCEDURE — 85652 RBC SED RATE AUTOMATED: CPT | Performed by: PHYSICIAN ASSISTANT

## 2023-03-10 PROCEDURE — 0 HYDROMORPHONE 1 MG/ML SOLUTION: Performed by: FAMILY MEDICINE

## 2023-03-10 PROCEDURE — 96375 TX/PRO/DX INJ NEW DRUG ADDON: CPT

## 2023-03-10 PROCEDURE — 86140 C-REACTIVE PROTEIN: CPT | Performed by: PHYSICIAN ASSISTANT

## 2023-03-10 PROCEDURE — 85730 THROMBOPLASTIN TIME PARTIAL: CPT | Performed by: PHYSICIAN ASSISTANT

## 2023-03-10 PROCEDURE — 25010000002 ONDANSETRON PER 1 MG: Performed by: PHYSICIAN ASSISTANT

## 2023-03-10 PROCEDURE — 85610 PROTHROMBIN TIME: CPT | Performed by: PHYSICIAN ASSISTANT

## 2023-03-10 PROCEDURE — 85025 COMPLETE CBC W/AUTO DIFF WBC: CPT | Performed by: PHYSICIAN ASSISTANT

## 2023-03-10 PROCEDURE — 80053 COMPREHEN METABOLIC PANEL: CPT | Performed by: PHYSICIAN ASSISTANT

## 2023-03-10 PROCEDURE — 87637 SARSCOV2&INF A&B&RSV AMP PRB: CPT | Performed by: PHYSICIAN ASSISTANT

## 2023-03-10 PROCEDURE — 0 GADOBENATE DIMEGLUMINE 529 MG/ML SOLUTION: Performed by: PHYSICIAN ASSISTANT

## 2023-03-10 PROCEDURE — 96374 THER/PROPH/DIAG INJ IV PUSH: CPT

## 2023-03-10 PROCEDURE — 25010000002 DIAZEPAM PER 5 MG: Performed by: FAMILY MEDICINE

## 2023-03-10 PROCEDURE — 72158 MRI LUMBAR SPINE W/O & W/DYE: CPT

## 2023-03-10 PROCEDURE — 25010000002 MORPHINE PER 10 MG: Performed by: FAMILY MEDICINE

## 2023-03-10 PROCEDURE — A9577 INJ MULTIHANCE: HCPCS | Performed by: PHYSICIAN ASSISTANT

## 2023-03-10 PROCEDURE — 84145 PROCALCITONIN (PCT): CPT | Performed by: PHYSICIAN ASSISTANT

## 2023-03-10 PROCEDURE — 25010000002 DROPERIDOL PER 5 MG: Performed by: FAMILY MEDICINE

## 2023-03-10 PROCEDURE — 81001 URINALYSIS AUTO W/SCOPE: CPT | Performed by: PHYSICIAN ASSISTANT

## 2023-03-10 RX ORDER — LIDOCAINE 50 MG/G
1 PATCH TOPICAL EVERY 24 HOURS
Qty: 30 EACH | Refills: 0 | Status: SHIPPED | OUTPATIENT
Start: 2023-03-10

## 2023-03-10 RX ORDER — DIAZEPAM 5 MG/ML
5 INJECTION, SOLUTION INTRAMUSCULAR; INTRAVENOUS ONCE
Status: COMPLETED | OUTPATIENT
Start: 2023-03-10 | End: 2023-03-10

## 2023-03-10 RX ORDER — TIZANIDINE 4 MG/1
4 TABLET ORAL EVERY 6 HOURS PRN
Qty: 12 TABLET | Refills: 0 | Status: SHIPPED | OUTPATIENT
Start: 2023-03-10

## 2023-03-10 RX ORDER — LIDOCAINE 50 MG/G
1 PATCH TOPICAL ONCE
Status: DISCONTINUED | OUTPATIENT
Start: 2023-03-10 | End: 2023-03-11 | Stop reason: HOSPADM

## 2023-03-10 RX ORDER — DROPERIDOL 2.5 MG/ML
2.5 INJECTION, SOLUTION INTRAMUSCULAR; INTRAVENOUS ONCE
Status: COMPLETED | OUTPATIENT
Start: 2023-03-10 | End: 2023-03-10

## 2023-03-10 RX ORDER — HYDROMORPHONE HYDROCHLORIDE 2 MG/1
2 TABLET ORAL ONCE
Status: COMPLETED | OUTPATIENT
Start: 2023-03-10 | End: 2023-03-10

## 2023-03-10 RX ORDER — SODIUM CHLORIDE 0.9 % (FLUSH) 0.9 %
10 SYRINGE (ML) INJECTION AS NEEDED
Status: DISCONTINUED | OUTPATIENT
Start: 2023-03-10 | End: 2023-03-11 | Stop reason: HOSPADM

## 2023-03-10 RX ORDER — ONDANSETRON 2 MG/ML
4 INJECTION INTRAMUSCULAR; INTRAVENOUS ONCE
Status: COMPLETED | OUTPATIENT
Start: 2023-03-10 | End: 2023-03-10

## 2023-03-10 RX ADMIN — LIDOCAINE 1 PATCH: 50 PATCH CUTANEOUS at 19:42

## 2023-03-10 RX ADMIN — HYDROMORPHONE HYDROCHLORIDE 1 MG: 1 INJECTION, SOLUTION INTRAMUSCULAR; INTRAVENOUS; SUBCUTANEOUS at 20:32

## 2023-03-10 RX ADMIN — ONDANSETRON 4 MG: 2 INJECTION INTRAMUSCULAR; INTRAVENOUS at 19:18

## 2023-03-10 RX ADMIN — MORPHINE SULFATE 4 MG: 4 INJECTION, SOLUTION INTRAMUSCULAR; INTRAVENOUS at 18:59

## 2023-03-10 RX ADMIN — DIAZEPAM 5 MG: 5 INJECTION, SOLUTION INTRAMUSCULAR; INTRAVENOUS at 19:00

## 2023-03-10 RX ADMIN — GADOBENATE DIMEGLUMINE 12 ML: 529 INJECTION, SOLUTION INTRAVENOUS at 21:15

## 2023-03-10 RX ADMIN — DROPERIDOL 2.5 MG: 2.5 INJECTION, SOLUTION INTRAMUSCULAR; INTRAVENOUS at 20:29

## 2023-03-10 RX ADMIN — HYDROMORPHONE HYDROCHLORIDE 2 MG: 2 TABLET ORAL at 22:23

## 2023-03-10 RX ADMIN — SODIUM CHLORIDE 1000 ML: 9 INJECTION, SOLUTION INTRAVENOUS at 19:01

## 2023-03-10 NOTE — ED PROVIDER NOTES
"Subjective   History of Present Illness    Patient is a 58-year-old female presenting to ED via EMS with lower back pain.  PMH significant for Recent lumbar fusion performed over 3 days,, history of breast carcinoma, COPD, osteoporosis, degenerative disc disease, chronic back pain, iron deficiency anemia, hypertension, coronary artery calcifications.  Patient states she had a three-part surgical procedure performed by Dr. Goldstein on 2/13, 2/20, as well as the final part on 2/23.  Patient states that when she was discharged home from the hospital after her procedure she had pain for which she has been using oxycodone 10 mg, her most recent dose around noon today.  Patient states that her pain continued to increase to the point that she can no longer tolerate it and she was seen in the ED on 2/26 at which time they were able to \"stabilize my pain.\"  Patient states that since that time she has no longer been able to control her pain at home.  Patient has not yet had an outpatient follow-up appointment postop and is not scheduled until this upcoming Wednesday 3/15/2023.  Patient states that she cannot find a comfortable position to sleep and she has significantly increased pain with ambulation.  Patient denies any numbness or weakness to any of her extremities or inability to walk but states it is becoming harder due to pain.  Patient denies saddle anesthesia, fevers, or urinary retention however reports that she has had intermittent episodes of incontinence of both urine and stool over the past few days.  Patient states that she is not taking any other medications including no steroids, no muscle relaxer's, no Valium, no anti-inflammatories, as well as no antibiotics.  Patient could no longer tolerate her symptoms for which she contacted the ambulance today.    Records reviewed show patient last seen in the ED on 2/26/23 for closed fracture third lower bar vertebra, status post lumbar spinal fusion. At that time " patient had a lumbar spine CT which showed: Posterior fusion L3-S1 with anterior interbody fusion L5-S1 and right lateral interbody fusion at the L3-L4 level, new anterior L3 vertebral body fracture with only mild displacement but no significant loss of height.  Patient was given oral Percocet while in ED and sent home with no new prescriptions.    Patient was seen by Dr. Goldstein on 2/13/2023 for anterior decompression, anterior lumbar interbody fusion with instrumentation of L5-S1, subsequently on 2/20/2023 for right lateral lumbar interbody fusion L3-L5 with instrumentation at L3-L4 as well as on 2/22/2023 for posterior spinal fusion with instrumentation at L3-S1.    Review of Systems   Constitutional: Negative.  Negative for fever.   HENT: Negative.    Eyes: Negative.    Respiratory: Negative.    Cardiovascular: Negative.    Gastrointestinal: Negative.  Negative for abdominal pain, nausea and vomiting.   Genitourinary: Negative.  Negative for flank pain and hematuria.        Denies urinary retention   Musculoskeletal: Positive for back pain (lower).   Skin: Negative.  Negative for color change and wound.   Neurological: Negative for weakness and numbness.        Denies saddle anesthesia  Reports + incontinence (bowel, bladder)   Psychiatric/Behavioral: Negative.    All other systems reviewed and are negative.      Past Medical History:   Diagnosis Date   • Anxiety    • Arthralgia    • Arthritis    • Francisco's esophagus    • Carcinoembryonic antigen present    • Chronic bilateral low back pain without sciatica 2/20/2023   • Chronic kidney disease    • COPD (chronic obstructive pulmonary disease) (HCC)    • Coronary artery calcification 10/17/2022    PT STATES CARDIOLOGIST AND DR.J DELCID HAVE CLEARED HER OF THIS, CHOLESTEROL IS GOOD, NO PROBLEMS AT THIS TIME   • DDD (degenerative disc disease), lumbar 2/20/2023   • Fibrocystic breast disease    • GERD (gastroesophageal reflux disease)    • Hiatal hernia    •  Hypertension    • Hypokalemia    • Infiltrating ductal carcinoma of breast (HCC)      ER Status: Positive; ID Status: Positive   • Iron deficiency anemia    • Lumbar stenosis    • Osteopenia    • Osteoporosis    • Tobacco user        No Known Allergies    Past Surgical History:   Procedure Laterality Date   • ANTERIOR LUMBAR EXPOSURE N/A 2023    Procedure: ANTERIOR LUMBAR EXPOSURE;  Surgeon: Jordy Macario DO;  Location:  [facility] OR;  Service: Vascular;  Laterality: N/A;   • BREAST BIOPSY Right 2006    Biopsy of breast, right, benign fibrocystic changes   • CARPAL TUNNEL RELEASE      left 2011; right 2011   •  SECTION      x2. Most recently in    • COLONOSCOPY     • ENDOSCOPY     • LUMBAR FUSION N/A 2023    Procedure: ANTERIOR DECOMPRESSION, ANTERIOR LUMBAR INTERBODY FUSION WITH INSTRUMENTATION L5-S1;  Surgeon: CAMILA Goldstein MD;  Location:  [facility] OR;  Service: Orthopedic Spine;  Laterality: N/A;   • LUMBAR LAMINECTOMY WITH FUSION N/A 2023    Procedure: POSTERIOR SPINAL FUSION WITH INSTRUMENTATION L3-S1;  Surgeon: CAMILA Goldstein MD;  Location:  [facility] OR;  Service: Orthopedic Spine;  Laterality: N/A;   • MASTECTOMY MODIFIED RADICAL / SIMPLE / COMPLETE Right 2006   • SIMPLE MASTECTOMY Left 2006     prophylactic, left with left breast reconstruction   • TONSILLECTOMY     • TOTAL ABDOMINAL HYSTERECTOMY WITH SALPINGO OOPHORECTOMY  2006       Family History   Problem Relation Age of Onset   • Polymyalgia rheumatica Mother    • Heart attack Mother    • Kidney disease Mother    • Diabetes Father    • Hypertension Father    • Other Father         heart issues   • No Known Problems Brother    • No Known Problems Maternal Grandmother    • Heart attack Maternal Grandfather         52 years old    • No Known Problems Paternal Grandmother    • Diabetes Paternal Grandfather    • No Known Problems Brother    • No Known Problems Brother    • No Known  Problems Brother        Social History     Socioeconomic History   • Marital status:    Tobacco Use   • Smoking status: Former     Packs/day: 1.00     Years: 25.00     Pack years: 25.00     Types: Cigarettes     Quit date: 2017     Years since quittin.2   • Smokeless tobacco: Never   Vaping Use   • Vaping Use: Former   • Quit date: 8/10/2022   • Substances: Nicotine   • Devices: Pre-filled pod   Substance and Sexual Activity   • Alcohol use: No   • Drug use: No   • Sexual activity: Defer           Objective   Physical Exam  Vitals and nursing note reviewed.   Constitutional:       General: She is in acute distress (appears uncomfortable due to pain).      Appearance: Normal appearance. She is well-developed and well-groomed. She is not toxic-appearing or diaphoretic.   HENT:      Head: Normocephalic.      Mouth/Throat:      Mouth: Mucous membranes are moist.      Pharynx: Oropharynx is clear.   Eyes:      Extraocular Movements: Extraocular movements intact.      Conjunctiva/sclera: Conjunctivae normal.      Pupils: Pupils are equal, round, and reactive to light.   Cardiovascular:      Rate and Rhythm: Normal rate and regular rhythm.   Pulmonary:      Effort: Pulmonary effort is normal.      Breath sounds: Normal breath sounds.   Abdominal:      Palpations: Abdomen is soft.      Tenderness: There is no abdominal tenderness. There is no right CVA tenderness or left CVA tenderness.      Comments: Well-healing surgical incision to the left lower anterior abdominal wall with no dehiscence, no erythema, no drainage or bleeding, no tenderness overlying the incision.  No surrounding swelling.   Musculoskeletal:         General: Tenderness present. No swelling.      Cervical back: Normal range of motion and neck supple. No tenderness.      Comments: Well-healing surgical incisions to the right lateral lower torso as well as lumbar spine region consistent with recent surgery.  No evidence of dehiscence,  erythema, swelling, or tenderness along the incision lines.  Diffuse tenderness to the lumbar midline spine as well as bilateral paraspinal muscular regions of the lumbar spine.  Full active range of motion of all joints of all 4 extremities.  No bony tenderness, no joint swelling, all 4 extremities neurovascular intact distally.   Skin:     General: Skin is warm and dry.      Findings: No erythema.   Neurological:      Mental Status: She is alert and oriented to person, place, and time.      Sensory: No sensory deficit.      Motor: No weakness.      Gait: Gait normal.   Psychiatric:         Attention and Perception: Attention normal.         Mood and Affect: Mood is anxious. Affect is angry.         Speech: Speech normal.         Behavior: Behavior is agitated. Behavior is cooperative.         Procedures           ED Course                                           Medical Decision Making  Amount and/or Complexity of Data Reviewed  External Data Reviewed: labs, radiology and notes.  Labs: ordered. Decision-making details documented in ED Course.  Radiology: ordered. Decision-making details documented in ED Course.  ECG/medicine tests: ordered. Decision-making details documented in ED Course.            Patient is a 58-year-old female presenting to ED via EMS with lower back pain.  PMH significant for Recent lumbar fusion performed over 3 days,, history of breast carcinoma, COPD, osteoporosis, degenerative disc disease, chronic back pain, iron deficiency anemia, hypertension, coronary artery calcifications.  Lab work with CRP 1.11, sed rate 59 however Normal procalcitonin, normal lactic acid.  White blood cell count normal.  CBC with improving H&H at 9/29.5 today and no further abnormalities.  CMP unremarkable with no electro disturbances, normal renal and hepatic function.  PT/INR WNL.  Urinalysis with no abnormalities including no infection or hematuria.  COVID, influenza, RSV testing negative. MRI of the lumbar  spine showed: L3-S1 postsurgical change, no fracture/disc herniation/abnormal enhancement, no conus compression.  Patient was initially given a fluid bolus, lidocaine patch, morphine, Valium, Zofran for which she reported no change in her pain.  Patient was then given droperidol and Dilaudid and was able to rest comfortably as well as ambulate throughout the department with significant improvement in her pain.  Discussed with patient reassuring MRI imaging today and need for continued outpatient follow-up by following up with Dr. Goldstein or her primary care provider within the next 48 hours for close reevaluation.  Patient was given 1 dose of oral Dilaudid prior to leaving.  Advised need for continued use of narcotic pain medicines as previously prescribed as well as ability to also use muscle relaxers, topical lidocaine or Biofreeze.  Patient with no focal neurological deficits throughout evaluation, stable vital signs, tolerating p.o. fluids without difficulty.  Discussed strict return precautions and need for immediate return to ED should she develop any new or worsening symptoms.  Patient with no further questions, concerns, needs at this time and is stable for discharge.    Final diagnoses:   Post-operative pain   S/P lumbar fusion       ED Disposition  ED Disposition     ED Disposition   Discharge    Condition   Stable    Comment   --             Erna Leung, DO  [address] [facility] [address]  [facility] [address]  [phone]    Schedule an appointment as soon as possible for a visit in 2 days      CAMILA Goldstein MD  [address] [place] [address]  [phone]    Schedule an appointment as soon as possible for a visit       [facility] Emergency Department  [address] [place] [id number] [phone]    As needed         Medication List      New Prescriptions    lidocaine 5 %  Commonly known as: LIDODERM  Place 1 patch on the skin as directed by provider Daily.  Remove & Discard patch within 12 hours or as directed by MD        Changed    * tiZANidine 4 MG tablet  Commonly known as: ZANAFLEX  What changed: Another medication with the same name was added. Make sure you understand how and when to take each.     * tiZANidine 4 MG tablet  Commonly known as: Zanaflex  Take 1 tablet by mouth Every 6 (Six) Hours As Needed for Muscle Spasms.  What changed: You were already taking a medication with the same name, and this prescription was added. Make sure you understand how and when to take each.         * This list has 2 medication(s) that are the same as other medications prescribed for you. Read the directions carefully, and ask your doctor or other care provider to review them with you.               Where to Get Your Medications      These medications were sent to [id number] - [place], [place] - [address] [place] [address] AT [address] [facility] [phone] [facility] [phone] FX  [address] [place] [id number]    Phone: [phone]   · lidocaine 5 %  · tiZANidine 4 MG tablet          Shad Monsivais PA-C  03/11/23 0012

## 2023-03-11 NOTE — DISCHARGE INSTRUCTIONS
Your MRI imaging is well appearing with no infection, no fractures, and no abnormalities from your surgery.   Please continue to use the pain medication as advised by Dr. Goldstein.  Please also use the muscle relaxers as needed, topical lidocaine patches, or topical Biofreeze.  Please make sure that you are doing small frequent movements throughout the day.  Please follow-up with your surgeon or primary care provider within the next 48 hours for reevaluation as well as to discuss physical therapy.  Should you develop any new or worsening symptoms please return to the ER for further evaluation.

## 2023-03-15 ENCOUNTER — READMISSION MANAGEMENT (OUTPATIENT)
Dept: CALL CENTER | Facility: HOSPITAL | Age: 58
End: 2023-03-15
Payer: COMMERCIAL

## 2023-03-15 NOTE — OUTREACH NOTE
General Surgery Week 3 Survey    Flowsheet Row Responses   [facility] patient discharged from? [facility]   Does the patient have one of the following disease processes/diagnoses(primary or secondary)? General Surgery   Week 3 attempt successful? Yes   Call start time 1724   Call end time 1725   Discharge diagnosis Lumbosacral radiculopathy    Is the patient taking all medications as directed (includes completed medication regime)? Yes   Does the patient have a follow up appointment scheduled with their surgeon? Yes   Has the patient kept scheduled appointments due by today? Yes   Has home health visited the patient within 72 hours of discharge? N/A   Psychosocial issues? No   What is the patient's perception of their health status since discharge? Improving   Nursing interventions Nurse provided patient education  [advised to follow discharge instructions]   Is the patient/caregiver able to teach back signs and symptoms of incisional infection? Fever, Pus or odor from incision, Incisional warmth, Increased drainage or bleeding, Increased redness, swelling or pain at the incisonal site   Is the patient/caregiver able to teach back the hierarchy of who to call/visit for symptoms/problems? PCP, Specialist, Home health nurse, Urgent Care, ED, 911 Yes   Week 3 call completed? Yes   Is the patient interested in additional calls from an ambulatory ?  NOTE:  applies to high risk patients requiring additional follow-up. No   Wrap up additional comments Doing well, no further calls needed.          Olga WHITING - Registered Nurse

## 2023-03-18 NOTE — PROGRESS NOTES
[facility] HEMATOLOGY AND ONCOLOGY  [address]  [place] [facility] [place] [address] [phone]    Patient Name: Carol Rodriguez  Encounter Date: 03/24/2023  YOB: 1965  Patient Number: [id number]      REASON FOR VISIT: Ms. Carol Rodriguez is a 58-year-old female who returns in follow-up of breast cancer. She is seen nearly 183 months since completion of adjuvant chemotherapy and 133.5 months since cessation of adjuvant Femara (completed 60 months of therapy). The patient is here alone.      I have reviewed the HPI and verified with the patient the accuracy of it. No changes to interval history since the information was documented. Dave Gonzalez MD 03/24/23     DIAGNOSTIC ABNORMALITIES: Breast carcinoma   1. Unilateral mammogram, right breast, 08/23/2006, [facility]. Approximately a 2 cm spiculated area in the upper-outer quadrant of the right breast at the 10 o'clock to 11 o'clock position.  2. Right breast ultrasound, 08/28/2006, [facility]. A solid nodule at 10 o’clock in the right breast measuring up to 2 cm is felt to correspond to the abnormality seen on the mammograms, including spot views today. There are also small cysts in this region. Excisional biopsy should be considered.  3. Bone scan, 09/07/2006, [facility]. Small areas of vaguely increased activity in the upper thoracic area and at the lumbosacral junction, probably arthritic. No definite metastatic change is identified.  4. CT of the brain, 09/07/2006, [facility]. Chronic sinus disease involving the left half of the sphenoid sinus. Otherwise unremarkable enhanced CT of the brain with no convincing evidence of metastatic disease.  5. CT of the chest, 09/07/2006, [facility] small cortical cyst involving the upper pole of the right kidney. Otherwise unremarkable CT of the chest with no evidence of metastatic disease.  6. CT of the pelvis, 09/07/2006,  [facility]. Sclerotic lesion involving the right femoral head which I would favor to represent a bony enostosis. Considering the lack of other metastatic disease, I feel a solitary bony lesion to the right femoral head would be an unusual manifestation but warranting followup nonetheless. Bone scan may be helpful for further evaluation.  7. Unilateral mammogram of breast, 09/11/2006, [facility]. Lobular areas of density in the left breast consistent with several left breast masses. Ultrasound was performed. There are no spiculated densities. No suspicious microcalcifications are identified. Screening exam of the left breast is recommended in 1 year.  8. Right breast biopsy, 09/29/2006, [facility]. Infiltrating mammary carcinoma. Part A: Primary tumor diagnosis is infiltrating ductal carcinoma. Part B: Right axillary lymph node was benign. Part C: Right breast tissue showed right modified radical mastectomy with deep margin without obvious histopathologic alteration. No residual tumor present at previous biopsy site. Fibrocystic mastopathy including fibrosis, adenosis, duct ectasia, papillary apocrine metaplasia, sclerosing adenosis, multiple cysts, and intraductal microcalcifications. Seventeen (17) benign lymph nodes. Part D: Left breast tissue reveals no tumor present. Fibrocystic mastopathy including fibrosis, adenosis, duct ectasia, multiple cysts, apocrine metaplasia, ruptured tension cysts, periductal chronic inflammation, intraductal hyperplasia of usual type, and intraductal microcalcifications (multiple). Part E: Right breast tissue shows portions of benign loose fibrofatty tissue. Part F: Left breast tissue show portions of benign loose fibrofatty tissue and fatty breast tissue. ER 94% (+), WI 7% (+), HER-2 negative.  9. Labs, 10/10/2006: CMP was unrevealing, CEA of 1.3, and CA27-29 of 14.1.  10. 2D echo, 10/17/2006, [facility]. Normal left ventricular chamber size and wall motion.  The left ventricular ejection fraction is felt to be in excess of 60%. No pericardial effusion or intracavitary thrombus noted. No valvular abnormalities are noted. Spectral and color flow Doppler studies reveal trivial tricuspid regurgitation. The right ventricular systolic pressure is normal and was estimated at 19 mmHg.  11. Labs, 08/22/2016: Hemoglobin 12.4, hematocrit 39.4,  (elevated).  12. Labs, 08/30/2016:  (313 to 615), TSH 1.9, B12 of 1055, folate greater than 20, T4 9.6 (4.9 to 12.3), SPIEP: IgG 527 (791 to 1643), IgM 261, IgA 76.6 (each normal). DAVION: No monoclonal immunoglobulins detected.  13. Comprehensive blood report, 08/30/2016. MILD ANEMIA; INCREASED T-CELL LGL'S. COMPREHENSIVE DIAGNOSIS. Review of peripheral blood smear: Mild anemia with borderline macrocytic features. Normal white blood cell and platelet counts and morphology. Mildly expanded population of reactive appearing T-cell large granular lymphocytes detected on flow cytometric studies. COMPREHENSIVE COMMENT. The exact etiology of this patient's mild anemia with borderline macrocytic features is not apparent from review of the specimen. Given the lack of significant atypia and normal white blood cell and platelet values, myelodysplasia appears less likely. Non-neoplastic etiologies to consider include liver disease, thyroid disease, immune mediated disorders, vitamin/nutritional deficiencies and drugs/toxins. Correlation recommended. Flow cytometry study after erythrolysis reveals no circulating myeloid or lymphoid blasts. Myeloid and monocytic lineages are represented by mature granulocytes and monocytes. Basophils and eosinophils are not increased. It must be noted that the diagnosis of a myeloid stem cell disorder, if clinically suspected, is best made using bone marrow specimens. Peripheral blood is not always representative of abnormalities involving the bone marrow. The B cells show no evidence of clonality or  antigenic aberrancy. The majority of the T cells show normal expression of the pan T-cell antigens. A mildly expanded population of T-cell LGL is detected accounting for approximately 35.9% of the total T cells and 12.6% of the total white blood cells. The overall immunophenotype of the T-LGL and a normal CD4:CD8 ratio favors reactive process over T-cell neoplasia. The presence of these large granular lymphocytes raises the possibility of an immune-mediated etiology for this patient's anemia.    PREVIOUS INTERVENTIONS: Breast carcinoma.   1. Adriamycin, 11/02/2006 through 12/14/2006. Four cycles completed.  2. Taxotere, 12/28/2006 through 02/09/2007. Four cycles completed.  3. Cytoxan, 02/22/2007 through 04/05/2007. Four cycles completed.  4. Femara 2.5 mg daily, 04/12/2007 through 04/12/2012.    DIAGNOSTIC ABNORMALITIES: Anemia.   1. CBC, 05/15/2006. Hemoglobin 7.9 and hematocrit 25.3. Additional lab same date revealed retic count 1.4%, serum iron 7 (50 to 170), iron saturation 2%, TIBC 345 (273 to 456), TSH 1.72, and T4 0.9 (each normal).  2. CBC (post transfusion 2 units of PRBCs), 05/24/2006. Hemoglobin 9.3, an hematocrit 29.6, an MCV 74.9, platelets 309,000 and a WBC 6.5 with a normal differential.  3. Anemia substrate evaluation, 07/12/2006. Fe 116, TIBC 343, Fe sat 34%, ferritin 14, B12 of 655, folate 23.1, retic 0.8%, and stools for occult blood (OB) 0/3 +.    PREVIOUS INTERVENTIONS: Anemia.   1. Two units RBCs, 05/22/2006.  2. Transabdominal hysterectomy and bilateral oophorectomy, 06/07/2006. Pathology report is not immediately available to this examiner.  3. Chromagen by mouth 07/12/2006 through present (currently on 1 daily).        Problem List Items Addressed This Visit        Other    Malignant neoplasm of right breast in female, estrogen receptor positive (HCC) - Primary     Oncology/Hematology History Overview Note   DIAGNOSTIC ABNORMALITIES:  Breast carcinoma    1.Unilateral mammogram, right  breast, 08/23/2006, [facility].  Approximately a 2 cm spiculated area in the upper-outer quadrant of the right breast at the 10 o'clock to 11 o'clock position.  2.Right breast ultrasound, 08/28/2006, [facility].  A solid nodule at 10 o’clock in the right breast measuring up to 2 cm is felt to correspond to the abnormality seen on the mammograms, including spot views today.  There are also small cysts in this region.  Excisional biopsy should be considered.  3.Bone scan, 09/07/2006, [facility]. Small areas of vaguely increased activity in the upper thoracic area and at the lumbosacral junction, probably arthritic.  No definite metastatic change is identified.  4.CT of the brain, 09/07/2006, [facility]. Chronic sinus disease involving the left half of the sphenoid sinus. Otherwise unremarkable enhanced CT of the brain with no convincing evidence of metastatic disease.  5.CT of the chest, 09/07/2006, [facility] small cortical cyst involving the upper pole of the right kidney. Otherwise unremarkable CT of the chest with no evidence of metastatic disease.  6.CT of the pelvis, 09/07/2006, [facility].  Sclerotic lesion involving the right femoral head which I would favor to represent a bony enostosis. Considering the lack of other metastatic disease, I feel a solitary bony lesion to the right femoral head would be an unusual manifestation but warranting followup nonetheless.  Bone scan may be helpful for further evaluation.  7.Unilateral mammogram of breast, 09/11/2006, [facility]. Lobular areas of density in the left breast consistent with several left breast masses. Ultrasound was performed. There are no spiculated densities. No suspicious microcalcifications are identified. Screening exam of the left breast is recommended in 1 year.  8.Right breast biopsy, 09/29/2006, [facility]. Infiltrating mammary carcinoma.  Part A: Primary tumor diagnosis is infiltrating ductal  carcinoma.  Part B: Right axillary lymph node was benign.  Part C: Right breast tissue showed right modified radical mastectomy with deep margin without obvious histopathologic alteration.  No residual tumor present at previous biopsy site.  Fibrocystic mastopathy including fibrosis, adenosis, duct ectasia, papillary apocrine metaplasia, sclerosing adenosis, multiple cysts, and intraductal microcalcifications.  Seventeen (17) benign lymph nodes.  Part D:  Left breast tissue reveals no tumor present.   Fibrocystic mastopathy including fibrosis, adenosis, duct ectasia, multiple cysts, apocrine metaplasia, ruptured tension cysts, periductal chronic inflammation, intraductal hyperplasia of usual type, and intraductal microcalcifications (multiple).  Part E:  Right breast tissue shows portions of benign loose fibrofatty tissue.  Part F:  Left breast tissue show portions of benign loose fibrofatty tissue and fatty breast tissue.  ER 94% (+), MT 7% (+), HER-2 negative.  9.Labs, 10/10/2006: CMP was unrevealing, CEA of 1.3, and CA27-29 of 14.1.  10.2D echo, 10/17/2006, [facility].  Normal left ventricular chamber size and wall motion.  The left ventricular ejection fraction is felt to be in excess of 60%. No pericardial effusion or intracavitary thrombus noted. No valvular abnormalities are noted.  Spectral and color flow Doppler studies reveal trivial tricuspid regurgitation. The right ventricular systolic pressure is normal and was estimated at 19 mmHg.  11.Labs, 08/22/2016: Hemoglobin 12.4, hematocrit 39.4,  (elevated).  12.Labs, 08/30/2016:  (313 to 615), TSH 1.9, B12 of 1055, folate greater than 20, T4 9.6 (4.9 to 12.3), SPIEP: IgG 527 (791 to 1643), IgM 261, IgA 76.6 (each normal). DAVION: No monoclonal immunoglobulins detected.  13.Comprehensive blood report, 08/30/2016.   MILD ANEMIA; INCREASED T-CELL LGL'S. COMPREHENSIVE DIAGNOSIS.  Review of peripheral blood smear: Mild anemia with borderline  macrocytic features. Normal white blood cell and platelet counts and morphology. Mildly expanded population of reactive appearing T-cell large granular lymphocytes detected on flow cytometric studies.  COMPREHENSIVE COMMENT.  The exact etiology of this patient's mild anemia with borderline macrocytic features is not apparent from review of the specimen. Given the lack of significant atypia and normal white blood cell and platelet values, myelodysplasia appears less likely. Non-neoplastic etiologies to consider include liver disease, thyroid disease, immune mediated disorders, vitamin/nutritional deficiencies and drugs/toxins. Correlation recommended. Flow cytometry study after erythrolysis reveals no circulating myeloid or lymphoid blasts. Myeloid and monocytic lineages are represented by mature granulocytes and monocytes. Basophils and eosinophils are not increased. It must be noted that the diagnosis of a myeloid stem cell disorder, if clinically suspected, is best made using bone marrow specimens. Peripheral blood is not always representative of abnormalities involving the bone marrow. The B cells show no evidence of clonality or antigenic aberrancy. The majority of the T cells show normal expression of the pan T-cell antigens. A mildly expanded population of T-cell LGL is detected accounting for approximately 35.9% of the total T cells and 12.6% of the total white blood cells. The overall immunophenotype of the T-LGL and a normal CD4:CD8 ratio favors reactive process over T-cell neoplasia. The presence of these large granular lymphocytes raises the possibility of an immune-mediated etiology for this patient's anemia.    PREVIOUS INTERVENTIONS:  Breast carcinoma.    1.Adriamycin, 11/02/2006 through 12/14/2006.  Four cycles completed.  2.Taxotere, 12/28/2006 through 02/09/2007.  Four cycles completed.  3.Cytoxan, 02/22/2007 through 04/05/2007.  Four cycles completed.  4.Femara 2.5 mg daily, 04/12/2007 through  04/12/2012.    DIAGNOSTIC ABNORMALITIES:  Anemia.    1.CBC, 05/15/2006. Hemoglobin 7.9 and hematocrit 25.3. Additional lab same date revealed retic count 1.4%, serum iron 7 (50 to 170), iron saturation 2%, TIBC 345 (273 to 456), TSH 1.72, and T4 0.9 (each normal).  2.CBC (post transfusion 2 units of PRBCs), 05/24/2006.  Hemoglobin 9.3, an hematocrit 29.6, an MCV 74.9, platelets 309,000 and a WBC 6.5 with a normal differential.  3.Anemia substrate evaluation, 07/12/2006. Fe 116, TIBC 343, Fe sat 34%, ferritin 14, B12 of 655, folate 23.1, retic 0.8%, and stools for occult blood (OB) 0/3 +.    PREVIOUS INTERVENTIONS:  Anemia.    1.Two units RBCs, 05/22/2006.  2.Transabdominal hysterectomy and bilateral oophorectomy, 06/07/2006. Pathology report is not immediately available to this examiner.  3.Chromagen by mouth 07/12/2006 through present (currently on 1 daily).       Malignant neoplasm of right breast in female, estrogen receptor positive (HCC)   8/16/2019 Initial Diagnosis    Malignant neoplasm of right breast in female, estrogen receptor positive (CMS/HCC)         PAST MEDICAL HISTORY:  ALLERGIES:  No Known Allergies  CURRENT MEDICATIONS:  Outpatient Encounter Medications as of 3/24/2023   Medication Sig Dispense Refill   • albuterol (ProAir RespiClick) 108 (90 Base) MCG/ACT inhaler Inhale 2 puffs Every 4 (Four) Hours As Needed for Wheezing.     • Calcium 500 MG tablet Take 500 mg by mouth Daily.     • Desvenlafaxine Succinate ER 25 MG tablet sustained-release 24 hour Take 25 mg by mouth Daily. --   - PRISTIQ -     • diazePAM (VALIUM) 10 MG tablet Take 10 mg by mouth Every 12 (Twelve) Hours As Needed for Anxiety.  2   • ferrous sulfate 325 (65 FE) MG tablet Take 325 mg by mouth Daily With Breakfast.     • lidocaine (LIDODERM) 5 % Place 1 patch on the skin as directed by provider Daily. Remove & Discard patch within 12 hours or as directed by MD 30 each 0   • lisdexamfetamine (VYVANSE) 40 MG capsule Take 40 mg by  mouth Every Morning     • naloxone (NARCAN) 4 MG/0.1ML nasal spray Place 1 spray into the nostril(s) As Needed for signs of overdose 2 each 1   • Omega-3 Fatty Acids (FISH OIL) 1000 MG capsule capsule Take 1,000 mg by mouth Daily With Breakfast.     • ondansetron ODT (ZOFRAN-ODT) 4 MG disintegrating tablet Place 1 tablet on the tongue Every 8 (Eight) Hours As Needed for Nausea or Vomiting. 60 tablet 0   • oxyCODONE-acetaminophen (PERCOCET)  MG per tablet Take 1 tablet by mouth Every 4 (Four) Hours As Needed for Moderate Pain. 45 tablet 0   • pantoprazole (PROTONIX) 40 MG EC tablet Take 40 mg by mouth Daily.     • pregabalin (LYRICA) 150 MG capsule Take 150 mg by mouth 3 (Three) Times a Day.     • Prolia 60 MG/ML solution prefilled syringe syringe TWICE A YEAR     • temazepam (RESTORIL) 15 MG capsule Take 15 mg by mouth At Night As Needed for Sleep.     • therapeutic multivitamin-minerals (THERAGRAN-M) tablet Take 1 tablet by mouth Daily.     • tiZANidine (ZANAFLEX) 4 MG tablet Take 4 mg by mouth 3 (Three) Times a Day As Needed.     • tiZANidine (Zanaflex) 4 MG tablet Take 1 tablet by mouth Every 6 (Six) Hours As Needed for Muscle Spasms. 12 tablet 0     No facility-administered encounter medications on file as of 3/24/2023.     ADULT ILLNESSES:   Infiltrating ductal carcinoma of breast ( ER Status: Positive; KS Status: Positive; )   Anxiety   Arthralgia   Francisco's esophagus (disorder)   Carcinoembryonic antigen present (finding)   Fibrocystic breast disease   Hypokalemia   Iron deficiency anemia   Osteopenia   Tobacco user (finding)    SURGERIES:   Total abdominal hysterectomy with bilateral salpingo-oophorectomy, (DANIEL/BSO), 2006    section, x2. Most recently in    Modified radical mastectomy, right, 2006   Simple mastectomy, prophylactic, left with left breast reconstruction, 2006   Biopsy of breast, right, benign fibrocystic changes, 2006   Carpal tunnel release, left  2011; right 2011      ADULT ILLNESSES:  Patient Active Problem List   Diagnosis Code   • Malignant neoplasm of right breast in female, estrogen receptor positive (HCC) C50.911, Z17.0   • Anemia D64.9   • Francisco esophagus K22.70   • GERD without esophagitis K21.9   • Hiatal hernia K44.9   • Irritable bowel syndrome K58.9   • Lumbar radicular pain M54.16   • Tortuous colon Q43.8   • Coronary artery calcification I25.10, I25.84   • Lumbar stenosis M48.061   • Osteoporosis M81.0   • Drug induced constipation K59.03   • Chronic bilateral low back pain without sciatica M54.50, G89.29   • DDD (degenerative disc disease), lumbar M51.36   • Lumbosacral radiculopathy M54.17     SURGERIES:  Past Surgical History:   Procedure Laterality Date   • ANTERIOR LUMBAR EXPOSURE N/A 2023    Procedure: ANTERIOR LUMBAR EXPOSURE;  Surgeon: Jordy Macario DO;  Location:  [facility] OR;  Service: Vascular;  Laterality: N/A;   • BREAST BIOPSY Right 2006    Biopsy of breast, right, benign fibrocystic changes   • CARPAL TUNNEL RELEASE      left 2011; right 2011   •  SECTION      x2. Most recently in    • COLONOSCOPY     • ENDOSCOPY     • LUMBAR FUSION N/A 2023    Procedure: ANTERIOR DECOMPRESSION, ANTERIOR LUMBAR INTERBODY FUSION WITH INSTRUMENTATION L5-S1;  Surgeon: CAMILA Goldstein MD;  Location:  [facility] OR;  Service: Orthopedic Spine;  Laterality: N/A;   • LUMBAR FUSION Right 2023    Procedure: RIGHT LATERAL LUMBAR INTERBODY FUSION L3-5 WITH INSTRUMENTATION L3-4;  Surgeon: CAMILA Goldstein MD;  Location:  [facility] OR;  Service: Orthopedic Spine;  Laterality: Right;   • LUMBAR LAMINECTOMY WITH FUSION N/A 2023    Procedure: POSTERIOR SPINAL FUSION WITH INSTRUMENTATION L3-S1;  Surgeon: CAMILA Goldstein MD;  Location:  [facility] OR;  Service: Orthopedic Spine;  Laterality: N/A;   • MASTECTOMY MODIFIED RADICAL / SIMPLE / COMPLETE Right 2006   • SIMPLE MASTECTOMY Left 2006      prophylactic, left with left breast reconstruction   • TONSILLECTOMY     • TOTAL ABDOMINAL HYSTERECTOMY WITH SALPINGO OOPHORECTOMY  2006     HEALTH MAINTENANCE ITEMS:  Health Maintenance Due   Topic Date Due   • Pneumococcal Vaccine 0-64 (1 - PCV) Never done   • TDAP/TD VACCINES (1 - Tdap) Never done   • ZOSTER VACCINE (1 of 2) Never done   • HEPATITIS C SCREENING  Never done   • ANNUAL PHYSICAL  Never done   • PAP SMEAR  Never done   • COVID-19 Vaccine (3 - Booster for Moderna series) 2021   • INFLUENZA VACCINE  2022       <no information>  Last Completed Colonoscopy          COLORECTAL CANCER SCREENING (COLONOSCOPY - Every 10 Years) Next due on 2015  [facility] Colonoscopy component of  COLONOSCOPY    2015  SCANNED - COLONOSCOPY    2014  Colonoscopy                There is no immunization history on file for this patient.  Last Completed Mammogram     This patient has no relevant Health Maintenance data.            FAMILY HISTORY:  Family History   Problem Relation Age of Onset   • Polymyalgia rheumatica Mother    • Heart attack Mother    • Kidney disease Mother    • Diabetes Father    • Hypertension Father    • Other Father         heart issues   • No Known Problems Brother    • No Known Problems Maternal Grandmother    • Heart attack Maternal Grandfather         52 years old    • No Known Problems Paternal Grandmother    • Diabetes Paternal Grandfather    • No Known Problems Brother    • No Known Problems Brother    • No Known Problems Brother      SOCIAL HISTORY:  Social History     Socioeconomic History   • Marital status:    Tobacco Use   • Smoking status: Former     Packs/day: 1.00     Years: 25.00     Pack years: 25.00     Types: Cigarettes     Quit date: 2017     Years since quittin.3   • Smokeless tobacco: Never   Vaping Use   • Vaping Use: Former   • Quit date: 8/10/2022   • Substances: Nicotine   • Devices: Pre-filled pod   Substance and  "Sexual Activity   • Alcohol use: No   • Drug use: No   • Sexual activity: Defer       REVIEW OF SYSTEMS:  Constitutional:   The patient reports a fairly good appetite and energy is variable, \"still some days better than others.\" She manages all her ADLs. She retired last 04/30/2021. She has gained 1 lb (no weight changes at her prior visit) in the interval.  States her fibromyalgia still slows her down.  She has no fevers, chills, or drenching night sweats. Still has intermittent hot flashes, \"just occasionally.\" Her sleep habits are fairly good even off Restoril.  Ear/Nose/Mouth/Throat:   She has chronic rhinitis with no sinus congestion, ear discomfort, or discolored drainage. She has no sore throat, nosebleeds, or sore tongue. She has fewer bouts of headaches.  Ocular:   She still has bouts of blurry vision. She is still followed by ophthalmology. No eye pain or double vision.  Respiratory:   No chest congestion.  No cough.  Only occasional exertional dyspnea, no significant shortness of breathing at rest, or unexplained chest wall pain. She has not smoked since 12/31/2017. Is aware that she has chronic obstructive pulmonary disease (COPD) on x-ray.   Cardiovascular:   She has no exertional chest pain, chest pressure, or chest heaviness. She has no claudication. She has no palpitations or symptomatic orthostasis.  Gastrointestinal:   She has no dysphagia, nausea, vomiting, postprandial abdominal pain (PPI), bloating, cramping, or change in bowel habits. She has not had dark stools or rectal bleeding. She has no diarrhea, and her bowels have been regular (still every other day) without stool softeners. EGD last 08/18/2016 per Dr. Sheppard. \"He took a biopsy but didn't say anything else.\" Colonoscopy, 09/04/2015. Normal. Says she has been recalled for repeat EGD  Genitourinary:   She has no urinary burning, frequency, dribbling, or discoloration. She has no need to urinate frequently through the night. She has no " "difficulty controlling her bladder. No vaginal bleeding. She denies any vaginal discharge. Says she had breakthrough bleeding last 12/2016 but was seen by Dr. Graham (GYN). \"She did a pelvic exam and ran a bunch of tests and everything was okay.\" No repeat episodes since.  Musculoskeletal:   She has no unexplained myalgias or nighttime leg cramping. Has only mild aching, especially in the hands, lower back, hips, and shoulders.  She has spinal degenerative joint disease (DJD), (MRI, 10/09/2008; 07/2012). She has no distal radiculitis. She is still using oxycontin since back surgery. Previously on Lortab ~ 3-4 daily. Nabumetone has been stopped. Has undergone back surgery (see below).    Extremities:   She has no trouble with fluid retention or significant leg swelling.  Endocrine:   The hot flashes are tolerable. She has no problems with excess thirst, excessive urination, or unexplained fatigue.  Heme/Lymphatic:   She has no unexplained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

## 2023-03-24 ENCOUNTER — OFFICE VISIT (OUTPATIENT)
Dept: ONCOLOGY | Facility: CLINIC | Age: 58
End: 2023-03-24
Payer: COMMERCIAL

## 2023-03-24 VITALS
RESPIRATION RATE: 18 BRPM | DIASTOLIC BLOOD PRESSURE: 80 MMHG | OXYGEN SATURATION: 98 % | WEIGHT: 148.4 LBS | HEIGHT: 63 IN | HEART RATE: 56 BPM | TEMPERATURE: 98.3 F | SYSTOLIC BLOOD PRESSURE: 140 MMHG | BODY MASS INDEX: 26.29 KG/M2

## 2023-03-24 DIAGNOSIS — Z17.0 MALIGNANT NEOPLASM OF RIGHT BREAST IN FEMALE, ESTROGEN RECEPTOR POSITIVE, UNSPECIFIED SITE OF BREAST: Primary | ICD-10-CM

## 2023-03-24 DIAGNOSIS — C50.911 MALIGNANT NEOPLASM OF RIGHT BREAST IN FEMALE, ESTROGEN RECEPTOR POSITIVE, UNSPECIFIED SITE OF BREAST: Primary | ICD-10-CM

## 2023-03-24 PROCEDURE — 99215 OFFICE O/P EST HI 40 MIN: CPT | Performed by: INTERNAL MEDICINE

## 2023-03-24 RX ORDER — DIPHENHYDRAMINE HYDROCHLORIDE 50 MG/ML
50 INJECTION INTRAMUSCULAR; INTRAVENOUS AS NEEDED
Status: CANCELLED | OUTPATIENT
Start: 2023-03-30

## 2023-03-24 RX ORDER — FAMOTIDINE 10 MG/ML
20 INJECTION, SOLUTION INTRAVENOUS AS NEEDED
Status: CANCELLED | OUTPATIENT
Start: 2023-03-30

## 2023-03-24 RX ORDER — SODIUM CHLORIDE 9 MG/ML
250 INJECTION, SOLUTION INTRAVENOUS ONCE
Status: CANCELLED | OUTPATIENT
Start: 2023-03-30

## 2023-03-30 ENCOUNTER — TELEPHONE (OUTPATIENT)
Dept: ONCOLOGY | Facility: CLINIC | Age: 58
End: 2023-03-30
Payer: COMMERCIAL

## 2023-03-30 NOTE — TELEPHONE ENCOUNTER
Caller: Carol Rodriguez    Relationship to patient: Self    Best call back number: [phone]    Chief complaint: PATIENT MISSED APPOINTMENT THIS MORNING AND WANTED TO RESCHEDULE    Type of visit: INFUSION       If rescheduling, when is the original appointment: 3-30-23

## 2023-03-31 ENCOUNTER — INFUSION (OUTPATIENT)
Dept: ONCOLOGY | Facility: HOSPITAL | Age: 58
End: 2023-03-31
Payer: COMMERCIAL

## 2023-03-31 VITALS
OXYGEN SATURATION: 99 % | HEIGHT: 63 IN | SYSTOLIC BLOOD PRESSURE: 156 MMHG | BODY MASS INDEX: 26.22 KG/M2 | RESPIRATION RATE: 18 BRPM | TEMPERATURE: 98.5 F | WEIGHT: 148 LBS | HEART RATE: 59 BPM | DIASTOLIC BLOOD PRESSURE: 68 MMHG

## 2023-03-31 DIAGNOSIS — D50.9 IRON DEFICIENCY ANEMIA, UNSPECIFIED IRON DEFICIENCY ANEMIA TYPE: Primary | ICD-10-CM

## 2023-03-31 PROCEDURE — 96374 THER/PROPH/DIAG INJ IV PUSH: CPT

## 2023-03-31 PROCEDURE — 96365 THER/PROPH/DIAG IV INF INIT: CPT

## 2023-03-31 PROCEDURE — 25010000002 FERRIC CARBOXYMALTOSE 750 MG/15ML SOLUTION 15 ML VIAL: Performed by: INTERNAL MEDICINE

## 2023-03-31 RX ORDER — SODIUM CHLORIDE 9 MG/ML
250 INJECTION, SOLUTION INTRAVENOUS ONCE
Status: COMPLETED | OUTPATIENT
Start: 2023-03-31 | End: 2023-03-31

## 2023-03-31 RX ORDER — DIPHENHYDRAMINE HYDROCHLORIDE 50 MG/ML
50 INJECTION INTRAMUSCULAR; INTRAVENOUS AS NEEDED
Status: DISCONTINUED | OUTPATIENT
Start: 2023-03-31 | End: 2023-03-31 | Stop reason: HOSPADM

## 2023-03-31 RX ORDER — FAMOTIDINE 10 MG/ML
20 INJECTION, SOLUTION INTRAVENOUS AS NEEDED
Status: DISCONTINUED | OUTPATIENT
Start: 2023-03-31 | End: 2023-03-31 | Stop reason: HOSPADM

## 2023-03-31 RX ADMIN — FERRIC CARBOXYMALTOSE INJECTION 750 MG: 50 INJECTION, SOLUTION INTRAVENOUS at 09:03

## 2023-03-31 RX ADMIN — SODIUM CHLORIDE 250 ML: 9 INJECTION, SOLUTION INTRAVENOUS at 09:00

## 2023-04-18 ENCOUNTER — OFFICE VISIT (OUTPATIENT)
Dept: GASTROENTEROLOGY | Age: 58
End: 2023-04-18
Payer: COMMERCIAL

## 2023-04-18 VITALS
BODY MASS INDEX: 22.71 KG/M2 | WEIGHT: 133 LBS | HEART RATE: 70 BPM | DIASTOLIC BLOOD PRESSURE: 80 MMHG | OXYGEN SATURATION: 97 % | SYSTOLIC BLOOD PRESSURE: 139 MMHG | HEIGHT: 64 IN

## 2023-04-18 DIAGNOSIS — K22.70 BARRETT'S ESOPHAGUS DETERMINED BY BIOPSY: ICD-10-CM

## 2023-04-18 DIAGNOSIS — K21.9 CHRONIC GERD: ICD-10-CM

## 2023-04-18 DIAGNOSIS — D64.9 CHRONIC ANEMIA: Primary | ICD-10-CM

## 2023-04-18 PROCEDURE — 99214 OFFICE O/P EST MOD 30 MIN: CPT | Performed by: NURSE PRACTITIONER

## 2023-04-18 NOTE — PROGRESS NOTES
tenderness. There is no guarding or rebound. Musculoskeletal:         General: Normal range of motion. Cervical back: Normal range of motion and neck supple. Comments: Back brace on    Skin:     General: Skin is warm and dry. Coloration: Skin is not pale. Neurological:      Mental Status: She is alert and oriented to person, place, and time.    Psychiatric:         Behavior: Behavior normal.

## 2023-04-19 ASSESSMENT — ENCOUNTER SYMPTOMS
TROUBLE SWALLOWING: 0
SHORTNESS OF BREATH: 0
BLOOD IN STOOL: 0
DIARRHEA: 0
CONSTIPATION: 0
BACK PAIN: 1
COUGH: 0
ABDOMINAL PAIN: 0
RECTAL PAIN: 0
CHOKING: 0
NAUSEA: 0
VOMITING: 0
ANAL BLEEDING: 0
ABDOMINAL DISTENTION: 0

## 2023-05-17 ENCOUNTER — ANESTHESIA EVENT (OUTPATIENT)
Dept: OPERATING ROOM | Age: 58
End: 2023-05-17

## 2023-05-18 ENCOUNTER — APPOINTMENT (OUTPATIENT)
Dept: OPERATING ROOM | Age: 58
End: 2023-05-18

## 2023-05-18 ENCOUNTER — ANESTHESIA (OUTPATIENT)
Dept: OPERATING ROOM | Age: 58
End: 2023-05-18

## 2023-05-18 ENCOUNTER — HOSPITAL ENCOUNTER (OUTPATIENT)
Age: 58
Setting detail: OUTPATIENT SURGERY
Discharge: HOME | End: 2023-05-18
Attending: INTERNAL MEDICINE | Admitting: INTERNAL MEDICINE

## 2023-05-18 ENCOUNTER — HOSPITAL ENCOUNTER (OUTPATIENT)
Age: 58
Setting detail: SPECIMEN
Discharge: HOME | End: 2023-05-18
Payer: COMMERCIAL

## 2023-05-18 VITALS
DIASTOLIC BLOOD PRESSURE: 71 MMHG | BODY MASS INDEX: 23.9 KG/M2 | OXYGEN SATURATION: 100 % | TEMPERATURE: 97.6 F | WEIGHT: 140 LBS | SYSTOLIC BLOOD PRESSURE: 130 MMHG | HEART RATE: 55 BPM | RESPIRATION RATE: 18 BRPM | HEIGHT: 64 IN

## 2023-05-18 PROCEDURE — G8907 PT DOC NO EVENTS ON DISCHARG: HCPCS

## 2023-05-18 PROCEDURE — 45378 DIAGNOSTIC COLONOSCOPY: CPT

## 2023-05-18 PROCEDURE — 88305 TISSUE EXAM BY PATHOLOGIST: CPT

## 2023-05-18 PROCEDURE — 88342 IMHCHEM/IMCYTCHM 1ST ANTB: CPT

## 2023-05-18 PROCEDURE — G8918 PT W/O PREOP ORDER IV AB PRO: HCPCS

## 2023-05-18 PROCEDURE — 43239 EGD BIOPSY SINGLE/MULTIPLE: CPT

## 2023-05-18 RX ORDER — SODIUM CHLORIDE, SODIUM LACTATE, POTASSIUM CHLORIDE, CALCIUM CHLORIDE 600; 310; 30; 20 MG/100ML; MG/100ML; MG/100ML; MG/100ML
INJECTION, SOLUTION INTRAVENOUS CONTINUOUS
Status: DISCONTINUED | OUTPATIENT
Start: 2023-05-18 | End: 2023-05-18 | Stop reason: HOSPADM

## 2023-05-18 RX ORDER — PROPOFOL 10 MG/ML
INJECTION, EMULSION INTRAVENOUS PRN
Status: DISCONTINUED | OUTPATIENT
Start: 2023-05-18 | End: 2023-05-18

## 2023-05-18 RX ORDER — LIDOCAINE HYDROCHLORIDE 10 MG/ML
INJECTION, SOLUTION EPIDURAL; INFILTRATION; INTRACAUDAL; PERINEURAL PRN
Status: DISCONTINUED | OUTPATIENT
Start: 2023-05-18 | End: 2023-05-18 | Stop reason: SDUPTHER

## 2023-05-18 RX ORDER — PROPOFOL 10 MG/ML
INJECTION, EMULSION INTRAVENOUS PRN
Status: DISCONTINUED | OUTPATIENT
Start: 2023-05-18 | End: 2023-05-18 | Stop reason: SDUPTHER

## 2023-05-18 RX ORDER — ONDANSETRON 2 MG/ML
4 INJECTION INTRAMUSCULAR; INTRAVENOUS
Status: CANCELLED | OUTPATIENT
Start: 2023-05-18 | End: 2023-05-19

## 2023-05-18 RX ADMIN — PROPOFOL 400 MG: 10 INJECTION, EMULSION INTRAVENOUS at 10:33

## 2023-05-18 RX ADMIN — SODIUM CHLORIDE, SODIUM LACTATE, POTASSIUM CHLORIDE, CALCIUM CHLORIDE: 600; 310; 30; 20 INJECTION, SOLUTION INTRAVENOUS at 10:21

## 2023-05-18 RX ADMIN — LIDOCAINE HYDROCHLORIDE 30 MG: 10 INJECTION, SOLUTION EPIDURAL; INFILTRATION; INTRACAUDAL; PERINEURAL at 10:33

## 2023-05-18 NOTE — ANESTHESIA PRE PROCEDURE
Department of Anesthesiology  Preprocedure Note       Name:  Julita Owens   Age:  62 y.o.  :  1965                                          MRN:  [id number]         Date:  2023      Surgeon: Shena Zhang):  Cheyenne Yanez MD    Procedure: Procedure(s):  EGD BIOPSY  COLONOSCOPY DIAGNOSTIC    Medications prior to admission:   Prior to Admission medications    Medication Sig Start Date End Date Taking? Authorizing Provider   denosumab (PROLIA) 60 MG/ML SOSY SC injection TWICE A YEAR 22   Historical Provider, MD   desvenlafaxine succinate (PRISTIQ) 25 MG TB24 extended release tablet Take 1 tablet by mouth daily 3/13/23   Historical Provider, MD   lidocaine (LIDODERM) 5 % Place 1 patch onto the skin daily 12 hours on, 12 hours off. Historical Provider, MD   VYVANSE 40 MG CAPS Take 1 tablet by mouth daily. Max Daily Amount: 1 tablet 23   Historical Provider, MD   naloxone 4 MG/0.1ML LIQD nasal spray CALL 911. SPR CONTENTS OF ONE SPRAYER (0.1ML) INTO ONE NOSTRIL. REPEAT IN 2-3 MIN IF SYMPTOMS OF OPIOID EMERGENCY PERSIST, ALTERNATE NOSTRILS 23   Historical Provider, MD   ondansetron (ZOFRAN-ODT) 4 MG disintegrating tablet DISSOLVE 1 TABLET ON THE TONGUE EVERY 8 HOURS AS NEEDED FOR NAUSEA OR VOMITING 23   Historical Provider, MD   oxyCODONE-acetaminophen (PERCOCET)  MG per tablet TAKE 1 TABLET BY MOUTH EVERY 4 HOURS AS NEEDED 3/24/23   Historical Provider, MD   pregabalin (LYRICA) 150 MG capsule Take 1 capsule by mouth 3 times daily. 3/23/23   Historical Provider, MD   tiZANidine (ZANAFLEX) 4 MG tablet Take 1 tablet by mouth 3 times daily as needed 3/23/23   Historical Provider, MD   ferrous sulfate (IRON 325) 325 (65 Fe) MG tablet Take 1 tablet by mouth daily 21   Historical Provider, MD   diazePAM (VALIUM) 10 MG tablet Take 1 tablet by mouth daily.     Historical Provider, MD   pantoprazole (PROTONIX) 40 MG tablet 1 tablet daily 5/31/15   Historical Provider, MD   FISH OIL Take 1,000

## 2023-05-18 NOTE — H&P
Patient Name: Contreras Culver  : 1965  MRN: [id number]  DATE: 23    Allergies: No Known Allergies     ENDOSCOPY  History and Physical    Procedure:    [x] Diagnostic Colonoscopy       [] Screening Colonoscopy  [x] EGD      [] ERCP      [] EUS       [] Other    [x] Previous office notes/History and Physical reviewed from the patients chart. Please see EMR for further details of HPI. I have examined the patient's status immediately prior to the procedure and:      Indications/HPI:    [x]Abdominal Pain   [x]Barretts  []Screening/Surveillance   []History of Polyps  []Dysphagia            [] +Cologard/DNA testing  []Abnormal Imaging              []EOE Hx              [] Family Hx of CRC/Polyps  [x]Anemia                            []Food Impaction       []Recent Poor Prep  []GI Bleed             []Lymphadenopathy  []History of Polyps  []Change in bowel habits []Heartburn/Reflux  []Cancer- GI/Lung  []Chest Pain - Non Cardiac []Heme (+) Stool []Ulcers  []Constipation  []Hemoptysis  []Incontinence    []Diarrhea  []Hypoxemia  []Rectal Bleed (BRBPR)  []Nausea/Vomiting   [] Varices  []Crohns/Colitis  []Pancreatic Cyst   [] Cirrhosis   []Pancreatitis    []Abnormal MRCP  []Elevated LFT [] Stent Removal, Previous ERCP  []Other:     Anesthesia:   [x] MAC [] Moderate Sedation   [] General   [] None     ROS: 12 pt Review of Symptoms was negative unless mentioned above    Medications:   Prior to Admission medications    Medication Sig Start Date End Date Taking? Authorizing Provider   denosumab (PROLIA) 60 MG/ML SOSY SC injection TWICE A YEAR 22   Historical Provider, MD   desvenlafaxine succinate (PRISTIQ) 25 MG TB24 extended release tablet Take 1 tablet by mouth daily 3/13/23   Historical Provider, MD   lidocaine (LIDODERM) 5 % Place 1 patch onto the skin daily 12 hours on, 12 hours off. Historical Provider, MD   VYVANSE 40 MG CAPS Take 1 tablet by mouth daily.  Max Daily Amount: 1 tablet 23   Historical

## 2023-05-18 NOTE — DISCHARGE INSTRUCTIONS
EGD RECOMMENDATIONS:    1. Await path results- if indicative of Ferrari's, repeat EGD in 3 years. 2.  Continue PPI  3. Minimize NSAID use      Colonoscopy Recommendations:  1. Repeat colonoscopy: 10 years for CRC screening  2. No obvious signs of GI blood loss, follow up as planned with Dr. De La Cruz Salts have been directed by your physician to avoid any NSAID's; the following medications are a list of those to avoid. If you think that you are taking any NSAID's notify your physician. Over The Counter  Advil                      Motrin  Nuprin                   Ibuprofen  Midol                     Aleve  Naproxen              Orudis  Aspirin                   Alejandrina-[place]  Prescriptions and Generics  Cataflam              Relafen  Voltaren               Clinoril  Indocin                 Naproxen  Arthrotec              Lodine  Daypro                 Nalfon  Toradol                Ansaid  Feldene               Meclofenamate  Fenoprofen          Ponstel  Mobic                   Celebrex  Vioxx     POST-OP ORDERS: ENDOSCOPY & COLONOSCOPY:    1. Rest today. 2. DO NOT eat or drink until wide awake; eat your usual diet today in moderate amount only. 3. DO NOT drive today. 4. Call physician if you have severe pain, vomiting, fever, rectal bleeding or black bowel movements. 5.  If a biopsy was taken or a polyp removed, you should expect to hear results in about 7-10 days. If you have heard nothing from your physician by then, call the office for results. 6.  Discharge home when patient awake, vitals signs stable and tolerating liquids.

## 2023-05-18 NOTE — ANESTHESIA POSTPROCEDURE EVALUATION
Department of Anesthesiology  Postprocedure Note    Patient: Donell Conway  MRN: [id number]  YOB: 1965  Date of evaluation: 5/18/2023      Procedure Summary     Date: 05/18/23 Room / Location: [facility] ENDO 02 / [address]    Anesthesia Start: 1027 Anesthesia Stop: 1103    Procedures:       EGD BIOPSY (Esophagus)      COLONOSCOPY DIAGNOSTIC (Abdomen) Diagnosis:       Chronic GERD      Ferrari's esophagus with dysplasia      Anemia, unspecified type      (Chronic GERD [K21.9])      (Ferrari's esophagus with dysplasia [K22.719])      (Anemia, unspecified type [D64.9])    Surgeons: Eym Sierra MD Responsible Provider: DAYNA Salazar CRNA    Anesthesia Type: General ASA Status: 2          Anesthesia Type: General    Alejandro Phase I:      Alejandro Phase II:        Anesthesia Post Evaluation    Patient location during evaluation: bedside  Patient participation: complete - patient participated  Level of consciousness: awake  Pain score: 0  Airway patency: patent  Nausea & Vomiting: no nausea and no vomiting  Complications: no  Cardiovascular status: hemodynamically stable  Respiratory status: acceptable and room air  Hydration status: euvolemic

## 2023-05-18 NOTE — OP NOTE
Endoscopic Procedure Note    Patient: Cherelle Erazo: 1965  Med Rec#: [id number] Acc#: [id number]     Primary Care Provider Lucy Powers DO  Referring Provider: DAYNA Lovett    Endoscopist: Little Garcia MD    Date of Procedure:  5/18/2023    Procedure:   1. EGD with biopsy    Indications:   1. Dysphagia   2. Hematemesis     Anesthesia:  Sedation was administered by anesthesia who monitored the patient during the procedure. Estimated Blood Loss: minimal    Procedure:   After reviewing the patient's chart and obtaining informed consent, the patient was placed in the left lateral decubitus position. A forward-viewing Olympus endoscope was lubricated and inserted through the mouth into the oropharynx. Under direct visualization, the upper esophagus was intubated. The scope was advanced to the level of the third portion of duodenum. Scope was slowly withdrawn with careful inspection of the mucosal surfaces. The scope was retroflexed for inspection of the gastric fundus and incisura. Findings and maneuvers are listed in impression below. The patient tolerated the procedure well. The scope was removed. There were no immediate complications. Findings:   Esophagus: abnormal: minimal mucosal changes possible of Ferrari's, biopsied due to patient history. There is no hiatal hernia present. Stomach:  Abnormal: Mild mucosal changes suggestive of gastritis noted -  Gastric biopsies were taken from the antrum and body to rule out Helicobacter pylori infection. Duodenum: normal: biopsies obtained for anemia       IMPRESSION:  1. Gastritis- biopsied  2. Esophageal biopsies obtained as above     RECOMMENDATIONS:    1. Await path results- if indicative of Ferrari's, repeat EGD in 3 years. 2.  Continue PPI  3. Minimize NSAID use    The results were discussed with the patient and family. A copy of the images obtained were given to the patient.      vivienne French

## 2023-05-18 NOTE — OP NOTE
Patient: Stephen Orellana : 1965  Med Rec#: [id number] Acc#: [id number]   Primary Care Provider Aminta Valero DO    Date of Procedure:  2023    Endoscopist: Chloe Ren MD    Referring Provider: Aminta Valero DO, Dorcas Senters, APRN    Operation Performed: Colonoscopy     Indications: constipation, anemia     Anesthesia:  Sedation was administered by anesthesia who monitored the patient during the procedure. I met with Stephen Orellana prior to procedure. We discussed the procedure itself, and I have discussed the risks of endoscopy (including-- but not limited to-- pain, discomfort, bleeding potentially requiring second endoscopic procedure and/or blood transfusion, organ perforation requiring operative repair, damage to organs near the colon, infection, aspiration, cardiopulmonary/allergic reaction), benefits, indications to endoscopy. Additionally, we discussed options other than colonoscopy. The patient expressed understanding. All questions answered. The patient decided to proceed with the procedure. Signed informed consent was placed on the chart. Blood Loss: minimal    Withdrawal time: n/a  Bowel Prep: adequate     Complications: no immediate complications    DESCRIPTION OF PROCEDURE:     A time out was performed. After written informed consent was obtained, the patient was placed in the left lateral position. The perianal area was inspected, and a digital rectal exam was performed. A rectal exam was performed: normal tone, no palpable lesions. At this point, a forward viewing Olympus colonoscope was inserted into the anus and carefully advanced to the cecum. The cecum was identified by the ileocecal valve and the appendiceal orifice. The colonoscope was then slowly withdrawn with careful inspection of the mucosa in a linear and circumferential fashion. The scope was retroflexed in the rectum.  Suction was utilized during the procedure to remove as much air as

## 2023-05-26 ENCOUNTER — HOSPITAL ENCOUNTER (OUTPATIENT)
Dept: GENERAL RADIOLOGY | Facility: HOSPITAL | Age: 58
Discharge: HOME | End: 2023-05-26
Payer: COMMERCIAL

## 2023-05-26 ENCOUNTER — LAB (OUTPATIENT)
Dept: LAB | Facility: HOSPITAL | Age: 58
End: 2023-05-26
Payer: COMMERCIAL

## 2023-05-26 DIAGNOSIS — E87.6 HYPOKALEMIA: Primary | ICD-10-CM

## 2023-05-26 DIAGNOSIS — C50.911 MALIGNANT NEOPLASM OF RIGHT BREAST IN FEMALE, ESTROGEN RECEPTOR POSITIVE, UNSPECIFIED SITE OF BREAST: ICD-10-CM

## 2023-05-26 DIAGNOSIS — Z17.0 MALIGNANT NEOPLASM OF RIGHT BREAST IN FEMALE, ESTROGEN RECEPTOR POSITIVE, UNSPECIFIED SITE OF BREAST: ICD-10-CM

## 2023-05-26 LAB
ALBUMIN SERPL-MCNC: 4.2 G/DL (ref 3.5–5.2)
ALBUMIN/GLOB SERPL: 1.6 G/DL
ALP SERPL-CCNC: 102 U/L (ref 39–117)
ALT SERPL W P-5'-P-CCNC: 12 U/L (ref 1–33)
ANION GAP SERPL CALCULATED.3IONS-SCNC: 10 MMOL/L (ref 5–15)
AST SERPL-CCNC: 17 U/L (ref 1–32)
BASOPHILS # BLD AUTO: 0.04 10*3/MM3 (ref 0–0.2)
BASOPHILS NFR BLD AUTO: 0.7 % (ref 0–1.5)
BILIRUB SERPL-MCNC: 0.2 MG/DL (ref 0–1.2)
BUN SERPL-MCNC: 6 MG/DL (ref 6–20)
BUN/CREAT SERPL: 6.3 (ref 7–25)
CALCIUM SPEC-SCNC: 9.8 MG/DL (ref 8.6–10.5)
CEA SERPL-MCNC: 1.56 NG/ML
CHLORIDE SERPL-SCNC: 105 MMOL/L (ref 98–107)
CO2 SERPL-SCNC: 30 MMOL/L (ref 22–29)
CREAT SERPL-MCNC: 0.95 MG/DL (ref 0.57–1)
DEPRECATED RDW RBC AUTO: 51.8 FL (ref 37–54)
EGFRCR SERPLBLD CKD-EPI 2021: 69.6 ML/MIN/1.73
EOSINOPHIL # BLD AUTO: 0.12 10*3/MM3 (ref 0–0.4)
EOSINOPHIL NFR BLD AUTO: 2.1 % (ref 0.3–6.2)
ERYTHROCYTE [DISTWIDTH] IN BLOOD BY AUTOMATED COUNT: 15.2 % (ref 12.3–15.4)
FERRITIN SERPL-MCNC: 277.3 NG/ML (ref 13–150)
FOLATE SERPL-MCNC: >20 NG/ML (ref 4.78–24.2)
GLOBULIN UR ELPH-MCNC: 2.7 GM/DL
GLUCOSE SERPL-MCNC: 93 MG/DL (ref 65–99)
HCT VFR BLD AUTO: 40.7 % (ref 34–46.6)
HGB BLD-MCNC: 12.5 G/DL (ref 12–15.9)
IMM GRANULOCYTES # BLD AUTO: 0.01 10*3/MM3 (ref 0–0.05)
IMM GRANULOCYTES NFR BLD AUTO: 0.2 % (ref 0–0.5)
IRON 24H UR-MRATE: 70 MCG/DL (ref 37–145)
IRON SATN MFR SERPL: 28 % (ref 20–50)
LYMPHOCYTES # BLD AUTO: 1.27 10*3/MM3 (ref 0.7–3.1)
LYMPHOCYTES NFR BLD AUTO: 22.2 % (ref 19.6–45.3)
MCH RBC QN AUTO: 28.4 PG (ref 26.6–33)
MCHC RBC AUTO-ENTMCNC: 30.7 G/DL (ref 31.5–35.7)
MCV RBC AUTO: 92.5 FL (ref 79–97)
MONOCYTES # BLD AUTO: 0.4 10*3/MM3 (ref 0.1–0.9)
MONOCYTES NFR BLD AUTO: 7 % (ref 5–12)
NEUTROPHILS NFR BLD AUTO: 3.89 10*3/MM3 (ref 1.7–7)
NEUTROPHILS NFR BLD AUTO: 67.8 % (ref 42.7–76)
NRBC BLD AUTO-RTO: 0 /100 WBC (ref 0–0.2)
PLATELET # BLD AUTO: 195 10*3/MM3 (ref 140–450)
PMV BLD AUTO: 11.1 FL (ref 6–12)
POTASSIUM SERPL-SCNC: 3.2 MMOL/L (ref 3.5–5.2)
PROT SERPL-MCNC: 6.9 G/DL (ref 6–8.5)
RBC # BLD AUTO: 4.4 10*6/MM3 (ref 3.77–5.28)
SODIUM SERPL-SCNC: 145 MMOL/L (ref 136–145)
TIBC SERPL-MCNC: 247 MCG/DL (ref 298–536)
TRANSFERRIN SERPL-MCNC: 166 MG/DL (ref 200–360)
VIT B12 BLD-MCNC: 832 PG/ML (ref 211–946)
WBC NRBC COR # BLD: 5.73 10*3/MM3 (ref 3.4–10.8)

## 2023-05-26 PROCEDURE — 71046 X-RAY EXAM CHEST 2 VIEWS: CPT

## 2023-05-26 PROCEDURE — 82607 VITAMIN B-12: CPT

## 2023-05-26 PROCEDURE — 84466 ASSAY OF TRANSFERRIN: CPT

## 2023-05-26 PROCEDURE — 82728 ASSAY OF FERRITIN: CPT

## 2023-05-26 PROCEDURE — 83540 ASSAY OF IRON: CPT

## 2023-05-26 PROCEDURE — 82746 ASSAY OF FOLIC ACID SERUM: CPT

## 2023-05-26 PROCEDURE — 82378 CARCINOEMBRYONIC ANTIGEN: CPT

## 2023-05-26 PROCEDURE — 80053 COMPREHEN METABOLIC PANEL: CPT

## 2023-05-26 PROCEDURE — 85025 COMPLETE CBC W/AUTO DIFF WBC: CPT

## 2023-05-26 PROCEDURE — 36415 COLL VENOUS BLD VENIPUNCTURE: CPT

## 2023-05-26 PROCEDURE — 86300 IMMUNOASSAY TUMOR CA 15-3: CPT

## 2023-05-26 RX ORDER — POTASSIUM CHLORIDE 20 MEQ/1
20 TABLET, EXTENDED RELEASE ORAL 3 TIMES DAILY
Qty: 9 TABLET | Refills: 0 | Status: SHIPPED | OUTPATIENT
Start: 2023-05-26

## 2023-05-27 LAB — CANCER AG27-29 SERPL-ACNC: 14.8 U/ML (ref 0–38.6)

## 2023-05-31 DIAGNOSIS — D50.9 IRON DEFICIENCY ANEMIA, UNSPECIFIED IRON DEFICIENCY ANEMIA TYPE: Primary | ICD-10-CM

## 2023-06-01 RX ORDER — FERROUS SULFATE 325(65) MG
TABLET ORAL
Qty: 30 TABLET | Refills: 3 | Status: SHIPPED | OUTPATIENT
Start: 2023-06-01

## 2023-08-24 ENCOUNTER — HOSPITAL ENCOUNTER (OUTPATIENT)
Dept: CARDIOLOGY | Facility: HOSPITAL | Age: 58
Discharge: HOME | End: 2023-08-24
Admitting: NURSE PRACTITIONER
Payer: COMMERCIAL

## 2023-08-24 ENCOUNTER — TRANSCRIBE ORDERS (OUTPATIENT)
Dept: ADMINISTRATIVE | Facility: HOSPITAL | Age: 58
End: 2023-08-24
Payer: COMMERCIAL

## 2023-08-24 DIAGNOSIS — Z79.899 OTHER LONG TERM (CURRENT) DRUG THERAPY: Primary | ICD-10-CM

## 2023-08-24 DIAGNOSIS — Z79.899 OTHER LONG TERM (CURRENT) DRUG THERAPY: ICD-10-CM

## 2023-08-24 PROCEDURE — 93005 ELECTROCARDIOGRAM TRACING: CPT | Performed by: NURSE PRACTITIONER

## 2023-08-25 LAB
QT INTERVAL: 444 MS
QTC INTERVAL: 412 MS

## 2023-09-18 ENCOUNTER — TELEPHONE (OUTPATIENT)
Dept: CARDIOLOGY | Facility: CLINIC | Age: 58
End: 2023-09-18
Payer: COMMERCIAL

## 2023-09-18 NOTE — TELEPHONE ENCOUNTER
iMriam from [facility] office called today and left a vm states that pt had an EKG that showed heart rate of 53 bpm. The APRN stopped the propanolol.    Call returned to Miriam but was not able to reached her.    I called the pt. She said she saw the APRN last Thursday and was still concerned about the EKG that was done on 8/25/23 ( in media). She said she is not having any cardiac issue but an occasional fatigue. We have only seen once in 10/2022

## 2023-09-19 NOTE — TELEPHONE ENCOUNTER
I called the pt back but I was unable to reached her. I also called Miriam from [facility]. She has yet to return my call

## 2023-12-19 ENCOUNTER — LAB (OUTPATIENT)
Dept: LAB | Facility: HOSPITAL | Age: 58
End: 2023-12-19
Payer: COMMERCIAL

## 2023-12-19 ENCOUNTER — TELEPHONE (OUTPATIENT)
Dept: ONCOLOGY | Facility: CLINIC | Age: 58
End: 2023-12-19
Payer: COMMERCIAL

## 2023-12-19 ENCOUNTER — HOSPITAL ENCOUNTER (OUTPATIENT)
Dept: GENERAL RADIOLOGY | Facility: HOSPITAL | Age: 58
Discharge: HOME | End: 2023-12-19
Payer: COMMERCIAL

## 2023-12-19 ENCOUNTER — APPOINTMENT (OUTPATIENT)
Dept: LAB | Facility: HOSPITAL | Age: 58
End: 2023-12-19
Payer: COMMERCIAL

## 2023-12-19 ENCOUNTER — TRANSCRIBE ORDERS (OUTPATIENT)
Dept: ADMINISTRATIVE | Facility: HOSPITAL | Age: 58
End: 2023-12-19
Payer: COMMERCIAL

## 2023-12-19 DIAGNOSIS — D64.9 ANEMIA, UNSPECIFIED TYPE: ICD-10-CM

## 2023-12-19 DIAGNOSIS — E55.9 VITAMIN D DEFICIENCY, UNSPECIFIED: ICD-10-CM

## 2023-12-19 DIAGNOSIS — E87.6 HYPOKALEMIA: Primary | ICD-10-CM

## 2023-12-19 DIAGNOSIS — Z00.01 ENCOUNTER FOR GENERAL ADULT MEDICAL EXAMINATION WITH ABNORMAL FINDINGS: ICD-10-CM

## 2023-12-19 DIAGNOSIS — C50.911 MALIGNANT NEOPLASM OF RIGHT BREAST IN FEMALE, ESTROGEN RECEPTOR POSITIVE, UNSPECIFIED SITE OF BREAST: ICD-10-CM

## 2023-12-19 DIAGNOSIS — Z17.0 MALIGNANT NEOPLASM OF RIGHT BREAST IN FEMALE, ESTROGEN RECEPTOR POSITIVE, UNSPECIFIED SITE OF BREAST: ICD-10-CM

## 2023-12-19 DIAGNOSIS — R94.4 ABNORMAL RESULTS OF KIDNEY FUNCTION STUDIES: ICD-10-CM

## 2023-12-19 DIAGNOSIS — Z79.899 OTHER LONG TERM (CURRENT) DRUG THERAPY: ICD-10-CM

## 2023-12-19 DIAGNOSIS — D64.9 ANEMIA, UNSPECIFIED TYPE: Primary | ICD-10-CM

## 2023-12-19 LAB
ALBUMIN SERPL-MCNC: 4 G/DL (ref 3.5–5.2)
ALBUMIN/GLOB SERPL: 1.4 G/DL
ALP SERPL-CCNC: 87 U/L (ref 39–117)
ALT SERPL W P-5'-P-CCNC: 11 U/L (ref 1–33)
ANION GAP SERPL CALCULATED.3IONS-SCNC: 7 MMOL/L (ref 5–15)
AST SERPL-CCNC: 14 U/L (ref 1–32)
BASOPHILS # BLD AUTO: 0.03 10*3/MM3 (ref 0–0.2)
BASOPHILS NFR BLD AUTO: 0.3 % (ref 0–1.5)
BILIRUB SERPL-MCNC: 0.4 MG/DL (ref 0–1.2)
BUN SERPL-MCNC: 7 MG/DL (ref 6–20)
BUN/CREAT SERPL: 11.3 (ref 7–25)
CALCIUM SPEC-SCNC: 8.9 MG/DL (ref 8.6–10.5)
CHLORIDE SERPL-SCNC: 101 MMOL/L (ref 98–107)
CO2 SERPL-SCNC: 32 MMOL/L (ref 22–29)
CREAT SERPL-MCNC: 0.62 MG/DL (ref 0.57–1)
DEPRECATED RDW RBC AUTO: 48.1 FL (ref 37–54)
EGFRCR SERPLBLD CKD-EPI 2021: 103.4 ML/MIN/1.73
EOSINOPHIL # BLD AUTO: 0.05 10*3/MM3 (ref 0–0.4)
EOSINOPHIL NFR BLD AUTO: 0.5 % (ref 0.3–6.2)
ERYTHROCYTE [DISTWIDTH] IN BLOOD BY AUTOMATED COUNT: 14 % (ref 12.3–15.4)
FERRITIN SERPL-MCNC: 246.6 NG/ML (ref 13–150)
GLOBULIN UR ELPH-MCNC: 2.8 GM/DL
GLUCOSE SERPL-MCNC: 102 MG/DL (ref 65–99)
HCT VFR BLD AUTO: 37.8 % (ref 34–46.6)
HGB BLD-MCNC: 12.2 G/DL (ref 12–15.9)
IMM GRANULOCYTES # BLD AUTO: 0.02 10*3/MM3 (ref 0–0.05)
IMM GRANULOCYTES NFR BLD AUTO: 0.2 % (ref 0–0.5)
IRON 24H UR-MRATE: 17 MCG/DL (ref 37–145)
IRON SATN MFR SERPL: 7 % (ref 20–50)
LYMPHOCYTES # BLD AUTO: 1.75 10*3/MM3 (ref 0.7–3.1)
LYMPHOCYTES NFR BLD AUTO: 18.9 % (ref 19.6–45.3)
MCH RBC QN AUTO: 30.2 PG (ref 26.6–33)
MCHC RBC AUTO-ENTMCNC: 32.3 G/DL (ref 31.5–35.7)
MCV RBC AUTO: 93.6 FL (ref 79–97)
MONOCYTES # BLD AUTO: 0.63 10*3/MM3 (ref 0.1–0.9)
MONOCYTES NFR BLD AUTO: 6.8 % (ref 5–12)
NEUTROPHILS NFR BLD AUTO: 6.76 10*3/MM3 (ref 1.7–7)
NEUTROPHILS NFR BLD AUTO: 73.3 % (ref 42.7–76)
NRBC BLD AUTO-RTO: 0 /100 WBC (ref 0–0.2)
PLATELET # BLD AUTO: 219 10*3/MM3 (ref 140–450)
PMV BLD AUTO: 10.4 FL (ref 6–12)
POTASSIUM SERPL-SCNC: 2.6 MMOL/L (ref 3.5–5.2)
PROT SERPL-MCNC: 6.8 G/DL (ref 6–8.5)
RBC # BLD AUTO: 4.04 10*6/MM3 (ref 3.77–5.28)
SODIUM SERPL-SCNC: 140 MMOL/L (ref 136–145)
TIBC SERPL-MCNC: 231 MCG/DL (ref 298–536)
TRANSFERRIN SERPL-MCNC: 155 MG/DL (ref 200–360)
WBC NRBC COR # BLD AUTO: 9.24 10*3/MM3 (ref 3.4–10.8)

## 2023-12-19 PROCEDURE — 36415 COLL VENOUS BLD VENIPUNCTURE: CPT

## 2023-12-19 PROCEDURE — 80061 LIPID PANEL: CPT

## 2023-12-19 PROCEDURE — 82306 VITAMIN D 25 HYDROXY: CPT

## 2023-12-19 PROCEDURE — 82378 CARCINOEMBRYONIC ANTIGEN: CPT

## 2023-12-19 PROCEDURE — 82728 ASSAY OF FERRITIN: CPT

## 2023-12-19 PROCEDURE — 71046 X-RAY EXAM CHEST 2 VIEWS: CPT

## 2023-12-19 PROCEDURE — 84481 FREE ASSAY (FT-3): CPT

## 2023-12-19 PROCEDURE — 82607 VITAMIN B-12: CPT

## 2023-12-19 PROCEDURE — 83540 ASSAY OF IRON: CPT

## 2023-12-19 PROCEDURE — 86300 IMMUNOASSAY TUMOR CA 15-3: CPT

## 2023-12-19 PROCEDURE — 80050 GENERAL HEALTH PANEL: CPT

## 2023-12-19 PROCEDURE — 82746 ASSAY OF FOLIC ACID SERUM: CPT

## 2023-12-19 PROCEDURE — 84439 ASSAY OF FREE THYROXINE: CPT

## 2023-12-19 PROCEDURE — 84550 ASSAY OF BLOOD/URIC ACID: CPT

## 2023-12-19 PROCEDURE — 84466 ASSAY OF TRANSFERRIN: CPT

## 2023-12-19 RX ORDER — POTASSIUM CHLORIDE 20 MEQ/1
20 TABLET, EXTENDED RELEASE ORAL 4 TIMES DAILY
Qty: 12 TABLET | Refills: 0 | Status: SHIPPED | OUTPATIENT
Start: 2023-12-19

## 2023-12-19 NOTE — TELEPHONE ENCOUNTER
Notified pt of decreased potassium level. Script has been sent in to pts pharmacy. Will recheck lab on friday at 1pm

## 2023-12-19 NOTE — PROGRESS NOTES
[facility] HEMATOLOGY AND ONCOLOGY  [address]  [facility] [facility] [address] [phone]    Patient Name: Carol Rodriguez  Encounter Date: 12/26/2023  YOB: 1965  Patient Number: [id number]      REASON FOR VISIT: Ms. Carol Rodriguez is a 58-year-old female who returns in follow-up of breast cancer. She is seen 192 months since completion of adjuvant chemotherapy and 143 months since cessation of adjuvant Femara (completed 60 months of therapy). The patient is here alone.      I have reviewed the HPI and verified with the patient the accuracy of it. No changes to interval history since the information was documented. Dave Gonzalez MD 12/26/23      DIAGNOSTIC ABNORMALITIES: Breast carcinoma   Unilateral mammogram, right breast, 08/23/2006, [facility]. Approximately a 2 cm spiculated area in the upper-outer quadrant of the right breast at the 10 o'clock to 11 o'clock position.  Right breast ultrasound, 08/28/2006, [facility]. A solid nodule at 10 o’clock in the right breast measuring up to 2 cm is felt to correspond to the abnormality seen on the mammograms, including spot views today. There are also small cysts in this region. Excisional biopsy should be considered.  Bone scan, 09/07/2006, [facility]. Small areas of vaguely increased activity in the upper thoracic area and at the lumbosacral junction, probably arthritic. No definite metastatic change is identified.  CT of the brain, 09/07/2006, [facility]. Chronic sinus disease involving the left half of the sphenoid sinus. Otherwise unremarkable enhanced CT of the brain with no convincing evidence of metastatic disease.  CT of the chest, 09/07/2006, [facility] small cortical cyst involving the upper pole of the right kidney. Otherwise unremarkable CT of the chest with no evidence of metastatic disease.  CT of the pelvis, 09/07/2006, [facility].  Sclerotic lesion involving the right femoral head which I would favor to represent a bony enostosis. Considering the lack of other metastatic disease, I feel a solitary bony lesion to the right femoral head would be an unusual manifestation but warranting followup nonetheless. Bone scan may be helpful for further evaluation.  Unilateral mammogram of breast, 09/11/2006, [facility]. Lobular areas of density in the left breast consistent with several left breast masses. Ultrasound was performed. There are no spiculated densities. No suspicious microcalcifications are identified. Screening exam of the left breast is recommended in 1 year.  Right breast biopsy, 09/29/2006, [facility]. Infiltrating mammary carcinoma. Part A: Primary tumor diagnosis is infiltrating ductal carcinoma. Part B: Right axillary lymph node was benign. Part C: Right breast tissue showed right modified radical mastectomy with deep margin without obvious histopathologic alteration. No residual tumor present at previous biopsy site. Fibrocystic mastopathy including fibrosis, adenosis, duct ectasia, papillary apocrine metaplasia, sclerosing adenosis, multiple cysts, and intraductal microcalcifications. Seventeen (17) benign lymph nodes. Part D: Left breast tissue reveals no tumor present. Fibrocystic mastopathy including fibrosis, adenosis, duct ectasia, multiple cysts, apocrine metaplasia, ruptured tension cysts, periductal chronic inflammation, intraductal hyperplasia of usual type, and intraductal microcalcifications (multiple). Part E: Right breast tissue shows portions of benign loose fibrofatty tissue. Part F: Left breast tissue show portions of benign loose fibrofatty tissue and fatty breast tissue. ER 94% (+), NC 7% (+), HER-2 negative.  Labs, 10/10/2006: CMP was unrevealing, CEA of 1.3, and CA27-29 of 14.1.  2D echo, 10/17/2006, [facility]. Normal left ventricular chamber size and wall motion. The left ventricular ejection  fraction is felt to be in excess of 60%. No pericardial effusion or intracavitary thrombus noted. No valvular abnormalities are noted. Spectral and color flow Doppler studies reveal trivial tricuspid regurgitation. The right ventricular systolic pressure is normal and was estimated at 19 mmHg.  Labs, 08/22/2016: Hemoglobin 12.4, hematocrit 39.4,  (elevated).  Labs, 08/30/2016:  (313 to 615), TSH 1.9, B12 of 1055, folate greater than 20, T4 9.6 (4.9 to 12.3), SPIEP: IgG 527 (791 to 1643), IgM 261, IgA 76.6 (each normal). DAVION: No monoclonal immunoglobulins detected.  Comprehensive blood report, 08/30/2016. MILD ANEMIA; INCREASED T-CELL LGL'S. COMPREHENSIVE DIAGNOSIS. Review of peripheral blood smear: Mild anemia with borderline macrocytic features. Normal white blood cell and platelet counts and morphology. Mildly expanded population of reactive appearing T-cell large granular lymphocytes detected on flow cytometric studies. COMPREHENSIVE COMMENT. The exact etiology of this patient's mild anemia with borderline macrocytic features is not apparent from review of the specimen. Given the lack of significant atypia and normal white blood cell and platelet values, myelodysplasia appears less likely. Non-neoplastic etiologies to consider include liver disease, thyroid disease, immune mediated disorders, vitamin/nutritional deficiencies and drugs/toxins. Correlation recommended. Flow cytometry study after erythrolysis reveals no circulating myeloid or lymphoid blasts. Myeloid and monocytic lineages are represented by mature granulocytes and monocytes. Basophils and eosinophils are not increased. It must be noted that the diagnosis of a myeloid stem cell disorder, if clinically suspected, is best made using bone marrow specimens. Peripheral blood is not always representative of abnormalities involving the bone marrow. The B cells show no evidence of clonality or antigenic aberrancy. The majority of the T cells  show normal expression of the pan T-cell antigens. A mildly expanded population of T-cell LGL is detected accounting for approximately 35.9% of the total T cells and 12.6% of the total white blood cells. The overall immunophenotype of the T-LGL and a normal CD4:CD8 ratio favors reactive process over T-cell neoplasia. The presence of these large granular lymphocytes raises the possibility of an immune-mediated etiology for this patient's anemia.    PREVIOUS INTERVENTIONS: Breast carcinoma.   Adriamycin, 11/02/2006 through 12/14/2006. Four cycles completed.  Taxotere, 12/28/2006 through 02/09/2007. Four cycles completed.  Cytoxan, 02/22/2007 through 04/05/2007. Four cycles completed.  Femara 2.5 mg daily, 04/12/2007 through 04/12/2012.    DIAGNOSTIC ABNORMALITIES: Anemia.   CBC, 05/15/2006. Hemoglobin 7.9 and hematocrit 25.3. Additional lab same date revealed retic count 1.4%, serum iron 7 (50 to 170), iron saturation 2%, TIBC 345 (273 to 456), TSH 1.72, and T4 0.9 (each normal).  CBC (post transfusion 2 units of PRBCs), 05/24/2006. Hemoglobin 9.3, an hematocrit 29.6, an MCV 74.9, platelets 309,000 and a WBC 6.5 with a normal differential.  Anemia substrate evaluation, 07/12/2006. Fe 116, TIBC 343, Fe sat 34%, ferritin 14, B12 of 655, folate 23.1, retic 0.8%, and stools for occult blood (OB) 0/3 +.    PREVIOUS INTERVENTIONS: Anemia.   Two units RBCs, 05/22/2006.  Transabdominal hysterectomy and bilateral oophorectomy, 06/07/2006. Pathology report is not immediately available to this examiner.  Chromagen by mouth 07/12/2006 through present (currently on 1 daily).        Problem List Items Addressed This Visit    None      Oncology/Hematology History Overview Note   DIAGNOSTIC ABNORMALITIES:  Breast carcinoma    1.Unilateral mammogram, right breast, 08/23/2006, [facility].  Approximately a 2 cm spiculated area in the upper-outer quadrant of the right breast at the 10 o'clock to 11 o'clock position.  2.Right breast  ultrasound, 08/28/2006, [facility].  A solid nodule at 10 o’clock in the right breast measuring up to 2 cm is felt to correspond to the abnormality seen on the mammograms, including spot views today.  There are also small cysts in this region.  Excisional biopsy should be considered.  3.Bone scan, 09/07/2006, [facility]. Small areas of vaguely increased activity in the upper thoracic area and at the lumbosacral junction, probably arthritic.  No definite metastatic change is identified.  4.CT of the brain, 09/07/2006, [facility]. Chronic sinus disease involving the left half of the sphenoid sinus. Otherwise unremarkable enhanced CT of the brain with no convincing evidence of metastatic disease.  5.CT of the chest, 09/07/2006, [facility] small cortical cyst involving the upper pole of the right kidney. Otherwise unremarkable CT of the chest with no evidence of metastatic disease.  6.CT of the pelvis, 09/07/2006, [facility].  Sclerotic lesion involving the right femoral head which I would favor to represent a bony enostosis. Considering the lack of other metastatic disease, I feel a solitary bony lesion to the right femoral head would be an unusual manifestation but warranting followup nonetheless.  Bone scan may be helpful for further evaluation.  7.Unilateral mammogram of breast, 09/11/2006, [facility]. Lobular areas of density in the left breast consistent with several left breast masses. Ultrasound was performed. There are no spiculated densities. No suspicious microcalcifications are identified. Screening exam of the left breast is recommended in 1 year.  8.Right breast biopsy, 09/29/2006, [facility]. Infiltrating mammary carcinoma.  Part A: Primary tumor diagnosis is infiltrating ductal carcinoma.  Part B: Right axillary lymph node was benign.  Part C: Right breast tissue showed right modified radical mastectomy with deep margin without obvious histopathologic alteration.   No residual tumor present at previous biopsy site.  Fibrocystic mastopathy including fibrosis, adenosis, duct ectasia, papillary apocrine metaplasia, sclerosing adenosis, multiple cysts, and intraductal microcalcifications.  Seventeen (17) benign lymph nodes.  Part D:  Left breast tissue reveals no tumor present.   Fibrocystic mastopathy including fibrosis, adenosis, duct ectasia, multiple cysts, apocrine metaplasia, ruptured tension cysts, periductal chronic inflammation, intraductal hyperplasia of usual type, and intraductal microcalcifications (multiple).  Part E:  Right breast tissue shows portions of benign loose fibrofatty tissue.  Part F:  Left breast tissue show portions of benign loose fibrofatty tissue and fatty breast tissue.  ER 94% (+), NJ 7% (+), HER-2 negative.  9.Labs, 10/10/2006: CMP was unrevealing, CEA of 1.3, and CA27-29 of 14.1.  10.2D echo, 10/17/2006, [facility].  Normal left ventricular chamber size and wall motion.  The left ventricular ejection fraction is felt to be in excess of 60%. No pericardial effusion or intracavitary thrombus noted. No valvular abnormalities are noted.  Spectral and color flow Doppler studies reveal trivial tricuspid regurgitation. The right ventricular systolic pressure is normal and was estimated at 19 mmHg.  11.Labs, 08/22/2016: Hemoglobin 12.4, hematocrit 39.4,  (elevated).  12.Labs, 08/30/2016:  (313 to 615), TSH 1.9, B12 of 1055, folate greater than 20, T4 9.6 (4.9 to 12.3), SPIEP: IgG 527 (791 to 1643), IgM 261, IgA 76.6 (each normal). DAVION: No monoclonal immunoglobulins detected.  13.Comprehensive blood report, 08/30/2016.   MILD ANEMIA; INCREASED T-CELL LGL'S. COMPREHENSIVE DIAGNOSIS.  Review of peripheral blood smear: Mild anemia with borderline macrocytic features. Normal white blood cell and platelet counts and morphology. Mildly expanded population of reactive appearing T-cell large granular lymphocytes detected on flow cytometric  studies.  COMPREHENSIVE COMMENT.  The exact etiology of this patient's mild anemia with borderline macrocytic features is not apparent from review of the specimen. Given the lack of significant atypia and normal white blood cell and platelet values, myelodysplasia appears less likely. Non-neoplastic etiologies to consider include liver disease, thyroid disease, immune mediated disorders, vitamin/nutritional deficiencies and drugs/toxins. Correlation recommended. Flow cytometry study after erythrolysis reveals no circulating myeloid or lymphoid blasts. Myeloid and monocytic lineages are represented by mature granulocytes and monocytes. Basophils and eosinophils are not increased. It must be noted that the diagnosis of a myeloid stem cell disorder, if clinically suspected, is best made using bone marrow specimens. Peripheral blood is not always representative of abnormalities involving the bone marrow. The B cells show no evidence of clonality or antigenic aberrancy. The majority of the T cells show normal expression of the pan T-cell antigens. A mildly expanded population of T-cell LGL is detected accounting for approximately 35.9% of the total T cells and 12.6% of the total white blood cells. The overall immunophenotype of the T-LGL and a normal CD4:CD8 ratio favors reactive process over T-cell neoplasia. The presence of these large granular lymphocytes raises the possibility of an immune-mediated etiology for this patient's anemia.    PREVIOUS INTERVENTIONS:  Breast carcinoma.    1.Adriamycin, 11/02/2006 through 12/14/2006.  Four cycles completed.  2.Taxotere, 12/28/2006 through 02/09/2007.  Four cycles completed.  3.Cytoxan, 02/22/2007 through 04/05/2007.  Four cycles completed.  4.Femara 2.5 mg daily, 04/12/2007 through 04/12/2012.    DIAGNOSTIC ABNORMALITIES:  Anemia.    1.CBC, 05/15/2006. Hemoglobin 7.9 and hematocrit 25.3. Additional lab same date revealed retic count 1.4%, serum iron 7 (50 to 170), iron  saturation 2%, TIBC 345 (273 to 456), TSH 1.72, and T4 0.9 (each normal).  2.CBC (post transfusion 2 units of PRBCs), 05/24/2006.  Hemoglobin 9.3, an hematocrit 29.6, an MCV 74.9, platelets 309,000 and a WBC 6.5 with a normal differential.  3.Anemia substrate evaluation, 07/12/2006. Fe 116, TIBC 343, Fe sat 34%, ferritin 14, B12 of 655, folate 23.1, retic 0.8%, and stools for occult blood (OB) 0/3 +.    PREVIOUS INTERVENTIONS:  Anemia.    1.Two units RBCs, 05/22/2006.  2.Transabdominal hysterectomy and bilateral oophorectomy, 06/07/2006. Pathology report is not immediately available to this examiner.  3.Chromagen by mouth 07/12/2006 through present (currently on 1 daily).       Malignant neoplasm of right breast in female, estrogen receptor positive   8/16/2019 Initial Diagnosis    Malignant neoplasm of right breast in female, estrogen receptor positive (CMS/HCC)         PAST MEDICAL HISTORY:  ALLERGIES:  No Known Allergies  CURRENT MEDICATIONS:  Outpatient Encounter Medications as of 12/26/2023   Medication Sig Dispense Refill    albuterol (ProAir RespiClick) 108 (90 Base) MCG/ACT inhaler Inhale 2 puffs Every 4 (Four) Hours As Needed for Wheezing.      Calcium 500 MG tablet Take 500 mg by mouth Daily.      diazePAM (VALIUM) 10 MG tablet Take 1 tablet by mouth Every 12 (Twelve) Hours As Needed for Anxiety.  2    lidocaine (LIDODERM) 5 % Place 1 patch on the skin as directed by provider Daily. Remove & Discard patch within 12 hours or as directed by MD 30 each 0    lisdexamfetamine (VYVANSE) 40 MG capsule Take 1 capsule by mouth Every Morning      naloxone (NARCAN) 4 MG/0.1ML nasal spray Place 1 spray into the nostril(s) As Needed for signs of overdose 2 each 1    Omega-3 Fatty Acids (FISH OIL) 1000 MG capsule capsule Take 1 capsule by mouth Daily With Breakfast.      ondansetron ODT (ZOFRAN-ODT) 4 MG disintegrating tablet Place 1 tablet on the tongue Every 8 (Eight) Hours As Needed for Nausea or Vomiting. 60 tablet  0    oxyCODONE-acetaminophen (PERCOCET)  MG per tablet Take 1 tablet by mouth Every 4 (Four) Hours As Needed for Moderate Pain. 45 tablet 0    pantoprazole (PROTONIX) 40 MG EC tablet Take 1 tablet by mouth Daily.      pregabalin (LYRICA) 150 MG capsule Take 1 capsule by mouth 3 (Three) Times a Day.      Prolia 60 MG/ML solution prefilled syringe syringe TWICE A YEAR      therapeutic multivitamin-minerals (THERAGRAN-M) tablet Take 1 tablet by mouth Daily.      tiZANidine (ZANAFLEX) 4 MG tablet Take 1 tablet by mouth 3 (Three) Times a Day As Needed.      tiZANidine (Zanaflex) 4 MG tablet Take 1 tablet by mouth Every 6 (Six) Hours As Needed for Muscle Spasms. 12 tablet 0    [DISCONTINUED] ferrous sulfate (FeroSul) 325 (65 FE) MG tablet TAKE 1 TABLET BY MOUTH EVERY DAY (Patient not taking: Reported on 2023) 30 tablet 3    [DISCONTINUED] potassium chloride (K-DUR,KLOR-CON) 20 MEQ CR tablet Take 1 tablet by mouth 4 (Four) Times a Day. (Patient not taking: Reported on 2023) 12 tablet 0     No facility-administered encounter medications on file as of 2023.     ADULT ILLNESSES:   Infiltrating ductal carcinoma of breast ( ER Status: Positive; OH Status: Positive; )   Anxiety   Arthralgia   Francisco's esophagus (disorder)   Carcinoembryonic antigen present (finding)   Fibrocystic breast disease   Hypokalemia   Iron deficiency anemia   Osteopenia   Tobacco user (finding)    SURGERIES:   Total abdominal hysterectomy with bilateral salpingo-oophorectomy, (DANIEL/BSO), 2006    section, x2. Most recently in    Modified radical mastectomy, right, 2006   Simple mastectomy, prophylactic, left with left breast reconstruction, 2006   Biopsy of breast, right, benign fibrocystic changes, 2006   Carpal tunnel release, left 2011; right 2011  EGD and c-scope, 23      ADULT ILLNESSES:  Patient Active Problem List   Diagnosis Code    Malignant neoplasm of right breast in  female, estrogen receptor positive C50.911, Z17.0    Anemia D64.9    Francisco esophagus K22.70    GERD without esophagitis K21.9    Hiatal hernia K44.9    Irritable bowel syndrome K58.9    Lumbar radicular pain M54.16    Tortuous colon Q43.8    Coronary artery calcification I25.10, I25.84    Lumbar stenosis M48.061    Osteoporosis M81.0    Drug induced constipation K59.03    Chronic bilateral low back pain without sciatica M54.50, G89.29    DDD (degenerative disc disease), lumbar M51.36    Lumbosacral radiculopathy M54.17     SURGERIES:  Past Surgical History:   Procedure Laterality Date    ANTERIOR LUMBAR EXPOSURE N/A 2023    Procedure: ANTERIOR LUMBAR EXPOSURE;  Surgeon: Jordy Macario DO;  Location:  [facility] OR;  Service: Vascular;  Laterality: N/A;    BREAST BIOPSY Right 2006    Biopsy of breast, right, benign fibrocystic changes    CARPAL TUNNEL RELEASE      left 2011; right 2011     SECTION      x2. Most recently in     COLONOSCOPY      ENDOSCOPY      LUMBAR FUSION N/A 2023    Procedure: ANTERIOR DECOMPRESSION, ANTERIOR LUMBAR INTERBODY FUSION WITH INSTRUMENTATION L5-S1;  Surgeon: CAMILA Goldstein MD;  Location:  [facility] OR;  Service: Orthopedic Spine;  Laterality: N/A;    LUMBAR FUSION Right 2023    Procedure: RIGHT LATERAL LUMBAR INTERBODY FUSION L3-5 WITH INSTRUMENTATION L3-4;  Surgeon: CAMILA Goldstein MD;  Location:  [facility] OR;  Service: Orthopedic Spine;  Laterality: Right;    LUMBAR LAMINECTOMY WITH FUSION N/A 2023    Procedure: POSTERIOR SPINAL FUSION WITH INSTRUMENTATION L3-S1;  Surgeon: CAMILA Goldstein MD;  Location:  [facility] OR;  Service: Orthopedic Spine;  Laterality: N/A;    MASTECTOMY MODIFIED RADICAL / SIMPLE / COMPLETE Right 2006    SIMPLE MASTECTOMY Left 2006     prophylactic, left with left breast reconstruction    TONSILLECTOMY      TOTAL ABDOMINAL HYSTERECTOMY WITH SALPINGO OOPHORECTOMY  2006     [facility]  ITEMS:  Health Maintenance Due   Topic Date Due    Pneumococcal Vaccine 0-64 (1 - PCV) Never done    TDAP/TD VACCINES (1 - Tdap) Never done    ZOSTER VACCINE (1 of 2) Never done    HEPATITIS C SCREENING  Never done    ANNUAL PHYSICAL  Never done    PAP SMEAR  Never done    LUNG CANCER SCREENING  2023    COVID-19 Vaccine (3 - -24 season) 2023    DXA SCAN  2023       <no information>  Last Completed Colonoscopy            COLORECTAL CANCER SCREENING (COLONOSCOPY - Every 10 Years) Next due on 2023  SCANNED - COLONOSCOPY    2015  [facility] Colonoscopy component of [facility] COLONOSCOPY    2015  SCANNED - COLONOSCOPY    2014  Colonoscopy                    There is no immunization history on file for this patient.  Last Completed Mammogram       This patient has no relevant Health Maintenance data.              FAMILY HISTORY:  Family History   Problem Relation Age of Onset    Polymyalgia rheumatica Mother     Heart attack Mother     Kidney disease Mother     Diabetes Father     Hypertension Father     Other Father         heart issues    No Known Problems Brother     No Known Problems Maternal Grandmother     Heart attack Maternal Grandfather         52 years old     No Known Problems Paternal Grandmother     Diabetes Paternal Grandfather     No Known Problems Brother     No Known Problems Brother     No Known Problems Brother      SOCIAL HISTORY:  Social History     Socioeconomic History    Marital status:    Tobacco Use    Smoking status: Former     Packs/day: 1.00     Years: 25.00     Additional pack years: 0.00     Total pack years: 25.00     Types: Cigarettes     Quit date: 2017     Years since quittin.0    Smokeless tobacco: Never   Vaping Use    Vaping Use: Former    Quit date: 8/10/2022    Substances: Nicotine    Devices: Pre-filled pod   Substance and Sexual Activity    Alcohol use: No    Drug use: No    Sexual activity: Defer       REVIEW OF  "SYSTEMS:  Constitutional:   The patient reports a fairly good appetite and energy is variable, \"I'm fine but my mom is sick with liver, my  is dying from COPD.\" She manages all her ADLs. She retired last 04/30/2021. She has lost 12 lb (in addition to 9 lb at her prior visit) in the interval.  \"I eat but I'm under a lot of stress.\" Again states her fibromyalgia still slows her down.  She has no fevers, chills, or drenching night sweats. Still has intermittent hot flashes, \"from time to time.\" Her sleep habits are fairly good even off Restoril.  Ear/Nose/Mouth/Throat:   She has chronic rhinitis with no sinus congestion, ear discomfort, or discolored drainage. She has no sore throat, nosebleeds, or sore tongue. She has fewer bouts of headaches.  Ocular:   She still has bouts of blurry vision. She is still followed by ophthalmology. No eye pain or double vision.  Respiratory:   No chest congestion.  No cough.  Only occasional exertional dyspnea, no significant shortness of breathing at rest, or unexplained chest wall pain. She has not smoked since 12/31/2017. Is aware that she has chronic obstructive pulmonary disease (COPD) on x-ray.   Cardiovascular:   She has no exertional chest pain, chest pressure, or chest heaviness. She has no claudication. She has no palpitations or symptomatic orthostasis.  Gastrointestinal:   She has no dysphagia, nausea, vomiting, postprandial abdominal pain (PPI), bloating, cramping, or change in bowel habits. She has not had dark stools or rectal bleeding. She has no diarrhea, and her bowels have been regular (now goes daily) on stool softeners. EGD and c-scope, 5/18/23.  Gastritis and normal.  Repeat 10 years  Genitourinary:   She has no urinary burning, frequency, dribbling, or discoloration. She has no need to urinate frequently through the night. She has no difficulty controlling her bladder. No vaginal bleeding. She denies any vaginal discharge. Says she had breakthrough " "bleeding last 12/2016 but was seen by Dr. Graham (GYN). \"She did a pelvic exam and ran a bunch of tests and everything was okay.\" No repeat episodes since.  Musculoskeletal:   She has no unexplained myalgias or nighttime leg cramping. Has only mild aching, especially in the hands, lower back, hips, and shoulders.  She has spinal degenerative joint disease (DJD), (MRI, 10/09/2008; 07/2012). She has no distal radiculitis. She is still using oxycodone (Percocet) since back surgery ~ 3-4 daily.  Extremities:   She has no trouble with fluid retention or significant leg swelling.  Endocrine:   The hot flashes are tolerable. She has no problems with excess thirst, excessive urination, or unexplained fatigue.  Heme/Lymphatic:   She has no unexplained bleeding, bruising, petechial rash, or swollen glands.  Skin:   She has no itching or rashes.  Neuro:   She has no loss of consciousness, seizures, fainting spells, or dizziness. She has no weakness of her face, arms, or legs. She has no difficulty with speech. She has no tremors.  Psych:   She has chronic depression and anxiety which sometimes interferes with her sleep patterns. She still feels that the anxiety is triggered due to the back pains. Says her symptoms are modulated by her pain medications and Pristiq.       VITAL SIGNS: /76   Pulse 66   Temp 97.7 °F (36.5 °C) (Temporal)   Resp 18   Ht 160 cm (63\")   Wt 56.7 kg (125 lb 1.6 oz)   SpO2 98%   BMI 22.16 kg/m² Body surface area is 1.58 meters squared.  Pain Score    12/26/23 1310   PainSc: 0-No pain           PHYSICAL EXAMINATION:   General:   She is a pleasant, perennially-anxious, slimmer, well-kept middle-aged female in no distress.  Ambulatory  Head/Neck:   The patient is anicteric and atraumatic.  The trachea is midline. The neck is supple without evidence of jugular venous distention or cervical adenopathy.   Eyes:   The extraocular movements are full. There is no scleral jaundice or erythema. " There is no conjunctival pallor.  Chest:   The respiratory efforts are normal and unhindered. The breath sounds are distant with scattered soft wheezing but no rhonchi, rales, or asymmetry of breath sounds. The port site is well healed (has been removed).  Breasts:   The right breast is remarkable for the presence of a saline implant. The left breast is remarkable for the presence of a saline implant. Both are well seated. There are no overt chest wall nodules or masses to suggest local recurrence. There is no axillary or supraclavicular adenopathy bilaterally.  Cardiovascular:   The patient has a regular cardiac rate and rhythm without murmurs, rubs, or gallops. The peripheral pulses are equal and full.  Extremities:   There is no evidence of cyanosis, clubbing, or edema. Is not wearing a rigid back brace today  Rheumatologic:   There is no overt evidence of rheumatoid deformities of the hands. There is no sausaging of the fingers. There is no sign of active synovitis. The gait is slow but independent  Cutaneous:   There are no overt rashes, disseminated lesions, purpura, or petechiae.  Lymphatics:   There is no evidence of adenopathy in the cervical, supraclavicular, or axillary areas.  Neurologic:   The patient is alert, oriented, cooperative, and pleasant. She is appropriately conversant. She ambulated into the exam room without assistance but declined transfer from chair to exam table. There is no overt dysfunction of the motor, sensory, or cerebellar systems.  Psych:   Mood and affect are appropriate for circumstance. Eye contact is appropriate.        LABS    Lab Results - Last 18 Months   Lab Units 12/19/23  1340 05/26/23  1121 03/10/23  1851 02/26/23  1015 02/23/23  1300 02/23/23  0448 08/12/22  1145 07/04/22  2028   HEMOGLOBIN g/dL 12.2 12.5 9.0* 7.9* 8.3* 7.0*   < > 12.5   HEMATOCRIT % 37.8 40.7 29.5* 25.5* 26.9* 23.1*   < > 38.6   MCV fL 93.6 92.5 92.8 92.7 94.1 95.5   < > 92.6   WBC 10*3/mm3 9.24 5.73  6.05 4.83 10.24 7.54   < > 5.43   RDW % 14.0 15.2 15.7* 13.6 13.5 13.8   < > 13.6   MPV fL 10.4 11.1 10.3 9.7 10.4 11.1   < > 10.1   PLATELETS 10*3/mm3 219 195 382 377 316 244   < > 237   IMM GRAN % % 0.2 0.2 0.3 0.6* 0.4 0.5   < >  --    NEUTROS ABS 10*3/mm3 6.76 3.89 4.79 3.82 8.16* 5.65   < > 3.04   LYMPHS ABS 10*3/mm3 1.75 1.27 0.76 0.46* 1.09 0.99   < >  --    MONOS ABS 10*3/mm3 0.63 0.40 0.29 0.39 0.86 0.78   < >  --    EOS ABS 10*3/mm3 0.05 0.12 0.14 0.12 0.07 0.05   < > 0.05   BASOS ABS 10*3/mm3 0.03 0.04 0.05 0.01 0.02 0.03   < > 0.11   IMMATURE GRANS (ABS) 10*3/mm3 0.02 0.01 0.02 0.03 0.04 0.04   < >  --    NRBC /100 WBC 0.0 0.0 0.0 0.0 0.0 0.0   < >  --    NEUTROPHIL % %  --   --   --   --   --   --   --  56.0   MONOCYTES % %  --   --   --   --   --   --   --  9.0   BASOPHIL % %  --   --   --   --   --   --   --  2.0*   ATYP LYMPH % %  --   --   --   --   --   --   --  4.0    < > = values in this interval not displayed.       Lab Results - Last 18 Months   Lab Units 12/19/23  1340 05/26/23  1121 03/10/23  1851 02/26/23  1015 02/23/23  1340 02/23/23  0448 02/14/23  0325 02/09/23  1017 08/12/22  1145   GLUCOSE mg/dL 102* 93 109* 106* 146* 79   < > 109* 94   SODIUM mmol/L 140 145 144 141 134* 143   < > 141 141   POTASSIUM mmol/L 2.6* 3.2* 3.6 3.8 3.6 3.0*   < > 3.6 3.3*   CO2 mmol/L 32.0* 30.0* 26.0 31.0* 28.0 24.0   < > 30.0* 27.0   CHLORIDE mmol/L 101 105 106 104 98 108*   < > 103 105   ANION GAP mmol/L 7.0 10.0 12.0 6.0 8.0 11.0   < > 8.0 9.0   CREATININE mg/dL 0.62 0.95 0.60 0.66 0.98 0.68   < > 0.91 1.12*   BUN mg/dL 7 6 6 5* 8 7   < > 9 9   BUN / CREAT RATIO  11.3 6.3* 10.0 7.6 8.2 10.3   < > 9.9 8.0   CALCIUM mg/dL 8.9 9.8 9.2 9.0 8.6 7.1*   < > 10.0 9.9   ALK PHOS U/L 87 102 129* 87  --   --   --  79 51   TOTAL PROTEIN g/dL 6.8 6.9 6.3 5.8*  --   --   --  6.8 6.5   ALT (SGPT) U/L 11 12 11 5  --   --   --  15 14   AST (SGOT) U/L 14 17 20 18  --   --   --  20 16   BILIRUBIN mg/dL 0.4 0.2 0.2 0.2  --    --   --  0.2 0.2   ALBUMIN g/dL 4.0 4.2 3.5 3.0*  --   --   --  4.3 4.10   GLOBULIN gm/dL 2.8 2.7 2.8 2.8  --   --   --  2.5 2.4    < > = values in this interval not displayed.       Lab Results - Last 18 Months   Lab Units 12/19/23  1340 05/26/23  1121 08/12/22  1145   URIC ACID mg/dL 2.0*  --   --    CEA ng/mL 1.44 1.56 2.22       Lab Results - Last 18 Months   Lab Units 12/19/23  1340 05/26/23  1121 02/21/23  0348 02/14/23  0325 08/12/22  1145   IRON mcg/dL 17* 70 15* 25* 88   TIBC mcg/dL 231* 247* 225* 253* 311   IRON SATURATION (TSAT) % 7* 28 7* 10* 28   FERRITIN ng/mL 246.60* 277.30* 181.10* 99.52 63.45   TSH uIU/mL 1.170  --   --   --   --    FOLATE ng/mL >20.00 >20.00 >20.00 >20.00 >20.00     ASSESSMENT:   1.  History of breast cancer, infiltrating ductal carcinoma:   Stage: I (pT1c, pN0, Mx; G2), ER 94% (+), SD 7% (+), HER-2 negative.   Tumor [place]: 1.9 cm right breast mass, 0/18 lymph nodes.   Complications of Tumor: Breast pain.   Tumor Status: No clinical or biochemical evidence of disease recurrence.     2. Normocytic anemia.   --Resolved-Hgb 12.8; MCV 94.4, 10/12/23 (prior range: Hgb 7 - 12.4; MCV 91.9 - 102):  a. Colonoscopy, 09/04/2015. Normal. Repeat surveillance 10 years.   b. Normal B12, normal folate, normal T4, normal TSH, normal SPIEP, normal LDH, and peripheral blood flow comprehensive report (above) showing reactive appearing T-cell large granular lymphocytes.  c.  Injectafer 750 mg IV, 3/31/23  d.  5/18/23- colonoscopy- normal. Repeat 10 years  3. History of Francisco's esophagus, gastritis, and hiatal hernia. Asymptomatic on proton pump inhibitor (PPI):  a. Esophagogastroduodenoscopy (EGD), 07/12/2013 (see above) with small hiatal hernia. May still be contributory to #5.   b. EGD last 08/18/2016. Report pending.  c. 5/18/23- EGD- Gastritis  4. Tobacco dependence. Has not smoked since 12/31/2017.   5. Abnormal CEA.    --Resolved.  (1.44, 12/19/23:. Chip 1.42 - Peak 13). She admits to  smoking. No other clinical or radiographic (PET scan, 02/24/2010 - no abnormal uptake) correlate. Continued followup warranted.   6. Chronic obstructive pulmonary disease (COPD).   7. 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 Confirmed by renal ultrasound, 11/10/2009.   8. Iron deficiency with negative repeat colonoscopy and esophagogastroduodenoscopy (EGD) that showed gastritis (see above). Repleted iron on oral iron repletion.   9. Arthralgias. Well-modulated on Lyrica and Lortab.   10. (Mild) Vasomotor symptoms secondary to iatrogenic menopause. Subjectively not worse.   11. Anxiety, unchanged.   12. Lumbar spinal degenerative disk disease (MRI, 10/09/2008, and again 07/2012). Has been seen by Dr. Goldstein and receives intraspinal injections by Dr. Walters.  -- 2/16 and 2/22/2023-L3-5-S1 spinal fusion by Dr. Goldstein  -- 2/26/2023-ER visit for back pain.  CT lumbar spine-There is posterior fusion at L3-S1 with anterior interbody fusion at  L5-S1 and right lateral interbody fusion at the L3/L4 level. There is a new anterior L3 vertebral body fracture with only mild displacement but no significant loss of height  13. Osteopenia with moderate fracture risk (see above). Already on calcium + vitamin D. Boniva intolerant.   --On Prolia every 6 months beginning 02/2018. Dr. Leung following.   14.  Chronic kidney disease, stage 3.    --GFR, 103 mL/min on 12/19/23 (prior:  GFR 49- 104 mL/min)    PLAN:   1.  Draw BMP    2.  Apprise of labs from 12/19/23 with stable anemia, resolution of macrocytosis, k+ 2.6 (Kdur called in), stable GFR, UA(-), repleted B12/folate, iron 17 (L), Fe sat 7% (prior: 7-35%), ferritin (246; from 277; 181; 99; 63; 64; 54; 55), CEA 1.44 (prior: 1.56), CA-27-29 16.4 (prior: 14.8).     3.  Chest xray, 12/19/23.  No acute disease  4.  Apprised again of EGD and c-scope, 5/18/23 (above)  5.  Again applaud her success at smoking cessation (has not smoked since 12/31/2017).    6. Previously  discussed the abnormal (has normalized) CEA. Other than malignancy (colon, breast, lung), I again noted that increased CEA levels can also be associated with inflammation, cirrhosis, peptic ulcer, ulcerative colitis, rectal polyps, emphysema, and benign breast disease. Needs continued followup.     7. Continue ongoing management per primary care physician.   8. Continue other currently identified medications.    9. Return to the [place] office in 24 weeks with pre-office chest x-ray, serum iron, Fe saturation, ferritin, TIBC, B12, folate, CMP, CEA, CA27-29, and CBC with differential.    I spent ~ 30 minutes caring for Carol on this date of service. This time includes time spent by me in the following activities: preparing for the visit, reviewing tests, performing a medically appropriate examination and/or evaluation, counseling and educating the patient/family/caregiver, ordering medications, tests, or procedures and documenting information in the medical record        cc: Erna Leung, DO

## 2023-12-20 LAB
25(OH)D3 SERPL-MCNC: 78.7 NG/ML (ref 30–100)
CANCER AG27-29 SERPL-ACNC: 16.4 U/ML (ref 0–38.6)
CEA SERPL-MCNC: 1.44 NG/ML
CHOLEST SERPL-MCNC: 170 MG/DL (ref 0–200)
FOLATE SERPL-MCNC: >20 NG/ML (ref 4.78–24.2)
HDLC SERPL-MCNC: 57 MG/DL (ref 40–60)
LDLC SERPL CALC-MCNC: 99 MG/DL (ref 0–100)
LDLC/HDLC SERPL: 1.72 {RATIO}
T3FREE SERPL-MCNC: 3.44 PG/ML (ref 2–4.4)
T4 FREE SERPL-MCNC: 1.32 NG/DL (ref 0.93–1.7)
TRIGL SERPL-MCNC: 75 MG/DL (ref 0–150)
TSH SERPL DL<=0.05 MIU/L-ACNC: 1.17 UIU/ML (ref 0.27–4.2)
URATE SERPL-MCNC: 2 MG/DL (ref 2.4–5.7)
VIT B12 BLD-MCNC: 1142 PG/ML (ref 211–946)
VLDLC SERPL-MCNC: 14 MG/DL (ref 5–40)

## 2023-12-26 ENCOUNTER — OFFICE VISIT (OUTPATIENT)
Dept: ONCOLOGY | Facility: CLINIC | Age: 58
End: 2023-12-26
Payer: COMMERCIAL

## 2023-12-26 VITALS
DIASTOLIC BLOOD PRESSURE: 76 MMHG | SYSTOLIC BLOOD PRESSURE: 130 MMHG | TEMPERATURE: 97.7 F | RESPIRATION RATE: 18 BRPM | HEART RATE: 66 BPM | HEIGHT: 63 IN | WEIGHT: 125.1 LBS | OXYGEN SATURATION: 98 % | BODY MASS INDEX: 22.16 KG/M2

## 2023-12-26 DIAGNOSIS — C50.911 MALIGNANT NEOPLASM OF RIGHT BREAST IN FEMALE, ESTROGEN RECEPTOR POSITIVE, UNSPECIFIED SITE OF BREAST: Primary | ICD-10-CM

## 2023-12-26 DIAGNOSIS — Z17.0 MALIGNANT NEOPLASM OF RIGHT BREAST IN FEMALE, ESTROGEN RECEPTOR POSITIVE, UNSPECIFIED SITE OF BREAST: Primary | ICD-10-CM

## 2024-02-23 ENCOUNTER — TRANSCRIBE ORDERS (OUTPATIENT)
Dept: GENERAL RADIOLOGY | Facility: HOSPITAL | Age: 59
End: 2024-02-23
Payer: COMMERCIAL

## 2024-02-23 DIAGNOSIS — M54.16 LUMBAR RADICULOPATHY: Primary | ICD-10-CM

## 2024-02-27 ENCOUNTER — HOSPITAL ENCOUNTER (OUTPATIENT)
Dept: GENERAL RADIOLOGY | Facility: HOSPITAL | Age: 59
Discharge: HOME | End: 2024-02-27
Admitting: ORTHOPAEDIC SURGERY
Payer: COMMERCIAL

## 2024-02-27 DIAGNOSIS — M54.16 LUMBAR RADICULOPATHY: ICD-10-CM

## 2024-02-27 PROCEDURE — 72110 X-RAY EXAM L-2 SPINE 4/>VWS: CPT

## 2024-06-13 ENCOUNTER — TELEPHONE (OUTPATIENT)
Dept: ONCOLOGY | Facility: CLINIC | Age: 59
End: 2024-06-13
Payer: COMMERCIAL

## 2024-06-13 ENCOUNTER — HOSPITAL ENCOUNTER (OUTPATIENT)
Dept: GENERAL RADIOLOGY | Facility: HOSPITAL | Age: 59
Discharge: HOME | End: 2024-06-13
Payer: COMMERCIAL

## 2024-06-13 ENCOUNTER — LAB (OUTPATIENT)
Dept: LAB | Facility: HOSPITAL | Age: 59
End: 2024-06-13
Payer: COMMERCIAL

## 2024-06-13 DIAGNOSIS — C50.911 MALIGNANT NEOPLASM OF RIGHT BREAST IN FEMALE, ESTROGEN RECEPTOR POSITIVE, UNSPECIFIED SITE OF BREAST: ICD-10-CM

## 2024-06-13 DIAGNOSIS — Z17.0 MALIGNANT NEOPLASM OF RIGHT BREAST IN FEMALE, ESTROGEN RECEPTOR POSITIVE, UNSPECIFIED SITE OF BREAST: ICD-10-CM

## 2024-06-13 LAB
ALBUMIN SERPL-MCNC: 4.3 G/DL (ref 3.5–5.2)
ALBUMIN/GLOB SERPL: 1.7 G/DL
ALP SERPL-CCNC: 45 U/L (ref 39–117)
ALT SERPL W P-5'-P-CCNC: 12 U/L (ref 1–33)
ANION GAP SERPL CALCULATED.3IONS-SCNC: 6 MMOL/L (ref 5–15)
AST SERPL-CCNC: 15 U/L (ref 1–32)
BASOPHILS # BLD AUTO: 0.03 10*3/MM3 (ref 0–0.2)
BASOPHILS NFR BLD AUTO: 0.7 % (ref 0–1.5)
BILIRUB SERPL-MCNC: <0.2 MG/DL (ref 0–1.2)
BUN SERPL-MCNC: 8 MG/DL (ref 6–20)
BUN/CREAT SERPL: 11.6 (ref 7–25)
CALCIUM SPEC-SCNC: 9.4 MG/DL (ref 8.6–10.5)
CEA SERPL-MCNC: 2.33 NG/ML
CHLORIDE SERPL-SCNC: 104 MMOL/L (ref 98–107)
CO2 SERPL-SCNC: 32 MMOL/L (ref 22–29)
CREAT SERPL-MCNC: 0.69 MG/DL (ref 0.57–1)
DEPRECATED RDW RBC AUTO: 46.1 FL (ref 37–54)
EGFRCR SERPLBLD CKD-EPI 2021: 100.1 ML/MIN/1.73
EOSINOPHIL # BLD AUTO: 0.12 10*3/MM3 (ref 0–0.4)
EOSINOPHIL NFR BLD AUTO: 2.6 % (ref 0.3–6.2)
ERYTHROCYTE [DISTWIDTH] IN BLOOD BY AUTOMATED COUNT: 13 % (ref 12.3–15.4)
FERRITIN SERPL-MCNC: 157.3 NG/ML (ref 13–150)
FOLATE SERPL-MCNC: >20 NG/ML (ref 4.78–24.2)
GLOBULIN UR ELPH-MCNC: 2.5 GM/DL
GLUCOSE SERPL-MCNC: 96 MG/DL (ref 65–99)
HCT VFR BLD AUTO: 36.8 % (ref 34–46.6)
HGB BLD-MCNC: 11.8 G/DL (ref 12–15.9)
IMM GRANULOCYTES # BLD AUTO: 0.01 10*3/MM3 (ref 0–0.05)
IMM GRANULOCYTES NFR BLD AUTO: 0.2 % (ref 0–0.5)
IRON 24H UR-MRATE: 62 MCG/DL (ref 37–145)
IRON SATN MFR SERPL: 24 % (ref 20–50)
LYMPHOCYTES # BLD AUTO: 1.87 10*3/MM3 (ref 0.7–3.1)
LYMPHOCYTES NFR BLD AUTO: 40.8 % (ref 19.6–45.3)
MCH RBC QN AUTO: 30.6 PG (ref 26.6–33)
MCHC RBC AUTO-ENTMCNC: 32.1 G/DL (ref 31.5–35.7)
MCV RBC AUTO: 95.6 FL (ref 79–97)
MONOCYTES # BLD AUTO: 0.33 10*3/MM3 (ref 0.1–0.9)
MONOCYTES NFR BLD AUTO: 7.2 % (ref 5–12)
NEUTROPHILS NFR BLD AUTO: 2.22 10*3/MM3 (ref 1.7–7)
NEUTROPHILS NFR BLD AUTO: 48.5 % (ref 42.7–76)
NRBC BLD AUTO-RTO: 0 /100 WBC (ref 0–0.2)
PLATELET # BLD AUTO: 165 10*3/MM3 (ref 140–450)
PMV BLD AUTO: 10.6 FL (ref 6–12)
POTASSIUM SERPL-SCNC: 3.3 MMOL/L (ref 3.5–5.2)
PROT SERPL-MCNC: 6.8 G/DL (ref 6–8.5)
RBC # BLD AUTO: 3.85 10*6/MM3 (ref 3.77–5.28)
SODIUM SERPL-SCNC: 142 MMOL/L (ref 136–145)
TIBC SERPL-MCNC: 259 MCG/DL (ref 298–536)
TRANSFERRIN SERPL-MCNC: 174 MG/DL (ref 200–360)
VIT B12 BLD-MCNC: 820 PG/ML (ref 211–946)
WBC NRBC COR # BLD AUTO: 4.58 10*3/MM3 (ref 3.4–10.8)

## 2024-06-13 PROCEDURE — 82607 VITAMIN B-12: CPT

## 2024-06-13 PROCEDURE — 84466 ASSAY OF TRANSFERRIN: CPT

## 2024-06-13 PROCEDURE — 71046 X-RAY EXAM CHEST 2 VIEWS: CPT

## 2024-06-13 PROCEDURE — 80053 COMPREHEN METABOLIC PANEL: CPT

## 2024-06-13 PROCEDURE — 82746 ASSAY OF FOLIC ACID SERUM: CPT

## 2024-06-13 PROCEDURE — 82728 ASSAY OF FERRITIN: CPT

## 2024-06-13 PROCEDURE — 86300 IMMUNOASSAY TUMOR CA 15-3: CPT

## 2024-06-13 PROCEDURE — 85025 COMPLETE CBC W/AUTO DIFF WBC: CPT

## 2024-06-13 PROCEDURE — 83540 ASSAY OF IRON: CPT

## 2024-06-13 PROCEDURE — 36415 COLL VENOUS BLD VENIPUNCTURE: CPT

## 2024-06-13 PROCEDURE — 82378 CARCINOEMBRYONIC ANTIGEN: CPT

## 2024-06-13 RX ORDER — POTASSIUM CHLORIDE 20 MEQ/1
20 TABLET, EXTENDED RELEASE ORAL 3 TIMES DAILY
Qty: 9 TABLET | Refills: 0 | Status: SHIPPED | OUTPATIENT
Start: 2024-06-13

## 2024-06-13 NOTE — TELEPHONE ENCOUNTER
Notified patient Carol Rodriguez, her potassium level is low @:  K+:  3.3  Patient informed a prescription for oral potassium tabs will be sent to pharmacy for :  RX: [facility]    Repeat lab Monday 6/17/24 prior to her apt with Dr Gonzalez

## 2024-06-13 NOTE — TELEPHONE ENCOUNTER
----- Message from Dave Gonzalez sent at 6/13/2024 12:02 PM CDT -----  Kdur 20 tid x 3 d  ----- Message -----  From: Lab, Background User  Sent: 6/13/2024  10:00 AM CDT  To: Dave Gonzalez MD

## 2024-06-14 LAB — CANCER AG27-29 SERPL-ACNC: 15.8 U/ML (ref 0–38.6)

## 2024-06-19 NOTE — PROGRESS NOTES
[facility] HEMATOLOGY AND ONCOLOGY  [address]  [facility] [address] [phone]    Patient Name: Carol Rodriguez  Encounter Date: 06/26/2024  YOB: 1965  Patient Number: [id number]      REASON FOR VISIT: Ms. Carol Rodriguez is a 59-year-old female who returns in follow-up of breast cancer. She is seen 198 months since completion of adjuvant chemotherapy and 149 months since cessation of adjuvant Femara (completed 60 months of therapy). The patient is here alone.      I have reviewed the HPI and verified with the patient the accuracy of it. No changes to interval history since the information was documented. Dave Gonzalez MD 06/25/24      DIAGNOSTIC ABNORMALITIES: Breast carcinoma   Unilateral mammogram, right breast, 08/23/2006, [facility]. Approximately a 2 cm spiculated area in the upper-outer quadrant of the right breast at the 10 o'clock to 11 o'clock position.  Right breast ultrasound, 08/28/2006, [facility]. A solid nodule at 10 o’clock in the right breast measuring up to 2 cm is felt to correspond to the abnormality seen on the mammograms, including spot views today. There are also small cysts in this region. Excisional biopsy should be considered.  Bone scan, 09/07/2006, [facility]. Small areas of vaguely increased activity in the upper thoracic area and at the lumbosacral junction, probably arthritic. No definite metastatic change is identified.  CT of the brain, 09/07/2006, [facility]. Chronic sinus disease involving the left half of the sphenoid sinus. Otherwise unremarkable enhanced CT of the brain with no convincing evidence of metastatic disease.  CT of the chest, 09/07/2006, [facility] small cortical cyst involving the upper pole of the right kidney. Otherwise unremarkable CT of the chest with no evidence of metastatic disease.  CT of the pelvis, 09/07/2006, [facility].  Sclerotic lesion involving the right femoral head which I would favor to represent a bony enostosis. Considering the lack of other metastatic disease, I feel a solitary bony lesion to the right femoral head would be an unusual manifestation but warranting followup nonetheless. Bone scan may be helpful for further evaluation.  Unilateral mammogram of breast, 09/11/2006, [facility]. Lobular areas of density in the left breast consistent with several left breast masses. Ultrasound was performed. There are no spiculated densities. No suspicious microcalcifications are identified. Screening exam of the left breast is recommended in 1 year.  Right breast biopsy, 09/29/2006, [facility]. Infiltrating mammary carcinoma. Part A: Primary tumor diagnosis is infiltrating ductal carcinoma. Part B: Right axillary lymph node was benign. Part C: Right breast tissue showed right modified radical mastectomy with deep margin without obvious histopathologic alteration. No residual tumor present at previous biopsy site. Fibrocystic mastopathy including fibrosis, adenosis, duct ectasia, papillary apocrine metaplasia, sclerosing adenosis, multiple cysts, and intraductal microcalcifications. Seventeen (17) benign lymph nodes. Part D: Left breast tissue reveals no tumor present. Fibrocystic mastopathy including fibrosis, adenosis, duct ectasia, multiple cysts, apocrine metaplasia, ruptured tension cysts, periductal chronic inflammation, intraductal hyperplasia of usual type, and intraductal microcalcifications (multiple). Part E: Right breast tissue shows portions of benign loose fibrofatty tissue. Part F: Left breast tissue show portions of benign loose fibrofatty tissue and fatty breast tissue. ER 94% (+), WI 7% (+), HER-2 negative.  Labs, 10/10/2006: CMP was unrevealing, CEA of 1.3, and CA27-29 of 14.1.  2D echo, 10/17/2006, [facility]. Normal left ventricular chamber size and wall motion. The left ventricular ejection  fraction is felt to be in excess of 60%. No pericardial effusion or intracavitary thrombus noted. No valvular abnormalities are noted. Spectral and color flow Doppler studies reveal trivial tricuspid regurgitation. The right ventricular systolic pressure is normal and was estimated at 19 mmHg.  Labs, 08/22/2016: Hemoglobin 12.4, hematocrit 39.4,  (elevated).  Labs, 08/30/2016:  (313 to 615), TSH 1.9, B12 of 1055, folate greater than 20, T4 9.6 (4.9 to 12.3), SPIEP: IgG 527 (791 to 1643), IgM 261, IgA 76.6 (each normal). DAVION: No monoclonal immunoglobulins detected.  Comprehensive blood report, 08/30/2016. MILD ANEMIA; INCREASED T-CELL LGL'S. COMPREHENSIVE DIAGNOSIS. Review of peripheral blood smear: Mild anemia with borderline macrocytic features. Normal white blood cell and platelet counts and morphology. Mildly expanded population of reactive appearing T-cell large granular lymphocytes detected on flow cytometric studies. COMPREHENSIVE COMMENT. The exact etiology of this patient's mild anemia with borderline macrocytic features is not apparent from review of the specimen. Given the lack of significant atypia and normal white blood cell and platelet values, myelodysplasia appears less likely. Non-neoplastic etiologies to consider include liver disease, thyroid disease, immune mediated disorders, vitamin/nutritional deficiencies and drugs/toxins. Correlation recommended. Flow cytometry study after erythrolysis reveals no circulating myeloid or lymphoid blasts. Myeloid and monocytic lineages are represented by mature granulocytes and monocytes. Basophils and eosinophils are not increased. It must be noted that the diagnosis of a myeloid stem cell disorder, if clinically suspected, is best made using bone marrow specimens. Peripheral blood is not always representative of abnormalities involving the bone marrow. The B cells show no evidence of clonality or antigenic aberrancy. The majority of the T cells  show normal expression of the pan T-cell antigens. A mildly expanded population of T-cell LGL is detected accounting for approximately 35.9% of the total T cells and 12.6% of the total white blood cells. The overall immunophenotype of the T-LGL and a normal CD4:CD8 ratio favors reactive process over T-cell neoplasia. The presence of these large granular lymphocytes raises the possibility of an immune-mediated etiology for this patient's anemia.    PREVIOUS INTERVENTIONS: Breast carcinoma.   Adriamycin, 11/02/2006 through 12/14/2006. Four cycles completed.  Taxotere, 12/28/2006 through 02/09/2007. Four cycles completed.  Cytoxan, 02/22/2007 through 04/05/2007. Four cycles completed.  Femara 2.5 mg daily, 04/12/2007 through 04/12/2012.    DIAGNOSTIC ABNORMALITIES: Anemia.   CBC, 05/15/2006. Hemoglobin 7.9 and hematocrit 25.3. Additional lab same date revealed retic count 1.4%, serum iron 7 (50 to 170), iron saturation 2%, TIBC 345 (273 to 456), TSH 1.72, and T4 0.9 (each normal).  CBC (post transfusion 2 units of PRBCs), 05/24/2006. Hemoglobin 9.3, an hematocrit 29.6, an MCV 74.9, platelets 309,000 and a WBC 6.5 with a normal differential.  Anemia substrate evaluation, 07/12/2006. Fe 116, TIBC 343, Fe sat 34%, ferritin 14, B12 of 655, folate 23.1, retic 0.8%, and stools for occult blood (OB) 0/3 +.    PREVIOUS INTERVENTIONS: Anemia.   Two units RBCs, 05/22/2006.  Transabdominal hysterectomy and bilateral oophorectomy, 06/07/2006. Pathology report is not immediately available to this examiner.  Chromagen by mouth 07/12/2006 through present (currently on 1 daily).        Problem List Items Addressed This Visit          Other    Malignant neoplasm of right breast in female, estrogen receptor positive - Primary       Oncology/Hematology History Overview Note   DIAGNOSTIC ABNORMALITIES:  Breast carcinoma    1.Unilateral mammogram, right breast, 08/23/2006, [facility].  Approximately a 2 cm spiculated area in the  upper-outer quadrant of the right breast at the 10 o'clock to 11 o'clock position.  2.Right breast ultrasound, 08/28/2006, [facility].  A solid nodule at 10 o’clock in the right breast measuring up to 2 cm is felt to correspond to the abnormality seen on the mammograms, including spot views today.  There are also small cysts in this region.  Excisional biopsy should be considered.  3.Bone scan, 09/07/2006, [facility]. Small areas of vaguely increased activity in the upper thoracic area and at the lumbosacral junction, probably arthritic.  No definite metastatic change is identified.  4.CT of the brain, 09/07/2006, [facility]. Chronic sinus disease involving the left half of the sphenoid sinus. Otherwise unremarkable enhanced CT of the brain with no convincing evidence of metastatic disease.  5.CT of the chest, 09/07/2006, [facility] small cortical cyst involving the upper pole of the right kidney. Otherwise unremarkable CT of the chest with no evidence of metastatic disease.  6.CT of the pelvis, 09/07/2006, [facility].  Sclerotic lesion involving the right femoral head which I would favor to represent a bony enostosis. Considering the lack of other metastatic disease, I feel a solitary bony lesion to the right femoral head would be an unusual manifestation but warranting followup nonetheless.  Bone scan may be helpful for further evaluation.  7.Unilateral mammogram of breast, 09/11/2006, [facility]. Lobular areas of density in the left breast consistent with several left breast masses. Ultrasound was performed. There are no spiculated densities. No suspicious microcalcifications are identified. Screening exam of the left breast is recommended in 1 year.  8.Right breast biopsy, 09/29/2006, [facility]. Infiltrating mammary carcinoma.  Part A: Primary tumor diagnosis is infiltrating ductal carcinoma.  Part B: Right axillary lymph node was benign.  Part C: Right breast tissue  showed right modified radical mastectomy with deep margin without obvious histopathologic alteration.  No residual tumor present at previous biopsy site.  Fibrocystic mastopathy including fibrosis, adenosis, duct ectasia, papillary apocrine metaplasia, sclerosing adenosis, multiple cysts, and intraductal microcalcifications.  Seventeen (17) benign lymph nodes.  Part D:  Left breast tissue reveals no tumor present.   Fibrocystic mastopathy including fibrosis, adenosis, duct ectasia, multiple cysts, apocrine metaplasia, ruptured tension cysts, periductal chronic inflammation, intraductal hyperplasia of usual type, and intraductal microcalcifications (multiple).  Part E:  Right breast tissue shows portions of benign loose fibrofatty tissue.  Part F:  Left breast tissue show portions of benign loose fibrofatty tissue and fatty breast tissue.  ER 94% (+), HI 7% (+), HER-2 negative.  9.Labs, 10/10/2006: CMP was unrevealing, CEA of 1.3, and CA27-29 of 14.1.  10.2D echo, 10/17/2006, [facility].  Normal left ventricular chamber size and wall motion.  The left ventricular ejection fraction is felt to be in excess of 60%. No pericardial effusion or intracavitary thrombus noted. No valvular abnormalities are noted.  Spectral and color flow Doppler studies reveal trivial tricuspid regurgitation. The right ventricular systolic pressure is normal and was estimated at 19 mmHg.  11.Labs, 08/22/2016: Hemoglobin 12.4, hematocrit 39.4,  (elevated).  12.Labs, 08/30/2016:  (313 to 615), TSH 1.9, B12 of 1055, folate greater than 20, T4 9.6 (4.9 to 12.3), SPIEP: IgG 527 (791 to 1643), IgM 261, IgA 76.6 (each normal). DAVION: No monoclonal immunoglobulins detected.  13.Comprehensive blood report, 08/30/2016.   MILD ANEMIA; INCREASED T-CELL LGL'S. COMPREHENSIVE DIAGNOSIS.  Review of peripheral blood smear: Mild anemia with borderline macrocytic features. Normal white blood cell and platelet counts and morphology. Mildly  expanded population of reactive appearing T-cell large granular lymphocytes detected on flow cytometric studies.  COMPREHENSIVE COMMENT.  The exact etiology of this patient's mild anemia with borderline macrocytic features is not apparent from review of the specimen. Given the lack of significant atypia and normal white blood cell and platelet values, myelodysplasia appears less likely. Non-neoplastic etiologies to consider include liver disease, thyroid disease, immune mediated disorders, vitamin/nutritional deficiencies and drugs/toxins. Correlation recommended. Flow cytometry study after erythrolysis reveals no circulating myeloid or lymphoid blasts. Myeloid and monocytic lineages are represented by mature granulocytes and monocytes. Basophils and eosinophils are not increased. It must be noted that the diagnosis of a myeloid stem cell disorder, if clinically suspected, is best made using bone marrow specimens. Peripheral blood is not always representative of abnormalities involving the bone marrow. The B cells show no evidence of clonality or antigenic aberrancy. The majority of the T cells show normal expression of the pan T-cell antigens. A mildly expanded population of T-cell LGL is detected accounting for approximately 35.9% of the total T cells and 12.6% of the total white blood cells. The overall immunophenotype of the T-LGL and a normal CD4:CD8 ratio favors reactive process over T-cell neoplasia. The presence of these large granular lymphocytes raises the possibility of an immune-mediated etiology for this patient's anemia.    PREVIOUS INTERVENTIONS:  Breast carcinoma.    1.Adriamycin, 11/02/2006 through 12/14/2006.  Four cycles completed.  2.Taxotere, 12/28/2006 through 02/09/2007.  Four cycles completed.  3.Cytoxan, 02/22/2007 through 04/05/2007.  Four cycles completed.  4.Femara 2.5 mg daily, 04/12/2007 through 04/12/2012.    DIAGNOSTIC ABNORMALITIES:  Anemia.    1.CBC, 05/15/2006. Hemoglobin 7.9 and  hematocrit 25.3. Additional lab same date revealed retic count 1.4%, serum iron 7 (50 to 170), iron saturation 2%, TIBC 345 (273 to 456), TSH 1.72, and T4 0.9 (each normal).  2.CBC (post transfusion 2 units of PRBCs), 05/24/2006.  Hemoglobin 9.3, an hematocrit 29.6, an MCV 74.9, platelets 309,000 and a WBC 6.5 with a normal differential.  3.Anemia substrate evaluation, 07/12/2006. Fe 116, TIBC 343, Fe sat 34%, ferritin 14, B12 of 655, folate 23.1, retic 0.8%, and stools for occult blood (OB) 0/3 +.    PREVIOUS INTERVENTIONS:  Anemia.    1.Two units RBCs, 05/22/2006.  2.Transabdominal hysterectomy and bilateral oophorectomy, 06/07/2006. Pathology report is not immediately available to this examiner.  3.Chromagen by mouth 07/12/2006 through present (currently on 1 daily).       Malignant neoplasm of right breast in female, estrogen receptor positive   8/16/2019 Initial Diagnosis    Malignant neoplasm of right breast in female, estrogen receptor positive (CMS/HCC)         PAST MEDICAL HISTORY:  ALLERGIES:  No Known Allergies  CURRENT MEDICATIONS:  Outpatient Encounter Medications as of 6/25/2024   Medication Sig Dispense Refill    albuterol (ProAir RespiClick) 108 (90 Base) MCG/ACT inhaler Inhale 2 puffs Every 4 (Four) Hours As Needed for Wheezing.      Calcium 500 MG tablet Take 500 mg by mouth Daily.      diazePAM (VALIUM) 10 MG tablet Take 1 tablet by mouth Every 12 (Twelve) Hours As Needed for Anxiety.  2    lidocaine (LIDODERM) 5 % Place 1 patch on the skin as directed by provider Daily. Remove & Discard patch within 12 hours or as directed by MD 30 each 0    lisdexamfetamine (VYVANSE) 40 MG capsule Take 1 capsule by mouth Every Morning      naloxone (NARCAN) 4 MG/0.1ML nasal spray Place 1 spray into the nostril(s) As Needed for signs of overdose 2 each 1    Omega-3 Fatty Acids (FISH OIL) 1000 MG capsule capsule Take 1 capsule by mouth Daily With Breakfast.      ondansetron ODT (ZOFRAN-ODT) 4 MG disintegrating  tablet Place 1 tablet on the tongue Every 8 (Eight) Hours As Needed for Nausea or Vomiting. 60 tablet 0    oxyCODONE-acetaminophen (PERCOCET)  MG per tablet Take 1 tablet by mouth Every 4 (Four) Hours As Needed for Moderate Pain. 45 tablet 0    pantoprazole (PROTONIX) 40 MG EC tablet Take 1 tablet by mouth Daily.      potassium chloride (KLOR-CON M20) 20 MEQ CR tablet Take 1 tablet by mouth 3 (Three) Times a Day. 9 tablet 0    pregabalin (LYRICA) 150 MG capsule Take 1 capsule by mouth 3 (Three) Times a Day.      Prolia 60 MG/ML solution prefilled syringe syringe TWICE A YEAR      therapeutic multivitamin-minerals (THERAGRAN-M) tablet Take 1 tablet by mouth Daily.      tiZANidine (ZANAFLEX) 4 MG tablet Take 1 tablet by mouth 3 (Three) Times a Day As Needed.      tiZANidine (Zanaflex) 4 MG tablet Take 1 tablet by mouth Every 6 (Six) Hours As Needed for Muscle Spasms. 12 tablet 0     No facility-administered encounter medications on file as of 2024.     ADULT ILLNESSES:   Infiltrating ductal carcinoma of breast ( ER Status: Positive; LA Status: Positive; )   Anxiety   Arthralgia   Francisco's esophagus (disorder)   Carcinoembryonic antigen present (finding)   Fibrocystic breast disease   Hypokalemia   Iron deficiency anemia   Osteopenia   Tobacco user (finding)    SURGERIES:   Total abdominal hysterectomy with bilateral salpingo-oophorectomy, (DANIEL/BSO), 2006    section, x2. Most recently in    Modified radical mastectomy, right, 2006   Simple mastectomy, prophylactic, left with left breast reconstruction, 2006   Biopsy of breast, right, benign fibrocystic changes, 2006   Carpal tunnel release, left 2011; right 2011  EGD and c-scope, 23      ADULT ILLNESSES:  Patient Active Problem List   Diagnosis Code    Malignant neoplasm of right breast in female, estrogen receptor positive C50.911, Z17.0    Anemia D64.9    Francisco esophagus K22.70    GERD without  esophagitis K21.9    Hiatal hernia K44.9    Irritable bowel syndrome K58.9    Lumbar radicular pain M54.16    Tortuous colon Q43.8    Coronary artery calcification I25.10, I25.84    Lumbar stenosis M48.061    Osteoporosis M81.0    Drug induced constipation K59.03    Chronic bilateral low back pain without sciatica M54.50, G89.29    DDD (degenerative disc disease), lumbar M51.36    Lumbosacral radiculopathy M54.17     SURGERIES:  Past Surgical History:   Procedure Laterality Date    ANTERIOR LUMBAR EXPOSURE N/A 2023    Procedure: ANTERIOR LUMBAR EXPOSURE;  Surgeon: Jordy Macario DO;  Location:  [facility] OR;  Service: Vascular;  Laterality: N/A;    BREAST BIOPSY Right 2006    Biopsy of breast, right, benign fibrocystic changes    CARPAL TUNNEL RELEASE      left 2011; right 2011     SECTION      x2. Most recently in     COLONOSCOPY      ENDOSCOPY      LUMBAR FUSION N/A 2023    Procedure: ANTERIOR DECOMPRESSION, ANTERIOR LUMBAR INTERBODY FUSION WITH INSTRUMENTATION L5-S1;  Surgeon: CAMILA Goldstein MD;  Location:  [facility];  Service: Orthopedic Spine;  Laterality: N/A;    LUMBAR FUSION Right 2023    Procedure: RIGHT LATERAL LUMBAR INTERBODY FUSION L3-5 WITH INSTRUMENTATION L3-4;  Surgeon: CAMILA Goldstein MD;  Location:  [facility];  Service: Orthopedic Spine;  Laterality: Right;    LUMBAR LAMINECTOMY WITH FUSION N/A 2023    Procedure: POSTERIOR SPINAL FUSION WITH INSTRUMENTATION L3-S1;  Surgeon: CAMILA Goldstein MD;  Location:  [facility];  Service: Orthopedic Spine;  Laterality: N/A;    MASTECTOMY MODIFIED RADICAL / SIMPLE / COMPLETE Right 2006    SIMPLE MASTECTOMY Left 2006     prophylactic, left with left breast reconstruction    TONSILLECTOMY      TOTAL ABDOMINAL HYSTERECTOMY WITH SALPINGO OOPHORECTOMY  2006     HEALTH MAINTENANCE ITEMS:  Health Maintenance Due   Topic Date Due    Pneumococcal Vaccine 0-64 (1 of 2 - PCV) Never done    TDAP/TD  VACCINES (1 - Tdap) Never done    ZOSTER VACCINE (1 of 2) Never done    HEPATITIS C SCREENING  Never done    ANNUAL PHYSICAL  Never done    PAP SMEAR  Never done    LUNG CANCER SCREENING  2023    COVID-19 Vaccine (3 - 2023-24 season) 2023    DXA SCAN  2023       <no information>  Last Completed Colonoscopy            COLORECTAL CANCER SCREENING (COLONOSCOPY - Every 10 Years) Next due on 2023  SCANNED - COLONOSCOPY    2015  [facility] Colonoscopy component of [facility] COLONOSCOPY    2015  SCANNED - COLONOSCOPY    2014  Colonoscopy                    There is no immunization history on file for this patient.  Last Completed Mammogram       This patient has no relevant Health Maintenance data.              FAMILY HISTORY:  Family History   Problem Relation Age of Onset    Polymyalgia rheumatica Mother     Heart attack Mother     Kidney disease Mother     Diabetes Father     Hypertension Father     Other Father         heart issues    No Known Problems Brother     No Known Problems Maternal Grandmother     Heart attack Maternal Grandfather         52 years old     No Known Problems Paternal Grandmother     Diabetes Paternal Grandfather     No Known Problems Brother     No Known Problems Brother     No Known Problems Brother      SOCIAL HISTORY:  Social History     Socioeconomic History    Marital status:    Tobacco Use    Smoking status: Former     Current packs/day: 0.00     Average packs/day: 1 pack/day for 25.0 years (25.0 ttl pk-yrs)     Types: Cigarettes     Start date: 1992     Quit date: 2017     Years since quittin.5    Smokeless tobacco: Never   Vaping Use    Vaping status: Former    Quit date: 8/10/2022    Substances: Nicotine    Devices: Pre-filled pod   Substance and Sexual Activity    Alcohol use: No    Drug use: No    Sexual activity: Defer       REVIEW OF SYSTEMS:  Constitutional:   The patient reports a fairly good appetite and energy  "is variable, stating that she is under stress from ,\" my mom passed away with liver disease, and my  is sick from COPD.\" She manages all her ADLs. She retired last 04/30/2021. She has lost 1 lb (in addition to 21 lb at her 2 prior visits) in the interval.  Again states her fibromyalgia still slows her down.  She has no fevers, chills, or drenching night sweats. Still has intermittent hot flashes.  Her sleep habits are fairly good even off Restoril.  Ear/Nose/Mouth/Throat:   She has chronic rhinitis with no sinus congestion, ear discomfort, or discolored drainage. She has no sore throat, nosebleeds, or sore tongue. She has fewer bouts of headaches.  Ocular:   She still has bouts of blurry vision. She is still followed by ophthalmology. No eye pain or double vision.  Respiratory:   No chest congestion.  No cough.  Only occasional exertional dyspnea, no significant shortness of breathing at rest, or unexplained chest wall pain. She has not smoked since 12/31/2017. Is aware that she has chronic obstructive pulmonary disease (COPD) on x-ray.   Cardiovascular:   She has no exertional chest pain, chest pressure, or chest heaviness. She has no claudication. She has no palpitations or symptomatic orthostasis.  Gastrointestinal:   She has no dysphagia, nausea, vomiting, postprandial abdominal pain (PPI), bloating, cramping, or change in bowel habits. She has not had dark stools or rectal bleeding. She has no diarrhea, and her bowels have been regular (now goes daily) on stool softeners. EGD and c-scope, 5/18/23.  Gastritis and normal.  Repeat 10 years  Genitourinary:   She has no urinary burning, frequency, dribbling, or discoloration. She has no need to urinate frequently through the night. She has no difficulty controlling her bladder. No vaginal bleeding. She denies any vaginal discharge. Says she had breakthrough bleeding last 12/2016 but was seen by Dr. Graham (GYN). \"She did a pelvic exam and ran a bunch of " "tests and everything was okay.\" No repeat episodes since.  Musculoskeletal:   She has no unexplained myalgias or nighttime leg cramping. Has only mild aching, especially in the hands, lower back, hips, and shoulders.  She has spinal degenerative joint disease (DJD), (MRI, 10/09/2008; 07/2012). She has no distal radiculitis. She is still using oxycodone (Percocet) since back surgery ~ 3-4 daily.  Extremities:   She has no trouble with fluid retention or significant leg swelling.  Endocrine:   The hot flashes are tolerable, \"same.\" She has no problems with excess thirst, excessive urination, or unexplained fatigue.  Heme/Lymphatic:   She has no unexplained bleeding, bruising, petechial rash, or swollen glands.  Skin:   She has no itching or rashes.  Neuro:   She has no loss of consciousness, seizures, fainting spells, or dizziness. She has no weakness of her face, arms, or legs. She has no difficulty with speech. She has no tremors.  Psych:   She has chronic depression and anxiety which sometimes interferes with her sleep patterns. She still feels that the anxiety is triggered due to the back pains. Says her symptoms are modulated by her pain medications and Pristiq.       VITAL SIGNS: /65   Pulse 57   Temp 97.7 °F (36.5 °C)   Resp 18   Ht 160 cm (63\")   Wt 56.2 kg (124 lb)   SpO2 94%   Breastfeeding No   BMI 21.97 kg/m² Body surface area is 1.58 meters squared.  Pain Score    06/25/24 1108   PainSc: 0-No pain             PHYSICAL EXAMINATION:   General:   She is a pleasant, perennially-anxious, slim, well-kept middle-aged female in no distress.  Ambulatory  Head/Neck:   The patient is anicteric and atraumatic.  The trachea is midline. The neck is supple without evidence of jugular venous distention or cervical adenopathy.   Eyes:   The extraocular movements are full. There is no scleral jaundice or erythema. There is no conjunctival pallor.  Chest:   The respiratory efforts are normal and unhindered. " The breath sounds are distant with scattered soft wheezing but no rhonchi, rales, or asymmetry of breath sounds. The port site is well healed (has been removed).  Breasts:   The right breast is remarkable for the presence of a saline implant. The left breast is remarkable for the presence of a saline implant. Both are well seated. There are no overt chest wall nodules or masses to suggest local recurrence. There is no axillary or supraclavicular adenopathy bilaterally.  Cardiovascular:   The patient has a regular cardiac rate and rhythm without murmurs, rubs, or gallops. The peripheral pulses are equal and full.  Extremities:   There is no evidence of cyanosis, clubbing, or edema. Is not wearing a rigid back brace today  Rheumatologic:   There is no overt evidence of rheumatoid deformities of the hands. There is no sausaging of the fingers. There is no sign of active synovitis. The gait is slow but independent  Cutaneous:   There are no overt rashes, disseminated lesions, purpura, or petechiae.  Lymphatics:   There is no evidence of adenopathy in the cervical, supraclavicular, or axillary areas.  Neurologic:   The patient is alert, oriented, cooperative, and pleasant. She is appropriately conversant. She ambulated into the exam room without assistance but declined transfer from chair to exam table. There is no overt dysfunction of the motor, sensory, or cerebellar systems.  Psych:   Mood and affect are appropriate for circumstance. Eye contact is appropriate.        LABS    Lab Results - Last 18 Months   Lab Units 06/13/24  0949 12/19/23  1340 05/26/23  1121 03/10/23  1851 02/26/23  1015 02/23/23  1300   HEMOGLOBIN g/dL 11.8* 12.2 12.5 9.0* 7.9* 8.3*   HEMATOCRIT % 36.8 37.8 40.7 29.5* 25.5* 26.9*   MCV fL 95.6 93.6 92.5 92.8 92.7 94.1   WBC 10*3/mm3 4.58 9.24 5.73 6.05 4.83 10.24   RDW % 13.0 14.0 15.2 15.7* 13.6 13.5   MPV fL 10.6 10.4 11.1 10.3 9.7 10.4   PLATELETS 10*3/mm3 165 219 195 382 377 316   IMM GRAN % %  0.2 0.2 0.2 0.3 0.6* 0.4   NEUTROS ABS 10*3/mm3 2.22 6.76 3.89 4.79 3.82 8.16*   LYMPHS ABS 10*3/mm3 1.87 1.75 1.27 0.76 0.46* 1.09   MONOS ABS 10*3/mm3 0.33 0.63 0.40 0.29 0.39 0.86   EOS ABS 10*3/mm3 0.12 0.05 0.12 0.14 0.12 0.07   BASOS ABS 10*3/mm3 0.03 0.03 0.04 0.05 0.01 0.02   IMMATURE GRANS (ABS) 10*3/mm3 0.01 0.02 0.01 0.02 0.03 0.04   NRBC /100 WBC 0.0 0.0 0.0 0.0 0.0 0.0       Lab Results - Last 18 Months   Lab Units 06/25/24  1058 06/13/24  0949 12/19/23  1340 05/26/23  1121 03/10/23  1851 02/26/23  1015 02/14/23  0325 02/09/23  1017   GLUCOSE mg/dL 129* 96 102* 93 109* 106*   < > 109*   SODIUM mmol/L 141 142 140 145 144 141   < > 141   POTASSIUM mmol/L 3.7 3.3* 2.6* 3.2* 3.6 3.8   < > 3.6   CO2 mmol/L 31.0* 32.0* 32.0* 30.0* 26.0 31.0*   < > 30.0*   CHLORIDE mmol/L 101 104 101 105 106 104   < > 103   ANION GAP mmol/L 9.0 6.0 7.0 10.0 12.0 6.0   < > 8.0   CREATININE mg/dL 0.90 0.69 0.62 0.95 0.60 0.66   < > 0.91   BUN mg/dL 12 8 7 6 6 5*   < > 9   BUN / CREAT RATIO  13.3 11.6 11.3 6.3* 10.0 7.6   < > 9.9   CALCIUM mg/dL 10.4 9.4 8.9 9.8 9.2 9.0   < > 10.0   ALK PHOS U/L  --  45 87 102 129* 87  --  79   TOTAL PROTEIN g/dL  --  6.8 6.8 6.9 6.3 5.8*  --  6.8   ALT (SGPT) U/L  --  12 11 12 11 5  --  15   AST (SGOT) U/L  --  15 14 17 20 18  --  20   BILIRUBIN mg/dL  --  <0.2 0.4 0.2 0.2 0.2  --  0.2   ALBUMIN g/dL  --  4.3 4.0 4.2 3.5 3.0*  --  4.3   GLOBULIN gm/dL  --  2.5 2.8 2.7 2.8 2.8  --  2.5    < > = values in this interval not displayed.       Lab Results - Last 18 Months   Lab Units 06/13/24  0949 12/19/23  1340 05/26/23  1121   URIC ACID mg/dL  --  2.0*  --    CEA ng/mL 2.33 1.44 1.56       Lab Results - Last 18 Months   Lab Units 06/13/24  0949 12/19/23  1340 05/26/23  1121 02/21/23  0348 02/14/23  0325   IRON mcg/dL 62 17* 70 15* 25*   TIBC mcg/dL 259* 231* 247* 225* 253*   IRON SATURATION (TSAT) % 24 7* 28 7* 10*   FERRITIN ng/mL 157.30* 246.60* 277.30* 181.10* 99.52   TSH uIU/mL  --  1.170  --    --   --    FOLATE ng/mL >20.00 >20.00 >20.00 >20.00 >20.00     ASSESSMENT:   1.  History of breast cancer, infiltrating ductal carcinoma:   Stage: I (pT1c, pN0, Mx; G2), ER 94% (+), PA 7% (+), HER-2 negative.   Tumor [place]: 1.9 cm right breast mass, 0/18 lymph nodes.   Complications of Tumor: Breast pain.   Tumor Status:   Bilateral mastectomies  No clinical or biochemical evidence of disease recurrence.     2. Normocytic anemia.   --Stable. Hgb 11.8; MCV 95.6, 6/13/24 (prior range: Hgb 7 - 12.4; MCV 91.9 - 102):  a. Colonoscopy, 09/04/2015. Normal. Repeat surveillance 10 years.   b. Normal B12, normal folate, normal T4, normal TSH, normal SPIEP, normal LDH, and peripheral blood flow comprehensive report (above) showing reactive appearing T-cell large granular lymphocytes.  c.  Injectafer 750 mg IV, 3/31/23  d.  5/18/23- colonoscopy- normal. Repeat 10 years  3. History of Francisco's esophagus, gastritis, and hiatal hernia. Asymptomatic on proton pump inhibitor (PPI):  a. Esophagogastroduodenoscopy (EGD), 07/12/2013 (see above) with small hiatal hernia. May still be contributory to #5.   b. EGD last 08/18/2016. Report pending.  c. 5/18/23- EGD- Gastritis  4. Tobacco dependence. Has not smoked since 12/31/2017.   5. Abnormal CEA.    --Resolved.  (2.33, 6/13/24:. Chip 1.42 - Peak 13). She admits to smoking. No other clinical or radiographic (PET scan, 02/24/2010 - no abnormal uptake) correlate. Continued followup warranted.   6. Chronic obstructive pulmonary disease (COPD).   7. 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 Confirmed by renal ultrasound, 11/10/2009.   8. Iron deficiency with negative repeat colonoscopy and esophagogastroduodenoscopy (EGD) that showed gastritis (see above). Repleted iron on oral iron repletion.   9. Arthralgias. Well-modulated on Lyrica and Lortab.   10. (Mild) Vasomotor symptoms secondary to iatrogenic menopause. Subjectively not worse.   11. Anxiety, unchanged.   12. Lumbar  spinal degenerative disk disease (MRI, 10/09/2008, and again 07/2012). Has been seen by Dr. Goldstein and receives intraspinal injections by Dr. Walters.  -- 2/16 and 2/22/2023-L3-5-S1 spinal fusion by Dr. Goldstein  -- 2/26/2023-ER visit for back pain.  CT lumbar spine-There is posterior fusion at L3-S1 with anterior interbody fusion at  L5-S1 and right lateral interbody fusion at the L3/L4 level. There is a new anterior L3 vertebral body fracture with only mild displacement but no significant loss of height  13. Osteopenia with moderate fracture risk (see above). Already on calcium + vitamin D. Boniva intolerant.   --On Prolia every 6 months beginning 02/2018. Dr. Leung following.   14.  Chronic kidney disease, stage 3.    --GFR, 100.3 mL/min on 6/13/24 (prior:  GFR 49- 104 mL/min)  15.  Grieving recent death of her mom.  Condolences offered    PLAN:   1.  Apprise of labs from 6/13/24 with stable anemia, resolution of macrocytosis, k+ 3.3 (Kdur called in), stable GFR otherwise normal CMP, UA(-), repleted B12/folate, iron 62 (N), Fe sat 24% (prior: 7-35%), ferritin (157; from 246; 277; 181; 99; 63; 64; 54; 55), repleted B12/folate, CEA 2.33 (prior: 1.44), CA-27-29 16.4 (prior: 14.8).     2.  Chest xray, 6/13/24.  No acute disease  3.  Apprised again of EGD and c-scope, 5/18/23 (above)  4.  Again applaud her success at smoking cessation (has not smoked since 12/31/2017).    5. Previously discussed the abnormal (has normalized) CEA. Other than malignancy (colon, breast, lung), I again noted that increased CEA levels can also be associated with inflammation, cirrhosis, peptic ulcer, ulcerative colitis, rectal polyps, emphysema, and benign breast disease. Needs continued followup.     6. Continue ongoing management per primary care physician.   7. Continue other currently identified medications.    8. Return to the [place] office in 1 year with pre-office chest x-ray, serum iron, Fe saturation, ferritin, TIBC, B12,  folate, CMP, CEA, CA27-29, and CBC with differential.    I spent ~ 37 minutes caring for Carol on this date of service. This time includes time spent by me in the following activities: preparing for the visit, reviewing tests, performing a medically appropriate examination and/or evaluation, counseling and educating the patient/family/caregiver, ordering medications, tests, or procedures and documenting information in the medical record        cc: Erna Leung, DO

## 2024-06-25 ENCOUNTER — OFFICE VISIT (OUTPATIENT)
Dept: ONCOLOGY | Facility: CLINIC | Age: 59
End: 2024-06-25
Payer: COMMERCIAL

## 2024-06-25 ENCOUNTER — LAB (OUTPATIENT)
Dept: LAB | Facility: HOSPITAL | Age: 59
End: 2024-06-25
Payer: COMMERCIAL

## 2024-06-25 VITALS
BODY MASS INDEX: 21.97 KG/M2 | OXYGEN SATURATION: 94 % | HEIGHT: 63 IN | TEMPERATURE: 97.7 F | RESPIRATION RATE: 18 BRPM | HEART RATE: 57 BPM | WEIGHT: 124 LBS | SYSTOLIC BLOOD PRESSURE: 135 MMHG | DIASTOLIC BLOOD PRESSURE: 65 MMHG

## 2024-06-25 DIAGNOSIS — Z17.0 MALIGNANT NEOPLASM OF RIGHT BREAST IN FEMALE, ESTROGEN RECEPTOR POSITIVE, UNSPECIFIED SITE OF BREAST: Primary | ICD-10-CM

## 2024-06-25 DIAGNOSIS — C50.911 MALIGNANT NEOPLASM OF RIGHT BREAST IN FEMALE, ESTROGEN RECEPTOR POSITIVE, UNSPECIFIED SITE OF BREAST: Primary | ICD-10-CM

## 2024-06-25 LAB
ANION GAP SERPL CALCULATED.3IONS-SCNC: 9 MMOL/L (ref 5–15)
BUN SERPL-MCNC: 12 MG/DL (ref 6–20)
BUN/CREAT SERPL: 13.3 (ref 7–25)
CALCIUM SPEC-SCNC: 10.4 MG/DL (ref 8.6–10.5)
CHLORIDE SERPL-SCNC: 101 MMOL/L (ref 98–107)
CO2 SERPL-SCNC: 31 MMOL/L (ref 22–29)
CREAT SERPL-MCNC: 0.9 MG/DL (ref 0.57–1)
EGFRCR SERPLBLD CKD-EPI 2021: 73.8 ML/MIN/1.73
GLUCOSE SERPL-MCNC: 129 MG/DL (ref 65–99)
POTASSIUM SERPL-SCNC: 3.7 MMOL/L (ref 3.5–5.2)
SODIUM SERPL-SCNC: 141 MMOL/L (ref 136–145)

## 2024-06-25 PROCEDURE — 80048 BASIC METABOLIC PNL TOTAL CA: CPT | Performed by: INTERNAL MEDICINE

## 2024-06-25 PROCEDURE — 36415 COLL VENOUS BLD VENIPUNCTURE: CPT | Performed by: INTERNAL MEDICINE

## 2024-06-25 PROCEDURE — 99214 OFFICE O/P EST MOD 30 MIN: CPT | Performed by: INTERNAL MEDICINE
